# Patient Record
Sex: MALE | Race: BLACK OR AFRICAN AMERICAN | NOT HISPANIC OR LATINO
[De-identification: names, ages, dates, MRNs, and addresses within clinical notes are randomized per-mention and may not be internally consistent; named-entity substitution may affect disease eponyms.]

---

## 2017-01-11 ENCOUNTER — APPOINTMENT (OUTPATIENT)
Dept: CARDIOLOGY | Facility: CLINIC | Age: 57
End: 2017-01-11

## 2017-01-11 ENCOUNTER — NON-APPOINTMENT (OUTPATIENT)
Age: 57
End: 2017-01-11

## 2017-01-11 VITALS
HEIGHT: 67 IN | DIASTOLIC BLOOD PRESSURE: 88 MMHG | HEART RATE: 86 BPM | SYSTOLIC BLOOD PRESSURE: 143 MMHG | BODY MASS INDEX: 20.4 KG/M2 | WEIGHT: 130 LBS | OXYGEN SATURATION: 100 %

## 2017-01-20 ENCOUNTER — OUTPATIENT (OUTPATIENT)
Dept: OUTPATIENT SERVICES | Facility: HOSPITAL | Age: 57
LOS: 1 days | End: 2017-01-20

## 2017-01-20 ENCOUNTER — APPOINTMENT (OUTPATIENT)
Dept: CV DIAGNOSITCS | Facility: HOSPITAL | Age: 57
End: 2017-01-20

## 2017-01-20 DIAGNOSIS — Z99.2 DEPENDENCE ON RENAL DIALYSIS: Chronic | ICD-10-CM

## 2017-01-20 DIAGNOSIS — I10 ESSENTIAL (PRIMARY) HYPERTENSION: ICD-10-CM

## 2017-01-20 DIAGNOSIS — N18.6 END STAGE RENAL DISEASE: ICD-10-CM

## 2017-01-20 DIAGNOSIS — Z98.89 OTHER SPECIFIED POSTPROCEDURAL STATES: Chronic | ICD-10-CM

## 2017-01-26 ENCOUNTER — OTHER (OUTPATIENT)
Age: 57
End: 2017-01-26

## 2017-02-15 ENCOUNTER — OUTPATIENT (OUTPATIENT)
Dept: OUTPATIENT SERVICES | Facility: HOSPITAL | Age: 57
LOS: 1 days | Discharge: HOME | End: 2017-02-15
Payer: MEDICARE

## 2017-02-15 DIAGNOSIS — Z98.89 OTHER SPECIFIED POSTPROCEDURAL STATES: Chronic | ICD-10-CM

## 2017-02-15 DIAGNOSIS — N18.6 END STAGE RENAL DISEASE: ICD-10-CM

## 2017-02-15 DIAGNOSIS — Z99.2 DEPENDENCE ON RENAL DIALYSIS: Chronic | ICD-10-CM

## 2017-02-15 PROCEDURE — 71020: CPT | Mod: 26

## 2017-02-23 ENCOUNTER — EMERGENCY (EMERGENCY)
Facility: HOSPITAL | Age: 57
LOS: 1 days | Discharge: HOME | End: 2017-02-23
Attending: EMERGENCY MEDICINE | Admitting: EMERGENCY MEDICINE
Payer: MEDICARE

## 2017-02-23 VITALS
HEART RATE: 81 BPM | DIASTOLIC BLOOD PRESSURE: 80 MMHG | OXYGEN SATURATION: 98 % | SYSTOLIC BLOOD PRESSURE: 137 MMHG | RESPIRATION RATE: 18 BRPM | TEMPERATURE: 99 F | HEIGHT: 69 IN | WEIGHT: 130.07 LBS

## 2017-02-23 DIAGNOSIS — Z99.2 DEPENDENCE ON RENAL DIALYSIS: Chronic | ICD-10-CM

## 2017-02-23 DIAGNOSIS — Z98.89 OTHER SPECIFIED POSTPROCEDURAL STATES: Chronic | ICD-10-CM

## 2017-02-23 PROCEDURE — 99282 EMERGENCY DEPT VISIT SF MDM: CPT

## 2017-02-23 NOTE — ED ADULT NURSE NOTE - PMH
Acid reflux    Alcoholism /alcohol abuse    Bipolar 1 disorder    CKD (chronic kidney disease), stage 4 (severe)    Dialysis patient  Since 2005  Hypertension    Polycystic kidney disease

## 2017-02-23 NOTE — ED PROVIDER NOTE - MEDICAL DECISION MAKING DETAILS
55 yo male on transplant list with possible donor identified necessitating emergent pre-op blood draws.  Otherwise no complaint.  Blood draw coordinated with transplant team.

## 2017-02-23 NOTE — ED PROVIDER NOTE - OBJECTIVE STATEMENT
56 y//o gentleman hx of Polycystic kidney comes in at advice of transplant coordinator. the patient was advised his blood should be drawn. no complaints. feels well. HD t/th/saturday. had his hd earlier this morning

## 2017-02-23 NOTE — ED PROVIDER NOTE - PROGRESS NOTE DETAILS
spoke with Val who states that he does not need orders, draw the one tube of blood in the kit and call 6 du. called back again because the paperwork states "post transplant" advised that this is the correct paperwork and kit

## 2017-02-24 ENCOUNTER — RX RENEWAL (OUTPATIENT)
Age: 57
End: 2017-02-24

## 2017-02-24 ENCOUNTER — APPOINTMENT (OUTPATIENT)
Dept: TRANSPLANT | Facility: CLINIC | Age: 57
End: 2017-02-24

## 2017-02-24 ENCOUNTER — TRANSCRIPTION ENCOUNTER (OUTPATIENT)
Age: 57
End: 2017-02-24

## 2017-02-24 ENCOUNTER — INPATIENT (INPATIENT)
Facility: HOSPITAL | Age: 57
LOS: 3 days | Discharge: HOME | DRG: 652 | End: 2017-02-28
Payer: MEDICARE

## 2017-02-24 VITALS
OXYGEN SATURATION: 99 % | DIASTOLIC BLOOD PRESSURE: 83 MMHG | HEART RATE: 88 BPM | SYSTOLIC BLOOD PRESSURE: 134 MMHG | WEIGHT: 130.07 LBS | TEMPERATURE: 98 F | HEIGHT: 69 IN | RESPIRATION RATE: 18 BRPM

## 2017-02-24 DIAGNOSIS — Z98.89 OTHER SPECIFIED POSTPROCEDURAL STATES: Chronic | ICD-10-CM

## 2017-02-24 DIAGNOSIS — N17.9 ACUTE KIDNEY FAILURE, UNSPECIFIED: ICD-10-CM

## 2017-02-24 DIAGNOSIS — Z99.2 DEPENDENCE ON RENAL DIALYSIS: Chronic | ICD-10-CM

## 2017-02-24 LAB
ALBUMIN SERPL ELPH-MCNC: 3.6 G/DL — SIGNIFICANT CHANGE UP (ref 3.3–5)
ALP SERPL-CCNC: 62 U/L — SIGNIFICANT CHANGE UP (ref 40–120)
ALT FLD-CCNC: 11 U/L RC — SIGNIFICANT CHANGE UP (ref 10–45)
ANION GAP SERPL CALC-SCNC: 14 MMOL/L — SIGNIFICANT CHANGE UP (ref 5–17)
ANION GAP SERPL CALC-SCNC: 15 MMOL/L — SIGNIFICANT CHANGE UP (ref 5–17)
ANION GAP SERPL CALC-SCNC: 17 MMOL/L — SIGNIFICANT CHANGE UP (ref 5–17)
APTT BLD: 32.9 SEC — SIGNIFICANT CHANGE UP (ref 27.5–37.4)
APTT BLD: 34.5 SEC — SIGNIFICANT CHANGE UP (ref 27.5–37.4)
AST SERPL-CCNC: 18 U/L — SIGNIFICANT CHANGE UP (ref 10–40)
BILIRUB SERPL-MCNC: 0.3 MG/DL — SIGNIFICANT CHANGE UP (ref 0.2–1.2)
BLD GP AB SCN SERPL QL: NEGATIVE — SIGNIFICANT CHANGE UP
BUN SERPL-MCNC: 34 MG/DL — HIGH (ref 7–23)
BUN SERPL-MCNC: 35 MG/DL — HIGH (ref 7–23)
BUN SERPL-MCNC: 36 MG/DL — HIGH (ref 7–23)
CALCIUM SERPL-MCNC: 10.2 MG/DL — SIGNIFICANT CHANGE UP (ref 8.4–10.5)
CALCIUM SERPL-MCNC: 10.5 MG/DL — SIGNIFICANT CHANGE UP (ref 8.4–10.5)
CALCIUM SERPL-MCNC: 8.8 MG/DL — SIGNIFICANT CHANGE UP (ref 8.4–10.5)
CHLORIDE SERPL-SCNC: 104 MMOL/L — SIGNIFICANT CHANGE UP (ref 96–108)
CHLORIDE SERPL-SCNC: 96 MMOL/L — SIGNIFICANT CHANGE UP (ref 96–108)
CHLORIDE SERPL-SCNC: 96 MMOL/L — SIGNIFICANT CHANGE UP (ref 96–108)
CO2 SERPL-SCNC: 22 MMOL/L — SIGNIFICANT CHANGE UP (ref 22–31)
CO2 SERPL-SCNC: 28 MMOL/L — SIGNIFICANT CHANGE UP (ref 22–31)
CO2 SERPL-SCNC: 29 MMOL/L — SIGNIFICANT CHANGE UP (ref 22–31)
CREAT SERPL-MCNC: 7.55 MG/DL — HIGH (ref 0.5–1.3)
CREAT SERPL-MCNC: 7.83 MG/DL — HIGH (ref 0.5–1.3)
CREAT SERPL-MCNC: 8.07 MG/DL — HIGH (ref 0.5–1.3)
GLUCOSE SERPL-MCNC: 73 MG/DL — SIGNIFICANT CHANGE UP (ref 70–99)
GLUCOSE SERPL-MCNC: 75 MG/DL — SIGNIFICANT CHANGE UP (ref 70–99)
GLUCOSE SERPL-MCNC: 98 MG/DL — SIGNIFICANT CHANGE UP (ref 70–99)
HCT VFR BLD CALC: 34.9 % — LOW (ref 39–50)
HCT VFR BLD CALC: 35.8 % — LOW (ref 39–50)
HGB BLD-MCNC: 11.9 G/DL — LOW (ref 13–17)
HGB BLD-MCNC: 12.1 G/DL — LOW (ref 13–17)
INR BLD: 1.01 RATIO — SIGNIFICANT CHANGE UP (ref 0.88–1.16)
INR BLD: 1.01 RATIO — SIGNIFICANT CHANGE UP (ref 0.88–1.16)
MAGNESIUM SERPL-MCNC: 1.8 MG/DL — SIGNIFICANT CHANGE UP (ref 1.6–2.6)
MCHC RBC-ENTMCNC: 33.4 PG — SIGNIFICANT CHANGE UP (ref 27–34)
MCHC RBC-ENTMCNC: 33.7 PG — SIGNIFICANT CHANGE UP (ref 27–34)
MCHC RBC-ENTMCNC: 33.8 GM/DL — SIGNIFICANT CHANGE UP (ref 32–36)
MCHC RBC-ENTMCNC: 34 GM/DL — SIGNIFICANT CHANGE UP (ref 32–36)
MCV RBC AUTO: 99 FL — SIGNIFICANT CHANGE UP (ref 80–100)
MCV RBC AUTO: 99.1 FL — SIGNIFICANT CHANGE UP (ref 80–100)
PHOSPHATE SERPL-MCNC: 2.1 MG/DL — LOW (ref 2.5–4.5)
PLATELET # BLD AUTO: 125 K/UL — LOW (ref 150–400)
PLATELET # BLD AUTO: 126 K/UL — LOW (ref 150–400)
POTASSIUM SERPL-MCNC: 5.2 MMOL/L — SIGNIFICANT CHANGE UP (ref 3.5–5.3)
POTASSIUM SERPL-MCNC: 5.5 MMOL/L — HIGH (ref 3.5–5.3)
POTASSIUM SERPL-MCNC: 5.9 MMOL/L — HIGH (ref 3.5–5.3)
POTASSIUM SERPL-SCNC: 5.2 MMOL/L — SIGNIFICANT CHANGE UP (ref 3.5–5.3)
POTASSIUM SERPL-SCNC: 5.5 MMOL/L — HIGH (ref 3.5–5.3)
POTASSIUM SERPL-SCNC: 5.9 MMOL/L — HIGH (ref 3.5–5.3)
PROT SERPL-MCNC: 6.4 G/DL — SIGNIFICANT CHANGE UP (ref 6–8.3)
PROTHROM AB SERPL-ACNC: 11 SEC — SIGNIFICANT CHANGE UP (ref 10–13.1)
PROTHROM AB SERPL-ACNC: 11 SEC — SIGNIFICANT CHANGE UP (ref 10–13.1)
RBC # BLD: 3.52 M/UL — LOW (ref 4.2–5.8)
RBC # BLD: 3.62 M/UL — LOW (ref 4.2–5.8)
RBC # FLD: 14.2 % — SIGNIFICANT CHANGE UP (ref 10.3–14.5)
RBC # FLD: 14.3 % — SIGNIFICANT CHANGE UP (ref 10.3–14.5)
RH IG SCN BLD-IMP: POSITIVE — SIGNIFICANT CHANGE UP
SODIUM SERPL-SCNC: 138 MMOL/L — SIGNIFICANT CHANGE UP (ref 135–145)
SODIUM SERPL-SCNC: 140 MMOL/L — SIGNIFICANT CHANGE UP (ref 135–145)
SODIUM SERPL-SCNC: 143 MMOL/L — SIGNIFICANT CHANGE UP (ref 135–145)
WBC # BLD: 4.4 K/UL — SIGNIFICANT CHANGE UP (ref 3.8–10.5)
WBC # BLD: 8.9 K/UL — SIGNIFICANT CHANGE UP (ref 3.8–10.5)
WBC # FLD AUTO: 4.4 K/UL — SIGNIFICANT CHANGE UP (ref 3.8–10.5)
WBC # FLD AUTO: 8.9 K/UL — SIGNIFICANT CHANGE UP (ref 3.8–10.5)

## 2017-02-24 PROCEDURE — 93010 ELECTROCARDIOGRAM REPORT: CPT

## 2017-02-24 PROCEDURE — 71010: CPT | Mod: 26

## 2017-02-24 PROCEDURE — 76776 US EXAM K TRANSPL W/DOPPLER: CPT | Mod: 26,RT

## 2017-02-24 PROCEDURE — 50360 RNL ALTRNSPLJ W/O RCP NFRCT: CPT

## 2017-02-24 RX ORDER — NALOXONE HYDROCHLORIDE 4 MG/.1ML
0.1 SPRAY NASAL
Qty: 0 | Refills: 0 | Status: DISCONTINUED | OUTPATIENT
Start: 2017-02-24 | End: 2017-02-28

## 2017-02-24 RX ORDER — SODIUM CHLORIDE 9 MG/ML
1000 INJECTION INTRAMUSCULAR; INTRAVENOUS; SUBCUTANEOUS
Qty: 0 | Refills: 0 | Status: DISCONTINUED | OUTPATIENT
Start: 2017-02-24 | End: 2017-02-25

## 2017-02-24 RX ORDER — HYDROMORPHONE HYDROCHLORIDE 2 MG/ML
0.5 INJECTION INTRAMUSCULAR; INTRAVENOUS; SUBCUTANEOUS
Qty: 0 | Refills: 0 | Status: DISCONTINUED | OUTPATIENT
Start: 2017-02-24 | End: 2017-02-25

## 2017-02-24 RX ORDER — AMLODIPINE BESYLATE 2.5 MG/1
10 TABLET ORAL DAILY
Qty: 0 | Refills: 0 | Status: DISCONTINUED | OUTPATIENT
Start: 2017-02-24 | End: 2017-02-24

## 2017-02-24 RX ORDER — ONDANSETRON 8 MG/1
4 TABLET, FILM COATED ORAL ONCE
Qty: 0 | Refills: 0 | Status: DISCONTINUED | OUTPATIENT
Start: 2017-02-24 | End: 2017-02-25

## 2017-02-24 RX ORDER — VALGANCICLOVIR 450 MG/1
450 TABLET, FILM COATED ORAL DAILY
Qty: 0 | Refills: 0 | Status: DISCONTINUED | OUTPATIENT
Start: 2017-02-24 | End: 2017-02-24

## 2017-02-24 RX ORDER — BASILIXIMAB 20 MG/5ML
20 INJECTION, POWDER, FOR SOLUTION INTRAVENOUS ONCE
Qty: 0 | Refills: 0 | Status: COMPLETED | OUTPATIENT
Start: 2017-02-28 | End: 2017-02-28

## 2017-02-24 RX ORDER — TACROLIMUS 5 MG/1
3 CAPSULE ORAL EVERY 12 HOURS
Qty: 0 | Refills: 0 | Status: DISCONTINUED | OUTPATIENT
Start: 2017-02-25 | End: 2017-02-28

## 2017-02-24 RX ORDER — BASILIXIMAB 20 MG/5ML
20 INJECTION, POWDER, FOR SOLUTION INTRAVENOUS ONCE
Qty: 0 | Refills: 0 | Status: DISCONTINUED | OUTPATIENT
Start: 2017-02-24 | End: 2017-02-24

## 2017-02-24 RX ORDER — HYDROMORPHONE HYDROCHLORIDE 2 MG/ML
30 INJECTION INTRAMUSCULAR; INTRAVENOUS; SUBCUTANEOUS
Qty: 0 | Refills: 0 | Status: DISCONTINUED | OUTPATIENT
Start: 2017-02-24 | End: 2017-02-25

## 2017-02-24 RX ORDER — MYCOPHENOLATE MOFETIL 250 MG/1
1 CAPSULE ORAL EVERY 12 HOURS
Qty: 0 | Refills: 0 | Status: DISCONTINUED | OUTPATIENT
Start: 2017-02-25 | End: 2017-02-28

## 2017-02-24 RX ORDER — SODIUM CHLORIDE 9 MG/ML
500 INJECTION INTRAMUSCULAR; INTRAVENOUS; SUBCUTANEOUS ONCE
Qty: 0 | Refills: 0 | Status: COMPLETED | OUTPATIENT
Start: 2017-02-24 | End: 2017-02-24

## 2017-02-24 RX ORDER — NYSTATIN 500MM UNIT
500000 POWDER (EA) MISCELLANEOUS
Qty: 0 | Refills: 0 | Status: DISCONTINUED | OUTPATIENT
Start: 2017-02-24 | End: 2017-02-24

## 2017-02-24 RX ORDER — MYCOPHENOLATE MOFETIL 250 MG/1
1000 CAPSULE ORAL
Qty: 0 | Refills: 0 | Status: DISCONTINUED | OUTPATIENT
Start: 2017-02-24 | End: 2017-02-24

## 2017-02-24 RX ORDER — FAMOTIDINE 10 MG/ML
20 INJECTION INTRAVENOUS DAILY
Qty: 0 | Refills: 0 | Status: DISCONTINUED | OUTPATIENT
Start: 2017-02-24 | End: 2017-02-24

## 2017-02-24 RX ORDER — HEPARIN SODIUM 5000 [USP'U]/ML
5000 INJECTION INTRAVENOUS; SUBCUTANEOUS EVERY 8 HOURS
Qty: 0 | Refills: 0 | Status: DISCONTINUED | OUTPATIENT
Start: 2017-02-24 | End: 2017-02-24

## 2017-02-24 RX ORDER — VANCOMYCIN HCL 1 G
1000 VIAL (EA) INTRAVENOUS ONCE
Qty: 0 | Refills: 0 | Status: DISCONTINUED | OUTPATIENT
Start: 2017-02-24 | End: 2017-02-24

## 2017-02-24 RX ORDER — ASPIRIN/CALCIUM CARB/MAGNESIUM 324 MG
81 TABLET ORAL DAILY
Qty: 0 | Refills: 0 | Status: DISCONTINUED | OUTPATIENT
Start: 2017-02-24 | End: 2017-02-24

## 2017-02-24 RX ORDER — NYSTATIN 500MM UNIT
500000 POWDER (EA) MISCELLANEOUS
Qty: 0 | Refills: 0 | Status: DISCONTINUED | OUTPATIENT
Start: 2017-02-25 | End: 2017-02-28

## 2017-02-24 RX ORDER — TACROLIMUS 5 MG/1
3 CAPSULE ORAL EVERY 12 HOURS
Qty: 0 | Refills: 0 | Status: DISCONTINUED | OUTPATIENT
Start: 2017-02-24 | End: 2017-02-24

## 2017-02-24 RX ORDER — FAMOTIDINE 10 MG/ML
20 INJECTION INTRAVENOUS DAILY
Qty: 0 | Refills: 0 | Status: DISCONTINUED | OUTPATIENT
Start: 2017-02-25 | End: 2017-02-28

## 2017-02-24 RX ORDER — CEFAZOLIN SODIUM 1 G
1000 VIAL (EA) INJECTION ONCE
Qty: 0 | Refills: 0 | Status: DISCONTINUED | OUTPATIENT
Start: 2017-02-24 | End: 2017-02-24

## 2017-02-24 RX ORDER — ONDANSETRON 8 MG/1
4 TABLET, FILM COATED ORAL EVERY 6 HOURS
Qty: 0 | Refills: 0 | Status: DISCONTINUED | OUTPATIENT
Start: 2017-02-24 | End: 2017-02-28

## 2017-02-24 RX ORDER — VALGANCICLOVIR 450 MG/1
450 TABLET, FILM COATED ORAL DAILY
Qty: 0 | Refills: 0 | Status: DISCONTINUED | OUTPATIENT
Start: 2017-02-25 | End: 2017-02-25

## 2017-02-24 RX ADMIN — SODIUM CHLORIDE 1000 MILLILITER(S): 9 INJECTION INTRAMUSCULAR; INTRAVENOUS; SUBCUTANEOUS at 23:45

## 2017-02-24 RX ADMIN — HYDROMORPHONE HYDROCHLORIDE 30 MILLILITER(S): 2 INJECTION INTRAMUSCULAR; INTRAVENOUS; SUBCUTANEOUS at 21:51

## 2017-02-24 RX ADMIN — MYCOPHENOLATE MOFETIL 1000 MILLIGRAM(S): 250 CAPSULE ORAL at 15:22

## 2017-02-24 RX ADMIN — SODIUM CHLORIDE 70 MILLILITER(S): 9 INJECTION INTRAMUSCULAR; INTRAVENOUS; SUBCUTANEOUS at 21:44

## 2017-02-24 RX ADMIN — SODIUM CHLORIDE 10 MILLILITER(S): 9 INJECTION INTRAMUSCULAR; INTRAVENOUS; SUBCUTANEOUS at 21:45

## 2017-02-24 RX ADMIN — Medication 81 MILLIGRAM(S): at 15:20

## 2017-02-24 RX ADMIN — HEPARIN SODIUM 5000 UNIT(S): 5000 INJECTION INTRAVENOUS; SUBCUTANEOUS at 16:19

## 2017-02-24 RX ADMIN — FAMOTIDINE 20 MILLIGRAM(S): 10 INJECTION INTRAVENOUS at 16:18

## 2017-02-24 RX ADMIN — Medication 100 MILLIGRAM(S): at 15:20

## 2017-02-24 RX ADMIN — TACROLIMUS 3 MILLIGRAM(S): 5 CAPSULE ORAL at 15:23

## 2017-02-24 RX ADMIN — SODIUM CHLORIDE 1000 MILLILITER(S): 9 INJECTION INTRAMUSCULAR; INTRAVENOUS; SUBCUTANEOUS at 21:15

## 2017-02-24 NOTE — H&P ADULT. - ATTENDING COMMENTS
I personally saw and examined patient and discussed case with Pao Herrera, Bree, Anna, and Nate.  Patient presents to undergo a DCD kidney transplantation.  I had previously spoken with patient on 02- at around 20:24 PM to discuss this donor, inclusive of DCD status.  At the time I reviewed DCD donation with patient, who understood and agreed to proceed.  Transplant coordinator also discussed DCD donor with patient, who understood and agreed to proceed independently from my own conversation with him..  On physical exam:  Alert, oriented, responsive, appropriate.  Neck: No carotid bruits  Lungs: clear to auscultation  Heart: regular rate and rhythm  Abdomen: Soft.  No guarding or rebound  Lower extremities: Palpable femoral and pedal (DP) pulses.  Labs pending.  I personally obtained informed consent from patient.  In order to do so, I discussed the donor at length and answered all of his questions.  Patient understands that:  - this is a DCD donor,   - the donor is a  woman in her late teenage years.  - there is a high likelihood (could be as high as 50%) that the kidney may not work immediately, requiring dialysis for a period of time ranging from days to months, and impossible to predict prior to the surgery.  - there is a possibility that the kidney may not work at all, and if this is the case he would have sustained a major surgery and his chances for a second transplant would be negatively affected by sensitization  - the kidney had low flow by pump numbers, probably associated with a small diameter cannula based on the small size of the artery.  - the outcome of the transplant has the potential of being not as good as the outcome of a non-DCD donor  - he may need to be admitted to the intensive care unit postoperatively  He understands and agrees to proceed.  I personally marked the surgical site.

## 2017-02-24 NOTE — BRIEF OPERATIVE NOTE - OPERATION/FINDINGS
Donor information:  This was a DCD organ  Donor UNOS [id number]  The right kidney was transplanted  The right renal vein was not reconstructed  Cold ischemia time: 18 hours 04 minutes  Warm ischemia time: 20 minutes  The kidney had a single artery, a single vein, and a single ureter  Weight of the kidney: 137 grams  During DCD procurement, ischemic time of 15 minutes  Both donor and recipient were blood type B  The kidney did not make any urine intra-operatively

## 2017-02-24 NOTE — BRIEF OPERATIVE NOTE - PROCEDURE
Kidney transplant  2017  1- DCD  donor right kidney transplant to right external iliac vessels  2- Preparation of kidney in back table  Active  EMOLMENT

## 2017-02-24 NOTE — H&P ADULT. - ASSESSMENT
57 y/o male with history as listed presenting for  donor renal transplant.    From a medical stand point would consider pt moderate risk for general anesthesia. 57 y/o male with history as listed presenting for  donor renal transplant.    From a medical stand point would consider pt moderate risk for general anesthesia.  According to  for Psych, they will not see patient, stated they discussed it with Dr. Sullivan.

## 2017-02-25 LAB
ALBUMIN SERPL ELPH-MCNC: 3.3 G/DL — SIGNIFICANT CHANGE UP (ref 3.3–5)
ALBUMIN SERPL ELPH-MCNC: 3.6 G/DL — SIGNIFICANT CHANGE UP (ref 3.3–5)
ALBUMIN SERPL ELPH-MCNC: 4 G/DL — SIGNIFICANT CHANGE UP (ref 3.3–5)
ALBUMIN SERPL ELPH-MCNC: 4.1 G/DL — SIGNIFICANT CHANGE UP (ref 3.3–5)
ALP SERPL-CCNC: 53 U/L — SIGNIFICANT CHANGE UP (ref 40–120)
ALP SERPL-CCNC: 64 U/L — SIGNIFICANT CHANGE UP (ref 40–120)
ALP SERPL-CCNC: 75 U/L — SIGNIFICANT CHANGE UP (ref 40–120)
ALT FLD-CCNC: 10 U/L RC — SIGNIFICANT CHANGE UP (ref 10–45)
ALT FLD-CCNC: 11 U/L RC — SIGNIFICANT CHANGE UP (ref 10–45)
ALT FLD-CCNC: 9 U/L RC — LOW (ref 10–45)
ANION GAP SERPL CALC-SCNC: 17 MMOL/L — SIGNIFICANT CHANGE UP (ref 5–17)
ANION GAP SERPL CALC-SCNC: 17 MMOL/L — SIGNIFICANT CHANGE UP (ref 5–17)
ANION GAP SERPL CALC-SCNC: 18 MMOL/L — HIGH (ref 5–17)
ANION GAP SERPL CALC-SCNC: 18 MMOL/L — HIGH (ref 5–17)
APPEARANCE UR: ABNORMAL
AST SERPL-CCNC: 15 U/L — SIGNIFICANT CHANGE UP (ref 10–40)
AST SERPL-CCNC: 17 U/L — SIGNIFICANT CHANGE UP (ref 10–40)
AST SERPL-CCNC: 21 U/L — SIGNIFICANT CHANGE UP (ref 10–40)
BASE EXCESS BLDV CALC-SCNC: 1.4 MMOL/L — SIGNIFICANT CHANGE UP (ref -2–2)
BASOPHILS # BLD AUTO: 0 K/UL — SIGNIFICANT CHANGE UP (ref 0–0.2)
BASOPHILS NFR BLD AUTO: 0 % — SIGNIFICANT CHANGE UP (ref 0–2)
BILIRUB SERPL-MCNC: 0.2 MG/DL — SIGNIFICANT CHANGE UP (ref 0.2–1.2)
BILIRUB SERPL-MCNC: 0.3 MG/DL — SIGNIFICANT CHANGE UP (ref 0.2–1.2)
BILIRUB SERPL-MCNC: 0.3 MG/DL — SIGNIFICANT CHANGE UP (ref 0.2–1.2)
BILIRUB UR-MCNC: NEGATIVE — SIGNIFICANT CHANGE UP
BUN SERPL-MCNC: 41 MG/DL — HIGH (ref 7–23)
BUN SERPL-MCNC: 43 MG/DL — HIGH (ref 7–23)
BUN SERPL-MCNC: 45 MG/DL — HIGH (ref 7–23)
BUN SERPL-MCNC: 48 MG/DL — HIGH (ref 7–23)
CA-I SERPL-SCNC: 1.15 MMOL/L — SIGNIFICANT CHANGE UP (ref 1.12–1.3)
CALCIUM SERPL-MCNC: 8 MG/DL — LOW (ref 8.4–10.5)
CALCIUM SERPL-MCNC: 8.2 MG/DL — LOW (ref 8.4–10.5)
CALCIUM SERPL-MCNC: 8.8 MG/DL — SIGNIFICANT CHANGE UP (ref 8.4–10.5)
CALCIUM SERPL-MCNC: 8.8 MG/DL — SIGNIFICANT CHANGE UP (ref 8.4–10.5)
CHLORIDE BLDV-SCNC: 101 MMOL/L — SIGNIFICANT CHANGE UP (ref 96–108)
CHLORIDE SERPL-SCNC: 101 MMOL/L — SIGNIFICANT CHANGE UP (ref 96–108)
CHLORIDE SERPL-SCNC: 103 MMOL/L — SIGNIFICANT CHANGE UP (ref 96–108)
CHLORIDE SERPL-SCNC: 104 MMOL/L — SIGNIFICANT CHANGE UP (ref 96–108)
CHLORIDE SERPL-SCNC: 99 MMOL/L — SIGNIFICANT CHANGE UP (ref 96–108)
CO2 BLDV-SCNC: 28 MMOL/L — SIGNIFICANT CHANGE UP (ref 22–30)
CO2 SERPL-SCNC: 18 MMOL/L — LOW (ref 22–31)
CO2 SERPL-SCNC: 19 MMOL/L — LOW (ref 22–31)
CO2 SERPL-SCNC: 20 MMOL/L — LOW (ref 22–31)
CO2 SERPL-SCNC: 23 MMOL/L — SIGNIFICANT CHANGE UP (ref 22–31)
COLOR SPEC: YELLOW — SIGNIFICANT CHANGE UP
COMMENT - URINE: SIGNIFICANT CHANGE UP
CREAT SERPL-MCNC: 7.02 MG/DL — HIGH (ref 0.5–1.3)
CREAT SERPL-MCNC: 7.55 MG/DL — HIGH (ref 0.5–1.3)
CREAT SERPL-MCNC: 7.71 MG/DL — HIGH (ref 0.5–1.3)
CREAT SERPL-MCNC: 7.73 MG/DL — HIGH (ref 0.5–1.3)
DIFF PNL FLD: ABNORMAL
EOSINOPHIL # BLD AUTO: 0 K/UL — SIGNIFICANT CHANGE UP (ref 0–0.5)
EOSINOPHIL NFR BLD AUTO: 0.1 % — SIGNIFICANT CHANGE UP (ref 0–6)
GAS PNL BLDV: 136 MMOL/L — SIGNIFICANT CHANGE UP (ref 136–145)
GAS PNL BLDV: SIGNIFICANT CHANGE UP
GLUCOSE BLDV-MCNC: 128 MG/DL — HIGH (ref 70–99)
GLUCOSE SERPL-MCNC: 122 MG/DL — HIGH (ref 70–99)
GLUCOSE SERPL-MCNC: 133 MG/DL — HIGH (ref 70–99)
GLUCOSE SERPL-MCNC: 139 MG/DL — HIGH (ref 70–99)
GLUCOSE SERPL-MCNC: 149 MG/DL — HIGH (ref 70–99)
GLUCOSE UR QL: NEGATIVE — SIGNIFICANT CHANGE UP
HCO3 BLDV-SCNC: 26 MMOL/L — SIGNIFICANT CHANGE UP (ref 21–29)
HCT VFR BLD CALC: 34 % — LOW (ref 39–50)
HCT VFR BLD CALC: 34.3 % — LOW (ref 39–50)
HCT VFR BLDA CALC: 35 % — LOW (ref 39–50)
HGB BLD CALC-MCNC: 11.3 G/DL — LOW (ref 13–17)
HGB BLD-MCNC: 11.5 G/DL — LOW (ref 13–17)
HGB BLD-MCNC: 11.6 G/DL — LOW (ref 13–17)
KETONES UR-MCNC: NEGATIVE — SIGNIFICANT CHANGE UP
LACTATE BLDV-MCNC: 1.2 MMOL/L — SIGNIFICANT CHANGE UP (ref 0.7–2)
LDH SERPL L TO P-CCNC: 185 U/L — SIGNIFICANT CHANGE UP (ref 50–242)
LEUKOCYTE ESTERASE UR-ACNC: ABNORMAL
LYMPHOCYTES # BLD AUTO: 0.6 K/UL — LOW (ref 1–3.3)
LYMPHOCYTES # BLD AUTO: 5 % — LOW (ref 13–44)
MAGNESIUM SERPL-MCNC: 1.8 MG/DL — SIGNIFICANT CHANGE UP (ref 1.6–2.6)
MCHC RBC-ENTMCNC: 33.6 PG — SIGNIFICANT CHANGE UP (ref 27–34)
MCHC RBC-ENTMCNC: 33.6 PG — SIGNIFICANT CHANGE UP (ref 27–34)
MCHC RBC-ENTMCNC: 33.8 GM/DL — SIGNIFICANT CHANGE UP (ref 32–36)
MCHC RBC-ENTMCNC: 33.8 GM/DL — SIGNIFICANT CHANGE UP (ref 32–36)
MCV RBC AUTO: 99.2 FL — SIGNIFICANT CHANGE UP (ref 80–100)
MCV RBC AUTO: 99.5 FL — SIGNIFICANT CHANGE UP (ref 80–100)
MONOCYTES # BLD AUTO: 0.6 K/UL — SIGNIFICANT CHANGE UP (ref 0–0.9)
MONOCYTES NFR BLD AUTO: 5.8 % — SIGNIFICANT CHANGE UP (ref 2–14)
NEUTROPHILS # BLD AUTO: 9.9 K/UL — HIGH (ref 1.8–7.4)
NEUTROPHILS NFR BLD AUTO: 89.1 % — HIGH (ref 43–77)
NITRITE UR-MCNC: NEGATIVE — SIGNIFICANT CHANGE UP
OTHER CELLS CSF MANUAL: 9 ML/DL — LOW (ref 18–22)
PCO2 BLDV: 45 MMHG — SIGNIFICANT CHANGE UP (ref 35–50)
PH BLDV: 7.38 — SIGNIFICANT CHANGE UP (ref 7.35–7.45)
PH UR: 7 — SIGNIFICANT CHANGE UP (ref 4.8–8)
PHOSPHATE SERPL-MCNC: 2.5 MG/DL — SIGNIFICANT CHANGE UP (ref 2.5–4.5)
PHOSPHATE SERPL-MCNC: 3.2 MG/DL — SIGNIFICANT CHANGE UP (ref 2.5–4.5)
PLATELET # BLD AUTO: 117 K/UL — LOW (ref 150–400)
PLATELET # BLD AUTO: 121 K/UL — LOW (ref 150–400)
PO2 BLDV: 32 MMHG — SIGNIFICANT CHANGE UP (ref 25–45)
POTASSIUM BLDV-SCNC: 4.8 MMOL/L — SIGNIFICANT CHANGE UP (ref 3.5–5)
POTASSIUM SERPL-MCNC: 4.8 MMOL/L — SIGNIFICANT CHANGE UP (ref 3.5–5.3)
POTASSIUM SERPL-MCNC: 5.3 MMOL/L — SIGNIFICANT CHANGE UP (ref 3.5–5.3)
POTASSIUM SERPL-MCNC: 5.4 MMOL/L — HIGH (ref 3.5–5.3)
POTASSIUM SERPL-MCNC: 6.3 MMOL/L — CRITICAL HIGH (ref 3.5–5.3)
POTASSIUM SERPL-SCNC: 4.8 MMOL/L — SIGNIFICANT CHANGE UP (ref 3.5–5.3)
POTASSIUM SERPL-SCNC: 5.3 MMOL/L — SIGNIFICANT CHANGE UP (ref 3.5–5.3)
POTASSIUM SERPL-SCNC: 5.4 MMOL/L — HIGH (ref 3.5–5.3)
POTASSIUM SERPL-SCNC: 6.3 MMOL/L — CRITICAL HIGH (ref 3.5–5.3)
PROT SERPL-MCNC: 5.8 G/DL — LOW (ref 6–8.3)
PROT SERPL-MCNC: 6.1 G/DL — SIGNIFICANT CHANGE UP (ref 6–8.3)
PROT SERPL-MCNC: 7.1 G/DL — SIGNIFICANT CHANGE UP (ref 6–8.3)
PROT UR-MCNC: 500 MG/DL
RBC # BLD: 3.42 M/UL — LOW (ref 4.2–5.8)
RBC # BLD: 3.46 M/UL — LOW (ref 4.2–5.8)
RBC # FLD: 14.3 % — SIGNIFICANT CHANGE UP (ref 10.3–14.5)
RBC # FLD: 14.3 % — SIGNIFICANT CHANGE UP (ref 10.3–14.5)
RBC CASTS # UR COMP ASSIST: >50 /HPF (ref 0–2)
SAO2 % BLDV: 57 % — LOW (ref 67–88)
SODIUM SERPL-SCNC: 138 MMOL/L — SIGNIFICANT CHANGE UP (ref 135–145)
SODIUM SERPL-SCNC: 138 MMOL/L — SIGNIFICANT CHANGE UP (ref 135–145)
SODIUM SERPL-SCNC: 139 MMOL/L — SIGNIFICANT CHANGE UP (ref 135–145)
SODIUM SERPL-SCNC: 142 MMOL/L — SIGNIFICANT CHANGE UP (ref 135–145)
SP GR SPEC: 1.01 — SIGNIFICANT CHANGE UP (ref 1.01–1.02)
TACROLIMUS SERPL-MCNC: 7.5 NG/ML — SIGNIFICANT CHANGE UP
UROBILINOGEN FLD QL: NEGATIVE — SIGNIFICANT CHANGE UP
WBC # BLD: 11.1 K/UL — HIGH (ref 3.8–10.5)
WBC # BLD: 9.2 K/UL — SIGNIFICANT CHANGE UP (ref 3.8–10.5)
WBC # FLD AUTO: 11.1 K/UL — HIGH (ref 3.8–10.5)
WBC # FLD AUTO: 9.2 K/UL — SIGNIFICANT CHANGE UP (ref 3.8–10.5)
WBC UR QL: >50 /HPF (ref 0–5)

## 2017-02-25 PROCEDURE — 99233 SBSQ HOSP IP/OBS HIGH 50: CPT

## 2017-02-25 PROCEDURE — 93010 ELECTROCARDIOGRAM REPORT: CPT

## 2017-02-25 RX ORDER — INSULIN HUMAN 100 [IU]/ML
10 INJECTION, SOLUTION SUBCUTANEOUS ONCE
Qty: 0 | Refills: 0 | Status: DISCONTINUED | OUTPATIENT
Start: 2017-02-25 | End: 2017-02-25

## 2017-02-25 RX ORDER — DEXTROSE 50 % IN WATER 50 %
50 SYRINGE (ML) INTRAVENOUS ONCE
Qty: 0 | Refills: 0 | Status: COMPLETED | OUTPATIENT
Start: 2017-02-25 | End: 2017-02-25

## 2017-02-25 RX ORDER — FUROSEMIDE 40 MG
100 TABLET ORAL ONCE
Qty: 0 | Refills: 0 | Status: COMPLETED | OUTPATIENT
Start: 2017-02-25 | End: 2017-02-25

## 2017-02-25 RX ORDER — ACETAMINOPHEN 500 MG
650 TABLET ORAL EVERY 6 HOURS
Qty: 0 | Refills: 0 | Status: DISCONTINUED | OUTPATIENT
Start: 2017-02-25 | End: 2017-02-28

## 2017-02-25 RX ORDER — SODIUM BICARBONATE 1 MEQ/ML
50 SYRINGE (ML) INTRAVENOUS ONCE
Qty: 0 | Refills: 0 | Status: COMPLETED | OUTPATIENT
Start: 2017-02-25 | End: 2017-02-25

## 2017-02-25 RX ORDER — VALGANCICLOVIR 450 MG/1
450 TABLET, FILM COATED ORAL
Qty: 0 | Refills: 0 | Status: DISCONTINUED | OUTPATIENT
Start: 2017-02-25 | End: 2017-02-28

## 2017-02-25 RX ORDER — HEPARIN SODIUM 5000 [USP'U]/ML
5000 INJECTION INTRAVENOUS; SUBCUTANEOUS EVERY 8 HOURS
Qty: 0 | Refills: 0 | Status: DISCONTINUED | OUTPATIENT
Start: 2017-02-25 | End: 2017-02-26

## 2017-02-25 RX ORDER — INSULIN HUMAN 100 [IU]/ML
10 INJECTION, SOLUTION SUBCUTANEOUS ONCE
Qty: 0 | Refills: 0 | Status: COMPLETED | OUTPATIENT
Start: 2017-02-25 | End: 2017-02-25

## 2017-02-25 RX ADMIN — Medication 1 TABLET(S): at 12:56

## 2017-02-25 RX ADMIN — Medication 500000 UNIT(S): at 00:28

## 2017-02-25 RX ADMIN — TACROLIMUS 3 MILLIGRAM(S): 5 CAPSULE ORAL at 18:08

## 2017-02-25 RX ADMIN — Medication 500000 UNIT(S): at 06:11

## 2017-02-25 RX ADMIN — MYCOPHENOLATE MOFETIL 1 GRAM(S): 250 CAPSULE ORAL at 06:11

## 2017-02-25 RX ADMIN — TACROLIMUS 3 MILLIGRAM(S): 5 CAPSULE ORAL at 06:11

## 2017-02-25 RX ADMIN — HEPARIN SODIUM 5000 UNIT(S): 5000 INJECTION INTRAVENOUS; SUBCUTANEOUS at 23:55

## 2017-02-25 RX ADMIN — MYCOPHENOLATE MOFETIL 1 GRAM(S): 250 CAPSULE ORAL at 18:08

## 2017-02-25 RX ADMIN — Medication 50 MILLILITER(S): at 04:47

## 2017-02-25 RX ADMIN — Medication 50 MILLIGRAM(S): at 12:55

## 2017-02-25 RX ADMIN — Medication 120 MILLIGRAM(S): at 05:05

## 2017-02-25 RX ADMIN — FAMOTIDINE 20 MILLIGRAM(S): 10 INJECTION INTRAVENOUS at 12:55

## 2017-02-25 RX ADMIN — HEPARIN SODIUM 5000 UNIT(S): 5000 INJECTION INTRAVENOUS; SUBCUTANEOUS at 12:56

## 2017-02-25 RX ADMIN — Medication 50 MILLIEQUIVALENT(S): at 04:33

## 2017-02-25 RX ADMIN — Medication 500000 UNIT(S): at 23:54

## 2017-02-25 RX ADMIN — Medication 500000 UNIT(S): at 12:56

## 2017-02-25 RX ADMIN — VALGANCICLOVIR 450 MILLIGRAM(S): 450 TABLET, FILM COATED ORAL at 10:26

## 2017-02-25 RX ADMIN — Medication 500000 UNIT(S): at 18:07

## 2017-02-25 RX ADMIN — INSULIN HUMAN 10 UNIT(S): 100 INJECTION, SOLUTION SUBCUTANEOUS at 04:43

## 2017-02-26 LAB
ALBUMIN SERPL ELPH-MCNC: 3.6 G/DL — SIGNIFICANT CHANGE UP (ref 3.3–5)
ALBUMIN SERPL ELPH-MCNC: 3.6 G/DL — SIGNIFICANT CHANGE UP (ref 3.3–5)
ALP SERPL-CCNC: 53 U/L — SIGNIFICANT CHANGE UP (ref 40–120)
ALT FLD-CCNC: 11 U/L RC — SIGNIFICANT CHANGE UP (ref 10–45)
ANION GAP SERPL CALC-SCNC: 14 MMOL/L — SIGNIFICANT CHANGE UP (ref 5–17)
ANION GAP SERPL CALC-SCNC: 14 MMOL/L — SIGNIFICANT CHANGE UP (ref 5–17)
APPEARANCE UR: ABNORMAL
AST SERPL-CCNC: 19 U/L — SIGNIFICANT CHANGE UP (ref 10–40)
BACTERIA # UR AUTO: NEGATIVE — SIGNIFICANT CHANGE UP
BASOPHILS # BLD AUTO: 0 K/UL — SIGNIFICANT CHANGE UP (ref 0–0.2)
BASOPHILS NFR BLD AUTO: 0.2 % — SIGNIFICANT CHANGE UP (ref 0–2)
BILIRUB SERPL-MCNC: 0.2 MG/DL — SIGNIFICANT CHANGE UP (ref 0.2–1.2)
BILIRUB UR-MCNC: NEGATIVE — SIGNIFICANT CHANGE UP
BUN SERPL-MCNC: 55 MG/DL — HIGH (ref 7–23)
BUN SERPL-MCNC: 55 MG/DL — HIGH (ref 7–23)
CALCIUM SERPL-MCNC: 8 MG/DL — LOW (ref 8.4–10.5)
CALCIUM SERPL-MCNC: 8 MG/DL — LOW (ref 8.4–10.5)
CHLORIDE SERPL-SCNC: 96 MMOL/L — SIGNIFICANT CHANGE UP (ref 96–108)
CHLORIDE SERPL-SCNC: 96 MMOL/L — SIGNIFICANT CHANGE UP (ref 96–108)
CO2 SERPL-SCNC: 24 MMOL/L — SIGNIFICANT CHANGE UP (ref 22–31)
CO2 SERPL-SCNC: 24 MMOL/L — SIGNIFICANT CHANGE UP (ref 22–31)
COLOR SPEC: ABNORMAL
CREAT SERPL-MCNC: 6.7 MG/DL — HIGH (ref 0.5–1.3)
CREAT SERPL-MCNC: 6.7 MG/DL — HIGH (ref 0.5–1.3)
DIFF PNL FLD: ABNORMAL
EOSINOPHIL # BLD AUTO: 0 K/UL — SIGNIFICANT CHANGE UP (ref 0–0.5)
EOSINOPHIL NFR BLD AUTO: 0.1 % — SIGNIFICANT CHANGE UP (ref 0–6)
EPI CELLS # UR: 5 /HPF — SIGNIFICANT CHANGE UP (ref 0–5)
GLUCOSE SERPL-MCNC: 121 MG/DL — HIGH (ref 70–99)
GLUCOSE SERPL-MCNC: 121 MG/DL — HIGH (ref 70–99)
GLUCOSE UR QL: NEGATIVE — SIGNIFICANT CHANGE UP
HCT VFR BLD CALC: 28.7 % — LOW (ref 39–50)
HCT VFR BLD CALC: 29.2 % — LOW (ref 39–50)
HGB BLD-MCNC: 9.5 G/DL — LOW (ref 13–17)
HGB BLD-MCNC: 9.5 G/DL — LOW (ref 13–17)
HYALINE CASTS # UR AUTO: 0 /LPF — SIGNIFICANT CHANGE UP (ref 0–7)
KETONES UR-MCNC: NEGATIVE — SIGNIFICANT CHANGE UP
LDH SERPL L TO P-CCNC: 162 U/L — SIGNIFICANT CHANGE UP (ref 50–242)
LEUKOCYTE ESTERASE UR-ACNC: ABNORMAL
LYMPHOCYTES # BLD AUTO: 0.5 K/UL — LOW (ref 1–3.3)
LYMPHOCYTES # BLD AUTO: 5.5 % — LOW (ref 13–44)
MAGNESIUM SERPL-MCNC: 1.9 MG/DL — SIGNIFICANT CHANGE UP (ref 1.6–2.6)
MCHC RBC-ENTMCNC: 32.4 PG — SIGNIFICANT CHANGE UP (ref 27–34)
MCHC RBC-ENTMCNC: 32.6 GM/DL — SIGNIFICANT CHANGE UP (ref 32–36)
MCHC RBC-ENTMCNC: 32.9 PG — SIGNIFICANT CHANGE UP (ref 27–34)
MCHC RBC-ENTMCNC: 33.2 GM/DL — SIGNIFICANT CHANGE UP (ref 32–36)
MCV RBC AUTO: 99.1 FL — SIGNIFICANT CHANGE UP (ref 80–100)
MCV RBC AUTO: 99.5 FL — SIGNIFICANT CHANGE UP (ref 80–100)
MONOCYTES # BLD AUTO: 0.6 K/UL — SIGNIFICANT CHANGE UP (ref 0–0.9)
MONOCYTES NFR BLD AUTO: 7.1 % — SIGNIFICANT CHANGE UP (ref 2–14)
NEUTROPHILS # BLD AUTO: 7.3 K/UL — SIGNIFICANT CHANGE UP (ref 1.8–7.4)
NEUTROPHILS NFR BLD AUTO: 87.1 % — HIGH (ref 43–77)
NITRITE UR-MCNC: NEGATIVE — SIGNIFICANT CHANGE UP
PH UR: 7.5 — SIGNIFICANT CHANGE UP (ref 5–8)
PHOSPHATE SERPL-MCNC: 3.7 MG/DL — SIGNIFICANT CHANGE UP (ref 2.5–4.5)
PLATELET # BLD AUTO: 110 K/UL — LOW (ref 150–400)
PLATELET # BLD AUTO: 117 K/UL — LOW (ref 150–400)
POTASSIUM SERPL-MCNC: 5.2 MMOL/L — SIGNIFICANT CHANGE UP (ref 3.5–5.3)
POTASSIUM SERPL-MCNC: 5.2 MMOL/L — SIGNIFICANT CHANGE UP (ref 3.5–5.3)
POTASSIUM SERPL-SCNC: 5.2 MMOL/L — SIGNIFICANT CHANGE UP (ref 3.5–5.3)
POTASSIUM SERPL-SCNC: 5.2 MMOL/L — SIGNIFICANT CHANGE UP (ref 3.5–5.3)
PROT SERPL-MCNC: 6 G/DL — SIGNIFICANT CHANGE UP (ref 6–8.3)
PROT UR-MCNC: 300
RBC # BLD: 2.89 M/UL — LOW (ref 4.2–5.8)
RBC # BLD: 2.94 M/UL — LOW (ref 4.2–5.8)
RBC # FLD: 14.2 % — SIGNIFICANT CHANGE UP (ref 10.3–14.5)
RBC # FLD: 14.3 % — SIGNIFICANT CHANGE UP (ref 10.3–14.5)
RBC CASTS # UR COMP ASSIST: >720 /HPF — HIGH (ref 0–4)
SODIUM SERPL-SCNC: 134 MMOL/L — LOW (ref 135–145)
SODIUM SERPL-SCNC: 134 MMOL/L — LOW (ref 135–145)
SP GR SPEC: 1.01 — SIGNIFICANT CHANGE UP (ref 1.01–1.02)
TACROLIMUS SERPL-MCNC: 8.2 NG/ML — SIGNIFICANT CHANGE UP
UROBILINOGEN FLD QL: NEGATIVE — SIGNIFICANT CHANGE UP
WBC # BLD: 8 K/UL — SIGNIFICANT CHANGE UP (ref 3.8–10.5)
WBC # BLD: 8.4 K/UL — SIGNIFICANT CHANGE UP (ref 3.8–10.5)
WBC # FLD AUTO: 8 K/UL — SIGNIFICANT CHANGE UP (ref 3.8–10.5)
WBC # FLD AUTO: 8.4 K/UL — SIGNIFICANT CHANGE UP (ref 3.8–10.5)
WBC UR QL: 21 /HPF — HIGH (ref 0–5)

## 2017-02-26 PROCEDURE — 99233 SBSQ HOSP IP/OBS HIGH 50: CPT

## 2017-02-26 RX ADMIN — Medication 500000 UNIT(S): at 06:03

## 2017-02-26 RX ADMIN — Medication 1 TABLET(S): at 12:09

## 2017-02-26 RX ADMIN — MYCOPHENOLATE MOFETIL 1 GRAM(S): 250 CAPSULE ORAL at 06:04

## 2017-02-26 RX ADMIN — TACROLIMUS 3 MILLIGRAM(S): 5 CAPSULE ORAL at 06:04

## 2017-02-26 RX ADMIN — Medication 103.2 MILLIGRAM(S): at 12:13

## 2017-02-26 RX ADMIN — MYCOPHENOLATE MOFETIL 1 GRAM(S): 250 CAPSULE ORAL at 18:14

## 2017-02-26 RX ADMIN — Medication 500000 UNIT(S): at 22:01

## 2017-02-26 RX ADMIN — Medication 500000 UNIT(S): at 12:09

## 2017-02-26 RX ADMIN — HEPARIN SODIUM 5000 UNIT(S): 5000 INJECTION INTRAVENOUS; SUBCUTANEOUS at 06:12

## 2017-02-26 RX ADMIN — TACROLIMUS 3 MILLIGRAM(S): 5 CAPSULE ORAL at 18:14

## 2017-02-26 RX ADMIN — FAMOTIDINE 20 MILLIGRAM(S): 10 INJECTION INTRAVENOUS at 12:09

## 2017-02-26 RX ADMIN — Medication 500000 UNIT(S): at 18:13

## 2017-02-26 NOTE — DIETITIAN INITIAL EVALUATION ADULT. - ORAL INTAKE PTA
Although pt has been thin all his life, he has been having better diet compliance in the years since his last rehab after ETOH abuse/good good/Although pt has been thin all his life, he has been having a better diet in the years since his last rehab after ETOH abuse

## 2017-02-26 NOTE — DIETITIAN INITIAL EVALUATION ADULT. - DIET TYPE
renal replacement pts:no protein restr,no conc K & phos, low sodium liberalize diet as soon as renal indices drop and UO increases to Regular texture diet with no restrictions/renal replacement pts:no protein restr,no conc K & phos, low sodium

## 2017-02-26 NOTE — DIETITIAN INITIAL EVALUATION ADULT. - SIGNS/SYMPTOMS
POD#1 with signs of allograft function starting e.g. 900cc UO at time of pt interview. POD# 2 with signs of allograft function starting e.g. 900cc UO at time of pt interview.

## 2017-02-26 NOTE — DIETITIAN INITIAL EVALUATION ADULT. - INDICATOR
Pt to verbalize key pts from diet educ today; Pt able to work w/ RD and guides in close f/u team to change diet Rx with changing lab values (normalized renal indices) and assoc/d high UO

## 2017-02-26 NOTE — DIETITIAN INITIAL EVALUATION ADULT. - NS AS NUTRI INTERV STRATEGIES2
Monitor closely and liberalize diet order with team MD approval in next day or 2 - looking for further drop in renal indices and higher UO/Other (specify)

## 2017-02-26 NOTE — DIETITIAN INITIAL EVALUATION ADULT. - PERTINENT LABORATORY DATA
Hgb 9.5, Hct 28.7, Na 134, BUN 55, sCR 6.70 (pre surgery baseline sCR 8.22 on 12/7/16), , Ca 8.0, and all other labb values were within normal range.  RD does expect lab values to improve in days following surgery as already see signs of starting graft function. on POD # 2: Hgb 9.5, Hct 28.7, Na 134, BUN 55, sCR 6.70 (pre surgery or "baseline" sCR 8.22 on 12/7/16), , Ca 8.0, and all other labb values were within normal range.  RD does expect lab values to improve in days following surgery as we already see signs of starting graft function.and we are dealing with a DD DCDD graft, so this is encouraging.

## 2017-02-26 NOTE — DIETITIAN INITIAL EVALUATION ADULT. - NS AS NUTRI DX MULTI NUTRIENT
Pt is transitioning from HD Renal Diet to acute-phase-post-transplant diet - and RD gave education on nutr. goals & offered to guide pt on the expected changes that come with full graft function getting underway and the associated high UO that accompanies it.  We also reviewed food safety skills now that pt is immune-suppressed, and RD gave pt a reference booklet to read and refer back to as a guide when self-managing diet after discharge.  Pt has energy and protein needs, now, that are shown above where auto-calculated, that are higher than what his needs will be after healing i.e. after ~ 8 weeks time, but these needs are quite similar to what the pt needed on dialysis.  RD will enourage adequate p o intake for pt with BMI ~ 20-21 based on [facility] (67 “ as measured without shoes on), which was 2 inches lower than the Ht entered in [place] for this pt, on this admission.   Pt's fluid needs were not entered in auto-calculator in same section as protein and energy, above, since the fluid auto-calculator can't be set for the high fluid intake the pt will need to strive for, starting as soon as tomorrow, which can reach > 3L/day with expected graft function and the associated high UO as graft function begins.  The pt will be urged to try to keep his amount of fluid intake even with his amount of UO, or come as close to that as he can.  RD will monitor pt's graft function, UO, lab results and will call or see pt to help him prioritize fluid intake, and the food choices that may need to be prioritized due to dropping lab values - e.g. we will likely soon be pushing fluid intake and pushing Phos-dense food choices.  RD also discussed the immuno-suppression Rx’s the pt is now on and the related food:drug interactions (to avoid).  We discussed their potential side effects, and the need for impeccable food safety behaviors.  The diet plan during the acute-phase-post-transplant includes many changes, and RD will  pt closely during this period

## 2017-02-26 NOTE — DIETITIAN INITIAL EVALUATION ADULT. - NS AS NUTRI INTERV ED CONTENT
Purpose of the nutrition education/RD coached pt on expected changes and transitioning diet priorities during acute-phase-post-transplant and gave pt food lists that help pt address food choices that may be a priority and possible restrictions, as needed.  Reviewed food safety skills for pt, who is immune-suppressed and gave appropriate guide book to be read/understood, and be used as a day to day reference guide for food safety behaviors.

## 2017-02-26 NOTE — DIETITIAN INITIAL EVALUATION ADULT. - FACTORS AFF FOOD INTAKE
persistent nausea/other (specify)/limited appetite on POD # 2 other (specify)/limited appetite on POD # 2

## 2017-02-26 NOTE — DIETITIAN INITIAL EVALUATION ADULT. - CRITERIA
Pt did verbalize understanding of key pts and will work with RD and food guides monitor indicators, & change diet Rx accordingly - to regular texture w/ no restrictions as per labs

## 2017-02-26 NOTE — DIETITIAN INITIAL EVALUATION ADULT. - OTHER INFO
pt with limited appetite/intake since surgery, but he did report having 75% of lunch at time of our interview (POD #@) and no GI complaints.

## 2017-02-26 NOTE — DIETITIAN INITIAL EVALUATION ADULT. - PERTINENT MEDS FT
Pt is on immuno-suppression Rxs, Pain mgmt meds, as needed, Prophylactic Rx's for infection and to avor limit GI distress, surgery related Rx's e.g.heparin and asparin, and any "still pertinent" home Rx's (e.g. BP meds) Pt is on immuno-suppression Rx's, Pain mgmt meds, as needed, Prophylactic Rx's to avoid infection and to avoid or limit GI distress, surgery related Rx's: heparin and asa, and any "still pertinent" home Rx's (e.g. pt's BP meds)

## 2017-02-27 LAB
ALBUMIN SERPL ELPH-MCNC: 3.6 G/DL — SIGNIFICANT CHANGE UP (ref 3.3–5)
ALP SERPL-CCNC: 56 U/L — SIGNIFICANT CHANGE UP (ref 40–120)
ALT FLD-CCNC: 9 U/L RC — LOW (ref 10–45)
ANION GAP SERPL CALC-SCNC: 15 MMOL/L — SIGNIFICANT CHANGE UP (ref 5–17)
APPEARANCE UR: ABNORMAL
AST SERPL-CCNC: 18 U/L — SIGNIFICANT CHANGE UP (ref 10–40)
BACTERIA # UR AUTO: ABNORMAL
BASOPHILS # BLD AUTO: 0 K/UL — SIGNIFICANT CHANGE UP (ref 0–0.2)
BASOPHILS NFR BLD AUTO: 0.2 % — SIGNIFICANT CHANGE UP (ref 0–2)
BILIRUB SERPL-MCNC: 0.2 MG/DL — SIGNIFICANT CHANGE UP (ref 0.2–1.2)
BILIRUB UR-MCNC: NEGATIVE — SIGNIFICANT CHANGE UP
BUN SERPL-MCNC: 63 MG/DL — HIGH (ref 7–23)
CALCIUM SERPL-MCNC: 8.4 MG/DL — SIGNIFICANT CHANGE UP (ref 8.4–10.5)
CHLORIDE SERPL-SCNC: 95 MMOL/L — LOW (ref 96–108)
CO2 SERPL-SCNC: 23 MMOL/L — SIGNIFICANT CHANGE UP (ref 22–31)
COLOR SPEC: SIGNIFICANT CHANGE UP
CREAT SERPL-MCNC: 4.92 MG/DL — HIGH (ref 0.5–1.3)
DIFF PNL FLD: ABNORMAL
EOSINOPHIL # BLD AUTO: 0 K/UL — SIGNIFICANT CHANGE UP (ref 0–0.5)
EOSINOPHIL NFR BLD AUTO: 0.3 % — SIGNIFICANT CHANGE UP (ref 0–6)
EPI CELLS # UR: 5 /HPF — SIGNIFICANT CHANGE UP (ref 0–5)
GLUCOSE SERPL-MCNC: 90 MG/DL — SIGNIFICANT CHANGE UP (ref 70–99)
GLUCOSE UR QL: NEGATIVE — SIGNIFICANT CHANGE UP
HCT VFR BLD CALC: 29 % — LOW (ref 39–50)
HGB BLD-MCNC: 10 G/DL — LOW (ref 13–17)
KETONES UR-MCNC: NEGATIVE — SIGNIFICANT CHANGE UP
LDH SERPL L TO P-CCNC: 193 U/L — SIGNIFICANT CHANGE UP (ref 50–242)
LEUKOCYTE ESTERASE UR-ACNC: ABNORMAL
LYMPHOCYTES # BLD AUTO: 0.9 K/UL — LOW (ref 1–3.3)
LYMPHOCYTES # BLD AUTO: 13.2 % — SIGNIFICANT CHANGE UP (ref 13–44)
MAGNESIUM SERPL-MCNC: 2.3 MG/DL — SIGNIFICANT CHANGE UP (ref 1.6–2.6)
MCHC RBC-ENTMCNC: 33.8 PG — SIGNIFICANT CHANGE UP (ref 27–34)
MCHC RBC-ENTMCNC: 34.6 GM/DL — SIGNIFICANT CHANGE UP (ref 32–36)
MCV RBC AUTO: 97.6 FL — SIGNIFICANT CHANGE UP (ref 80–100)
MONOCYTES # BLD AUTO: 0.6 K/UL — SIGNIFICANT CHANGE UP (ref 0–0.9)
MONOCYTES NFR BLD AUTO: 7.8 % — SIGNIFICANT CHANGE UP (ref 2–14)
NEUTROPHILS # BLD AUTO: 5.5 K/UL — SIGNIFICANT CHANGE UP (ref 1.8–7.4)
NEUTROPHILS NFR BLD AUTO: 78.4 % — HIGH (ref 43–77)
NITRITE UR-MCNC: NEGATIVE — SIGNIFICANT CHANGE UP
PH UR: 7.5 — SIGNIFICANT CHANGE UP (ref 5–8)
PHOSPHATE SERPL-MCNC: 2.9 MG/DL — SIGNIFICANT CHANGE UP (ref 2.5–4.5)
PLATELET # BLD AUTO: 102 K/UL — LOW (ref 150–400)
POTASSIUM SERPL-MCNC: 4.8 MMOL/L — SIGNIFICANT CHANGE UP (ref 3.5–5.3)
POTASSIUM SERPL-SCNC: 4.8 MMOL/L — SIGNIFICANT CHANGE UP (ref 3.5–5.3)
PROT SERPL-MCNC: 6.3 G/DL — SIGNIFICANT CHANGE UP (ref 6–8.3)
PROT UR-MCNC: 300
RBC # BLD: 2.97 M/UL — LOW (ref 4.2–5.8)
RBC # FLD: 14.6 % — HIGH (ref 10.3–14.5)
RBC CASTS # UR COMP ASSIST: >720 /HPF — HIGH (ref 0–4)
SODIUM SERPL-SCNC: 133 MMOL/L — LOW (ref 135–145)
SP GR SPEC: 1.01 — LOW (ref 1.01–1.02)
TACROLIMUS SERPL-MCNC: 5.2 NG/ML — SIGNIFICANT CHANGE UP
UROBILINOGEN FLD QL: NEGATIVE — SIGNIFICANT CHANGE UP
WBC # BLD: 7 K/UL — SIGNIFICANT CHANGE UP (ref 3.8–10.5)
WBC # FLD AUTO: 7 K/UL — SIGNIFICANT CHANGE UP (ref 3.8–10.5)
WBC UR QL: 100 /HPF — HIGH (ref 0–5)

## 2017-02-27 PROCEDURE — 99233 SBSQ HOSP IP/OBS HIGH 50: CPT

## 2017-02-27 RX ORDER — HEPARIN SODIUM 5000 [USP'U]/ML
5000 INJECTION INTRAVENOUS; SUBCUTANEOUS EVERY 8 HOURS
Qty: 0 | Refills: 0 | Status: DISCONTINUED | OUTPATIENT
Start: 2017-02-27 | End: 2017-02-28

## 2017-02-27 RX ORDER — FLUCONAZOLE 150 MG/1
200 TABLET ORAL EVERY 24 HOURS
Qty: 0 | Refills: 0 | Status: DISCONTINUED | OUTPATIENT
Start: 2017-02-28 | End: 2017-02-28

## 2017-02-27 RX ORDER — FLUCONAZOLE 150 MG/1
200 TABLET ORAL ONCE
Qty: 0 | Refills: 0 | Status: COMPLETED | OUTPATIENT
Start: 2017-02-27 | End: 2017-02-27

## 2017-02-27 RX ORDER — FLUCONAZOLE 150 MG/1
TABLET ORAL
Qty: 0 | Refills: 0 | Status: DISCONTINUED | OUTPATIENT
Start: 2017-02-27 | End: 2017-02-28

## 2017-02-27 RX ADMIN — Medication 60 MILLIGRAM(S): at 11:59

## 2017-02-27 RX ADMIN — HEPARIN SODIUM 5000 UNIT(S): 5000 INJECTION INTRAVENOUS; SUBCUTANEOUS at 21:03

## 2017-02-27 RX ADMIN — TACROLIMUS 3 MILLIGRAM(S): 5 CAPSULE ORAL at 06:04

## 2017-02-27 RX ADMIN — Medication 500000 UNIT(S): at 17:53

## 2017-02-27 RX ADMIN — Medication 650 MILLIGRAM(S): at 15:00

## 2017-02-27 RX ADMIN — Medication 325 MILLIGRAM(S): at 04:54

## 2017-02-27 RX ADMIN — Medication 500000 UNIT(S): at 23:06

## 2017-02-27 RX ADMIN — Medication 650 MILLIGRAM(S): at 21:15

## 2017-02-27 RX ADMIN — Medication 500000 UNIT(S): at 11:59

## 2017-02-27 RX ADMIN — Medication 650 MILLIGRAM(S): at 22:04

## 2017-02-27 RX ADMIN — HEPARIN SODIUM 5000 UNIT(S): 5000 INJECTION INTRAVENOUS; SUBCUTANEOUS at 12:00

## 2017-02-27 RX ADMIN — FAMOTIDINE 20 MILLIGRAM(S): 10 INJECTION INTRAVENOUS at 12:00

## 2017-02-27 RX ADMIN — Medication 500000 UNIT(S): at 06:04

## 2017-02-27 RX ADMIN — TACROLIMUS 3 MILLIGRAM(S): 5 CAPSULE ORAL at 17:51

## 2017-02-27 RX ADMIN — MYCOPHENOLATE MOFETIL 1 GRAM(S): 250 CAPSULE ORAL at 17:52

## 2017-02-27 RX ADMIN — Medication 1 TABLET(S): at 12:01

## 2017-02-27 RX ADMIN — Medication 650 MILLIGRAM(S): at 05:25

## 2017-02-27 RX ADMIN — Medication 650 MILLIGRAM(S): at 14:34

## 2017-02-27 RX ADMIN — FLUCONAZOLE 100 MILLIGRAM(S): 150 TABLET ORAL at 16:20

## 2017-02-27 RX ADMIN — MYCOPHENOLATE MOFETIL 1 GRAM(S): 250 CAPSULE ORAL at 06:05

## 2017-02-27 NOTE — PROVIDER CONTACT NOTE (OTHER) - BACKGROUND
renal transplant recipient, hx of hypertension on lisinopril 30mg daily and norvasc 10mg daily not currently on anything

## 2017-02-28 ENCOUNTER — TRANSCRIPTION ENCOUNTER (OUTPATIENT)
Age: 57
End: 2017-02-28

## 2017-02-28 VITALS
RESPIRATION RATE: 18 BRPM | SYSTOLIC BLOOD PRESSURE: 150 MMHG | TEMPERATURE: 97 F | HEART RATE: 87 BPM | OXYGEN SATURATION: 100 % | DIASTOLIC BLOOD PRESSURE: 88 MMHG

## 2017-02-28 LAB
ALBUMIN SERPL ELPH-MCNC: 3.7 G/DL — SIGNIFICANT CHANGE UP (ref 3.3–5)
ALBUMIN SERPL ELPH-MCNC: 3.8 G/DL — SIGNIFICANT CHANGE UP (ref 3.3–5)
ALP SERPL-CCNC: 54 U/L — SIGNIFICANT CHANGE UP (ref 40–120)
ALP SERPL-CCNC: 55 U/L — SIGNIFICANT CHANGE UP (ref 40–120)
ALT FLD-CCNC: 10 U/L RC — SIGNIFICANT CHANGE UP (ref 10–45)
ALT FLD-CCNC: 14 U/L RC — SIGNIFICANT CHANGE UP (ref 10–45)
ANION GAP SERPL CALC-SCNC: 15 MMOL/L — SIGNIFICANT CHANGE UP (ref 5–17)
ANION GAP SERPL CALC-SCNC: 16 MMOL/L — SIGNIFICANT CHANGE UP (ref 5–17)
APPEARANCE UR: ABNORMAL
APTT BLD: 29.6 SEC — SIGNIFICANT CHANGE UP (ref 27.5–37.4)
AST SERPL-CCNC: 18 U/L — SIGNIFICANT CHANGE UP (ref 10–40)
AST SERPL-CCNC: 20 U/L — SIGNIFICANT CHANGE UP (ref 10–40)
BASOPHILS # BLD AUTO: 0 K/UL — SIGNIFICANT CHANGE UP (ref 0–0.2)
BASOPHILS NFR BLD AUTO: 0.3 % — SIGNIFICANT CHANGE UP (ref 0–2)
BILIRUB SERPL-MCNC: 0.4 MG/DL — SIGNIFICANT CHANGE UP (ref 0.2–1.2)
BILIRUB SERPL-MCNC: 0.4 MG/DL — SIGNIFICANT CHANGE UP (ref 0.2–1.2)
BILIRUB UR-MCNC: NEGATIVE — SIGNIFICANT CHANGE UP
BUN SERPL-MCNC: 61 MG/DL — HIGH (ref 7–23)
BUN SERPL-MCNC: 62 MG/DL — HIGH (ref 7–23)
CALCIUM SERPL-MCNC: 8.9 MG/DL — SIGNIFICANT CHANGE UP (ref 8.4–10.5)
CALCIUM SERPL-MCNC: 9.2 MG/DL — SIGNIFICANT CHANGE UP (ref 8.4–10.5)
CHLORIDE SERPL-SCNC: 100 MMOL/L — SIGNIFICANT CHANGE UP (ref 96–108)
CHLORIDE SERPL-SCNC: 99 MMOL/L — SIGNIFICANT CHANGE UP (ref 96–108)
CO2 SERPL-SCNC: 18 MMOL/L — LOW (ref 22–31)
CO2 SERPL-SCNC: 21 MMOL/L — LOW (ref 22–31)
COLOR SPEC: YELLOW — SIGNIFICANT CHANGE UP
CREAT SERPL-MCNC: 3.89 MG/DL — HIGH (ref 0.5–1.3)
CREAT SERPL-MCNC: 4.03 MG/DL — HIGH (ref 0.5–1.3)
CULTURE RESULTS: NO GROWTH — SIGNIFICANT CHANGE UP
DIFF PNL FLD: ABNORMAL
EOSINOPHIL # BLD AUTO: 0 K/UL — SIGNIFICANT CHANGE UP (ref 0–0.5)
EOSINOPHIL NFR BLD AUTO: 0.2 % — SIGNIFICANT CHANGE UP (ref 0–6)
GLUCOSE SERPL-MCNC: 93 MG/DL — SIGNIFICANT CHANGE UP (ref 70–99)
GLUCOSE SERPL-MCNC: 97 MG/DL — SIGNIFICANT CHANGE UP (ref 70–99)
GLUCOSE UR QL: NEGATIVE — SIGNIFICANT CHANGE UP
HCT VFR BLD CALC: 29.9 % — LOW (ref 39–50)
HGB BLD-MCNC: 9.8 G/DL — LOW (ref 13–17)
INR BLD: 0.97 RATIO — SIGNIFICANT CHANGE UP (ref 0.88–1.16)
KETONES UR-MCNC: NEGATIVE — SIGNIFICANT CHANGE UP
LDH SERPL L TO P-CCNC: 210 U/L — SIGNIFICANT CHANGE UP (ref 50–242)
LEUKOCYTE ESTERASE UR-ACNC: ABNORMAL
LYMPHOCYTES # BLD AUTO: 0.7 K/UL — LOW (ref 1–3.3)
LYMPHOCYTES # BLD AUTO: 14.7 % — SIGNIFICANT CHANGE UP (ref 13–44)
MAGNESIUM SERPL-MCNC: 2.1 MG/DL — SIGNIFICANT CHANGE UP (ref 1.6–2.6)
MCHC RBC-ENTMCNC: 32.2 PG — SIGNIFICANT CHANGE UP (ref 27–34)
MCHC RBC-ENTMCNC: 32.8 GM/DL — SIGNIFICANT CHANGE UP (ref 32–36)
MCV RBC AUTO: 98.2 FL — SIGNIFICANT CHANGE UP (ref 80–100)
MONOCYTES # BLD AUTO: 0.5 K/UL — SIGNIFICANT CHANGE UP (ref 0–0.9)
MONOCYTES NFR BLD AUTO: 9.2 % — SIGNIFICANT CHANGE UP (ref 2–14)
NEUTROPHILS # BLD AUTO: 3.8 K/UL — SIGNIFICANT CHANGE UP (ref 1.8–7.4)
NEUTROPHILS NFR BLD AUTO: 75.6 % — SIGNIFICANT CHANGE UP (ref 43–77)
NITRITE UR-MCNC: NEGATIVE — SIGNIFICANT CHANGE UP
PH UR: 7 — SIGNIFICANT CHANGE UP (ref 4.8–8)
PHOSPHATE SERPL-MCNC: 3.8 MG/DL — SIGNIFICANT CHANGE UP (ref 2.5–4.5)
PLATELET # BLD AUTO: 103 K/UL — LOW (ref 150–400)
POTASSIUM SERPL-MCNC: 5.1 MMOL/L — SIGNIFICANT CHANGE UP (ref 3.5–5.3)
POTASSIUM SERPL-MCNC: 5.8 MMOL/L — HIGH (ref 3.5–5.3)
POTASSIUM SERPL-SCNC: 5.1 MMOL/L — SIGNIFICANT CHANGE UP (ref 3.5–5.3)
POTASSIUM SERPL-SCNC: 5.8 MMOL/L — HIGH (ref 3.5–5.3)
PROT SERPL-MCNC: 6.4 G/DL — SIGNIFICANT CHANGE UP (ref 6–8.3)
PROT SERPL-MCNC: 6.7 G/DL — SIGNIFICANT CHANGE UP (ref 6–8.3)
PROT UR-MCNC: 150 MG/DL
PROTHROM AB SERPL-ACNC: 10.5 SEC — SIGNIFICANT CHANGE UP (ref 10–13.1)
RBC # BLD: 3.05 M/UL — LOW (ref 4.2–5.8)
RBC # FLD: 14.1 % — SIGNIFICANT CHANGE UP (ref 10.3–14.5)
RBC CASTS # UR COMP ASSIST: ABNORMAL /HPF (ref 0–2)
SODIUM SERPL-SCNC: 134 MMOL/L — LOW (ref 135–145)
SODIUM SERPL-SCNC: 135 MMOL/L — SIGNIFICANT CHANGE UP (ref 135–145)
SP GR SPEC: 1.01 — LOW (ref 1.01–1.02)
SPECIMEN SOURCE: SIGNIFICANT CHANGE UP
TACROLIMUS SERPL-MCNC: 5.6 NG/ML — SIGNIFICANT CHANGE UP
UROBILINOGEN FLD QL: NEGATIVE — SIGNIFICANT CHANGE UP
WBC # BLD: 5 K/UL — SIGNIFICANT CHANGE UP (ref 3.8–10.5)
WBC # FLD AUTO: 5 K/UL — SIGNIFICANT CHANGE UP (ref 3.8–10.5)
WBC UR QL: SIGNIFICANT CHANGE UP /HPF (ref 0–5)

## 2017-02-28 PROCEDURE — 82803 BLOOD GASES ANY COMBINATION: CPT

## 2017-02-28 PROCEDURE — 80197 ASSAY OF TACROLIMUS: CPT

## 2017-02-28 PROCEDURE — 71045 X-RAY EXAM CHEST 1 VIEW: CPT

## 2017-02-28 PROCEDURE — 82330 ASSAY OF CALCIUM: CPT

## 2017-02-28 PROCEDURE — 80048 BASIC METABOLIC PNL TOTAL CA: CPT

## 2017-02-28 PROCEDURE — 84100 ASSAY OF PHOSPHORUS: CPT

## 2017-02-28 PROCEDURE — 99222 1ST HOSP IP/OBS MODERATE 55: CPT

## 2017-02-28 PROCEDURE — 86901 BLOOD TYPING SEROLOGIC RH(D): CPT

## 2017-02-28 PROCEDURE — 83605 ASSAY OF LACTIC ACID: CPT

## 2017-02-28 PROCEDURE — 86850 RBC ANTIBODY SCREEN: CPT

## 2017-02-28 PROCEDURE — 84132 ASSAY OF SERUM POTASSIUM: CPT

## 2017-02-28 PROCEDURE — 80053 COMPREHEN METABOLIC PANEL: CPT

## 2017-02-28 PROCEDURE — 85610 PROTHROMBIN TIME: CPT

## 2017-02-28 PROCEDURE — 81001 URINALYSIS AUTO W/SCOPE: CPT

## 2017-02-28 PROCEDURE — 99282 EMERGENCY DEPT VISIT SF MDM: CPT

## 2017-02-28 PROCEDURE — 82947 ASSAY GLUCOSE BLOOD QUANT: CPT

## 2017-02-28 PROCEDURE — 85014 HEMATOCRIT: CPT

## 2017-02-28 PROCEDURE — 86900 BLOOD TYPING SEROLOGIC ABO: CPT

## 2017-02-28 PROCEDURE — 93005 ELECTROCARDIOGRAM TRACING: CPT

## 2017-02-28 PROCEDURE — 83615 LACTATE (LD) (LDH) ENZYME: CPT

## 2017-02-28 PROCEDURE — 82435 ASSAY OF BLOOD CHLORIDE: CPT

## 2017-02-28 PROCEDURE — 80069 RENAL FUNCTION PANEL: CPT

## 2017-02-28 PROCEDURE — 76776 US EXAM K TRANSPL W/DOPPLER: CPT

## 2017-02-28 PROCEDURE — 85730 THROMBOPLASTIN TIME PARTIAL: CPT

## 2017-02-28 PROCEDURE — 84295 ASSAY OF SERUM SODIUM: CPT

## 2017-02-28 PROCEDURE — 87086 URINE CULTURE/COLONY COUNT: CPT

## 2017-02-28 PROCEDURE — 99233 SBSQ HOSP IP/OBS HIGH 50: CPT

## 2017-02-28 PROCEDURE — 85027 COMPLETE CBC AUTOMATED: CPT

## 2017-02-28 PROCEDURE — 83735 ASSAY OF MAGNESIUM: CPT

## 2017-02-28 RX ORDER — TACROLIMUS 5 MG/1
3 CAPSULE ORAL
Qty: 0 | Refills: 0 | COMMUNITY
Start: 2017-02-28

## 2017-02-28 RX ORDER — TACROLIMUS 5 MG/1
5 CAPSULE ORAL ONCE
Qty: 0 | Refills: 0 | Status: DISCONTINUED | OUTPATIENT
Start: 2017-02-28 | End: 2017-02-28

## 2017-02-28 RX ORDER — BUPROPION HYDROCHLORIDE 150 MG/1
1 TABLET, EXTENDED RELEASE ORAL
Qty: 0 | Refills: 0 | COMMUNITY

## 2017-02-28 RX ORDER — CALCIUM ACETATE 667 MG
3 TABLET ORAL
Qty: 0 | Refills: 0 | COMMUNITY

## 2017-02-28 RX ORDER — NYSTATIN 500MM UNIT
5 POWDER (EA) MISCELLANEOUS
Qty: 0 | Refills: 0 | DISCHARGE
Start: 2017-02-28

## 2017-02-28 RX ORDER — AMLODIPINE BESYLATE 2.5 MG/1
1 TABLET ORAL
Qty: 0 | Refills: 0 | COMMUNITY

## 2017-02-28 RX ORDER — TACROLIMUS 5 MG/1
5 CAPSULE ORAL
Qty: 0 | Refills: 0 | COMMUNITY
Start: 2017-02-28

## 2017-02-28 RX ORDER — VALGANCICLOVIR 450 MG/1
1 TABLET, FILM COATED ORAL
Qty: 0 | Refills: 0 | COMMUNITY
Start: 2017-02-28

## 2017-02-28 RX ORDER — ASPIRIN/CALCIUM CARB/MAGNESIUM 324 MG
1 TABLET ORAL
Qty: 0 | Refills: 0 | COMMUNITY

## 2017-02-28 RX ORDER — FAMOTIDINE 10 MG/ML
1 INJECTION INTRAVENOUS
Qty: 0 | Refills: 0 | DISCHARGE
Start: 2017-02-28

## 2017-02-28 RX ORDER — LISINOPRIL 2.5 MG/1
1 TABLET ORAL
Qty: 0 | Refills: 0 | COMMUNITY

## 2017-02-28 RX ORDER — ACETAMINOPHEN 500 MG
2 TABLET ORAL
Qty: 0 | Refills: 0 | COMMUNITY
Start: 2017-02-28

## 2017-02-28 RX ORDER — OMEPRAZOLE 10 MG/1
1 CAPSULE, DELAYED RELEASE ORAL
Qty: 0 | Refills: 0 | COMMUNITY

## 2017-02-28 RX ORDER — DIAZEPAM 5 MG
1 TABLET ORAL
Qty: 0 | Refills: 0 | COMMUNITY

## 2017-02-28 RX ADMIN — HEPARIN SODIUM 5000 UNIT(S): 5000 INJECTION INTRAVENOUS; SUBCUTANEOUS at 14:01

## 2017-02-28 RX ADMIN — FAMOTIDINE 20 MILLIGRAM(S): 10 INJECTION INTRAVENOUS at 11:23

## 2017-02-28 RX ADMIN — MYCOPHENOLATE MOFETIL 1 GRAM(S): 250 CAPSULE ORAL at 06:06

## 2017-02-28 RX ADMIN — HEPARIN SODIUM 5000 UNIT(S): 5000 INJECTION INTRAVENOUS; SUBCUTANEOUS at 06:06

## 2017-02-28 RX ADMIN — Medication 500000 UNIT(S): at 11:23

## 2017-02-28 RX ADMIN — Medication 1 PATCH: at 08:12

## 2017-02-28 RX ADMIN — TACROLIMUS 3 MILLIGRAM(S): 5 CAPSULE ORAL at 06:06

## 2017-02-28 RX ADMIN — Medication 650 MILLIGRAM(S): at 08:50

## 2017-02-28 RX ADMIN — FLUCONAZOLE 100 MILLIGRAM(S): 150 TABLET ORAL at 14:02

## 2017-02-28 RX ADMIN — Medication 500000 UNIT(S): at 06:06

## 2017-02-28 RX ADMIN — VALGANCICLOVIR 450 MILLIGRAM(S): 450 TABLET, FILM COATED ORAL at 08:12

## 2017-02-28 RX ADMIN — Medication 650 MILLIGRAM(S): at 09:20

## 2017-02-28 RX ADMIN — Medication 1 TABLET(S): at 11:23

## 2017-02-28 RX ADMIN — BASILIXIMAB 100 MILLIGRAM(S): 20 INJECTION, POWDER, FOR SOLUTION INTRAVENOUS at 07:55

## 2017-02-28 RX ADMIN — Medication 40 MILLIGRAM(S): at 11:23

## 2017-02-28 NOTE — DISCHARGE NOTE ADULT - MEDICATION SUMMARY - MEDICATIONS TO STOP TAKING
I will STOP taking the medications listed below when I get home from the hospital:    Benadryl  -- 1 tab(s) by mouth once a day, As Needed    lisinopril 30 mg oral tablet  -- 1 tab(s) by mouth once a day    Norvasc 10 mg oral tablet  -- 1 tab(s) by mouth once a day    PhosLo 667 mg oral tablet  -- 3 tab(s) by mouth 3 times a day    buPROPion 75 mg oral tablet  -- 1 tab(s) by mouth once a day    benzonatate 100 mg oral capsule  -- 1 cap(s) by mouth 3 times a day    diazePAM 5 mg oral tablet  -- 1 tab(s) by mouth once a day (at bedtime)    omeprazole 40 mg oral delayed release capsule  -- 1 cap(s) by mouth once a day I will STOP taking the medications listed below when I get home from the hospital:    Benadryl  -- 1 tab(s) by mouth once a day, As Needed    lisinopril 30 mg oral tablet  -- 1 tab(s) by mouth once a day    Norvasc 10 mg oral tablet  -- 1 tab(s) by mouth once a day    PhosLo 667 mg oral tablet  -- 3 tab(s) by mouth 3 times a day    buPROPion 75 mg oral tablet  -- 1 tab(s) by mouth once a day    benzonatate 100 mg oral capsule  -- 1 cap(s) by mouth 3 times a day    diazePAM 5 mg oral tablet  -- 1 tab(s) by mouth once a day (at bedtime)    aspirin 81 mg oral tablet, chewable  -- 1 tab(s) by mouth once a day    omeprazole 40 mg oral delayed release capsule  -- 1 cap(s) by mouth once a day

## 2017-02-28 NOTE — DISCHARGE NOTE ADULT - CARE PROVIDERS DIRECT ADDRESSES
[email or web address]

## 2017-02-28 NOTE — PHARMACY COMMUNICATION NOTE - COMMENTS
Patient seen at bedside by Dr. Chung, Dr. Cedeno, Dr. Machado on multidisciplinary renal transplant ([facility] DDRT) rounds POD#4. Patient received basiliximab (Simulect) 20 mg IV x 1 dose and high dose methylprednisolone for immunosuppression induction therapy. The patient is to receive a second dose of basiliximab 20 mg IV x1 dose on POD#4.    Maintenance immunosuppression with mycophenolate mofetil (Cellcept) 1000 mg orally every 12 hours and tacrolimus (Prograf) 3 mg orally every 12 hours, with goal  8 to 12 ng/ml; nystatin suspension 500,000 units orally four times daily for oral candida prophylaxis; valganciclovir (Valcyte) 450 mg orally daily for CMV prophylaxis; Sulfamethoxazole 400 mg and trimethoprim 80 mg (Bactrim Single Strength) orally daily for PCP prophylaxis; famotidine 20 mg po daily for PUD prophylaxis; heparin 5000 unit SC q8h; steroid taper to prednisone 5 mg PO daily; fluconazole 200 mg IV q24h; clonidine 0.1 mg patch every 7 days.    Goal is to provide medication education to patient prior to discharge.

## 2017-02-28 NOTE — DISCHARGE NOTE ADULT - CARE PROVIDER_API CALL
Jacek Chung (MD), Surgery  [address] [place] [id number]  Phone: [phone]  Fax: [phone]

## 2017-02-28 NOTE — DISCHARGE NOTE ADULT - PATIENT PORTAL LINK FT
“You can access the FollowHealth Patient Portal, offered by [facility], by registering with the following website: [email or web address]”

## 2017-02-28 NOTE — DISCHARGE NOTE ADULT - CARE PLAN
Principal Discharge DX:	CKD (chronic kidney disease), stage 4 (severe)  Goal:	s/p kidney transplant. Pain control and outpatient follow up.  Instructions for follow-up, activity and diet:	Medications as prescribed.   Please do not drive until your pain is well controlled, and you do not need to take pain medications. These medications may make you constipated. If so, please take over the counter stool softeners for symptoms.   Please follow up with Dr. Chung in 1-2 weeks. You should be seen at the Transplant Clinic 2/29/17. Contact info below.  Secondary Diagnosis:	Bipolar 1 disorder  Goal:	Continue home medications and follow up with your primary care doctor.  Secondary Diagnosis:	Essential hypertension

## 2017-02-28 NOTE — PHARMACY COMMUNICATION NOTE - COMMENTS
Patient seen at bedside POD#4. Patient received basiliximab (Simulect) 20mg IV x 2 dose and high dose methylprednisolone (Solumedrol) for immunosuppressive induction therapy.     Patient's medications were shown to the patient on how they look like and education was done on their names, doses, frequency, and indications. Patient counseled on the importance of adherence. Patient advised to minimize sun exposure and to wear sunblock SPF>15  secondary to immunosuppressive regimen may increase his risk of skin cancer. Patient advised to avoid grapefruit juice and pomegranate juice as they may increase prograf levels. Patient also to avoid over-the-counter NSAIDs for analgesic and herbal or dietary supplements as they may be harmful to the kidney.

## 2017-02-28 NOTE — DISCHARGE NOTE ADULT - PLAN OF CARE
s/p kidney transplant. Pain control and outpatient follow up. Medications as prescribed.   Please do not drive until your pain is well controlled, and you do not need to take pain medications. These medications may make you constipated. If so, please take over the counter stool softeners for symptoms.   Please follow up with Dr. Chung in 1-2 weeks. You should be seen at the Transplant Clinic 2/29/17. Contact info below. Continue home medications and follow up with your primary care doctor.

## 2017-02-28 NOTE — DISCHARGE NOTE ADULT - INSTRUCTIONS
You may eat a regular diet and shower, but please do not soak your wounds in a bath or pool. You may shower. Pat Dry abdomen. Leave the white steri strips in place, they will fall off on their own in approximately 5-7 days.   NOTIFY YOUR SURGEON IF: You have any bleeding that does not stop, any pus draining from your wound, any fever (over 100.4 F) or chills, persistent nausea/vomiting, persistent diarrhea, or if your pain is not controlled on your discharge pain medications.

## 2017-02-28 NOTE — DISCHARGE NOTE ADULT - ADDITIONAL INSTRUCTIONS
1. Dr. Chung  2. Please call for a follow up appointment with your primary care medical doctor upon discharge regarding your recent surgery and hospitalization- Dr. Pearson.  3. Mental health clinic or your primary psychiatrist

## 2017-02-28 NOTE — DISCHARGE NOTE ADULT - MEDICATION SUMMARY - MEDICATIONS TO TAKE
I will START or STAY ON the medications listed below when I get home from the hospital:    aspirin 81 mg oral tablet, chewable  -- 1 tab(s) by mouth once a day  -- Indication: For Renal transplant recipient    acetaminophen 325 mg oral tablet  -- 2 tab(s) by mouth every 6 hours, As needed, Mild Pain (1 - 3)  -- Indication: For pain control    clonidine 0.1 mg/24 hr transdermal film, extended release  -- 1 patch by transdermal patch once a week  -- Indication: For HTN    Diflucan 200 mg oral tablet  -- 1 tab(s) by mouth once a day x 10 days  -- Indication: For Kidney transplant donor with fungal UTI    nystatin 100,000 units/mL oral suspension  -- 5 milliliter(s) by mouth 4 times a day  -- Indication: For Renal transplant recipient, prophylaxis    valGANciclovir 450 mg oral tablet  -- 1 tab(s) by mouth   -- Indication: For Renal transplant recipient, prophylaxis    famotidine 20 mg oral tablet  -- 1 tab(s) by mouth once a day  -- Indication: For chronic steroid therapy    mycophenolate mofetil 500 mg oral tablet  -- 2 tab(s) by mouth 2 times a day  -- Indication: For Renal transplant recipient    tacrolimus 1 mg oral capsule  -- 3 cap(s) by mouth every 12 hours  -- Indication: For Renal transplant recipient    sulfamethoxazole-trimethoprim 400 mg-80 mg oral tablet  -- 1 tab(s) by mouth once a day  -- Indication: For Renal transplant recipient, prophylaxis I will START or STAY ON the medications listed below when I get home from the hospital:    acetaminophen 325 mg oral tablet  -- 2 tab(s) by mouth every 6 hours, As needed, Mild Pain (1 - 3)  -- Indication: For pain control    clonidine 0.1 mg/24 hr transdermal film, extended release  -- 1 patch by transdermal patch once a week  -- Indication: For HTN    Diflucan 200 mg oral tablet  -- 1 tab(s) by mouth once a day x 10 days  -- Indication: For Kidney transplant donor with fungal UTI    nystatin 100,000 units/mL oral suspension  -- 5 milliliter(s) by mouth 4 times a day  -- Indication: For Renal transplant recipient, prophylaxis    valGANciclovir 450 mg oral tablet  -- 1 tab(s) by mouth   -- Indication: For Renal transplant recipient, prophylaxis    famotidine 20 mg oral tablet  -- 1 tab(s) by mouth once a day  -- Indication: For chronic steroid therapy    mycophenolate mofetil 500 mg oral tablet  -- 2 tab(s) by mouth 2 times a day  -- Indication: For Renal transplant recipient    tacrolimus 1 mg oral capsule  -- 3 cap(s) by mouth every 12 hours  -- Indication: For Renal transplant recipient    sulfamethoxazole-trimethoprim 400 mg-80 mg oral tablet  -- 1 tab(s) by mouth once a day  -- Indication: For Renal transplant recipient, prophylaxis I will START or STAY ON the medications listed below when I get home from the hospital:    predniSONE 5 mg oral delayed release tablet  -- On the 3/1/2017 take 20 mg PO daily   And then take 5 mg PO daily starting from 3/2/2017  -- Indication: For immunosupresion     acetaminophen 325 mg oral tablet  -- 2 tab(s) by mouth every 6 hours, As needed, Mild Pain (1 - 3)  -- Indication: For pain control    clonidine 0.1 mg/24 hr transdermal film, extended release  -- 1 patch by transdermal patch once a week  -- Indication: For HTN    Diflucan 200 mg oral tablet  -- 1 tab(s) by mouth once a day x 10 days  -- Indication: For Kidney transplant donor with fungal UTI    nystatin 100,000 units/mL oral suspension  -- 5 milliliter(s) by mouth 4 times a day  -- Indication: For Renal transplant recipient, prophylaxis    valGANciclovir 450 mg oral tablet  -- 1 tab(s) by mouth   -- Indication: For Renal transplant recipient, prophylaxis    famotidine 20 mg oral tablet  -- 1 tab(s) by mouth once a day  -- Indication: For chronic steroid therapy    mycophenolate mofetil 500 mg oral tablet  -- 2 tab(s) by mouth 2 times a day  -- Indication: For Renal transplant recipient    tacrolimus 1 mg oral capsule  -- 5 cap(s) by mouth every 12 hours  -- Indication: For Antirejection     sulfamethoxazole-trimethoprim 400 mg-80 mg oral tablet  -- 1 tab(s) by mouth once a day  -- Indication: For Renal transplant recipient, prophylaxis

## 2017-03-01 ENCOUNTER — LABORATORY RESULT (OUTPATIENT)
Age: 57
End: 2017-03-01

## 2017-03-01 ENCOUNTER — APPOINTMENT (OUTPATIENT)
Dept: NEPHROLOGY | Facility: CLINIC | Age: 57
End: 2017-03-01

## 2017-03-01 VITALS — BODY MASS INDEX: 19.64 KG/M2 | WEIGHT: 125.4 LBS

## 2017-03-01 VITALS
OXYGEN SATURATION: 99 % | DIASTOLIC BLOOD PRESSURE: 82 MMHG | RESPIRATION RATE: 18 BRPM | TEMPERATURE: 97.3 F | SYSTOLIC BLOOD PRESSURE: 144 MMHG | HEART RATE: 68 BPM

## 2017-03-01 RX ORDER — PREDNISONE 20 MG/1
20 TABLET ORAL
Qty: 30 | Refills: 0 | Status: DISCONTINUED | COMMUNITY
Start: 2017-02-24 | End: 2017-03-01

## 2017-03-02 ENCOUNTER — FORM ENCOUNTER (OUTPATIENT)
Age: 57
End: 2017-03-02

## 2017-03-03 ENCOUNTER — LABORATORY RESULT (OUTPATIENT)
Age: 57
End: 2017-03-03

## 2017-03-03 ENCOUNTER — APPOINTMENT (OUTPATIENT)
Dept: NEPHROLOGY | Facility: CLINIC | Age: 57
End: 2017-03-03

## 2017-03-03 ENCOUNTER — APPOINTMENT (OUTPATIENT)
Dept: ULTRASOUND IMAGING | Facility: HOSPITAL | Age: 57
End: 2017-03-03

## 2017-03-03 ENCOUNTER — OUTPATIENT (OUTPATIENT)
Dept: OUTPATIENT SERVICES | Facility: HOSPITAL | Age: 57
LOS: 1 days | End: 2017-03-03
Payer: COMMERCIAL

## 2017-03-03 VITALS
RESPIRATION RATE: 17 BRPM | HEART RATE: 71 BPM | DIASTOLIC BLOOD PRESSURE: 83 MMHG | HEIGHT: 67 IN | WEIGHT: 124 LBS | SYSTOLIC BLOOD PRESSURE: 161 MMHG | TEMPERATURE: 97.3 F | BODY MASS INDEX: 19.46 KG/M2

## 2017-03-03 DIAGNOSIS — F10.20 ALCOHOL DEPENDENCE, UNCOMPLICATED: ICD-10-CM

## 2017-03-03 DIAGNOSIS — Z99.2 DEPENDENCE ON RENAL DIALYSIS: Chronic | ICD-10-CM

## 2017-03-03 DIAGNOSIS — Z94.0 KIDNEY TRANSPLANT STATUS: ICD-10-CM

## 2017-03-03 DIAGNOSIS — F31.9 BIPOLAR DISORDER, UNSPECIFIED: ICD-10-CM

## 2017-03-03 DIAGNOSIS — Z99.2 DEPENDENCE ON RENAL DIALYSIS: ICD-10-CM

## 2017-03-03 DIAGNOSIS — Z88.0 ALLERGY STATUS TO PENICILLIN: ICD-10-CM

## 2017-03-03 DIAGNOSIS — Q61.3 POLYCYSTIC KIDNEY, UNSPECIFIED: ICD-10-CM

## 2017-03-03 DIAGNOSIS — F32.9 MAJOR DEPRESSIVE DISORDER, SINGLE EPISODE, UNSPECIFIED: ICD-10-CM

## 2017-03-03 DIAGNOSIS — K21.9 GASTRO-ESOPHAGEAL REFLUX DISEASE WITHOUT ESOPHAGITIS: ICD-10-CM

## 2017-03-03 DIAGNOSIS — N18.6 END STAGE RENAL DISEASE: ICD-10-CM

## 2017-03-03 DIAGNOSIS — I12.0 HYPERTENSIVE CHRONIC KIDNEY DISEASE WITH STAGE 5 CHRONIC KIDNEY DISEASE OR END STAGE RENAL DISEASE: ICD-10-CM

## 2017-03-03 DIAGNOSIS — Z98.89 OTHER SPECIFIED POSTPROCEDURAL STATES: Chronic | ICD-10-CM

## 2017-03-03 DIAGNOSIS — D64.9 ANEMIA, UNSPECIFIED: ICD-10-CM

## 2017-03-03 PROCEDURE — 76776 US EXAM K TRANSPL W/DOPPLER: CPT

## 2017-03-03 PROCEDURE — 76776 US EXAM K TRANSPL W/DOPPLER: CPT | Mod: 26,RT

## 2017-03-03 PROCEDURE — 76857 US EXAM PELVIC LIMITED: CPT | Mod: 26

## 2017-03-03 PROCEDURE — 76857 US EXAM PELVIC LIMITED: CPT

## 2017-03-07 ENCOUNTER — LABORATORY RESULT (OUTPATIENT)
Age: 57
End: 2017-03-07

## 2017-03-07 ENCOUNTER — OUTPATIENT (OUTPATIENT)
Dept: OUTPATIENT SERVICES | Facility: HOSPITAL | Age: 57
LOS: 1 days | End: 2017-03-07
Payer: MEDICARE

## 2017-03-07 ENCOUNTER — INPATIENT (INPATIENT)
Facility: HOSPITAL | Age: 57
LOS: 2 days | Discharge: HOME | DRG: 683 | End: 2017-03-10
Attending: INTERNAL MEDICINE | Admitting: INTERNAL MEDICINE
Payer: MEDICARE

## 2017-03-07 ENCOUNTER — APPOINTMENT (OUTPATIENT)
Dept: NEPHROLOGY | Facility: CLINIC | Age: 57
End: 2017-03-07

## 2017-03-07 VITALS
OXYGEN SATURATION: 100 % | TEMPERATURE: 98 F | DIASTOLIC BLOOD PRESSURE: 86 MMHG | SYSTOLIC BLOOD PRESSURE: 137 MMHG | HEART RATE: 82 BPM

## 2017-03-07 VITALS
RESPIRATION RATE: 16 BRPM | SYSTOLIC BLOOD PRESSURE: 136 MMHG | BODY MASS INDEX: 18.32 KG/M2 | DIASTOLIC BLOOD PRESSURE: 62 MMHG | WEIGHT: 117 LBS | TEMPERATURE: 97.6 F | HEART RATE: 70 BPM

## 2017-03-07 DIAGNOSIS — Z98.89 OTHER SPECIFIED POSTPROCEDURAL STATES: Chronic | ICD-10-CM

## 2017-03-07 DIAGNOSIS — Z94.0 KIDNEY TRANSPLANT STATUS: ICD-10-CM

## 2017-03-07 DIAGNOSIS — Z99.2 DEPENDENCE ON RENAL DIALYSIS: Chronic | ICD-10-CM

## 2017-03-07 DIAGNOSIS — Z01.818 ENCOUNTER FOR OTHER PREPROCEDURAL EXAMINATION: ICD-10-CM

## 2017-03-07 DIAGNOSIS — N18.6 END STAGE RENAL DISEASE: ICD-10-CM

## 2017-03-07 LAB
ACANTHOCYTES BLD QL SMEAR: SLIGHT — SIGNIFICANT CHANGE UP
ALBUMIN SERPL ELPH-MCNC: 4.7 G/DL — SIGNIFICANT CHANGE UP (ref 3.3–5)
ALBUMIN SERPL ELPH-MCNC: 4.8 G/DL — SIGNIFICANT CHANGE UP (ref 3.3–5)
ALP SERPL-CCNC: 69 U/L — SIGNIFICANT CHANGE UP (ref 40–120)
ALP SERPL-CCNC: 70 U/L — SIGNIFICANT CHANGE UP (ref 40–120)
ALT FLD-CCNC: 11 U/L RC — SIGNIFICANT CHANGE UP (ref 10–45)
ALT FLD-CCNC: 9 U/L — LOW (ref 10–45)
ANION GAP SERPL CALC-SCNC: 14 MMOL/L — SIGNIFICANT CHANGE UP (ref 5–17)
ANION GAP SERPL CALC-SCNC: 15 MMOL/L — SIGNIFICANT CHANGE UP (ref 5–17)
ANION GAP SERPL CALC-SCNC: 17 MMOL/L — SIGNIFICANT CHANGE UP (ref 5–17)
ANISOCYTOSIS BLD QL: SLIGHT — SIGNIFICANT CHANGE UP
APPEARANCE UR: ABNORMAL
APPEARANCE UR: ABNORMAL
AST SERPL-CCNC: 12 U/L — SIGNIFICANT CHANGE UP (ref 10–40)
AST SERPL-CCNC: 14 U/L — SIGNIFICANT CHANGE UP (ref 10–40)
BACTERIA # UR AUTO: NEGATIVE — SIGNIFICANT CHANGE UP
BASOPHILS # BLD AUTO: 0 K/UL — SIGNIFICANT CHANGE UP (ref 0–0.2)
BASOPHILS # BLD AUTO: 0.01 K/UL — SIGNIFICANT CHANGE UP (ref 0–0.2)
BASOPHILS NFR BLD AUTO: 0 % — SIGNIFICANT CHANGE UP (ref 0–2)
BASOPHILS NFR BLD AUTO: 0.1 % — SIGNIFICANT CHANGE UP (ref 0–2)
BILIRUB SERPL-MCNC: 0.6 MG/DL — SIGNIFICANT CHANGE UP (ref 0.2–1.2)
BILIRUB SERPL-MCNC: 0.6 MG/DL — SIGNIFICANT CHANGE UP (ref 0.2–1.2)
BILIRUB UR-MCNC: NEGATIVE — SIGNIFICANT CHANGE UP
BILIRUB UR-MCNC: NEGATIVE — SIGNIFICANT CHANGE UP
BUN SERPL-MCNC: 41 MG/DL — HIGH (ref 7–23)
BUN SERPL-MCNC: 42 MG/DL — HIGH (ref 7–23)
BUN SERPL-MCNC: 44 MG/DL — HIGH (ref 7–23)
CALCIUM SERPL-MCNC: 10 MG/DL — SIGNIFICANT CHANGE UP (ref 8.4–10.5)
CALCIUM SERPL-MCNC: 10.6 MG/DL — HIGH (ref 8.4–10.5)
CALCIUM SERPL-MCNC: 9.9 MG/DL — SIGNIFICANT CHANGE UP (ref 8.4–10.5)
CHLORIDE SERPL-SCNC: 102 MMOL/L — SIGNIFICANT CHANGE UP (ref 96–108)
CHLORIDE SERPL-SCNC: 103 MMOL/L — SIGNIFICANT CHANGE UP (ref 96–108)
CHLORIDE SERPL-SCNC: 104 MMOL/L — SIGNIFICANT CHANGE UP (ref 96–108)
CO2 SERPL-SCNC: 16 MMOL/L — LOW (ref 22–31)
CO2 SERPL-SCNC: 17 MMOL/L — LOW (ref 22–31)
CO2 SERPL-SCNC: 18 MMOL/L — LOW (ref 22–31)
COLOR SPEC: ABNORMAL
COLOR SPEC: YELLOW — SIGNIFICANT CHANGE UP
COMMENT - URINE: SIGNIFICANT CHANGE UP
CREAT ?TM UR-MCNC: 78 MG/DL — SIGNIFICANT CHANGE UP
CREAT SERPL-MCNC: 2.88 MG/DL — HIGH (ref 0.5–1.3)
CREAT SERPL-MCNC: 2.96 MG/DL — HIGH (ref 0.5–1.3)
CREAT SERPL-MCNC: 3.04 MG/DL — HIGH (ref 0.5–1.3)
DIFF PNL FLD: ABNORMAL
DIFF PNL FLD: ABNORMAL
ELLIPTOCYTES BLD QL SMEAR: SLIGHT — SIGNIFICANT CHANGE UP
EOSINOPHIL # BLD AUTO: 0 K/UL — SIGNIFICANT CHANGE UP (ref 0–0.5)
EOSINOPHIL # BLD AUTO: 0.09 K/UL — SIGNIFICANT CHANGE UP (ref 0–0.5)
EOSINOPHIL NFR BLD AUTO: 0 % — SIGNIFICANT CHANGE UP (ref 0–6)
EOSINOPHIL NFR BLD AUTO: 1.2 % — SIGNIFICANT CHANGE UP (ref 0–6)
EPI CELLS # UR: 7 /HPF — HIGH (ref 0–5)
GAS PNL BLDV: SIGNIFICANT CHANGE UP
GLUCOSE SERPL-MCNC: 118 MG/DL — HIGH (ref 70–99)
GLUCOSE SERPL-MCNC: 128 MG/DL — HIGH (ref 70–99)
GLUCOSE SERPL-MCNC: 99 MG/DL — SIGNIFICANT CHANGE UP (ref 70–99)
GLUCOSE UR QL: NEGATIVE MG/DL — SIGNIFICANT CHANGE UP
GLUCOSE UR QL: NEGATIVE — SIGNIFICANT CHANGE UP
HCT VFR BLD CALC: 37.3 % — LOW (ref 39–50)
HCT VFR BLD CALC: 37.4 % — LOW (ref 39–50)
HGB BLD-MCNC: 12.4 G/DL — LOW (ref 13–17)
HGB BLD-MCNC: 12.4 G/DL — LOW (ref 13–17)
HYALINE CASTS # UR AUTO: 0 /LPF — SIGNIFICANT CHANGE UP (ref 0–7)
IMM GRANULOCYTES NFR BLD AUTO: 0 % — SIGNIFICANT CHANGE UP (ref 0–1.5)
KETONES UR-MCNC: NEGATIVE — SIGNIFICANT CHANGE UP
KETONES UR-MCNC: NEGATIVE — SIGNIFICANT CHANGE UP
LDH SERPL L TO P-CCNC: 271 U/L — HIGH (ref 50–242)
LEUKOCYTE ESTERASE UR-ACNC: ABNORMAL
LEUKOCYTE ESTERASE UR-ACNC: ABNORMAL
LYMPHOCYTES # BLD AUTO: 0.8 K/UL — LOW (ref 1–3.3)
LYMPHOCYTES # BLD AUTO: 1.5 K/UL — SIGNIFICANT CHANGE UP (ref 1–3.3)
LYMPHOCYTES # BLD AUTO: 19.3 % — SIGNIFICANT CHANGE UP (ref 13–44)
LYMPHOCYTES # BLD AUTO: 8.5 % — LOW (ref 13–44)
MACROCYTES BLD QL: SLIGHT — SIGNIFICANT CHANGE UP
MAGNESIUM SERPL-MCNC: 1.9 MG/DL — SIGNIFICANT CHANGE UP (ref 1.6–2.6)
MAGNESIUM SERPL-MCNC: 1.9 MG/DL — SIGNIFICANT CHANGE UP (ref 1.6–2.6)
MANUAL SMEAR VERIFICATION: SIGNIFICANT CHANGE UP
MCHC RBC-ENTMCNC: 32 PG — SIGNIFICANT CHANGE UP (ref 27–34)
MCHC RBC-ENTMCNC: 32.8 PG — SIGNIFICANT CHANGE UP (ref 27–34)
MCHC RBC-ENTMCNC: 33.1 GM/DL — SIGNIFICANT CHANGE UP (ref 32–36)
MCHC RBC-ENTMCNC: 33.2 GM/DL — SIGNIFICANT CHANGE UP (ref 32–36)
MCV RBC AUTO: 96.6 FL — SIGNIFICANT CHANGE UP (ref 80–100)
MCV RBC AUTO: 98.9 FL — SIGNIFICANT CHANGE UP (ref 80–100)
MONOCYTES # BLD AUTO: 0.1 K/UL — SIGNIFICANT CHANGE UP (ref 0–0.9)
MONOCYTES # BLD AUTO: 0.34 K/UL — SIGNIFICANT CHANGE UP (ref 0–0.9)
MONOCYTES NFR BLD AUTO: 1 % — LOW (ref 2–14)
MONOCYTES NFR BLD AUTO: 4.4 % — SIGNIFICANT CHANGE UP (ref 2–14)
NEUTROPHILS # BLD AUTO: 5.85 K/UL — SIGNIFICANT CHANGE UP (ref 1.8–7.4)
NEUTROPHILS # BLD AUTO: 8.1 K/UL — HIGH (ref 1.8–7.4)
NEUTROPHILS NFR BLD AUTO: 75 % — SIGNIFICANT CHANGE UP (ref 43–77)
NEUTROPHILS NFR BLD AUTO: 90.4 % — HIGH (ref 43–77)
NITRITE UR-MCNC: NEGATIVE — SIGNIFICANT CHANGE UP
NITRITE UR-MCNC: POSITIVE
PH UR: 5.5 — SIGNIFICANT CHANGE UP (ref 5–8)
PH UR: 6 — SIGNIFICANT CHANGE UP (ref 4.8–8)
PHOSPHATE SERPL-MCNC: 2.6 MG/DL — SIGNIFICANT CHANGE UP (ref 2.5–4.5)
PHOSPHATE SERPL-MCNC: 3 MG/DL — SIGNIFICANT CHANGE UP (ref 2.5–4.5)
PLAT MORPH BLD: NORMAL — SIGNIFICANT CHANGE UP
PLATELET # BLD AUTO: 185 K/UL — SIGNIFICANT CHANGE UP (ref 150–400)
PLATELET # BLD AUTO: 185 K/UL — SIGNIFICANT CHANGE UP (ref 150–400)
POIKILOCYTOSIS BLD QL AUTO: SLIGHT — SIGNIFICANT CHANGE UP
POTASSIUM SERPL-MCNC: 5.9 MMOL/L — HIGH (ref 3.5–5.3)
POTASSIUM SERPL-MCNC: 6.6 MMOL/L — CRITICAL HIGH (ref 3.5–5.3)
POTASSIUM SERPL-MCNC: 6.7 MMOL/L — CRITICAL HIGH (ref 3.5–5.3)
POTASSIUM SERPL-SCNC: 5.9 MMOL/L — HIGH (ref 3.5–5.3)
POTASSIUM SERPL-SCNC: 6.6 MMOL/L — CRITICAL HIGH (ref 3.5–5.3)
POTASSIUM SERPL-SCNC: 6.7 MMOL/L — CRITICAL HIGH (ref 3.5–5.3)
PROT ?TM UR-MCNC: 175 MG/DL — HIGH (ref 0–12)
PROT SERPL-MCNC: 8.1 G/DL — SIGNIFICANT CHANGE UP (ref 6–8.3)
PROT SERPL-MCNC: 8.3 G/DL — SIGNIFICANT CHANGE UP (ref 6–8.3)
PROT UR-MCNC: 100 MG/DL
PROT UR-MCNC: 300 MG/DL
PROT/CREAT UR-RTO: 2.2 RATIO — HIGH (ref 0–0.2)
RBC # BLD: 3.77 M/UL — LOW (ref 4.2–5.8)
RBC # BLD: 3.87 M/UL — LOW (ref 4.2–5.8)
RBC # FLD: 14.7 % — HIGH (ref 10.3–14.5)
RBC # FLD: 15.9 % — HIGH (ref 10.3–14.5)
RBC BLD AUTO: ABNORMAL
RBC CASTS # UR COMP ASSIST: 337 /HPF — HIGH (ref 0–4)
RBC CASTS # UR COMP ASSIST: ABNORMAL /HPF (ref 0–2)
SCHISTOCYTES BLD QL AUTO: SLIGHT — SIGNIFICANT CHANGE UP
SODIUM SERPL-SCNC: 135 MMOL/L — SIGNIFICANT CHANGE UP (ref 135–145)
SODIUM SERPL-SCNC: 135 MMOL/L — SIGNIFICANT CHANGE UP (ref 135–145)
SODIUM SERPL-SCNC: 136 MMOL/L — SIGNIFICANT CHANGE UP (ref 135–145)
SP GR SPEC: 1.02 — SIGNIFICANT CHANGE UP (ref 1.01–1.02)
SP GR SPEC: 1.02 — SIGNIFICANT CHANGE UP (ref 1.01–1.02)
TACROLIMUS SERPL-MCNC: 17.2 NG/ML — SIGNIFICANT CHANGE UP
UROBILINOGEN FLD QL: 1 MG/DL — SIGNIFICANT CHANGE UP
UROBILINOGEN FLD QL: NEGATIVE — SIGNIFICANT CHANGE UP
WBC # BLD: 7.79 K/UL — SIGNIFICANT CHANGE UP (ref 3.8–10.5)
WBC # BLD: 9 K/UL — SIGNIFICANT CHANGE UP (ref 3.8–10.5)
WBC # FLD AUTO: 7.79 K/UL — SIGNIFICANT CHANGE UP (ref 3.8–10.5)
WBC # FLD AUTO: 9 K/UL — SIGNIFICANT CHANGE UP (ref 3.8–10.5)
WBC UR QL: 56 /HPF — HIGH (ref 0–5)
WBC UR QL: SIGNIFICANT CHANGE UP /HPF (ref 0–5)

## 2017-03-07 PROCEDURE — 84156 ASSAY OF PROTEIN URINE: CPT

## 2017-03-07 PROCEDURE — 85027 COMPLETE CBC AUTOMATED: CPT

## 2017-03-07 PROCEDURE — 83735 ASSAY OF MAGNESIUM: CPT

## 2017-03-07 PROCEDURE — 81001 URINALYSIS AUTO W/SCOPE: CPT

## 2017-03-07 PROCEDURE — 80197 ASSAY OF TACROLIMUS: CPT

## 2017-03-07 PROCEDURE — 80053 COMPREHEN METABOLIC PANEL: CPT

## 2017-03-07 PROCEDURE — 76776 US EXAM K TRANSPL W/DOPPLER: CPT | Mod: 26,RT

## 2017-03-07 PROCEDURE — 83615 LACTATE (LD) (LDH) ENZYME: CPT

## 2017-03-07 PROCEDURE — 84100 ASSAY OF PHOSPHORUS: CPT

## 2017-03-07 PROCEDURE — 99285 EMERGENCY DEPT VISIT HI MDM: CPT | Mod: 25

## 2017-03-07 PROCEDURE — 93010 ELECTROCARDIOGRAM REPORT: CPT

## 2017-03-07 RX ORDER — SODIUM POLYSTYRENE SULFONATE 4.1 MEQ/G
30 POWDER, FOR SUSPENSION ORAL ONCE
Qty: 0 | Refills: 0 | Status: COMPLETED | OUTPATIENT
Start: 2017-03-07 | End: 2017-03-07

## 2017-03-07 RX ORDER — DEXTROSE 50 % IN WATER 50 %
50 SYRINGE (ML) INTRAVENOUS ONCE
Qty: 0 | Refills: 0 | Status: COMPLETED | OUTPATIENT
Start: 2017-03-07 | End: 2017-03-07

## 2017-03-07 RX ORDER — CIPROFLOXACIN HYDROCHLORIDE 500 MG/1
500 TABLET, FILM COATED ORAL DAILY
Qty: 14 | Refills: 1 | Status: DISCONTINUED | COMMUNITY
Start: 2017-03-03 | End: 2017-03-07

## 2017-03-07 RX ORDER — CALCIUM GLUCONATE 100 MG/ML
1 VIAL (ML) INTRAVENOUS ONCE
Qty: 0 | Refills: 0 | Status: COMPLETED | OUTPATIENT
Start: 2017-03-07 | End: 2017-03-07

## 2017-03-07 RX ORDER — MORPHINE SULFATE 50 MG/1
2 CAPSULE, EXTENDED RELEASE ORAL ONCE
Qty: 0 | Refills: 0 | Status: DISCONTINUED | OUTPATIENT
Start: 2017-03-07 | End: 2017-03-07

## 2017-03-07 RX ORDER — FUROSEMIDE 40 MG
40 TABLET ORAL ONCE
Qty: 0 | Refills: 0 | Status: COMPLETED | OUTPATIENT
Start: 2017-03-07 | End: 2017-03-07

## 2017-03-07 RX ORDER — SODIUM CHLORIDE 9 MG/ML
500 INJECTION INTRAMUSCULAR; INTRAVENOUS; SUBCUTANEOUS ONCE
Qty: 0 | Refills: 0 | Status: COMPLETED | OUTPATIENT
Start: 2017-03-07 | End: 2017-03-07

## 2017-03-07 RX ORDER — INSULIN HUMAN 100 [IU]/ML
10 INJECTION, SOLUTION SUBCUTANEOUS ONCE
Qty: 0 | Refills: 0 | Status: COMPLETED | OUTPATIENT
Start: 2017-03-07 | End: 2017-03-07

## 2017-03-07 RX ADMIN — MORPHINE SULFATE 2 MILLIGRAM(S): 50 CAPSULE, EXTENDED RELEASE ORAL at 22:54

## 2017-03-07 RX ADMIN — MORPHINE SULFATE 2 MILLIGRAM(S): 50 CAPSULE, EXTENDED RELEASE ORAL at 22:13

## 2017-03-07 RX ADMIN — SODIUM POLYSTYRENE SULFONATE 30 GRAM(S): 4.1 POWDER, FOR SUSPENSION ORAL at 18:18

## 2017-03-07 RX ADMIN — Medication 200 GRAM(S): at 18:25

## 2017-03-07 RX ADMIN — SODIUM POLYSTYRENE SULFONATE 30 GRAM(S): 4.1 POWDER, FOR SUSPENSION ORAL at 23:00

## 2017-03-07 RX ADMIN — SODIUM CHLORIDE 500 MILLILITER(S): 9 INJECTION INTRAMUSCULAR; INTRAVENOUS; SUBCUTANEOUS at 22:58

## 2017-03-07 RX ADMIN — SODIUM CHLORIDE 500 MILLILITER(S): 9 INJECTION INTRAMUSCULAR; INTRAVENOUS; SUBCUTANEOUS at 18:27

## 2017-03-07 RX ADMIN — INSULIN HUMAN 10 UNIT(S): 100 INJECTION, SOLUTION SUBCUTANEOUS at 18:19

## 2017-03-07 RX ADMIN — Medication 50 MILLILITER(S): at 18:19

## 2017-03-07 RX ADMIN — Medication 50 MILLILITER(S): at 18:54

## 2017-03-07 RX ADMIN — MORPHINE SULFATE 2 MILLIGRAM(S): 50 CAPSULE, EXTENDED RELEASE ORAL at 18:18

## 2017-03-07 NOTE — ED PROVIDER NOTE - INTERPRETATION
EKG reviewed for rate, rhythm, axis, intervals and segments, including QRS morphology, P wave appearance T wave appearance, NE interval, and QT interval.  I find the EKG to be unremarkable in all of these regards except as follows: L aixs, LVH, prominent precordial T waves

## 2017-03-07 NOTE — ED ADULT NURSE NOTE - OBJECTIVE STATEMENT
patient was ESRD and recently had kidney transplant at the end of February. States he has been urinating more than he thought he should and has been feeling weak with some lower ABD pain. Patient denies any fevers or chills, states he has been feeling well overall since his surgery and taking his medications. Patient has LUE fistula. abd tender in the lower quadrant. LS clear. Patient ambulatory with steady gait. EKG done on arrival.

## 2017-03-07 NOTE — ED PROVIDER NOTE - PHYSICAL EXAMINATION
AAOX3, NAD, NCAT, EOMI, PERRL, MMM, Neck Supple, RRR, CTABL, ABD S periumbilical tenderness without rebound or gaurding, +BS, No CVAT, EXT warm, well perfused, No Edema, Distal Pulses Intact, No Rash,  MAEx4, Sensation to soft touch grossly intact, normal strength/tone.

## 2017-03-07 NOTE — ED PROVIDER NOTE - OBJECTIVE STATEMENT
56M pmhx of APKD sp renal tx in Feb at [facility] sent from tx center for hyperkalemia. Pt endorses polyuria every 3-4 minutes for the last 3 days. Tolerating PO. No hematuria, dysuria, worsening abdominal pain. No fever.

## 2017-03-07 NOTE — ED PROVIDER NOTE - PROGRESS NOTE DETAILS
Dr. Murphy / [place] - EKG, 1g caglu, d50, iv 10insulin kaexalate 30mg. 500cc NS, US Flow study. Urine Culture. Hold prograf tonight. 1/2 dose tomorrow. 2.5 twice a day. Do not give abx for nitrate positive urine.   Jaun Cedeno - [phone]; MD [place]: feeling better, pending repeat labwork. Sonal: Patient repeat bmp shows K+ is 6.0. Resident spoke with Renal and recommend consult Dr. Fulton and 500 cc IVF. spoke with Dr. Chung admit to Dr. Giron. dr. mark nagy spoke with Dr. Giron, concerned about too much kayexalate given (redosed with lasix after 2nd fluid bolus as per conversation with Dr. Cedeno after 2nd elevated potassium at 5.9, check lactate

## 2017-03-07 NOTE — ED PROVIDER NOTE - NS ED ROS FT
No fever/chills, no change in vision, no throat pain, no chest pain, no abdominal pain, no nausea/vomiting,  no dysuria, no joint pain, no rashes, no focal neurologic complaints, no known mental health issues

## 2017-03-07 NOTE — ED PROVIDER NOTE - MEDICAL DECISION MAKING DETAILS
55 yo male s/p renal transplant w/ frequent urination and hyperkalemia on outpatient labs.  Will consult transplant team/nephrology, IVF, medicate, repeat labs in 4 hours.  Nontoxic on exam otherwise.

## 2017-03-08 DIAGNOSIS — F31.9 BIPOLAR DISORDER, UNSPECIFIED: ICD-10-CM

## 2017-03-08 DIAGNOSIS — E87.5 HYPERKALEMIA: ICD-10-CM

## 2017-03-08 DIAGNOSIS — Z94.0 KIDNEY TRANSPLANT STATUS: Chronic | ICD-10-CM

## 2017-03-08 DIAGNOSIS — I10 ESSENTIAL (PRIMARY) HYPERTENSION: ICD-10-CM

## 2017-03-08 DIAGNOSIS — Z94.0 KIDNEY TRANSPLANT STATUS: ICD-10-CM

## 2017-03-08 LAB
ANION GAP SERPL CALC-SCNC: 15 MMOL/L — SIGNIFICANT CHANGE UP (ref 5–17)
ANION GAP SERPL CALC-SCNC: 18 MMOL/L — HIGH (ref 5–17)
BASE EXCESS BLDV CALC-SCNC: -5.9 MMOL/L — LOW (ref -2–2)
BUN SERPL-MCNC: 37 MG/DL — HIGH (ref 7–23)
BUN SERPL-MCNC: 40 MG/DL — HIGH (ref 7–23)
BUN SERPL-MCNC: 41 MG/DL — HIGH (ref 7–23)
BUN SERPL-MCNC: 42 MG/DL — HIGH (ref 7–23)
CA-I SERPL-SCNC: 1.3 MMOL/L — SIGNIFICANT CHANGE UP (ref 1.12–1.3)
CALCIUM SERPL-MCNC: 10 MG/DL — SIGNIFICANT CHANGE UP (ref 8.4–10.5)
CALCIUM SERPL-MCNC: 9.4 MG/DL — SIGNIFICANT CHANGE UP (ref 8.4–10.5)
CALCIUM SERPL-MCNC: 9.4 MG/DL — SIGNIFICANT CHANGE UP (ref 8.4–10.5)
CALCIUM SERPL-MCNC: 9.9 MG/DL — SIGNIFICANT CHANGE UP (ref 8.4–10.5)
CHLORIDE BLDV-SCNC: 107 MMOL/L — SIGNIFICANT CHANGE UP (ref 96–108)
CHLORIDE SERPL-SCNC: 103 MMOL/L — SIGNIFICANT CHANGE UP (ref 96–108)
CHLORIDE SERPL-SCNC: 103 MMOL/L — SIGNIFICANT CHANGE UP (ref 96–108)
CHLORIDE SERPL-SCNC: 106 MMOL/L — SIGNIFICANT CHANGE UP (ref 96–108)
CHLORIDE SERPL-SCNC: 108 MMOL/L — SIGNIFICANT CHANGE UP (ref 96–108)
CO2 BLDV-SCNC: 20 MMOL/L — LOW (ref 22–30)
CO2 SERPL-SCNC: 17 MMOL/L — LOW (ref 22–31)
CO2 SERPL-SCNC: 17 MMOL/L — LOW (ref 22–31)
CO2 SERPL-SCNC: 18 MMOL/L — LOW (ref 22–31)
CO2 SERPL-SCNC: 19 MMOL/L — LOW (ref 22–31)
CREAT SERPL-MCNC: 2.83 MG/DL — HIGH (ref 0.5–1.3)
CREAT SERPL-MCNC: 2.92 MG/DL — HIGH (ref 0.5–1.3)
CREAT SERPL-MCNC: 3.02 MG/DL — HIGH (ref 0.5–1.3)
CREAT SERPL-MCNC: 3.03 MG/DL — HIGH (ref 0.5–1.3)
CULTURE RESULTS: NO GROWTH — SIGNIFICANT CHANGE UP
GAS PNL BLDV: 135 MMOL/L — LOW (ref 136–145)
GAS PNL BLDV: SIGNIFICANT CHANGE UP
GLUCOSE BLDV-MCNC: 99 MG/DL — SIGNIFICANT CHANGE UP (ref 70–99)
GLUCOSE SERPL-MCNC: 106 MG/DL — HIGH (ref 70–99)
GLUCOSE SERPL-MCNC: 108 MG/DL — HIGH (ref 70–99)
GLUCOSE SERPL-MCNC: 131 MG/DL — HIGH (ref 70–99)
GLUCOSE SERPL-MCNC: 154 MG/DL — HIGH (ref 70–99)
HCO3 BLDV-SCNC: 19 MMOL/L — LOW (ref 21–29)
HCT VFR BLD CALC: 34.2 % — LOW (ref 39–50)
HCT VFR BLDA CALC: 36 % — LOW (ref 39–50)
HGB BLD CALC-MCNC: 11.6 G/DL — LOW (ref 13–17)
HGB BLD-MCNC: 11.5 G/DL — LOW (ref 13–17)
HOROWITZ INDEX BLDV+IHG-RTO: SIGNIFICANT CHANGE UP
LACTATE BLDV-MCNC: 1 MMOL/L — SIGNIFICANT CHANGE UP (ref 0.7–2)
MCHC RBC-ENTMCNC: 33.1 PG — SIGNIFICANT CHANGE UP (ref 27–34)
MCHC RBC-ENTMCNC: 33.7 GM/DL — SIGNIFICANT CHANGE UP (ref 32–36)
MCV RBC AUTO: 98.5 FL — SIGNIFICANT CHANGE UP (ref 80–100)
OTHER CELLS CSF MANUAL: 14 ML/DL — LOW (ref 18–22)
PCO2 BLDV: 36 MMHG — SIGNIFICANT CHANGE UP (ref 35–50)
PH BLDV: 7.34 — LOW (ref 7.35–7.45)
PLATELET # BLD AUTO: 200 K/UL — SIGNIFICANT CHANGE UP (ref 150–400)
PO2 BLDV: 64 MMHG — HIGH (ref 25–45)
POTASSIUM BLDV-SCNC: 7.8 MMOL/L — CRITICAL HIGH (ref 3.5–5)
POTASSIUM SERPL-MCNC: 5.6 MMOL/L — HIGH (ref 3.5–5.3)
POTASSIUM SERPL-MCNC: 5.7 MMOL/L — HIGH (ref 3.5–5.3)
POTASSIUM SERPL-MCNC: 6 MMOL/L — HIGH (ref 3.5–5.3)
POTASSIUM SERPL-MCNC: 6.1 MMOL/L — HIGH (ref 3.5–5.3)
POTASSIUM SERPL-SCNC: 5.6 MMOL/L — HIGH (ref 3.5–5.3)
POTASSIUM SERPL-SCNC: 5.7 MMOL/L — HIGH (ref 3.5–5.3)
POTASSIUM SERPL-SCNC: 6 MMOL/L — HIGH (ref 3.5–5.3)
POTASSIUM SERPL-SCNC: 6.1 MMOL/L — HIGH (ref 3.5–5.3)
RBC # BLD: 3.47 M/UL — LOW (ref 4.2–5.8)
RBC # FLD: 14.6 % — HIGH (ref 10.3–14.5)
SAO2 % BLDV: 91 % — HIGH (ref 67–88)
SODIUM SERPL-SCNC: 136 MMOL/L — SIGNIFICANT CHANGE UP (ref 135–145)
SODIUM SERPL-SCNC: 138 MMOL/L — SIGNIFICANT CHANGE UP (ref 135–145)
SODIUM SERPL-SCNC: 140 MMOL/L — SIGNIFICANT CHANGE UP (ref 135–145)
SODIUM SERPL-SCNC: 140 MMOL/L — SIGNIFICANT CHANGE UP (ref 135–145)
SPECIMEN SOURCE: SIGNIFICANT CHANGE UP
TACROLIMUS SERPL-MCNC: 15.5 NG/ML — SIGNIFICANT CHANGE UP
TACROLIMUS SERPL-MCNC: 34 NG/ML — SIGNIFICANT CHANGE UP
WBC # BLD: 13.4 K/UL — HIGH (ref 3.8–10.5)
WBC # FLD AUTO: 13.4 K/UL — HIGH (ref 3.8–10.5)

## 2017-03-08 PROCEDURE — 99223 1ST HOSP IP/OBS HIGH 75: CPT

## 2017-03-08 PROCEDURE — 99223 1ST HOSP IP/OBS HIGH 75: CPT | Mod: GC

## 2017-03-08 RX ORDER — INSULIN HUMAN 100 [IU]/ML
10 INJECTION, SOLUTION SUBCUTANEOUS ONCE
Qty: 0 | Refills: 0 | Status: DISCONTINUED | OUTPATIENT
Start: 2017-03-08 | End: 2017-03-08

## 2017-03-08 RX ORDER — DEXTROSE 50 % IN WATER 50 %
50 SYRINGE (ML) INTRAVENOUS ONCE
Qty: 0 | Refills: 0 | Status: COMPLETED | OUTPATIENT
Start: 2017-03-08 | End: 2017-03-08

## 2017-03-08 RX ORDER — MYCOPHENOLATE MOFETIL 250 MG/1
1000 CAPSULE ORAL EVERY 12 HOURS
Qty: 0 | Refills: 0 | Status: DISCONTINUED | OUTPATIENT
Start: 2017-03-08 | End: 2017-03-10

## 2017-03-08 RX ORDER — INSULIN HUMAN 100 [IU]/ML
10 INJECTION, SOLUTION SUBCUTANEOUS ONCE
Qty: 0 | Refills: 0 | Status: COMPLETED | OUTPATIENT
Start: 2017-03-08 | End: 2017-03-08

## 2017-03-08 RX ORDER — VALGANCICLOVIR 450 MG/1
450 TABLET, FILM COATED ORAL DAILY
Qty: 0 | Refills: 0 | Status: DISCONTINUED | OUTPATIENT
Start: 2017-03-08 | End: 2017-03-10

## 2017-03-08 RX ORDER — DEXTROSE 50 % IN WATER 50 %
50 SYRINGE (ML) INTRAVENOUS ONCE
Qty: 0 | Refills: 0 | Status: DISCONTINUED | OUTPATIENT
Start: 2017-03-08 | End: 2017-03-08

## 2017-03-08 RX ORDER — FLUCONAZOLE 150 MG/1
1 TABLET ORAL
Qty: 0 | Refills: 0 | COMMUNITY

## 2017-03-08 RX ORDER — ONDANSETRON 8 MG/1
4 TABLET, FILM COATED ORAL ONCE
Qty: 0 | Refills: 0 | Status: COMPLETED | OUTPATIENT
Start: 2017-03-08 | End: 2017-03-08

## 2017-03-08 RX ORDER — SODIUM CHLORIDE 9 MG/ML
1000 INJECTION INTRAMUSCULAR; INTRAVENOUS; SUBCUTANEOUS
Qty: 0 | Refills: 0 | Status: DISCONTINUED | OUTPATIENT
Start: 2017-03-08 | End: 2017-03-08

## 2017-03-08 RX ORDER — SODIUM CHLORIDE 9 MG/ML
500 INJECTION INTRAMUSCULAR; INTRAVENOUS; SUBCUTANEOUS ONCE
Qty: 0 | Refills: 0 | Status: COMPLETED | OUTPATIENT
Start: 2017-03-08 | End: 2017-03-08

## 2017-03-08 RX ORDER — HEPARIN SODIUM 5000 [USP'U]/ML
5000 INJECTION INTRAVENOUS; SUBCUTANEOUS EVERY 12 HOURS
Qty: 0 | Refills: 0 | Status: DISCONTINUED | OUTPATIENT
Start: 2017-03-08 | End: 2017-03-10

## 2017-03-08 RX ORDER — LIDOCAINE 4 G/100G
1 CREAM TOPICAL
Qty: 0 | Refills: 0 | Status: DISCONTINUED | OUTPATIENT
Start: 2017-03-08 | End: 2017-03-10

## 2017-03-08 RX ORDER — MORPHINE SULFATE 50 MG/1
1 CAPSULE, EXTENDED RELEASE ORAL ONCE
Qty: 0 | Refills: 0 | Status: DISCONTINUED | OUTPATIENT
Start: 2017-03-08 | End: 2017-03-08

## 2017-03-08 RX ORDER — SODIUM CHLORIDE 9 MG/ML
1000 INJECTION, SOLUTION INTRAVENOUS
Qty: 0 | Refills: 0 | Status: DISCONTINUED | OUTPATIENT
Start: 2017-03-08 | End: 2017-03-10

## 2017-03-08 RX ORDER — FAMOTIDINE 10 MG/ML
20 INJECTION INTRAVENOUS DAILY
Qty: 0 | Refills: 0 | Status: DISCONTINUED | OUTPATIENT
Start: 2017-03-08 | End: 2017-03-10

## 2017-03-08 RX ORDER — NYSTATIN 500MM UNIT
500000 POWDER (EA) MISCELLANEOUS
Qty: 0 | Refills: 0 | Status: DISCONTINUED | OUTPATIENT
Start: 2017-03-08 | End: 2017-03-10

## 2017-03-08 RX ORDER — FUROSEMIDE 40 MG
40 TABLET ORAL ONCE
Qty: 0 | Refills: 0 | Status: COMPLETED | OUTPATIENT
Start: 2017-03-08 | End: 2017-03-08

## 2017-03-08 RX ORDER — FUROSEMIDE 40 MG
20 TABLET ORAL ONCE
Qty: 0 | Refills: 0 | Status: DISCONTINUED | OUTPATIENT
Start: 2017-03-08 | End: 2017-03-08

## 2017-03-08 RX ORDER — MORPHINE SULFATE 50 MG/1
0.2 CAPSULE, EXTENDED RELEASE ORAL ONCE
Qty: 0 | Refills: 0 | Status: DISCONTINUED | OUTPATIENT
Start: 2017-03-08 | End: 2017-03-08

## 2017-03-08 RX ORDER — SODIUM POLYSTYRENE SULFONATE 4.1 MEQ/G
15 POWDER, FOR SUSPENSION ORAL ONCE
Qty: 0 | Refills: 0 | Status: DISCONTINUED | OUTPATIENT
Start: 2017-03-08 | End: 2017-03-08

## 2017-03-08 RX ORDER — MAGNESIUM SULFATE 500 MG/ML
2 VIAL (ML) INJECTION ONCE
Qty: 0 | Refills: 0 | Status: COMPLETED | OUTPATIENT
Start: 2017-03-08 | End: 2017-03-08

## 2017-03-08 RX ORDER — CALCIUM GLUCONATE 100 MG/ML
1 VIAL (ML) INTRAVENOUS ONCE
Qty: 0 | Refills: 0 | Status: COMPLETED | OUTPATIENT
Start: 2017-03-08 | End: 2017-03-08

## 2017-03-08 RX ORDER — SODIUM POLYSTYRENE SULFONATE 4.1 MEQ/G
30 POWDER, FOR SUSPENSION ORAL ONCE
Qty: 0 | Refills: 0 | Status: COMPLETED | OUTPATIENT
Start: 2017-03-08 | End: 2017-03-08

## 2017-03-08 RX ORDER — FUROSEMIDE 40 MG
80 TABLET ORAL ONCE
Qty: 0 | Refills: 0 | Status: COMPLETED | OUTPATIENT
Start: 2017-03-08 | End: 2017-03-08

## 2017-03-08 RX ORDER — ACETAMINOPHEN 500 MG
650 TABLET ORAL EVERY 6 HOURS
Qty: 0 | Refills: 0 | Status: DISCONTINUED | OUTPATIENT
Start: 2017-03-08 | End: 2017-03-10

## 2017-03-08 RX ADMIN — MORPHINE SULFATE 0.2 MILLIGRAM(S): 50 CAPSULE, EXTENDED RELEASE ORAL at 17:40

## 2017-03-08 RX ADMIN — ONDANSETRON 4 MILLIGRAM(S): 8 TABLET, FILM COATED ORAL at 01:12

## 2017-03-08 RX ADMIN — FAMOTIDINE 20 MILLIGRAM(S): 10 INJECTION INTRAVENOUS at 06:41

## 2017-03-08 RX ADMIN — MORPHINE SULFATE 1 MILLIGRAM(S): 50 CAPSULE, EXTENDED RELEASE ORAL at 22:51

## 2017-03-08 RX ADMIN — Medication 40 MILLIGRAM(S): at 17:42

## 2017-03-08 RX ADMIN — Medication 500000 UNIT(S): at 11:16

## 2017-03-08 RX ADMIN — Medication 500000 UNIT(S): at 23:26

## 2017-03-08 RX ADMIN — MORPHINE SULFATE 1 MILLIGRAM(S): 50 CAPSULE, EXTENDED RELEASE ORAL at 23:21

## 2017-03-08 RX ADMIN — MORPHINE SULFATE 2 MILLIGRAM(S): 50 CAPSULE, EXTENDED RELEASE ORAL at 00:23

## 2017-03-08 RX ADMIN — SODIUM CHLORIDE 125 MILLILITER(S): 9 INJECTION INTRAMUSCULAR; INTRAVENOUS; SUBCUTANEOUS at 17:42

## 2017-03-08 RX ADMIN — SODIUM CHLORIDE 1000 MILLILITER(S): 9 INJECTION INTRAMUSCULAR; INTRAVENOUS; SUBCUTANEOUS at 05:23

## 2017-03-08 RX ADMIN — Medication 50 MILLILITER(S): at 23:00

## 2017-03-08 RX ADMIN — VALGANCICLOVIR 450 MILLIGRAM(S): 450 TABLET, FILM COATED ORAL at 11:16

## 2017-03-08 RX ADMIN — SODIUM CHLORIDE 500 MILLILITER(S): 9 INJECTION INTRAMUSCULAR; INTRAVENOUS; SUBCUTANEOUS at 07:54

## 2017-03-08 RX ADMIN — HEPARIN SODIUM 5000 UNIT(S): 5000 INJECTION INTRAVENOUS; SUBCUTANEOUS at 17:42

## 2017-03-08 RX ADMIN — Medication 1 PATCH: at 11:16

## 2017-03-08 RX ADMIN — Medication 50 GRAM(S): at 05:38

## 2017-03-08 RX ADMIN — Medication 500000 UNIT(S): at 17:42

## 2017-03-08 RX ADMIN — SODIUM POLYSTYRENE SULFONATE 30 GRAM(S): 4.1 POWDER, FOR SUSPENSION ORAL at 07:52

## 2017-03-08 RX ADMIN — Medication 200 GRAM(S): at 07:53

## 2017-03-08 RX ADMIN — MYCOPHENOLATE MOFETIL 1000 MILLIGRAM(S): 250 CAPSULE ORAL at 22:29

## 2017-03-08 RX ADMIN — SODIUM POLYSTYRENE SULFONATE 30 GRAM(S): 4.1 POWDER, FOR SUSPENSION ORAL at 22:30

## 2017-03-08 RX ADMIN — INSULIN HUMAN 10 UNIT(S): 100 INJECTION, SOLUTION SUBCUTANEOUS at 23:01

## 2017-03-08 RX ADMIN — Medication 5 MILLIGRAM(S): at 15:43

## 2017-03-08 RX ADMIN — Medication 40 MILLIGRAM(S): at 00:22

## 2017-03-08 RX ADMIN — MYCOPHENOLATE MOFETIL 1000 MILLIGRAM(S): 250 CAPSULE ORAL at 11:16

## 2017-03-08 RX ADMIN — Medication 80 MILLIGRAM(S): at 23:00

## 2017-03-08 RX ADMIN — Medication 200 GRAM(S): at 23:26

## 2017-03-08 NOTE — H&P ADULT. - PMH
Acid reflux    Alcoholism /alcohol abuse    Bipolar 1 disorder    Hypertension    Polycystic kidney disease  with ESRD s/p kidney txp 2/24/17

## 2017-03-08 NOTE — H&P ADULT. - HISTORY OF PRESENT ILLNESS
57 yo man with PMH of ESRD from PCKD s/p renal txp 2/24/17, HTN, bipolar disorder sent from the office with hyperkalemia and high profraf level.  Pt reports feeling pretty well since transplant but has been having symptoms including frequent urination, mild pain at surgery site and some nausea.  Denies dysuria, hematuria, CP, SOB, leg swelling, fevers, chills.  Sent from the office with high K.  In the ER, given kayexylate, Ca gluconate, insulin and fluids.

## 2017-03-08 NOTE — H&P ADULT. - ASSESSMENT
55 yo man with PMH of HTN, ESRD s/p kidney txp 2/24/17, bipolar disorder a/w hyperkalemia in setting of high prograf level.  EKG with peaked T waves.  Given Ca gluconate, kayexylate, insulin in the ER.  Last K on VBP was 5.4.  Pt seen with Dr. Chung and Pao of kidney txp team - they recommend rechecking a BMP and prograf level to guide further treatment, hold prograf for now.

## 2017-03-08 NOTE — H&P ADULT. - PSH
Dialysis patient  Left Arm A V Graft ( 2004 )  H/O kidney transplant    S/P breast lumpectomy  right, 1996

## 2017-03-08 NOTE — H&P ADULT. - PROBLEM SELECTOR PLAN 1
Notable for peaked T waves, given kayexylate/Ca gluconate/insulin - last K was 5.4 on VBP  Management per renal/renal txp - checking repeat BMP now to guide further treatment

## 2017-03-09 ENCOUNTER — APPOINTMENT (OUTPATIENT)
Dept: NEPHROLOGY | Facility: CLINIC | Age: 57
End: 2017-03-09

## 2017-03-09 LAB
ANION GAP SERPL CALC-SCNC: 17 MMOL/L — SIGNIFICANT CHANGE UP (ref 5–17)
BUN SERPL-MCNC: 42 MG/DL — HIGH (ref 7–23)
CALCIUM SERPL-MCNC: 8.7 MG/DL — SIGNIFICANT CHANGE UP (ref 8.4–10.5)
CHLORIDE SERPL-SCNC: 101 MMOL/L — SIGNIFICANT CHANGE UP (ref 96–108)
CO2 SERPL-SCNC: 21 MMOL/L — LOW (ref 22–31)
CREAT SERPL-MCNC: 2.62 MG/DL — HIGH (ref 0.5–1.3)
GLUCOSE SERPL-MCNC: 151 MG/DL — HIGH (ref 70–99)
HCT VFR BLD CALC: 33.8 % — LOW (ref 39–50)
HGB BLD-MCNC: 11.1 G/DL — LOW (ref 13–17)
MAGNESIUM SERPL-MCNC: 1.7 MG/DL — SIGNIFICANT CHANGE UP (ref 1.6–2.6)
MCHC RBC-ENTMCNC: 31.8 PG — SIGNIFICANT CHANGE UP (ref 27–34)
MCHC RBC-ENTMCNC: 32.7 GM/DL — SIGNIFICANT CHANGE UP (ref 32–36)
MCV RBC AUTO: 97.3 FL — SIGNIFICANT CHANGE UP (ref 80–100)
PHOSPHATE SERPL-MCNC: 3 MG/DL — SIGNIFICANT CHANGE UP (ref 2.5–4.5)
PLATELET # BLD AUTO: 152 K/UL — SIGNIFICANT CHANGE UP (ref 150–400)
POTASSIUM SERPL-MCNC: 4.3 MMOL/L — SIGNIFICANT CHANGE UP (ref 3.5–5.3)
POTASSIUM SERPL-SCNC: 4.3 MMOL/L — SIGNIFICANT CHANGE UP (ref 3.5–5.3)
RBC # BLD: 3.47 M/UL — LOW (ref 4.2–5.8)
RBC # FLD: 14.7 % — HIGH (ref 10.3–14.5)
SODIUM SERPL-SCNC: 139 MMOL/L — SIGNIFICANT CHANGE UP (ref 135–145)
TACROLIMUS SERPL-MCNC: 7.5 NG/ML — SIGNIFICANT CHANGE UP
WBC # BLD: 17.4 K/UL — HIGH (ref 3.8–10.5)
WBC # FLD AUTO: 17.4 K/UL — HIGH (ref 3.8–10.5)

## 2017-03-09 PROCEDURE — 93010 ELECTROCARDIOGRAM REPORT: CPT

## 2017-03-09 PROCEDURE — 99232 SBSQ HOSP IP/OBS MODERATE 35: CPT | Mod: GC

## 2017-03-09 PROCEDURE — 74176 CT ABD & PELVIS W/O CONTRAST: CPT | Mod: 26

## 2017-03-09 PROCEDURE — 76775 US EXAM ABDO BACK WALL LIM: CPT | Mod: 26

## 2017-03-09 RX ORDER — ONDANSETRON 8 MG/1
4 TABLET, FILM COATED ORAL ONCE
Qty: 0 | Refills: 0 | Status: COMPLETED | OUTPATIENT
Start: 2017-03-09 | End: 2017-03-09

## 2017-03-09 RX ORDER — DEXTROSE 50 % IN WATER 50 %
25 SYRINGE (ML) INTRAVENOUS ONCE
Qty: 0 | Refills: 0 | Status: COMPLETED | OUTPATIENT
Start: 2017-03-09 | End: 2017-03-09

## 2017-03-09 RX ORDER — MORPHINE SULFATE 50 MG/1
1 CAPSULE, EXTENDED RELEASE ORAL EVERY 6 HOURS
Qty: 0 | Refills: 0 | Status: DISCONTINUED | OUTPATIENT
Start: 2017-03-09 | End: 2017-03-10

## 2017-03-09 RX ORDER — TACROLIMUS 5 MG/1
2 CAPSULE ORAL EVERY 12 HOURS
Qty: 0 | Refills: 0 | Status: DISCONTINUED | OUTPATIENT
Start: 2017-03-09 | End: 2017-03-10

## 2017-03-09 RX ORDER — TAMSULOSIN HYDROCHLORIDE 0.4 MG/1
0.4 CAPSULE ORAL AT BEDTIME
Qty: 0 | Refills: 0 | Status: DISCONTINUED | OUTPATIENT
Start: 2017-03-09 | End: 2017-03-10

## 2017-03-09 RX ADMIN — MORPHINE SULFATE 1 MILLIGRAM(S): 50 CAPSULE, EXTENDED RELEASE ORAL at 11:15

## 2017-03-09 RX ADMIN — Medication 25 MILLILITER(S): at 03:51

## 2017-03-09 RX ADMIN — TAMSULOSIN HYDROCHLORIDE 0.4 MILLIGRAM(S): 0.4 CAPSULE ORAL at 21:04

## 2017-03-09 RX ADMIN — Medication 650 MILLIGRAM(S): at 06:29

## 2017-03-09 RX ADMIN — Medication 500000 UNIT(S): at 23:35

## 2017-03-09 RX ADMIN — MORPHINE SULFATE 1 MILLIGRAM(S): 50 CAPSULE, EXTENDED RELEASE ORAL at 23:48

## 2017-03-09 RX ADMIN — VALGANCICLOVIR 450 MILLIGRAM(S): 450 TABLET, FILM COATED ORAL at 10:44

## 2017-03-09 RX ADMIN — MORPHINE SULFATE 1 MILLIGRAM(S): 50 CAPSULE, EXTENDED RELEASE ORAL at 10:44

## 2017-03-09 RX ADMIN — Medication 25 MILLILITER(S): at 01:38

## 2017-03-09 RX ADMIN — ONDANSETRON 4 MILLIGRAM(S): 8 TABLET, FILM COATED ORAL at 03:38

## 2017-03-09 RX ADMIN — Medication 500000 UNIT(S): at 17:07

## 2017-03-09 RX ADMIN — FAMOTIDINE 20 MILLIGRAM(S): 10 INJECTION INTRAVENOUS at 10:45

## 2017-03-09 RX ADMIN — HEPARIN SODIUM 5000 UNIT(S): 5000 INJECTION INTRAVENOUS; SUBCUTANEOUS at 17:07

## 2017-03-09 RX ADMIN — Medication 500000 UNIT(S): at 06:29

## 2017-03-09 RX ADMIN — MYCOPHENOLATE MOFETIL 1000 MILLIGRAM(S): 250 CAPSULE ORAL at 21:04

## 2017-03-09 RX ADMIN — HEPARIN SODIUM 5000 UNIT(S): 5000 INJECTION INTRAVENOUS; SUBCUTANEOUS at 06:29

## 2017-03-09 RX ADMIN — SODIUM CHLORIDE 125 MILLILITER(S): 9 INJECTION, SOLUTION INTRAVENOUS at 01:16

## 2017-03-09 RX ADMIN — TACROLIMUS 2 MILLIGRAM(S): 5 CAPSULE ORAL at 21:04

## 2017-03-09 RX ADMIN — Medication 500000 UNIT(S): at 10:44

## 2017-03-09 RX ADMIN — Medication 5 MILLIGRAM(S): at 06:29

## 2017-03-09 RX ADMIN — MYCOPHENOLATE MOFETIL 1000 MILLIGRAM(S): 250 CAPSULE ORAL at 09:00

## 2017-03-09 NOTE — PROVIDER CONTACT NOTE (OTHER) - ASSESSMENT
Patient appeared sweating and lethargic.  FS was 48.  Patient was slightly tachy. Patient appeared sweating and lethargic. VSS, but . FS was 48.

## 2017-03-09 NOTE — PROVIDER CONTACT NOTE (OTHER) - SITUATION
Patient glucose level is 48. 10u insulin given for hyperkalemia. 30 minute reassessment of glucose level is 48.

## 2017-03-09 NOTE — PROVIDER CONTACT NOTE (OTHER) - ACTION/TREATMENT ORDERED:
2, 25ml orders of D50.  FS in the AM 2, 25ml orders of D50. FS checked [id number] until >100.  FS in the AM is now 168.

## 2017-03-10 ENCOUNTER — TRANSCRIPTION ENCOUNTER (OUTPATIENT)
Age: 57
End: 2017-03-10

## 2017-03-10 VITALS
TEMPERATURE: 97 F | OXYGEN SATURATION: 97 % | HEART RATE: 70 BPM | RESPIRATION RATE: 20 BRPM | DIASTOLIC BLOOD PRESSURE: 64 MMHG | SYSTOLIC BLOOD PRESSURE: 100 MMHG

## 2017-03-10 LAB
ANION GAP SERPL CALC-SCNC: 12 MMOL/L — SIGNIFICANT CHANGE UP (ref 5–17)
BUN SERPL-MCNC: 33 MG/DL — HIGH (ref 7–23)
CALCIUM SERPL-MCNC: 8.8 MG/DL — SIGNIFICANT CHANGE UP (ref 8.4–10.5)
CHLORIDE SERPL-SCNC: 98 MMOL/L — SIGNIFICANT CHANGE UP (ref 96–108)
CO2 SERPL-SCNC: 26 MMOL/L — SIGNIFICANT CHANGE UP (ref 22–31)
CREAT SERPL-MCNC: 2.06 MG/DL — HIGH (ref 0.5–1.3)
GLUCOSE SERPL-MCNC: 119 MG/DL — HIGH (ref 70–99)
HCT VFR BLD CALC: 28.5 % — LOW (ref 39–50)
HGB BLD-MCNC: 9.5 G/DL — LOW (ref 13–17)
MCHC RBC-ENTMCNC: 32.7 PG — SIGNIFICANT CHANGE UP (ref 27–34)
MCHC RBC-ENTMCNC: 33.5 GM/DL — SIGNIFICANT CHANGE UP (ref 32–36)
MCV RBC AUTO: 97.6 FL — SIGNIFICANT CHANGE UP (ref 80–100)
PLATELET # BLD AUTO: 127 K/UL — LOW (ref 150–400)
POTASSIUM SERPL-MCNC: 4 MMOL/L — SIGNIFICANT CHANGE UP (ref 3.5–5.3)
POTASSIUM SERPL-SCNC: 4 MMOL/L — SIGNIFICANT CHANGE UP (ref 3.5–5.3)
RBC # BLD: 2.92 M/UL — LOW (ref 4.2–5.8)
RBC # FLD: 14.5 % — SIGNIFICANT CHANGE UP (ref 10.3–14.5)
SODIUM SERPL-SCNC: 136 MMOL/L — SIGNIFICANT CHANGE UP (ref 135–145)
TACROLIMUS SERPL-MCNC: 4.3 NG/ML — SIGNIFICANT CHANGE UP
WBC # BLD: 10.2 K/UL — SIGNIFICANT CHANGE UP (ref 3.8–10.5)
WBC # FLD AUTO: 10.2 K/UL — SIGNIFICANT CHANGE UP (ref 3.8–10.5)

## 2017-03-10 PROCEDURE — 80197 ASSAY OF TACROLIMUS: CPT

## 2017-03-10 PROCEDURE — 81001 URINALYSIS AUTO W/SCOPE: CPT

## 2017-03-10 PROCEDURE — 96376 TX/PRO/DX INJ SAME DRUG ADON: CPT

## 2017-03-10 PROCEDURE — 76775 US EXAM ABDO BACK WALL LIM: CPT

## 2017-03-10 PROCEDURE — 84132 ASSAY OF SERUM POTASSIUM: CPT

## 2017-03-10 PROCEDURE — 80048 BASIC METABOLIC PNL TOTAL CA: CPT

## 2017-03-10 PROCEDURE — 82962 GLUCOSE BLOOD TEST: CPT

## 2017-03-10 PROCEDURE — 84100 ASSAY OF PHOSPHORUS: CPT

## 2017-03-10 PROCEDURE — 82330 ASSAY OF CALCIUM: CPT

## 2017-03-10 PROCEDURE — 85027 COMPLETE CBC AUTOMATED: CPT

## 2017-03-10 PROCEDURE — 85014 HEMATOCRIT: CPT

## 2017-03-10 PROCEDURE — 76776 US EXAM K TRANSPL W/DOPPLER: CPT

## 2017-03-10 PROCEDURE — 80053 COMPREHEN METABOLIC PANEL: CPT

## 2017-03-10 PROCEDURE — 82803 BLOOD GASES ANY COMBINATION: CPT

## 2017-03-10 PROCEDURE — 83735 ASSAY OF MAGNESIUM: CPT

## 2017-03-10 PROCEDURE — 74176 CT ABD & PELVIS W/O CONTRAST: CPT

## 2017-03-10 PROCEDURE — 82435 ASSAY OF BLOOD CHLORIDE: CPT

## 2017-03-10 PROCEDURE — 87040 BLOOD CULTURE FOR BACTERIA: CPT

## 2017-03-10 PROCEDURE — 87086 URINE CULTURE/COLONY COUNT: CPT

## 2017-03-10 PROCEDURE — 87103 BLOOD FUNGUS CULTURE: CPT

## 2017-03-10 PROCEDURE — 83605 ASSAY OF LACTIC ACID: CPT

## 2017-03-10 PROCEDURE — 99232 SBSQ HOSP IP/OBS MODERATE 35: CPT

## 2017-03-10 PROCEDURE — 82947 ASSAY GLUCOSE BLOOD QUANT: CPT

## 2017-03-10 PROCEDURE — 96374 THER/PROPH/DIAG INJ IV PUSH: CPT

## 2017-03-10 PROCEDURE — 93005 ELECTROCARDIOGRAM TRACING: CPT

## 2017-03-10 PROCEDURE — 84295 ASSAY OF SERUM SODIUM: CPT

## 2017-03-10 PROCEDURE — 99285 EMERGENCY DEPT VISIT HI MDM: CPT | Mod: 25

## 2017-03-10 PROCEDURE — 96375 TX/PRO/DX INJ NEW DRUG ADDON: CPT

## 2017-03-10 RX ORDER — TACROLIMUS 5 MG/1
2.5 CAPSULE ORAL EVERY 12 HOURS
Qty: 0 | Refills: 0 | Status: DISCONTINUED | OUTPATIENT
Start: 2017-03-10 | End: 2017-03-10

## 2017-03-10 RX ORDER — MYCOPHENOLATE MOFETIL 250 MG/1
2 CAPSULE ORAL
Qty: 0 | Refills: 0 | COMMUNITY

## 2017-03-10 RX ORDER — TACROLIMUS 5 MG/1
2 CAPSULE ORAL
Qty: 0 | Refills: 0 | COMMUNITY
Start: 2017-03-10

## 2017-03-10 RX ORDER — TAMSULOSIN HYDROCHLORIDE 0.4 MG/1
1 CAPSULE ORAL
Qty: 60 | Refills: 0
Start: 2017-03-10 | End: 2017-04-09

## 2017-03-10 RX ORDER — TACROLIMUS 5 MG/1
5 CAPSULE ORAL
Qty: 0 | Refills: 0 | DISCHARGE
Start: 2017-03-10

## 2017-03-10 RX ORDER — TAMSULOSIN HYDROCHLORIDE 0.4 MG/1
1 CAPSULE ORAL
Qty: 30 | Refills: 0 | OUTPATIENT
Start: 2017-03-10 | End: 2017-04-09

## 2017-03-10 RX ORDER — VALGANCICLOVIR 450 MG/1
1 TABLET, FILM COATED ORAL
Qty: 0 | Refills: 0 | DISCHARGE
Start: 2017-03-10

## 2017-03-10 RX ORDER — MYCOPHENOLATE MOFETIL 250 MG/1
0 CAPSULE ORAL
Qty: 0 | Refills: 0 | DISCHARGE
Start: 2017-03-10

## 2017-03-10 RX ORDER — ACETAMINOPHEN 500 MG
2 TABLET ORAL
Qty: 0 | Refills: 0 | DISCHARGE
Start: 2017-03-10

## 2017-03-10 RX ORDER — TAMSULOSIN HYDROCHLORIDE 0.4 MG/1
0.4 CAPSULE ORAL ONCE
Qty: 0 | Refills: 0 | Status: COMPLETED | OUTPATIENT
Start: 2017-03-10 | End: 2017-03-10

## 2017-03-10 RX ADMIN — Medication 500000 UNIT(S): at 11:40

## 2017-03-10 RX ADMIN — Medication 5 MILLIGRAM(S): at 06:24

## 2017-03-10 RX ADMIN — MYCOPHENOLATE MOFETIL 1000 MILLIGRAM(S): 250 CAPSULE ORAL at 09:13

## 2017-03-10 RX ADMIN — FAMOTIDINE 20 MILLIGRAM(S): 10 INJECTION INTRAVENOUS at 11:40

## 2017-03-10 RX ADMIN — SODIUM CHLORIDE 125 MILLILITER(S): 9 INJECTION, SOLUTION INTRAVENOUS at 01:55

## 2017-03-10 RX ADMIN — VALGANCICLOVIR 450 MILLIGRAM(S): 450 TABLET, FILM COATED ORAL at 11:40

## 2017-03-10 RX ADMIN — Medication 500000 UNIT(S): at 06:24

## 2017-03-10 RX ADMIN — MORPHINE SULFATE 1 MILLIGRAM(S): 50 CAPSULE, EXTENDED RELEASE ORAL at 00:40

## 2017-03-10 RX ADMIN — HEPARIN SODIUM 5000 UNIT(S): 5000 INJECTION INTRAVENOUS; SUBCUTANEOUS at 06:25

## 2017-03-10 RX ADMIN — TAMSULOSIN HYDROCHLORIDE 0.4 MILLIGRAM(S): 0.4 CAPSULE ORAL at 13:04

## 2017-03-10 RX ADMIN — TACROLIMUS 2 MILLIGRAM(S): 5 CAPSULE ORAL at 09:13

## 2017-03-10 NOTE — DISCHARGE NOTE ADULT - MEDICATION SUMMARY - MEDICATIONS TO CHANGE
I will SWITCH the dose or number of times a day I take the medications listed below when I get home from the hospital:    tacrolimus 1 mg oral capsule  -- 5 cap(s) by mouth every 12 hours  -- Patient takes at 8am and 8pm

## 2017-03-10 NOTE — DISCHARGE NOTE ADULT - MEDICATION SUMMARY - MEDICATIONS TO TAKE
I will START or STAY ON the medications listed below when I get home from the hospital:    predniSONE 5 mg oral tablet  -- 1 tab(s) by mouth once a day  -- Indication: For antirejection    acetaminophen 325 mg oral tablet  -- 2 tab(s) by mouth every 6 hours, As needed, Mild Pain (1 - 3)  -- Indication: For mild pain    cloNIDine 0.1 mg/24 hr transdermal film, extended release  --  by transdermal patch every 7 days  -- Indication: For Blood pressure    tamsulosin 0.4 mg oral capsule  -- 1 cap(s) by mouth 2 times a day  -- Indication: For     nystatin 100,000 units/mL oral suspension  -- 5 milliliter(s) by mouth 4 times a day  -- Indication: For thrush prevention    valGANciclovir 450 mg oral tablet  -- 1 tab(s) by mouth once a day  -- Indication: For viral protection    famotidine 20 mg oral tablet  -- 1 tab(s) by mouth once a day  -- Indication: For GI    mycophenolate mofetil 250 mg oral capsule  -- 1000mg po twice daily  -- Indication: For antirejection    tacrolimus 1 mg oral capsule  -- 2 cap(s) by mouth every 12 hours  -- Indication: For antirejection    sulfamethoxazole-trimethoprim 400 mg-80 mg oral tablet  -- 1 tab(s) by mouth once a day  -- Indication: For PNA protection I will START or STAY ON the medications listed below when I get home from the hospital:    predniSONE 5 mg oral tablet  -- 1 tab(s) by mouth once a day  -- Indication: For antirejection    acetaminophen 325 mg oral tablet  -- 2 tab(s) by mouth every 6 hours, As needed, Mild Pain (1 - 3)  -- Indication: For mild pain    cloNIDine 0.1 mg/24 hr transdermal film, extended release  --  by transdermal patch every 7 days  -- Indication: For Blood pressure    tamsulosin 0.4 mg oral capsule  -- 1 cap(s) by mouth 2 times a day  -- Indication: For     nystatin 100,000 units/mL oral suspension  -- 5 milliliter(s) by mouth 4 times a day  -- Indication: For thrush prevention    valGANciclovir 450 mg oral tablet  -- 1 tab(s) by mouth once a day  -- Indication: For viral protection    famotidine 20 mg oral tablet  -- 1 tab(s) by mouth once a day  -- Indication: For GI    tacrolimus 0.5 mg oral capsule  -- 5 cap(s) by mouth every 12 hours  -- Indication: For antirejection    mycophenolate mofetil 250 mg oral capsule  -- 1000mg po twice daily  -- Indication: For antirejection    sulfamethoxazole-trimethoprim 400 mg-80 mg oral tablet  -- 1 tab(s) by mouth once a day  -- Indication: For PNA protection

## 2017-03-10 NOTE — DISCHARGE NOTE ADULT - PLAN OF CARE
resolved f/u at [facility] prevent rejection take prescribed medication; f/u with renal f/u with renal

## 2017-03-10 NOTE — DISCHARGE NOTE ADULT - HOSPITAL COURSE
attending to complete 57 y/o M with pmhx sig for APKD s/p renal tx in Feb at [facility], sent from tx center for hyperkalemia. Pt endorses polyuria every 3-4 minutes for the last 3 days. No fevers or chills.   Presents with increase urination and elevated Potassium  check BMP at 12N   Seen by Renal transplant Team - Patient on Bed   Per renal team patient was on high dose of Prograft currently will hold for now   check Prograft level now               prograft level 15.5 Cr. K 5.7           Per Renal : Patient dose not need Telemetry                      Place Landa catheter x 24 hours  2/2 hydronephosis   	Night: K 6.1 given 30 kayexelate, 1 g calcium gluconate, 10 units regular insulin 	and 1 amp D50, recheck BMP in AM and EKG   	s/p hyperkalemia tx became hypoglycemic 48 given 1 amp d50 and juice up to 126  	zofran 4 mg IV for nause and bilious vomiting x1   3/8 CT abd/pel w/oral contrast to evaluate abd pain; repeat US to evaluate hydronephrosis    pt eventually cleared for discharge.

## 2017-03-10 NOTE — DISCHARGE NOTE ADULT - CARE PROVIDERS DIRECT ADDRESSES
[email or web address],DirectAddress_Unknown,DirectAddress_Unknown

## 2017-03-10 NOTE — DISCHARGE NOTE ADULT - CARE PROVIDER_API CALL
Romulo Machado), Internal Medicine; Nephrology  [address] [place] [address]  Phone: [phone]  Fax: [phone]    Jaun Cedeno (), Internal Medicine; Nephrology  [address] [place] [address]  Phone: [phone]  Fax: [phone]

## 2017-03-10 NOTE — DISCHARGE NOTE ADULT - CARE PLAN
Principal Discharge DX:	Hyperkalemia  Goal:	resolved  Instructions for follow-up, activity and diet:	f/u at [facility]plant Center  Secondary Diagnosis:	H/O kidney transplant  Goal:	prevent rejection  Instructions for follow-up, activity and diet:	take prescribed medication; f/u with renal  Secondary Diagnosis:	Urinary retention  Instructions for follow-up, activity and diet:	take prescribed medication; f/u with renal  Secondary Diagnosis:	Hydronephrosis of kidney transplant  Goal:	resolved  Instructions for follow-up, activity and diet:	f/u with renal

## 2017-03-10 NOTE — DISCHARGE NOTE ADULT - PATIENT PORTAL LINK FT
“You can access the Follow[employer] Patient Portal, offered by [facility], by registering with the following website: [email or web address]”

## 2017-03-13 ENCOUNTER — APPOINTMENT (OUTPATIENT)
Dept: NEPHROLOGY | Facility: CLINIC | Age: 57
End: 2017-03-13

## 2017-03-13 ENCOUNTER — OUTPATIENT (OUTPATIENT)
Dept: OUTPATIENT SERVICES | Facility: HOSPITAL | Age: 57
LOS: 1 days | End: 2017-03-13
Payer: MEDICAID

## 2017-03-13 VITALS
OXYGEN SATURATION: 98 % | DIASTOLIC BLOOD PRESSURE: 76 MMHG | WEIGHT: 115 LBS | SYSTOLIC BLOOD PRESSURE: 126 MMHG | BODY MASS INDEX: 18.01 KG/M2 | RESPIRATION RATE: 18 BRPM | HEART RATE: 86 BPM | TEMPERATURE: 98.2 F

## 2017-03-13 DIAGNOSIS — N18.9 CHRONIC KIDNEY DISEASE, UNSPECIFIED: ICD-10-CM

## 2017-03-13 DIAGNOSIS — Z99.2 DEPENDENCE ON RENAL DIALYSIS: Chronic | ICD-10-CM

## 2017-03-13 DIAGNOSIS — Z98.89 OTHER SPECIFIED POSTPROCEDURAL STATES: Chronic | ICD-10-CM

## 2017-03-13 DIAGNOSIS — Z94.0 KIDNEY TRANSPLANT STATUS: Chronic | ICD-10-CM

## 2017-03-13 DIAGNOSIS — Z94.0 KIDNEY TRANSPLANT STATUS: ICD-10-CM

## 2017-03-13 PROCEDURE — 80197 ASSAY OF TACROLIMUS: CPT

## 2017-03-13 PROCEDURE — 85027 COMPLETE CBC AUTOMATED: CPT

## 2017-03-13 PROCEDURE — 80053 COMPREHEN METABOLIC PANEL: CPT

## 2017-03-13 PROCEDURE — 83735 ASSAY OF MAGNESIUM: CPT

## 2017-03-13 PROCEDURE — 84156 ASSAY OF PROTEIN URINE: CPT

## 2017-03-13 PROCEDURE — 83615 LACTATE (LD) (LDH) ENZYME: CPT

## 2017-03-13 PROCEDURE — 84100 ASSAY OF PHOSPHORUS: CPT

## 2017-03-13 PROCEDURE — 81001 URINALYSIS AUTO W/SCOPE: CPT

## 2017-03-13 RX ORDER — PHENAZOPYRIDINE 100 MG/1
100 TABLET, FILM COATED ORAL 3 TIMES DAILY
Qty: 21 | Refills: 1 | Status: DISCONTINUED | COMMUNITY
Start: 2017-03-03 | End: 2017-03-13

## 2017-03-14 LAB
CULTURE RESULTS: SIGNIFICANT CHANGE UP
CULTURE RESULTS: SIGNIFICANT CHANGE UP
SPECIMEN SOURCE: SIGNIFICANT CHANGE UP
SPECIMEN SOURCE: SIGNIFICANT CHANGE UP

## 2017-03-16 ENCOUNTER — APPOINTMENT (OUTPATIENT)
Dept: NEPHROLOGY | Facility: CLINIC | Age: 57
End: 2017-03-16

## 2017-03-16 VITALS
HEART RATE: 112 BPM | DIASTOLIC BLOOD PRESSURE: 74 MMHG | HEIGHT: 67 IN | RESPIRATION RATE: 17 BRPM | TEMPERATURE: 97.4 F | BODY MASS INDEX: 17.74 KG/M2 | SYSTOLIC BLOOD PRESSURE: 107 MMHG | WEIGHT: 113 LBS

## 2017-03-16 VITALS — SYSTOLIC BLOOD PRESSURE: 98 MMHG | DIASTOLIC BLOOD PRESSURE: 66 MMHG | HEART RATE: 96 BPM

## 2017-03-16 DIAGNOSIS — R30.0 DYSURIA: ICD-10-CM

## 2017-03-16 RX ORDER — FLUCONAZOLE 200 MG/1
200 TABLET ORAL DAILY
Qty: 10 | Refills: 0 | Status: DISCONTINUED | COMMUNITY
Start: 2017-02-28 | End: 2017-03-16

## 2017-03-16 RX ORDER — CLONIDINE 0.1 MG/D
0.1 PATCH TRANSDERMAL
Refills: 0 | Status: DISCONTINUED | COMMUNITY
Start: 2017-03-07 | End: 2017-03-16

## 2017-03-20 ENCOUNTER — APPOINTMENT (OUTPATIENT)
Dept: NEPHROLOGY | Facility: CLINIC | Age: 57
End: 2017-03-20

## 2017-03-20 ENCOUNTER — LABORATORY RESULT (OUTPATIENT)
Age: 57
End: 2017-03-20

## 2017-03-20 ENCOUNTER — OUTPATIENT (OUTPATIENT)
Dept: OUTPATIENT SERVICES | Facility: HOSPITAL | Age: 57
LOS: 1 days | End: 2017-03-20
Payer: MEDICARE

## 2017-03-20 ENCOUNTER — RX RENEWAL (OUTPATIENT)
Age: 57
End: 2017-03-20

## 2017-03-20 VITALS
OXYGEN SATURATION: 96 % | SYSTOLIC BLOOD PRESSURE: 108 MMHG | DIASTOLIC BLOOD PRESSURE: 68 MMHG | WEIGHT: 117 LBS | TEMPERATURE: 97.6 F | HEART RATE: 104 BPM | BODY MASS INDEX: 18.32 KG/M2 | RESPIRATION RATE: 16 BRPM

## 2017-03-20 DIAGNOSIS — Z98.89 OTHER SPECIFIED POSTPROCEDURAL STATES: Chronic | ICD-10-CM

## 2017-03-20 DIAGNOSIS — Z94.0 KIDNEY TRANSPLANT STATUS: Chronic | ICD-10-CM

## 2017-03-20 DIAGNOSIS — N18.9 CHRONIC KIDNEY DISEASE, UNSPECIFIED: ICD-10-CM

## 2017-03-20 DIAGNOSIS — Z86.39 PERSONAL HISTORY OF OTHER ENDOCRINE, NUTRITIONAL AND METABOLIC DISEASE: ICD-10-CM

## 2017-03-20 DIAGNOSIS — Z87.898 PERSONAL HISTORY OF OTHER SPECIFIED CONDITIONS: ICD-10-CM

## 2017-03-20 DIAGNOSIS — Z94.0 KIDNEY TRANSPLANT STATUS: ICD-10-CM

## 2017-03-20 DIAGNOSIS — Z99.2 DEPENDENCE ON RENAL DIALYSIS: Chronic | ICD-10-CM

## 2017-03-20 LAB
ALBUMIN SERPL ELPH-MCNC: 4.6 G/DL — SIGNIFICANT CHANGE UP (ref 3.3–5)
ALP SERPL-CCNC: 80 U/L — SIGNIFICANT CHANGE UP (ref 40–120)
ALT FLD-CCNC: 8 U/L — LOW (ref 10–45)
ANION GAP SERPL CALC-SCNC: 14 MMOL/L — SIGNIFICANT CHANGE UP (ref 5–17)
APPEARANCE UR: CLEAR — SIGNIFICANT CHANGE UP
AST SERPL-CCNC: 13 U/L — SIGNIFICANT CHANGE UP (ref 10–40)
BACTERIA # UR AUTO: NEGATIVE — SIGNIFICANT CHANGE UP
BASOPHILS # BLD AUTO: 0.02 K/UL — SIGNIFICANT CHANGE UP (ref 0–0.2)
BASOPHILS NFR BLD AUTO: 0.3 % — SIGNIFICANT CHANGE UP (ref 0–2)
BILIRUB SERPL-MCNC: 0.2 MG/DL — SIGNIFICANT CHANGE UP (ref 0.2–1.2)
BILIRUB UR-MCNC: NEGATIVE — SIGNIFICANT CHANGE UP
BUN SERPL-MCNC: 43 MG/DL — HIGH (ref 7–23)
CALCIUM SERPL-MCNC: 10.2 MG/DL — SIGNIFICANT CHANGE UP (ref 8.4–10.5)
CHLORIDE SERPL-SCNC: 100 MMOL/L — SIGNIFICANT CHANGE UP (ref 96–108)
CO2 SERPL-SCNC: 20 MMOL/L — LOW (ref 22–31)
COLOR SPEC: YELLOW — SIGNIFICANT CHANGE UP
CREAT ?TM UR-MCNC: 137 MG/DL — SIGNIFICANT CHANGE UP
CREAT SERPL-MCNC: 2.32 MG/DL — HIGH (ref 0.5–1.3)
DIFF PNL FLD: ABNORMAL
EOSINOPHIL # BLD AUTO: 0.1 K/UL — SIGNIFICANT CHANGE UP (ref 0–0.5)
EOSINOPHIL NFR BLD AUTO: 1.3 % — SIGNIFICANT CHANGE UP (ref 0–6)
EPI CELLS # UR: 0 /HPF — SIGNIFICANT CHANGE UP (ref 0–5)
GLUCOSE SERPL-MCNC: 98 MG/DL — SIGNIFICANT CHANGE UP (ref 70–99)
GLUCOSE UR QL: NEGATIVE MG/DL — SIGNIFICANT CHANGE UP
HCT VFR BLD CALC: 38 % — LOW (ref 39–50)
HGB BLD-MCNC: 12.1 G/DL — LOW (ref 13–17)
HYALINE CASTS # UR AUTO: 1 /LPF — SIGNIFICANT CHANGE UP (ref 0–7)
IMM GRANULOCYTES NFR BLD AUTO: 0.4 % — SIGNIFICANT CHANGE UP (ref 0–1.5)
KETONES UR-MCNC: NEGATIVE — SIGNIFICANT CHANGE UP
LDH SERPL L TO P-CCNC: 253 U/L — HIGH (ref 50–242)
LEUKOCYTE ESTERASE UR-ACNC: NEGATIVE — SIGNIFICANT CHANGE UP
LYMPHOCYTES # BLD AUTO: 1.75 K/UL — SIGNIFICANT CHANGE UP (ref 1–3.3)
LYMPHOCYTES # BLD AUTO: 22.1 % — SIGNIFICANT CHANGE UP (ref 13–44)
MAGNESIUM SERPL-MCNC: 1.9 MG/DL — SIGNIFICANT CHANGE UP (ref 1.6–2.6)
MCHC RBC-ENTMCNC: 31.4 PG — SIGNIFICANT CHANGE UP (ref 27–34)
MCHC RBC-ENTMCNC: 31.8 GM/DL — LOW (ref 32–36)
MCV RBC AUTO: 98.7 FL — SIGNIFICANT CHANGE UP (ref 80–100)
MONOCYTES # BLD AUTO: 0.27 K/UL — SIGNIFICANT CHANGE UP (ref 0–0.9)
MONOCYTES NFR BLD AUTO: 3.4 % — SIGNIFICANT CHANGE UP (ref 2–14)
NEUTROPHILS # BLD AUTO: 5.75 K/UL — SIGNIFICANT CHANGE UP (ref 1.8–7.4)
NEUTROPHILS NFR BLD AUTO: 72.5 % — SIGNIFICANT CHANGE UP (ref 43–77)
NITRITE UR-MCNC: NEGATIVE — SIGNIFICANT CHANGE UP
PH UR: 5.5 — SIGNIFICANT CHANGE UP (ref 5–8)
PHOSPHATE SERPL-MCNC: 2.4 MG/DL — LOW (ref 2.5–4.5)
PLATELET # BLD AUTO: 257 K/UL — SIGNIFICANT CHANGE UP (ref 150–400)
POTASSIUM SERPL-MCNC: 5.6 MMOL/L — HIGH (ref 3.5–5.3)
POTASSIUM SERPL-SCNC: 5.6 MMOL/L — HIGH (ref 3.5–5.3)
PROT ?TM UR-MCNC: 38 MG/DL — HIGH (ref 0–12)
PROT SERPL-MCNC: 7.4 G/DL — SIGNIFICANT CHANGE UP (ref 6–8.3)
PROT UR-MCNC: 30 MG/DL
PROT/CREAT UR-RTO: 0.3 RATIO — HIGH (ref 0–0.2)
RBC # BLD: 3.85 M/UL — LOW (ref 4.2–5.8)
RBC # FLD: 17.2 % — HIGH (ref 10.3–14.5)
RBC CASTS # UR COMP ASSIST: 3 /HPF — SIGNIFICANT CHANGE UP (ref 0–4)
SODIUM SERPL-SCNC: 134 MMOL/L — LOW (ref 135–145)
SP GR SPEC: 1.02 — SIGNIFICANT CHANGE UP (ref 1.01–1.02)
TACROLIMUS SERPL-MCNC: 4.5 NG/ML — SIGNIFICANT CHANGE UP
UROBILINOGEN FLD QL: NEGATIVE MG/DL — SIGNIFICANT CHANGE UP
WBC # BLD: 7.92 K/UL — SIGNIFICANT CHANGE UP (ref 3.8–10.5)
WBC # FLD AUTO: 7.92 K/UL — SIGNIFICANT CHANGE UP (ref 3.8–10.5)
WBC UR QL: 8 /HPF — HIGH (ref 0–5)

## 2017-03-20 PROCEDURE — 80197 ASSAY OF TACROLIMUS: CPT

## 2017-03-20 PROCEDURE — 84100 ASSAY OF PHOSPHORUS: CPT

## 2017-03-20 PROCEDURE — 85027 COMPLETE CBC AUTOMATED: CPT

## 2017-03-20 PROCEDURE — 84156 ASSAY OF PROTEIN URINE: CPT

## 2017-03-20 PROCEDURE — 83615 LACTATE (LD) (LDH) ENZYME: CPT

## 2017-03-20 PROCEDURE — 81001 URINALYSIS AUTO W/SCOPE: CPT

## 2017-03-20 PROCEDURE — 83735 ASSAY OF MAGNESIUM: CPT

## 2017-03-20 PROCEDURE — 80053 COMPREHEN METABOLIC PANEL: CPT

## 2017-03-27 ENCOUNTER — APPOINTMENT (OUTPATIENT)
Dept: NEPHROLOGY | Facility: CLINIC | Age: 57
End: 2017-03-27

## 2017-03-27 VITALS — WEIGHT: 121.9 LBS | BODY MASS INDEX: 19.09 KG/M2

## 2017-03-27 VITALS — SYSTOLIC BLOOD PRESSURE: 110 MMHG | HEART RATE: 86 BPM | DIASTOLIC BLOOD PRESSURE: 70 MMHG

## 2017-03-27 RX ORDER — CLONIDINE HYDROCHLORIDE 0.1 MG/1
0.1 TABLET ORAL
Qty: 90 | Refills: 1 | Status: DISCONTINUED | COMMUNITY
Start: 2017-03-16 | End: 2017-03-27

## 2017-04-03 ENCOUNTER — LABORATORY RESULT (OUTPATIENT)
Age: 57
End: 2017-04-03

## 2017-04-03 ENCOUNTER — APPOINTMENT (OUTPATIENT)
Dept: NEPHROLOGY | Facility: CLINIC | Age: 57
End: 2017-04-03

## 2017-04-03 ENCOUNTER — OUTPATIENT (OUTPATIENT)
Dept: OUTPATIENT SERVICES | Facility: HOSPITAL | Age: 57
LOS: 1 days | End: 2017-04-03
Payer: MEDICARE

## 2017-04-03 VITALS
BODY MASS INDEX: 19.72 KG/M2 | TEMPERATURE: 97.9 F | WEIGHT: 125.9 LBS | SYSTOLIC BLOOD PRESSURE: 132 MMHG | OXYGEN SATURATION: 98 % | RESPIRATION RATE: 18 BRPM | HEART RATE: 86 BPM | DIASTOLIC BLOOD PRESSURE: 78 MMHG

## 2017-04-03 DIAGNOSIS — Z94.0 KIDNEY TRANSPLANT STATUS: Chronic | ICD-10-CM

## 2017-04-03 DIAGNOSIS — Z94.0 KIDNEY TRANSPLANT STATUS: ICD-10-CM

## 2017-04-03 DIAGNOSIS — N18.9 CHRONIC KIDNEY DISEASE, UNSPECIFIED: ICD-10-CM

## 2017-04-03 DIAGNOSIS — Z98.89 OTHER SPECIFIED POSTPROCEDURAL STATES: Chronic | ICD-10-CM

## 2017-04-03 DIAGNOSIS — Z99.2 DEPENDENCE ON RENAL DIALYSIS: Chronic | ICD-10-CM

## 2017-04-03 LAB
ALBUMIN SERPL ELPH-MCNC: 4.5 G/DL — SIGNIFICANT CHANGE UP (ref 3.3–5)
ALP SERPL-CCNC: 81 U/L — SIGNIFICANT CHANGE UP (ref 40–120)
ALT FLD-CCNC: 12 U/L — SIGNIFICANT CHANGE UP (ref 10–45)
ANION GAP SERPL CALC-SCNC: 15 MMOL/L — SIGNIFICANT CHANGE UP (ref 5–17)
APPEARANCE UR: CLEAR — SIGNIFICANT CHANGE UP
AST SERPL-CCNC: 12 U/L — SIGNIFICANT CHANGE UP (ref 10–40)
BASOPHILS # BLD AUTO: 0.01 K/UL — SIGNIFICANT CHANGE UP (ref 0–0.2)
BASOPHILS NFR BLD AUTO: 0.2 % — SIGNIFICANT CHANGE UP (ref 0–2)
BILIRUB SERPL-MCNC: 0.3 MG/DL — SIGNIFICANT CHANGE UP (ref 0.2–1.2)
BILIRUB UR-MCNC: NEGATIVE — SIGNIFICANT CHANGE UP
BUN SERPL-MCNC: 32 MG/DL — HIGH (ref 7–23)
CALCIUM SERPL-MCNC: 10.2 MG/DL — SIGNIFICANT CHANGE UP (ref 8.4–10.5)
CHLORIDE SERPL-SCNC: 105 MMOL/L — SIGNIFICANT CHANGE UP (ref 96–108)
CO2 SERPL-SCNC: 22 MMOL/L — SIGNIFICANT CHANGE UP (ref 22–31)
COLOR SPEC: YELLOW — SIGNIFICANT CHANGE UP
CREAT ?TM UR-MCNC: 160 MG/DL — SIGNIFICANT CHANGE UP
CREAT SERPL-MCNC: 1.9 MG/DL — HIGH (ref 0.5–1.3)
DIFF PNL FLD: NEGATIVE — SIGNIFICANT CHANGE UP
EOSINOPHIL # BLD AUTO: 0.03 K/UL — SIGNIFICANT CHANGE UP (ref 0–0.5)
EOSINOPHIL NFR BLD AUTO: 0.6 % — SIGNIFICANT CHANGE UP (ref 0–6)
GLUCOSE SERPL-MCNC: 94 MG/DL — SIGNIFICANT CHANGE UP (ref 70–99)
GLUCOSE UR QL: NEGATIVE MG/DL — SIGNIFICANT CHANGE UP
HCT VFR BLD CALC: 37.5 % — LOW (ref 39–50)
HGB BLD-MCNC: 11.7 G/DL — LOW (ref 13–17)
IMM GRANULOCYTES NFR BLD AUTO: 0.2 % — SIGNIFICANT CHANGE UP (ref 0–1.5)
KETONES UR-MCNC: NEGATIVE — SIGNIFICANT CHANGE UP
LDH SERPL L TO P-CCNC: 160 U/L — SIGNIFICANT CHANGE UP (ref 50–242)
LEUKOCYTE ESTERASE UR-ACNC: NEGATIVE — SIGNIFICANT CHANGE UP
LYMPHOCYTES # BLD AUTO: 1.33 K/UL — SIGNIFICANT CHANGE UP (ref 1–3.3)
LYMPHOCYTES # BLD AUTO: 26.2 % — SIGNIFICANT CHANGE UP (ref 13–44)
MAGNESIUM SERPL-MCNC: 2.1 MG/DL — SIGNIFICANT CHANGE UP (ref 1.6–2.6)
MCHC RBC-ENTMCNC: 31.2 GM/DL — LOW (ref 32–36)
MCHC RBC-ENTMCNC: 32.1 PG — SIGNIFICANT CHANGE UP (ref 27–34)
MCV RBC AUTO: 102.7 FL — HIGH (ref 80–100)
MONOCYTES # BLD AUTO: 0.21 K/UL — SIGNIFICANT CHANGE UP (ref 0–0.9)
MONOCYTES NFR BLD AUTO: 4.1 % — SIGNIFICANT CHANGE UP (ref 2–14)
NEUTROPHILS # BLD AUTO: 3.48 K/UL — SIGNIFICANT CHANGE UP (ref 1.8–7.4)
NEUTROPHILS NFR BLD AUTO: 68.7 % — SIGNIFICANT CHANGE UP (ref 43–77)
NITRITE UR-MCNC: NEGATIVE — SIGNIFICANT CHANGE UP
PH UR: 5.5 — SIGNIFICANT CHANGE UP (ref 5–8)
PHOSPHATE SERPL-MCNC: 3 MG/DL — SIGNIFICANT CHANGE UP (ref 2.5–4.5)
PLATELET # BLD AUTO: 239 K/UL — SIGNIFICANT CHANGE UP (ref 150–400)
POTASSIUM SERPL-MCNC: 5.1 MMOL/L — SIGNIFICANT CHANGE UP (ref 3.5–5.3)
POTASSIUM SERPL-SCNC: 5.1 MMOL/L — SIGNIFICANT CHANGE UP (ref 3.5–5.3)
PROT ?TM UR-MCNC: 23 MG/DL — HIGH (ref 0–12)
PROT SERPL-MCNC: 7.1 G/DL — SIGNIFICANT CHANGE UP (ref 6–8.3)
PROT UR-MCNC: NEGATIVE MG/DL — SIGNIFICANT CHANGE UP
PROT/CREAT UR-RTO: 0.1 RATIO — SIGNIFICANT CHANGE UP (ref 0–0.2)
RBC # BLD: 3.65 M/UL — LOW (ref 4.2–5.8)
RBC # FLD: 17.2 % — HIGH (ref 10.3–14.5)
SODIUM SERPL-SCNC: 142 MMOL/L — SIGNIFICANT CHANGE UP (ref 135–145)
SP GR SPEC: 1.03 — HIGH (ref 1.01–1.02)
TACROLIMUS SERPL-MCNC: 7.3 NG/ML — SIGNIFICANT CHANGE UP
UROBILINOGEN FLD QL: NEGATIVE MG/DL — SIGNIFICANT CHANGE UP
WBC # BLD: 5.07 K/UL — SIGNIFICANT CHANGE UP (ref 3.8–10.5)
WBC # FLD AUTO: 5.07 K/UL — SIGNIFICANT CHANGE UP (ref 3.8–10.5)

## 2017-04-03 PROCEDURE — 84156 ASSAY OF PROTEIN URINE: CPT

## 2017-04-03 PROCEDURE — 81003 URINALYSIS AUTO W/O SCOPE: CPT

## 2017-04-03 PROCEDURE — 84100 ASSAY OF PHOSPHORUS: CPT

## 2017-04-03 PROCEDURE — 85027 COMPLETE CBC AUTOMATED: CPT

## 2017-04-03 PROCEDURE — 87799 DETECT AGENT NOS DNA QUANT: CPT

## 2017-04-03 PROCEDURE — 83735 ASSAY OF MAGNESIUM: CPT

## 2017-04-03 PROCEDURE — 80053 COMPREHEN METABOLIC PANEL: CPT

## 2017-04-03 PROCEDURE — 83615 LACTATE (LD) (LDH) ENZYME: CPT

## 2017-04-03 PROCEDURE — 80197 ASSAY OF TACROLIMUS: CPT

## 2017-04-04 LAB — BKV DNA SPEC QL NAA+PROBE: SIGNIFICANT CHANGE UP

## 2017-04-08 LAB
CULTURE RESULTS: SIGNIFICANT CHANGE UP
SPECIMEN SOURCE: SIGNIFICANT CHANGE UP

## 2017-04-10 ENCOUNTER — LABORATORY RESULT (OUTPATIENT)
Age: 57
End: 2017-04-10

## 2017-04-10 ENCOUNTER — APPOINTMENT (OUTPATIENT)
Dept: NEPHROLOGY | Facility: CLINIC | Age: 57
End: 2017-04-10
Payer: MEDICARE

## 2017-04-10 ENCOUNTER — OUTPATIENT (OUTPATIENT)
Dept: OUTPATIENT SERVICES | Facility: HOSPITAL | Age: 57
LOS: 1 days | End: 2017-04-10
Payer: MEDICAID

## 2017-04-10 VITALS
OXYGEN SATURATION: 98 % | SYSTOLIC BLOOD PRESSURE: 122 MMHG | HEART RATE: 82 BPM | WEIGHT: 124.8 LBS | RESPIRATION RATE: 17 BRPM | DIASTOLIC BLOOD PRESSURE: 68 MMHG | BODY MASS INDEX: 19.55 KG/M2 | TEMPERATURE: 98 F

## 2017-04-10 DIAGNOSIS — Z98.89 OTHER SPECIFIED POSTPROCEDURAL STATES: Chronic | ICD-10-CM

## 2017-04-10 DIAGNOSIS — N18.9 CHRONIC KIDNEY DISEASE, UNSPECIFIED: ICD-10-CM

## 2017-04-10 DIAGNOSIS — Z99.2 DEPENDENCE ON RENAL DIALYSIS: Chronic | ICD-10-CM

## 2017-04-10 DIAGNOSIS — Z94.0 KIDNEY TRANSPLANT STATUS: ICD-10-CM

## 2017-04-10 DIAGNOSIS — Z94.0 KIDNEY TRANSPLANT STATUS: Chronic | ICD-10-CM

## 2017-04-10 LAB
ALBUMIN SERPL ELPH-MCNC: 4.4 G/DL — SIGNIFICANT CHANGE UP (ref 3.3–5)
ALP SERPL-CCNC: 78 U/L — SIGNIFICANT CHANGE UP (ref 40–120)
ALT FLD-CCNC: 12 U/L — SIGNIFICANT CHANGE UP (ref 10–45)
ANION GAP SERPL CALC-SCNC: 14 MMOL/L — SIGNIFICANT CHANGE UP (ref 5–17)
AST SERPL-CCNC: 12 U/L — SIGNIFICANT CHANGE UP (ref 10–40)
BASOPHILS # BLD AUTO: 0.01 K/UL — SIGNIFICANT CHANGE UP (ref 0–0.2)
BASOPHILS NFR BLD AUTO: 0.2 % — SIGNIFICANT CHANGE UP (ref 0–2)
BILIRUB SERPL-MCNC: 0.2 MG/DL — SIGNIFICANT CHANGE UP (ref 0.2–1.2)
BUN SERPL-MCNC: 36 MG/DL — HIGH (ref 7–23)
CALCIUM SERPL-MCNC: 9.6 MG/DL — SIGNIFICANT CHANGE UP (ref 8.4–10.5)
CHLORIDE SERPL-SCNC: 104 MMOL/L — SIGNIFICANT CHANGE UP (ref 96–108)
CO2 SERPL-SCNC: 21 MMOL/L — LOW (ref 22–31)
CREAT ?TM UR-MCNC: 229 MG/DL — SIGNIFICANT CHANGE UP
CREAT SERPL-MCNC: 2.31 MG/DL — HIGH (ref 0.5–1.3)
EOSINOPHIL # BLD AUTO: 0.02 K/UL — SIGNIFICANT CHANGE UP (ref 0–0.5)
EOSINOPHIL NFR BLD AUTO: 0.3 % — SIGNIFICANT CHANGE UP (ref 0–6)
GLUCOSE SERPL-MCNC: 106 MG/DL — HIGH (ref 70–99)
HCT VFR BLD CALC: 38.1 % — LOW (ref 39–50)
HGB BLD-MCNC: 11.7 G/DL — LOW (ref 13–17)
IMM GRANULOCYTES NFR BLD AUTO: 0.3 % — SIGNIFICANT CHANGE UP (ref 0–1.5)
LDH SERPL L TO P-CCNC: 146 U/L — SIGNIFICANT CHANGE UP (ref 50–242)
LYMPHOCYTES # BLD AUTO: 1.17 K/UL — SIGNIFICANT CHANGE UP (ref 1–3.3)
LYMPHOCYTES # BLD AUTO: 19.8 % — SIGNIFICANT CHANGE UP (ref 13–44)
MAGNESIUM SERPL-MCNC: 2 MG/DL — SIGNIFICANT CHANGE UP (ref 1.6–2.6)
MCHC RBC-ENTMCNC: 30.7 GM/DL — LOW (ref 32–36)
MCHC RBC-ENTMCNC: 31.5 PG — SIGNIFICANT CHANGE UP (ref 27–34)
MCV RBC AUTO: 102.7 FL — HIGH (ref 80–100)
MONOCYTES # BLD AUTO: 0.33 K/UL — SIGNIFICANT CHANGE UP (ref 0–0.9)
MONOCYTES NFR BLD AUTO: 5.6 % — SIGNIFICANT CHANGE UP (ref 2–14)
NEUTROPHILS # BLD AUTO: 4.37 K/UL — SIGNIFICANT CHANGE UP (ref 1.8–7.4)
NEUTROPHILS NFR BLD AUTO: 73.8 % — SIGNIFICANT CHANGE UP (ref 43–77)
PHOSPHATE SERPL-MCNC: 3 MG/DL — SIGNIFICANT CHANGE UP (ref 2.5–4.5)
PLATELET # BLD AUTO: 189 K/UL — SIGNIFICANT CHANGE UP (ref 150–400)
POTASSIUM SERPL-MCNC: 4.6 MMOL/L — SIGNIFICANT CHANGE UP (ref 3.5–5.3)
POTASSIUM SERPL-SCNC: 4.6 MMOL/L — SIGNIFICANT CHANGE UP (ref 3.5–5.3)
PROT ?TM UR-MCNC: 27 MG/DL — HIGH (ref 0–12)
PROT SERPL-MCNC: 6.7 G/DL — SIGNIFICANT CHANGE UP (ref 6–8.3)
PROT/CREAT UR-RTO: 0.1 RATIO — SIGNIFICANT CHANGE UP (ref 0–0.2)
RBC # BLD: 3.71 M/UL — LOW (ref 4.2–5.8)
RBC # FLD: 17.1 % — HIGH (ref 10.3–14.5)
SODIUM SERPL-SCNC: 139 MMOL/L — SIGNIFICANT CHANGE UP (ref 135–145)
TACROLIMUS SERPL-MCNC: 7.8 NG/ML — SIGNIFICANT CHANGE UP
WBC # BLD: 5.92 K/UL — SIGNIFICANT CHANGE UP (ref 3.8–10.5)
WBC # FLD AUTO: 5.92 K/UL — SIGNIFICANT CHANGE UP (ref 3.8–10.5)

## 2017-04-10 PROCEDURE — 84156 ASSAY OF PROTEIN URINE: CPT

## 2017-04-10 PROCEDURE — 83615 LACTATE (LD) (LDH) ENZYME: CPT

## 2017-04-10 PROCEDURE — 80197 ASSAY OF TACROLIMUS: CPT

## 2017-04-10 PROCEDURE — 84100 ASSAY OF PHOSPHORUS: CPT

## 2017-04-10 PROCEDURE — 99214 OFFICE O/P EST MOD 30 MIN: CPT | Mod: GC

## 2017-04-10 PROCEDURE — 81001 URINALYSIS AUTO W/SCOPE: CPT

## 2017-04-10 PROCEDURE — 85027 COMPLETE CBC AUTOMATED: CPT

## 2017-04-10 PROCEDURE — 83735 ASSAY OF MAGNESIUM: CPT

## 2017-04-10 PROCEDURE — 80053 COMPREHEN METABOLIC PANEL: CPT

## 2017-04-10 RX ORDER — LINACLOTIDE 72 UG/1
72 CAPSULE, GELATIN COATED ORAL
Refills: 0 | Status: DISCONTINUED | COMMUNITY
Start: 2017-03-13 | End: 2017-04-10

## 2017-04-11 LAB
APPEARANCE UR: CLEAR — SIGNIFICANT CHANGE UP
BACTERIA # UR AUTO: NEGATIVE — SIGNIFICANT CHANGE UP
BILIRUB UR-MCNC: NEGATIVE — SIGNIFICANT CHANGE UP
COLOR SPEC: YELLOW — SIGNIFICANT CHANGE UP
DIFF PNL FLD: NEGATIVE — SIGNIFICANT CHANGE UP
EPI CELLS # UR: 0 /HPF — SIGNIFICANT CHANGE UP (ref 0–5)
GLUCOSE UR QL: NEGATIVE MG/DL — SIGNIFICANT CHANGE UP
KETONES UR-MCNC: NEGATIVE — SIGNIFICANT CHANGE UP
LEUKOCYTE ESTERASE UR-ACNC: NEGATIVE — SIGNIFICANT CHANGE UP
NITRITE UR-MCNC: NEGATIVE — SIGNIFICANT CHANGE UP
PH UR: 6 — SIGNIFICANT CHANGE UP (ref 5–8)
PROT UR-MCNC: ABNORMAL MG/DL
RBC CASTS # UR COMP ASSIST: 1 /HPF — SIGNIFICANT CHANGE UP (ref 0–4)
SP GR SPEC: 1.04 — HIGH (ref 1.01–1.02)
UROBILINOGEN FLD QL: 1 MG/DL — SIGNIFICANT CHANGE UP
WBC UR QL: 5 /HPF — SIGNIFICANT CHANGE UP (ref 0–5)

## 2017-04-12 ENCOUNTER — FORM ENCOUNTER (OUTPATIENT)
Age: 57
End: 2017-04-12

## 2017-04-13 ENCOUNTER — APPOINTMENT (OUTPATIENT)
Dept: ULTRASOUND IMAGING | Facility: HOSPITAL | Age: 57
End: 2017-04-13

## 2017-04-13 ENCOUNTER — OUTPATIENT (OUTPATIENT)
Dept: OUTPATIENT SERVICES | Facility: HOSPITAL | Age: 57
LOS: 1 days | End: 2017-04-13
Payer: COMMERCIAL

## 2017-04-13 DIAGNOSIS — Z94.0 KIDNEY TRANSPLANT STATUS: ICD-10-CM

## 2017-04-13 DIAGNOSIS — Z99.2 DEPENDENCE ON RENAL DIALYSIS: Chronic | ICD-10-CM

## 2017-04-13 DIAGNOSIS — Z98.89 OTHER SPECIFIED POSTPROCEDURAL STATES: Chronic | ICD-10-CM

## 2017-04-13 DIAGNOSIS — Z94.0 KIDNEY TRANSPLANT STATUS: Chronic | ICD-10-CM

## 2017-04-13 PROCEDURE — 93975 VASCULAR STUDY: CPT

## 2017-04-13 PROCEDURE — 93975 VASCULAR STUDY: CPT | Mod: 26

## 2017-04-24 ENCOUNTER — LABORATORY RESULT (OUTPATIENT)
Age: 57
End: 2017-04-24

## 2017-04-24 ENCOUNTER — APPOINTMENT (OUTPATIENT)
Dept: NEPHROLOGY | Facility: CLINIC | Age: 57
End: 2017-04-24
Payer: MEDICARE

## 2017-04-24 ENCOUNTER — OUTPATIENT (OUTPATIENT)
Dept: OUTPATIENT SERVICES | Facility: HOSPITAL | Age: 57
LOS: 1 days | End: 2017-04-24
Payer: MEDICARE

## 2017-04-24 VITALS — WEIGHT: 124 LBS | BODY MASS INDEX: 19.42 KG/M2

## 2017-04-24 VITALS
OXYGEN SATURATION: 98 % | HEART RATE: 74 BPM | TEMPERATURE: 98.2 F | RESPIRATION RATE: 18 BRPM | DIASTOLIC BLOOD PRESSURE: 78 MMHG | SYSTOLIC BLOOD PRESSURE: 132 MMHG

## 2017-04-24 DIAGNOSIS — Z98.89 OTHER SPECIFIED POSTPROCEDURAL STATES: Chronic | ICD-10-CM

## 2017-04-24 DIAGNOSIS — Z99.2 DEPENDENCE ON RENAL DIALYSIS: Chronic | ICD-10-CM

## 2017-04-24 DIAGNOSIS — Z94.0 KIDNEY TRANSPLANT STATUS: Chronic | ICD-10-CM

## 2017-04-24 DIAGNOSIS — N18.9 CHRONIC KIDNEY DISEASE, UNSPECIFIED: ICD-10-CM

## 2017-04-24 DIAGNOSIS — Z94.0 KIDNEY TRANSPLANT STATUS: ICD-10-CM

## 2017-04-24 LAB
CREAT ?TM UR-MCNC: 196 MG/DL — SIGNIFICANT CHANGE UP
PROT ?TM UR-MCNC: 21 MG/DL — HIGH (ref 0–12)
PROT/CREAT UR-RTO: 0.1 RATIO — SIGNIFICANT CHANGE UP (ref 0–0.2)

## 2017-04-24 PROCEDURE — 84156 ASSAY OF PROTEIN URINE: CPT

## 2017-04-24 PROCEDURE — 85027 COMPLETE CBC AUTOMATED: CPT

## 2017-04-24 PROCEDURE — 80197 ASSAY OF TACROLIMUS: CPT

## 2017-04-24 PROCEDURE — 84100 ASSAY OF PHOSPHORUS: CPT

## 2017-04-24 PROCEDURE — 81001 URINALYSIS AUTO W/SCOPE: CPT

## 2017-04-24 PROCEDURE — 99214 OFFICE O/P EST MOD 30 MIN: CPT

## 2017-04-24 PROCEDURE — 83735 ASSAY OF MAGNESIUM: CPT

## 2017-04-24 PROCEDURE — 82310 ASSAY OF CALCIUM: CPT

## 2017-04-24 PROCEDURE — 83615 LACTATE (LD) (LDH) ENZYME: CPT

## 2017-04-24 PROCEDURE — 80053 COMPREHEN METABOLIC PANEL: CPT

## 2017-04-24 RX ORDER — CLONIDINE HYDROCHLORIDE 0.1 MG/1
0.1 TABLET ORAL 3 TIMES DAILY
Refills: 0 | Status: DISCONTINUED | COMMUNITY
Start: 2017-04-24 | End: 2017-04-24

## 2017-04-24 RX ORDER — SODIUM BICARBONATE 650 MG/1
650 TABLET ORAL TWICE DAILY
Qty: 60 | Refills: 5 | Status: DISCONTINUED | COMMUNITY
Start: 2017-03-17 | End: 2017-04-24

## 2017-04-25 LAB
ALBUMIN SERPL ELPH-MCNC: 4.9 G/DL — SIGNIFICANT CHANGE UP (ref 3.3–5)
ALP SERPL-CCNC: 73 U/L — SIGNIFICANT CHANGE UP (ref 40–120)
ALT FLD-CCNC: 12 U/L — SIGNIFICANT CHANGE UP (ref 10–45)
ANION GAP SERPL CALC-SCNC: 18 MMOL/L — HIGH (ref 5–17)
APPEARANCE UR: CLEAR — SIGNIFICANT CHANGE UP
AST SERPL-CCNC: 17 U/L — SIGNIFICANT CHANGE UP (ref 10–40)
BACTERIA # UR AUTO: NEGATIVE — SIGNIFICANT CHANGE UP
BASOPHILS # BLD AUTO: 0.01 K/UL — SIGNIFICANT CHANGE UP (ref 0–0.2)
BASOPHILS NFR BLD AUTO: 0.1 % — SIGNIFICANT CHANGE UP (ref 0–2)
BILIRUB SERPL-MCNC: 0.3 MG/DL — SIGNIFICANT CHANGE UP (ref 0.2–1.2)
BILIRUB UR-MCNC: NEGATIVE — SIGNIFICANT CHANGE UP
BUN SERPL-MCNC: 29 MG/DL — HIGH (ref 7–23)
CALCIUM SERPL-MCNC: 10 MG/DL — SIGNIFICANT CHANGE UP (ref 8.4–10.5)
CALCIUM SERPL-MCNC: 10.1 MG/DL — SIGNIFICANT CHANGE UP (ref 8.4–10.5)
CHLORIDE SERPL-SCNC: 104 MMOL/L — SIGNIFICANT CHANGE UP (ref 96–108)
CO2 SERPL-SCNC: 20 MMOL/L — LOW (ref 22–31)
COLOR SPEC: ABNORMAL
CREAT SERPL-MCNC: 1.87 MG/DL — HIGH (ref 0.5–1.3)
DIFF PNL FLD: NEGATIVE — SIGNIFICANT CHANGE UP
EOSINOPHIL # BLD AUTO: 0.05 K/UL — SIGNIFICANT CHANGE UP (ref 0–0.5)
EOSINOPHIL NFR BLD AUTO: 0.7 % — SIGNIFICANT CHANGE UP (ref 0–6)
EPI CELLS # UR: 2 /HPF — SIGNIFICANT CHANGE UP (ref 0–5)
GLUCOSE SERPL-MCNC: 69 MG/DL — LOW (ref 70–99)
GLUCOSE UR QL: NEGATIVE MG/DL — SIGNIFICANT CHANGE UP
HCT VFR BLD CALC: 41.6 % — SIGNIFICANT CHANGE UP (ref 39–50)
HGB BLD-MCNC: 13 G/DL — SIGNIFICANT CHANGE UP (ref 13–17)
HYALINE CASTS # UR AUTO: 2 /LPF — SIGNIFICANT CHANGE UP (ref 0–7)
IMM GRANULOCYTES NFR BLD AUTO: 0.1 % — SIGNIFICANT CHANGE UP (ref 0–1.5)
KETONES UR-MCNC: NEGATIVE — SIGNIFICANT CHANGE UP
LDH SERPL L TO P-CCNC: 217 U/L — SIGNIFICANT CHANGE UP (ref 50–242)
LEUKOCYTE ESTERASE UR-ACNC: NEGATIVE — SIGNIFICANT CHANGE UP
LYMPHOCYTES # BLD AUTO: 1.55 K/UL — SIGNIFICANT CHANGE UP (ref 1–3.3)
LYMPHOCYTES # BLD AUTO: 21 % — SIGNIFICANT CHANGE UP (ref 13–44)
MAGNESIUM SERPL-MCNC: 1.9 MG/DL — SIGNIFICANT CHANGE UP (ref 1.6–2.6)
MCHC RBC-ENTMCNC: 31.3 GM/DL — LOW (ref 32–36)
MCHC RBC-ENTMCNC: 32.6 PG — SIGNIFICANT CHANGE UP (ref 27–34)
MCV RBC AUTO: 104.3 FL — HIGH (ref 80–100)
MONOCYTES # BLD AUTO: 0.59 K/UL — SIGNIFICANT CHANGE UP (ref 0–0.9)
MONOCYTES NFR BLD AUTO: 8 % — SIGNIFICANT CHANGE UP (ref 2–14)
NEUTROPHILS # BLD AUTO: 5.17 K/UL — SIGNIFICANT CHANGE UP (ref 1.8–7.4)
NEUTROPHILS NFR BLD AUTO: 70.1 % — SIGNIFICANT CHANGE UP (ref 43–77)
NITRITE UR-MCNC: NEGATIVE — SIGNIFICANT CHANGE UP
PH UR: 5.5 — SIGNIFICANT CHANGE UP (ref 5–8)
PHOSPHATE SERPL-MCNC: 3.4 MG/DL — SIGNIFICANT CHANGE UP (ref 2.5–4.5)
PLATELET # BLD AUTO: 203 K/UL — SIGNIFICANT CHANGE UP (ref 150–400)
POTASSIUM SERPL-MCNC: 4.6 MMOL/L — SIGNIFICANT CHANGE UP (ref 3.5–5.3)
POTASSIUM SERPL-SCNC: 4.6 MMOL/L — SIGNIFICANT CHANGE UP (ref 3.5–5.3)
PROT SERPL-MCNC: 7.7 G/DL — SIGNIFICANT CHANGE UP (ref 6–8.3)
PROT UR-MCNC: NEGATIVE MG/DL — SIGNIFICANT CHANGE UP
RBC # BLD: 3.99 M/UL — LOW (ref 4.2–5.8)
RBC # FLD: 16.1 % — HIGH (ref 10.3–14.5)
RBC CASTS # UR COMP ASSIST: 1 /HPF — SIGNIFICANT CHANGE UP (ref 0–4)
SODIUM SERPL-SCNC: 141 MMOL/L — SIGNIFICANT CHANGE UP (ref 135–145)
SP GR SPEC: 1.03 — HIGH (ref 1.01–1.02)
TACROLIMUS SERPL-MCNC: 7 NG/ML — SIGNIFICANT CHANGE UP
UROBILINOGEN FLD QL: NEGATIVE MG/DL — SIGNIFICANT CHANGE UP
WBC # BLD: 7.38 K/UL — SIGNIFICANT CHANGE UP (ref 3.8–10.5)
WBC # FLD AUTO: 7.38 K/UL — SIGNIFICANT CHANGE UP (ref 3.8–10.5)
WBC UR QL: 4 /HPF — SIGNIFICANT CHANGE UP (ref 0–5)

## 2017-05-08 ENCOUNTER — LABORATORY RESULT (OUTPATIENT)
Age: 57
End: 2017-05-08

## 2017-05-08 ENCOUNTER — OUTPATIENT (OUTPATIENT)
Dept: OUTPATIENT SERVICES | Facility: HOSPITAL | Age: 57
LOS: 1 days | End: 2017-05-08
Payer: MEDICARE

## 2017-05-08 ENCOUNTER — APPOINTMENT (OUTPATIENT)
Dept: NEPHROLOGY | Facility: CLINIC | Age: 57
End: 2017-05-08
Payer: MEDICARE

## 2017-05-08 VITALS
BODY MASS INDEX: 18.64 KG/M2 | SYSTOLIC BLOOD PRESSURE: 128 MMHG | OXYGEN SATURATION: 96 % | DIASTOLIC BLOOD PRESSURE: 72 MMHG | RESPIRATION RATE: 16 BRPM | HEART RATE: 77 BPM | WEIGHT: 119 LBS | TEMPERATURE: 97.6 F

## 2017-05-08 DIAGNOSIS — Z99.2 DEPENDENCE ON RENAL DIALYSIS: Chronic | ICD-10-CM

## 2017-05-08 DIAGNOSIS — N18.9 CHRONIC KIDNEY DISEASE, UNSPECIFIED: ICD-10-CM

## 2017-05-08 DIAGNOSIS — Z94.0 KIDNEY TRANSPLANT STATUS: ICD-10-CM

## 2017-05-08 DIAGNOSIS — Z94.0 KIDNEY TRANSPLANT STATUS: Chronic | ICD-10-CM

## 2017-05-08 DIAGNOSIS — Z98.89 OTHER SPECIFIED POSTPROCEDURAL STATES: Chronic | ICD-10-CM

## 2017-05-08 LAB
ALBUMIN SERPL ELPH-MCNC: 4.7 G/DL — SIGNIFICANT CHANGE UP (ref 3.3–5)
ALP SERPL-CCNC: 62 U/L — SIGNIFICANT CHANGE UP (ref 40–120)
ALT FLD-CCNC: 14 U/L — SIGNIFICANT CHANGE UP (ref 10–45)
ANION GAP SERPL CALC-SCNC: 16 MMOL/L — SIGNIFICANT CHANGE UP (ref 5–17)
APPEARANCE UR: CLEAR — SIGNIFICANT CHANGE UP
AST SERPL-CCNC: 13 U/L — SIGNIFICANT CHANGE UP (ref 10–40)
BACTERIA # UR AUTO: NEGATIVE — SIGNIFICANT CHANGE UP
BASOPHILS # BLD AUTO: 0 K/UL — SIGNIFICANT CHANGE UP (ref 0–0.2)
BASOPHILS NFR BLD AUTO: 0 % — SIGNIFICANT CHANGE UP (ref 0–2)
BILIRUB SERPL-MCNC: 0.3 MG/DL — SIGNIFICANT CHANGE UP (ref 0.2–1.2)
BILIRUB UR-MCNC: NEGATIVE — SIGNIFICANT CHANGE UP
BUN SERPL-MCNC: 28 MG/DL — HIGH (ref 7–23)
CALCIUM SERPL-MCNC: 10.3 MG/DL — SIGNIFICANT CHANGE UP (ref 8.4–10.5)
CHLORIDE SERPL-SCNC: 103 MMOL/L — SIGNIFICANT CHANGE UP (ref 96–108)
CO2 SERPL-SCNC: 21 MMOL/L — LOW (ref 22–31)
COLOR SPEC: ABNORMAL
CREAT SERPL-MCNC: 2.17 MG/DL — HIGH (ref 0.5–1.3)
DIFF PNL FLD: NEGATIVE — SIGNIFICANT CHANGE UP
EOSINOPHIL # BLD AUTO: 0.05 K/UL — SIGNIFICANT CHANGE UP (ref 0–0.5)
EOSINOPHIL NFR BLD AUTO: 1.1 % — SIGNIFICANT CHANGE UP (ref 0–6)
EPI CELLS # UR: 2 /HPF — SIGNIFICANT CHANGE UP (ref 0–5)
GLUCOSE SERPL-MCNC: 90 MG/DL — SIGNIFICANT CHANGE UP (ref 70–99)
GLUCOSE UR QL: NEGATIVE MG/DL — SIGNIFICANT CHANGE UP
HCT VFR BLD CALC: 40 % — SIGNIFICANT CHANGE UP (ref 39–50)
HGB BLD-MCNC: 12.9 G/DL — LOW (ref 13–17)
HYALINE CASTS # UR AUTO: 5 /LPF — SIGNIFICANT CHANGE UP (ref 0–7)
IMM GRANULOCYTES NFR BLD AUTO: 0.2 % — SIGNIFICANT CHANGE UP (ref 0–1.5)
KETONES UR-MCNC: ABNORMAL
LDH SERPL L TO P-CCNC: 222 U/L — SIGNIFICANT CHANGE UP (ref 50–242)
LEUKOCYTE ESTERASE UR-ACNC: NEGATIVE — SIGNIFICANT CHANGE UP
LYMPHOCYTES # BLD AUTO: 1.03 K/UL — SIGNIFICANT CHANGE UP (ref 1–3.3)
LYMPHOCYTES # BLD AUTO: 22.6 % — SIGNIFICANT CHANGE UP (ref 13–44)
MAGNESIUM SERPL-MCNC: 1.9 MG/DL — SIGNIFICANT CHANGE UP (ref 1.6–2.6)
MCHC RBC-ENTMCNC: 32.1 PG — SIGNIFICANT CHANGE UP (ref 27–34)
MCHC RBC-ENTMCNC: 32.3 GM/DL — SIGNIFICANT CHANGE UP (ref 32–36)
MCV RBC AUTO: 99.5 FL — SIGNIFICANT CHANGE UP (ref 80–100)
MONOCYTES # BLD AUTO: 0.16 K/UL — SIGNIFICANT CHANGE UP (ref 0–0.9)
MONOCYTES NFR BLD AUTO: 3.5 % — SIGNIFICANT CHANGE UP (ref 2–14)
NEUTROPHILS # BLD AUTO: 3.3 K/UL — SIGNIFICANT CHANGE UP (ref 1.8–7.4)
NEUTROPHILS NFR BLD AUTO: 72.6 % — SIGNIFICANT CHANGE UP (ref 43–77)
NITRITE UR-MCNC: NEGATIVE — SIGNIFICANT CHANGE UP
PH UR: 5.5 — SIGNIFICANT CHANGE UP (ref 5–8)
PHOSPHATE SERPL-MCNC: 2.4 MG/DL — LOW (ref 2.5–4.5)
PLATELET # BLD AUTO: 136 K/UL — LOW (ref 150–400)
POTASSIUM SERPL-MCNC: 5.4 MMOL/L — HIGH (ref 3.5–5.3)
POTASSIUM SERPL-SCNC: 5.4 MMOL/L — HIGH (ref 3.5–5.3)
PROT SERPL-MCNC: 7.1 G/DL — SIGNIFICANT CHANGE UP (ref 6–8.3)
PROT UR-MCNC: ABNORMAL MG/DL
RBC # BLD: 4.02 M/UL — LOW (ref 4.2–5.8)
RBC # FLD: 14.8 % — HIGH (ref 10.3–14.5)
RBC CASTS # UR COMP ASSIST: 3 /HPF — SIGNIFICANT CHANGE UP (ref 0–4)
SODIUM SERPL-SCNC: 140 MMOL/L — SIGNIFICANT CHANGE UP (ref 135–145)
SP GR SPEC: 1.03 — HIGH (ref 1.01–1.02)
TACROLIMUS SERPL-MCNC: 8.9 NG/ML — SIGNIFICANT CHANGE UP
UROBILINOGEN FLD QL: NEGATIVE MG/DL — SIGNIFICANT CHANGE UP
WBC # BLD: 4.55 K/UL — SIGNIFICANT CHANGE UP (ref 3.8–10.5)
WBC # FLD AUTO: 4.55 K/UL — SIGNIFICANT CHANGE UP (ref 3.8–10.5)
WBC UR QL: 8 /HPF — HIGH (ref 0–5)

## 2017-05-08 PROCEDURE — 85027 COMPLETE CBC AUTOMATED: CPT

## 2017-05-08 PROCEDURE — 81001 URINALYSIS AUTO W/SCOPE: CPT

## 2017-05-08 PROCEDURE — 83735 ASSAY OF MAGNESIUM: CPT

## 2017-05-08 PROCEDURE — 80053 COMPREHEN METABOLIC PANEL: CPT

## 2017-05-08 PROCEDURE — 80197 ASSAY OF TACROLIMUS: CPT

## 2017-05-08 PROCEDURE — 83615 LACTATE (LD) (LDH) ENZYME: CPT

## 2017-05-08 PROCEDURE — 84156 ASSAY OF PROTEIN URINE: CPT

## 2017-05-08 PROCEDURE — 84100 ASSAY OF PHOSPHORUS: CPT

## 2017-05-08 PROCEDURE — 99214 OFFICE O/P EST MOD 30 MIN: CPT

## 2017-05-09 ENCOUNTER — FORM ENCOUNTER (OUTPATIENT)
Age: 57
End: 2017-05-09

## 2017-05-09 LAB
CREAT ?TM UR-MCNC: 322 MG/DL — SIGNIFICANT CHANGE UP
PROT ?TM UR-MCNC: 29 MG/DL — HIGH (ref 0–12)
PROT/CREAT UR-RTO: 0.1 — SIGNIFICANT CHANGE UP

## 2017-05-10 ENCOUNTER — OUTPATIENT (OUTPATIENT)
Dept: OUTPATIENT SERVICES | Facility: HOSPITAL | Age: 57
LOS: 1 days | End: 2017-05-10
Payer: COMMERCIAL

## 2017-05-10 ENCOUNTER — APPOINTMENT (OUTPATIENT)
Dept: ULTRASOUND IMAGING | Facility: HOSPITAL | Age: 57
End: 2017-05-10

## 2017-05-10 DIAGNOSIS — Z98.89 OTHER SPECIFIED POSTPROCEDURAL STATES: Chronic | ICD-10-CM

## 2017-05-10 DIAGNOSIS — Z94.0 KIDNEY TRANSPLANT STATUS: Chronic | ICD-10-CM

## 2017-05-10 DIAGNOSIS — Z94.0 KIDNEY TRANSPLANT STATUS: ICD-10-CM

## 2017-05-10 DIAGNOSIS — Z99.2 DEPENDENCE ON RENAL DIALYSIS: Chronic | ICD-10-CM

## 2017-05-10 PROCEDURE — 76776 US EXAM K TRANSPL W/DOPPLER: CPT

## 2017-05-15 LAB — TACROLIMUS SERPL-MCNC: 8.5 NG/ML

## 2017-05-19 ENCOUNTER — RESULT CHARGE (OUTPATIENT)
Age: 57
End: 2017-05-19

## 2017-05-19 ENCOUNTER — OUTPATIENT (OUTPATIENT)
Dept: OUTPATIENT SERVICES | Facility: HOSPITAL | Age: 57
LOS: 1 days | End: 2017-05-19
Payer: MEDICARE

## 2017-05-19 ENCOUNTER — RESULT REVIEW (OUTPATIENT)
Age: 57
End: 2017-05-19

## 2017-05-19 ENCOUNTER — APPOINTMENT (OUTPATIENT)
Dept: ULTRASOUND IMAGING | Facility: HOSPITAL | Age: 57
End: 2017-05-19

## 2017-05-19 DIAGNOSIS — Z98.89 OTHER SPECIFIED POSTPROCEDURAL STATES: Chronic | ICD-10-CM

## 2017-05-19 DIAGNOSIS — Z94.0 KIDNEY TRANSPLANT STATUS: ICD-10-CM

## 2017-05-19 DIAGNOSIS — Z94.0 KIDNEY TRANSPLANT STATUS: Chronic | ICD-10-CM

## 2017-05-19 DIAGNOSIS — Z99.2 DEPENDENCE ON RENAL DIALYSIS: Chronic | ICD-10-CM

## 2017-05-19 LAB
APTT BLD: 28.9 SEC — SIGNIFICANT CHANGE UP (ref 27.5–37.4)
INR BLD: 1.02 RATIO — SIGNIFICANT CHANGE UP (ref 0.88–1.16)
PROTHROM AB SERPL-ACNC: 11 SEC — SIGNIFICANT CHANGE UP (ref 9.8–12.7)

## 2017-05-19 PROCEDURE — 50200 RENAL BIOPSY PERQ: CPT

## 2017-05-19 PROCEDURE — 85730 THROMBOPLASTIN TIME PARTIAL: CPT

## 2017-05-19 PROCEDURE — 88346 IMFLUOR 1ST 1ANTB STAIN PX: CPT

## 2017-05-19 PROCEDURE — 88305 TISSUE EXAM BY PATHOLOGIST: CPT

## 2017-05-19 PROCEDURE — 88313 SPECIAL STAINS GROUP 2: CPT

## 2017-05-19 PROCEDURE — 85610 PROTHROMBIN TIME: CPT

## 2017-05-19 PROCEDURE — 88350 IMFLUOR EA ADDL 1ANTB STN PX: CPT

## 2017-05-19 PROCEDURE — 88348 ELECTRON MICROSCOPY DX: CPT

## 2017-05-19 PROCEDURE — 88342 IMHCHEM/IMCYTCHM 1ST ANTB: CPT

## 2017-05-19 PROCEDURE — 76942 ECHO GUIDE FOR BIOPSY: CPT

## 2017-05-19 PROCEDURE — 88312 SPECIAL STAINS GROUP 1: CPT

## 2017-05-22 ENCOUNTER — APPOINTMENT (OUTPATIENT)
Dept: NEPHROLOGY | Facility: CLINIC | Age: 57
End: 2017-05-22

## 2017-05-22 VITALS
HEIGHT: 67 IN | BODY MASS INDEX: 18.83 KG/M2 | HEART RATE: 72 BPM | OXYGEN SATURATION: 100 % | SYSTOLIC BLOOD PRESSURE: 146 MMHG | TEMPERATURE: 97.4 F | DIASTOLIC BLOOD PRESSURE: 84 MMHG | WEIGHT: 120 LBS | RESPIRATION RATE: 14 BRPM

## 2017-05-22 RX ORDER — CIPROFLOXACIN HYDROCHLORIDE 250 MG/1
250 TABLET, FILM COATED ORAL
Qty: 10 | Refills: 0 | Status: DISCONTINUED | COMMUNITY
Start: 2017-01-30

## 2017-05-22 RX ORDER — NYSTATIN 100000 [USP'U]/ML
100000 SUSPENSION ORAL 4 TIMES DAILY
Qty: 1800 | Refills: 0 | Status: DISCONTINUED | COMMUNITY
Start: 2017-02-24 | End: 2017-05-22

## 2017-05-22 RX ORDER — AZITHROMYCIN 250 MG/1
250 TABLET, FILM COATED ORAL
Qty: 6 | Refills: 0 | Status: DISCONTINUED | COMMUNITY
Start: 2016-12-13

## 2017-05-22 RX ORDER — FAMOTIDINE 20 MG/1
20 TABLET, FILM COATED ORAL
Qty: 90 | Refills: 3 | Status: DISCONTINUED | COMMUNITY
Start: 2017-02-24 | End: 2017-05-22

## 2017-05-22 RX ORDER — DIAZEPAM 5 MG/1
5 TABLET ORAL
Qty: 30 | Refills: 0 | Status: DISCONTINUED | COMMUNITY
Start: 2016-12-06

## 2017-05-22 RX ORDER — OMEPRAZOLE 40 MG/1
40 CAPSULE, DELAYED RELEASE ORAL
Qty: 90 | Refills: 0 | Status: DISCONTINUED | COMMUNITY
Start: 2016-05-18

## 2017-05-22 RX ORDER — SODIUM POLYSTYRENE SULFONATE 15 G/60ML
15 SUSPENSION ORAL; RECTAL
Qty: 120 | Refills: 0 | Status: DISCONTINUED | COMMUNITY
Start: 2017-03-17 | End: 2017-05-22

## 2017-05-22 RX ORDER — BUSPIRONE HYDROCHLORIDE 10 MG/1
10 TABLET ORAL
Qty: 60 | Refills: 0 | Status: DISCONTINUED | COMMUNITY
Start: 2017-05-06

## 2017-06-05 ENCOUNTER — LABORATORY RESULT (OUTPATIENT)
Age: 57
End: 2017-06-05

## 2017-06-05 ENCOUNTER — APPOINTMENT (OUTPATIENT)
Dept: NEPHROLOGY | Facility: CLINIC | Age: 57
End: 2017-06-05

## 2017-06-05 VITALS
OXYGEN SATURATION: 100 % | BODY MASS INDEX: 19.62 KG/M2 | DIASTOLIC BLOOD PRESSURE: 89 MMHG | HEIGHT: 67 IN | RESPIRATION RATE: 16 BRPM | TEMPERATURE: 97.9 F | HEART RATE: 78 BPM | WEIGHT: 125 LBS | SYSTOLIC BLOOD PRESSURE: 143 MMHG

## 2017-06-05 RX ORDER — BUPROPION HYDROCHLORIDE 150 MG/1
150 TABLET, FILM COATED ORAL
Refills: 0 | Status: DISCONTINUED | COMMUNITY
Start: 2017-05-08 | End: 2017-06-05

## 2017-06-19 ENCOUNTER — LABORATORY RESULT (OUTPATIENT)
Age: 57
End: 2017-06-19

## 2017-06-19 ENCOUNTER — APPOINTMENT (OUTPATIENT)
Dept: NEPHROLOGY | Facility: CLINIC | Age: 57
End: 2017-06-19

## 2017-06-19 VITALS
WEIGHT: 123 LBS | BODY MASS INDEX: 19.3 KG/M2 | OXYGEN SATURATION: 96 % | HEIGHT: 67 IN | RESPIRATION RATE: 14 BRPM | SYSTOLIC BLOOD PRESSURE: 136 MMHG | HEART RATE: 71 BPM | DIASTOLIC BLOOD PRESSURE: 85 MMHG | TEMPERATURE: 97.5 F

## 2017-06-20 ENCOUNTER — APPOINTMENT (OUTPATIENT)
Dept: TRANSPLANT | Facility: CLINIC | Age: 57
End: 2017-06-20

## 2017-07-03 ENCOUNTER — APPOINTMENT (OUTPATIENT)
Dept: TRANSPLANT | Facility: CLINIC | Age: 57
End: 2017-07-03

## 2017-07-03 RX ORDER — TACROLIMUS 0.5 MG/1
0.5 CAPSULE ORAL
Qty: 180 | Refills: 3 | Status: DISCONTINUED | COMMUNITY
Start: 2017-02-24 | End: 2017-07-03

## 2017-07-03 RX ORDER — MYCOPHENOLATE MOFETIL 500 MG/1
500 TABLET ORAL
Qty: 180 | Refills: 0 | Status: DISCONTINUED | COMMUNITY
Start: 2017-02-24 | End: 2017-07-03

## 2017-07-05 ENCOUNTER — APPOINTMENT (OUTPATIENT)
Dept: TRANSPLANT | Facility: CLINIC | Age: 57
End: 2017-07-05

## 2017-07-17 ENCOUNTER — APPOINTMENT (OUTPATIENT)
Dept: NEPHROLOGY | Facility: CLINIC | Age: 57
End: 2017-07-17
Payer: MEDICARE

## 2017-07-17 ENCOUNTER — OTHER (OUTPATIENT)
Age: 57
End: 2017-07-17

## 2017-07-17 VITALS
BODY MASS INDEX: 19.78 KG/M2 | SYSTOLIC BLOOD PRESSURE: 118 MMHG | HEIGHT: 67 IN | OXYGEN SATURATION: 98 % | TEMPERATURE: 97.5 F | RESPIRATION RATE: 14 BRPM | HEART RATE: 74 BPM | WEIGHT: 126 LBS | DIASTOLIC BLOOD PRESSURE: 86 MMHG

## 2017-07-17 PROCEDURE — 99215 OFFICE O/P EST HI 40 MIN: CPT | Mod: GC

## 2017-08-31 ENCOUNTER — APPOINTMENT (OUTPATIENT)
Dept: NEPHROLOGY | Facility: CLINIC | Age: 57
End: 2017-08-31
Payer: MEDICARE

## 2017-08-31 VITALS
BODY MASS INDEX: 20.09 KG/M2 | HEART RATE: 71 BPM | OXYGEN SATURATION: 100 % | HEIGHT: 67 IN | DIASTOLIC BLOOD PRESSURE: 87 MMHG | TEMPERATURE: 97.4 F | SYSTOLIC BLOOD PRESSURE: 143 MMHG | WEIGHT: 128 LBS | RESPIRATION RATE: 14 BRPM

## 2017-08-31 PROCEDURE — 99215 OFFICE O/P EST HI 40 MIN: CPT

## 2017-08-31 RX ORDER — VALGANCICLOVIR HYDROCHLORIDE 450 MG/1
450 TABLET ORAL DAILY
Qty: 90 | Refills: 1 | Status: DISCONTINUED | COMMUNITY
Start: 2017-02-24 | End: 2017-08-31

## 2017-09-07 ENCOUNTER — APPOINTMENT (OUTPATIENT)
Dept: TRANSPLANT | Facility: CLINIC | Age: 57
End: 2017-09-07

## 2017-09-21 ENCOUNTER — APPOINTMENT (OUTPATIENT)
Dept: NEPHROLOGY | Facility: CLINIC | Age: 57
End: 2017-09-21
Payer: MEDICARE

## 2017-09-21 VITALS
TEMPERATURE: 97.5 F | OXYGEN SATURATION: 99 % | HEART RATE: 77 BPM | SYSTOLIC BLOOD PRESSURE: 135 MMHG | DIASTOLIC BLOOD PRESSURE: 91 MMHG | BODY MASS INDEX: 20.09 KG/M2 | RESPIRATION RATE: 14 BRPM | HEIGHT: 67 IN | WEIGHT: 128 LBS

## 2017-09-21 DIAGNOSIS — N18.6 END STAGE RENAL DISEASE: ICD-10-CM

## 2017-09-21 DIAGNOSIS — Z86.59 PERSONAL HISTORY OF OTHER MENTAL AND BEHAVIORAL DISORDERS: ICD-10-CM

## 2017-09-21 PROCEDURE — 99215 OFFICE O/P EST HI 40 MIN: CPT

## 2017-09-21 RX ORDER — TACROLIMUS 1 MG/1
1 CAPSULE ORAL
Qty: 720 | Refills: 1 | Status: DISCONTINUED | COMMUNITY
Start: 2017-02-24 | End: 2017-09-21

## 2017-09-21 RX ORDER — MYCOPHENOLATE MOFETIL 250 MG/1
250 CAPSULE ORAL TWICE DAILY
Qty: 180 | Refills: 3 | Status: DISCONTINUED | COMMUNITY
Start: 2017-07-03 | End: 2017-09-21

## 2017-10-11 ENCOUNTER — APPOINTMENT (OUTPATIENT)
Dept: TRANSPLANT | Facility: CLINIC | Age: 57
End: 2017-10-11

## 2018-01-04 ENCOUNTER — APPOINTMENT (OUTPATIENT)
Dept: TRANSPLANT | Facility: CLINIC | Age: 58
End: 2018-01-04

## 2018-01-29 ENCOUNTER — APPOINTMENT (OUTPATIENT)
Dept: NEPHROLOGY | Facility: CLINIC | Age: 58
End: 2018-01-29
Payer: MEDICARE

## 2018-01-29 VITALS
HEART RATE: 76 BPM | SYSTOLIC BLOOD PRESSURE: 138 MMHG | WEIGHT: 122 LBS | OXYGEN SATURATION: 99 % | RESPIRATION RATE: 15 BRPM | HEIGHT: 66 IN | BODY MASS INDEX: 19.61 KG/M2 | DIASTOLIC BLOOD PRESSURE: 81 MMHG | TEMPERATURE: 98.2 F

## 2018-01-29 PROCEDURE — 99215 OFFICE O/P EST HI 40 MIN: CPT

## 2018-01-29 RX ORDER — BUSPIRONE HYDROCHLORIDE 10 MG/1
10 TABLET ORAL
Refills: 0 | Status: DISCONTINUED | COMMUNITY
Start: 2017-06-05 | End: 2018-01-29

## 2018-01-29 RX ORDER — OMEPRAZOLE 40 MG/1
40 CAPSULE, DELAYED RELEASE ORAL TWICE DAILY
Qty: 180 | Refills: 1 | Status: DISCONTINUED | COMMUNITY
Start: 2017-05-22 | End: 2018-01-29

## 2018-01-29 RX ORDER — VITAMIN K2 90 MCG
125 MCG CAPSULE ORAL DAILY
Qty: 90 | Refills: 3 | Status: DISCONTINUED | COMMUNITY
Start: 2017-06-20 | End: 2018-01-29

## 2018-02-12 ENCOUNTER — APPOINTMENT (OUTPATIENT)
Dept: TRANSPLANT | Facility: CLINIC | Age: 58
End: 2018-02-12

## 2018-02-26 ENCOUNTER — APPOINTMENT (OUTPATIENT)
Dept: NEPHROLOGY | Facility: CLINIC | Age: 58
End: 2018-02-26
Payer: COMMERCIAL

## 2018-02-26 VITALS
BODY MASS INDEX: 19.61 KG/M2 | SYSTOLIC BLOOD PRESSURE: 128 MMHG | OXYGEN SATURATION: 97 % | HEART RATE: 73 BPM | HEIGHT: 66 IN | TEMPERATURE: 97.9 F | DIASTOLIC BLOOD PRESSURE: 75 MMHG | WEIGHT: 122 LBS | RESPIRATION RATE: 14 BRPM

## 2018-02-26 PROCEDURE — 99215 OFFICE O/P EST HI 40 MIN: CPT

## 2018-03-28 ENCOUNTER — APPOINTMENT (OUTPATIENT)
Dept: PHYSICAL MEDICINE AND REHAB | Facility: CLINIC | Age: 58
End: 2018-03-28
Payer: MEDICAID

## 2018-03-28 VITALS
OXYGEN SATURATION: 97 % | BODY MASS INDEX: 18.82 KG/M2 | TEMPERATURE: 97.7 F | HEIGHT: 69.5 IN | WEIGHT: 130 LBS | SYSTOLIC BLOOD PRESSURE: 118 MMHG | DIASTOLIC BLOOD PRESSURE: 81 MMHG | HEART RATE: 84 BPM

## 2018-03-28 DIAGNOSIS — M51.36 OTHER INTERVERTEBRAL DISC DEGENERATION, LUMBAR REGION: ICD-10-CM

## 2018-03-28 PROCEDURE — 99204 OFFICE O/P NEW MOD 45 MIN: CPT

## 2018-04-16 ENCOUNTER — APPOINTMENT (OUTPATIENT)
Dept: NEPHROLOGY | Facility: CLINIC | Age: 58
End: 2018-04-16
Payer: MEDICAID

## 2018-04-16 VITALS
HEART RATE: 87 BPM | SYSTOLIC BLOOD PRESSURE: 125 MMHG | TEMPERATURE: 97.8 F | BODY MASS INDEX: 17.87 KG/M2 | WEIGHT: 123.44 LBS | DIASTOLIC BLOOD PRESSURE: 82 MMHG | HEIGHT: 69.5 IN | RESPIRATION RATE: 17 BRPM

## 2018-04-16 PROCEDURE — 99215 OFFICE O/P EST HI 40 MIN: CPT

## 2018-04-16 RX ORDER — CYCLOBENZAPRINE HYDROCHLORIDE 7.5 MG/1
7.5 TABLET, FILM COATED ORAL 3 TIMES DAILY
Qty: 21 | Refills: 0 | Status: DISCONTINUED | COMMUNITY
Start: 2018-02-26 | End: 2018-04-16

## 2018-04-16 RX ORDER — FAMOTIDINE 20 MG/1
20 TABLET, FILM COATED ORAL
Qty: 90 | Refills: 3 | Status: DISCONTINUED | COMMUNITY
Start: 2018-01-29 | End: 2018-04-16

## 2018-04-23 ENCOUNTER — APPOINTMENT (OUTPATIENT)
Dept: TRANSPLANT | Facility: CLINIC | Age: 58
End: 2018-04-23

## 2018-05-08 ENCOUNTER — APPOINTMENT (OUTPATIENT)
Dept: TRANSPLANT | Facility: CLINIC | Age: 58
End: 2018-05-08

## 2018-05-14 ENCOUNTER — MEDICATION RENEWAL (OUTPATIENT)
Age: 58
End: 2018-05-14

## 2018-06-18 ENCOUNTER — APPOINTMENT (OUTPATIENT)
Dept: TRANSPLANT | Facility: CLINIC | Age: 58
End: 2018-06-18

## 2018-06-18 ENCOUNTER — APPOINTMENT (OUTPATIENT)
Dept: NEPHROLOGY | Facility: CLINIC | Age: 58
End: 2018-06-18

## 2018-06-18 PROCEDURE — 85025 COMPLETE CBC W/AUTO DIFF WBC: CPT

## 2018-06-18 PROCEDURE — 84550 ASSAY OF BLOOD/URIC ACID: CPT | Mod: QW

## 2018-06-18 PROCEDURE — 87496 CYTOMEG DNA AMP PROBE: CPT

## 2018-06-18 PROCEDURE — 80053 COMPREHEN METABOLIC PANEL: CPT | Mod: QW

## 2018-06-18 PROCEDURE — 80197 ASSAY OF TACROLIMUS: CPT

## 2018-06-18 PROCEDURE — 87799 DETECT AGENT NOS DNA QUANT: CPT

## 2018-06-18 PROCEDURE — 81001 URINALYSIS AUTO W/SCOPE: CPT

## 2018-06-18 PROCEDURE — 83615 LACTATE (LD) (LDH) ENZYME: CPT

## 2018-06-18 PROCEDURE — 84156 ASSAY OF PROTEIN URINE: CPT

## 2018-06-18 PROCEDURE — 84100 ASSAY OF PHOSPHORUS: CPT

## 2018-06-18 PROCEDURE — 83735 ASSAY OF MAGNESIUM: CPT

## 2018-07-03 ENCOUNTER — APPOINTMENT (OUTPATIENT)
Dept: NEPHROLOGY | Facility: CLINIC | Age: 58
End: 2018-07-03
Payer: COMMERCIAL

## 2018-07-03 VITALS
BODY MASS INDEX: 18.07 KG/M2 | DIASTOLIC BLOOD PRESSURE: 85 MMHG | HEART RATE: 94 BPM | HEIGHT: 69 IN | SYSTOLIC BLOOD PRESSURE: 125 MMHG | OXYGEN SATURATION: 95 % | WEIGHT: 122 LBS | TEMPERATURE: 97.9 F

## 2018-07-03 PROCEDURE — 99215 OFFICE O/P EST HI 40 MIN: CPT

## 2018-07-03 RX ORDER — TAMSULOSIN HYDROCHLORIDE 0.4 MG/1
0.4 CAPSULE ORAL
Qty: 180 | Refills: 3 | Status: DISCONTINUED | COMMUNITY
Start: 2017-03-13 | End: 2018-07-03

## 2018-07-03 RX ORDER — LOSARTAN POTASSIUM 50 MG/1
50 TABLET, FILM COATED ORAL DAILY
Qty: 90 | Refills: 2 | Status: DISCONTINUED | COMMUNITY
Start: 2018-04-24 | End: 2018-07-03

## 2018-07-05 LAB
25(OH)D3 SERPL-MCNC: 44.7 NG/ML
ALBUMIN SERPL ELPH-MCNC: 4.4 G/DL
ALP BLD-CCNC: 63 U/L
ALT SERPL-CCNC: 21 U/L
ANION GAP SERPL CALC-SCNC: 16 MMOL/L
APPEARANCE: CLEAR
AST SERPL-CCNC: 26 U/L
BACTERIA: NEGATIVE
BASOPHILS # BLD AUTO: 0.01 K/UL
BASOPHILS NFR BLD AUTO: 0.2 %
BILIRUB SERPL-MCNC: 1.6 MG/DL
BILIRUBIN URINE: ABNORMAL
BLOOD URINE: NEGATIVE
BUN SERPL-MCNC: 25 MG/DL
CALCIUM SERPL-MCNC: 10.8 MG/DL
CHLORIDE SERPL-SCNC: 98 MMOL/L
CHOLEST SERPL-MCNC: 231 MG/DL
CHOLEST/HDLC SERPL: 3.3 RATIO
CMV IGG SERPL QL: >10 U/ML
CMV IGG SERPL-IMP: POSITIVE
CO2 SERPL-SCNC: 22 MMOL/L
COLOR: ABNORMAL
CREAT SERPL-MCNC: 1.84 MG/DL
CREAT SPEC-SCNC: 350 MG/DL
CREAT/PROT UR: 0.1 RATIO
EOSINOPHIL # BLD AUTO: 0.04 K/UL
EOSINOPHIL NFR BLD AUTO: 0.8 %
GLUCOSE QUALITATIVE U: NEGATIVE MG/DL
GLUCOSE SERPL-MCNC: 110 MG/DL
HBA1C MFR BLD HPLC: 5.3 %
HCT VFR BLD CALC: 57.8 %
HDLC SERPL-MCNC: 70 MG/DL
HGB BLD-MCNC: 18.9 G/DL
HYALINE CASTS: 8 /LPF
IMM GRANULOCYTES NFR BLD AUTO: 0.2 %
KETONES URINE: ABNORMAL
LDH SERPL-CCNC: 281 U/L
LDLC SERPL CALC-MCNC: 147 MG/DL
LEUKOCYTE ESTERASE URINE: NEGATIVE
LYMPHOCYTES # BLD AUTO: 0.81 K/UL
LYMPHOCYTES NFR BLD AUTO: 16.6 %
MAGNESIUM SERPL-MCNC: 1.8 MG/DL
MAN DIFF?: NORMAL
MCHC RBC-ENTMCNC: 29.5 PG
MCHC RBC-ENTMCNC: 32.7 GM/DL
MCV RBC AUTO: 90.3 FL
MICROSCOPIC-UA: NORMAL
MONOCYTES # BLD AUTO: 0.82 K/UL
MONOCYTES NFR BLD AUTO: 16.8 %
NEUTROPHILS # BLD AUTO: 3.19 K/UL
NEUTROPHILS NFR BLD AUTO: 65.4 %
NITRITE URINE: NEGATIVE
PH URINE: 5
PHOSPHATE SERPL-MCNC: 2.9 MG/DL
PLATELET # BLD AUTO: 147 K/UL
POTASSIUM SERPL-SCNC: 4.7 MMOL/L
PROT SERPL-MCNC: 7.9 G/DL
PROT UR-MCNC: 27 MG/DL
PROTEIN URINE: ABNORMAL MG/DL
RBC # BLD: 6.4 M/UL
RBC # FLD: 13.6 %
RED BLOOD CELLS URINE: 8 /HPF
SODIUM SERPL-SCNC: 136 MMOL/L
SPECIFIC GRAVITY URINE: 1.03
SQUAMOUS EPITHELIAL CELLS: 1 /HPF
TACROLIMUS SERPL-MCNC: 10.3 NG/ML
TRIGL SERPL-MCNC: 72 MG/DL
URATE SERPL-MCNC: 8.8 MG/DL
UROBILINOGEN URINE: 1 MG/DL
WBC # FLD AUTO: 4.88 K/UL
WHITE BLOOD CELLS URINE: 2 /HPF

## 2018-07-09 LAB — BKV DNA SPEC QL NAA+PROBE: NORMAL

## 2018-07-16 PROBLEM — Q61.3 POLYCYSTIC KIDNEY, UNSPECIFIED: Chronic | Status: ACTIVE | Noted: 2017-02-23

## 2018-07-18 LAB
CMV DNA SPEC QL NAA+PROBE: 93 IU/ML
CMVPCR LOG: 1.97 LOGIU/ML

## 2018-07-26 ENCOUNTER — LABORATORY RESULT (OUTPATIENT)
Age: 58
End: 2018-07-26

## 2018-07-26 ENCOUNTER — APPOINTMENT (OUTPATIENT)
Dept: TRANSPLANT | Facility: CLINIC | Age: 58
End: 2018-07-26

## 2018-07-26 ENCOUNTER — OTHER (OUTPATIENT)
Age: 58
End: 2018-07-26

## 2018-07-26 LAB
ANION GAP SERPL CALC-SCNC: 13 MMOL/L
APPEARANCE: CLEAR
BACTERIA: NEGATIVE
BILIRUBIN URINE: NEGATIVE
BLOOD URINE: NEGATIVE
BUN SERPL-MCNC: 25 MG/DL
CALCIUM SERPL-MCNC: 9.8 MG/DL
CHLORIDE SERPL-SCNC: 102 MMOL/L
CHOLEST SERPL-MCNC: 206 MG/DL
CO2 SERPL-SCNC: 26 MMOL/L
COLOR: YELLOW
CREAT SERPL-MCNC: 1.91 MG/DL
CREAT SPEC-SCNC: 118 MG/DL
CREAT/PROT UR: 0.1 RATIO
GLUCOSE QUALITATIVE U: NEGATIVE MG/DL
GLUCOSE SERPL-MCNC: 77 MG/DL
HBA1C MFR BLD HPLC: 5.6 %
KETONES URINE: NEGATIVE
LEUKOCYTE ESTERASE URINE: NEGATIVE
MAGNESIUM SERPL-MCNC: 1.8 MG/DL
MICROSCOPIC-UA: NORMAL
NITRITE URINE: NEGATIVE
PH URINE: 6
PHOSPHATE SERPL-MCNC: 2.3 MG/DL
POTASSIUM SERPL-SCNC: 4.7 MMOL/L
PROT UR-MCNC: 16 MG/DL
PROTEIN URINE: NEGATIVE MG/DL
RED BLOOD CELLS URINE: 0 /HPF
SODIUM SERPL-SCNC: 141 MMOL/L
SPECIFIC GRAVITY URINE: 1.02
SQUAMOUS EPITHELIAL CELLS: 0 /HPF
TACROLIMUS SERPL-MCNC: 9.8 NG/ML
URATE SERPL-MCNC: 7.3 MG/DL
UROBILINOGEN URINE: NEGATIVE MG/DL
WHITE BLOOD CELLS URINE: 0 /HPF

## 2018-07-27 LAB — BKV DNA SPEC QL NAA+PROBE: NORMAL

## 2018-08-09 ENCOUNTER — APPOINTMENT (OUTPATIENT)
Dept: TRANSPLANT | Facility: CLINIC | Age: 58
End: 2018-08-09

## 2018-08-10 LAB
ALBUMIN SERPL ELPH-MCNC: 3.8 G/DL
ALP BLD-CCNC: 51 U/L
ALT SERPL-CCNC: 14 U/L
ANION GAP SERPL CALC-SCNC: 15 MMOL/L
APPEARANCE: CLEAR
AST SERPL-CCNC: 25 U/L
BACTERIA: NEGATIVE
BASOPHILS # BLD AUTO: 0.01 K/UL
BASOPHILS NFR BLD AUTO: 0.1 %
BILIRUB SERPL-MCNC: 0.8 MG/DL
BILIRUBIN URINE: NEGATIVE
BLOOD URINE: NEGATIVE
BUN SERPL-MCNC: 26 MG/DL
CALCIUM SERPL-MCNC: 9.5 MG/DL
CHLORIDE SERPL-SCNC: 102 MMOL/L
CO2 SERPL-SCNC: 22 MMOL/L
COLOR: YELLOW
CREAT SERPL-MCNC: 1.62 MG/DL
CREAT SPEC-SCNC: 79 MG/DL
CREAT/PROT UR: 0.1 RATIO
EOSINOPHIL # BLD AUTO: 0.12 K/UL
EOSINOPHIL NFR BLD AUTO: 1.8 %
GLUCOSE QUALITATIVE U: NEGATIVE MG/DL
GLUCOSE SERPL-MCNC: 68 MG/DL
HCT VFR BLD CALC: 56.1 %
HGB BLD-MCNC: 16.9 G/DL
IMM GRANULOCYTES NFR BLD AUTO: 0.4 %
KETONES URINE: NEGATIVE
LDH SERPL-CCNC: 351 U/L
LEUKOCYTE ESTERASE URINE: NEGATIVE
LYMPHOCYTES # BLD AUTO: 0.95 K/UL
LYMPHOCYTES NFR BLD AUTO: 14.1 %
MAGNESIUM SERPL-MCNC: 2.1 MG/DL
MAN DIFF?: NORMAL
MCHC RBC-ENTMCNC: 28.7 PG
MCHC RBC-ENTMCNC: 30.1 GM/DL
MCV RBC AUTO: 95.2 FL
MICROSCOPIC-UA: NORMAL
MONOCYTES # BLD AUTO: 0.79 K/UL
MONOCYTES NFR BLD AUTO: 11.7 %
NEUTROPHILS # BLD AUTO: 4.84 K/UL
NEUTROPHILS NFR BLD AUTO: 71.9 %
NITRITE URINE: NEGATIVE
PH URINE: 7
PHOSPHATE SERPL-MCNC: 2.7 MG/DL
PLATELET # BLD AUTO: 121 K/UL
POTASSIUM SERPL-SCNC: 4.5 MMOL/L
PROT SERPL-MCNC: 6.6 G/DL
PROT UR-MCNC: 11 MG/DL
PROTEIN URINE: NEGATIVE MG/DL
RBC # BLD: 5.89 M/UL
RBC # FLD: 15.5 %
RED BLOOD CELLS URINE: 0 /HPF
SODIUM SERPL-SCNC: 139 MMOL/L
SPECIFIC GRAVITY URINE: 1.02
SQUAMOUS EPITHELIAL CELLS: 0 /HPF
TACROLIMUS SERPL-MCNC: 2.7 NG/ML
URATE SERPL-MCNC: 6.5 MG/DL
UROBILINOGEN URINE: NEGATIVE MG/DL
WBC # FLD AUTO: 6.74 K/UL
WHITE BLOOD CELLS URINE: 0 /HPF

## 2018-08-11 LAB
BKV DNA SPEC QL NAA+PROBE: NORMAL
CMV DNA SPEC QL NAA+PROBE: 349 IU/ML
CMVPCR LOG: 2.54 LOGIU/ML

## 2018-09-10 ENCOUNTER — APPOINTMENT (OUTPATIENT)
Dept: NEPHROLOGY | Facility: CLINIC | Age: 58
End: 2018-09-10

## 2018-09-27 ENCOUNTER — LABORATORY RESULT (OUTPATIENT)
Age: 58
End: 2018-09-27

## 2018-09-27 ENCOUNTER — APPOINTMENT (OUTPATIENT)
Dept: NEPHROLOGY | Facility: CLINIC | Age: 58
End: 2018-09-27
Payer: MEDICARE

## 2018-09-27 ENCOUNTER — APPOINTMENT (OUTPATIENT)
Dept: NEPHROLOGY | Facility: CLINIC | Age: 58
End: 2018-09-27

## 2018-09-27 VITALS
BODY MASS INDEX: 19.4 KG/M2 | HEART RATE: 80 BPM | DIASTOLIC BLOOD PRESSURE: 94 MMHG | SYSTOLIC BLOOD PRESSURE: 141 MMHG | RESPIRATION RATE: 16 BRPM | HEIGHT: 68 IN | OXYGEN SATURATION: 98 % | WEIGHT: 128 LBS | TEMPERATURE: 97.8 F

## 2018-09-27 PROCEDURE — 99215 OFFICE O/P EST HI 40 MIN: CPT

## 2018-09-27 RX ORDER — FENTANYL 25 UG/H
25 PATCH, EXTENDED RELEASE TRANSDERMAL
Refills: 0 | Status: DISCONTINUED | COMMUNITY
Start: 2018-07-03 | End: 2018-09-27

## 2018-10-01 LAB
25(OH)D3 SERPL-MCNC: 33.4 NG/ML
ALBUMIN SERPL ELPH-MCNC: 4.2 G/DL
ALP BLD-CCNC: 50 U/L
ALT SERPL-CCNC: 10 U/L
ANION GAP SERPL CALC-SCNC: 20 MMOL/L
APPEARANCE: CLEAR
AST SERPL-CCNC: 10 U/L
BASOPHILS # BLD AUTO: 0.01 K/UL
BASOPHILS NFR BLD AUTO: 0.2 %
BILIRUB SERPL-MCNC: 0.7 MG/DL
BILIRUBIN URINE: NEGATIVE
BLOOD URINE: NEGATIVE
BUN SERPL-MCNC: 22 MG/DL
CALCIUM SERPL-MCNC: 9.6 MG/DL
CHLORIDE SERPL-SCNC: 105 MMOL/L
CHOLEST SERPL-MCNC: 190 MG/DL
CHOLEST/HDLC SERPL: 2.3 RATIO
CMV DNA SPEC QL NAA+PROBE: 197 IU/ML
CMVPCR LOG: 2.29 LOGIU/ML
CO2 SERPL-SCNC: 20 MMOL/L
COLOR: YELLOW
CREAT SERPL-MCNC: 1.59 MG/DL
CREAT SPEC-SCNC: 207 MG/DL
CREAT/PROT UR: 0.1 RATIO
EOSINOPHIL # BLD AUTO: 0.08 K/UL
EOSINOPHIL NFR BLD AUTO: 1.4 %
GLUCOSE QUALITATIVE U: NEGATIVE MG/DL
GLUCOSE SERPL-MCNC: 82 MG/DL
HBA1C MFR BLD HPLC: 5.6 %
HCT VFR BLD CALC: 58.3 %
HDLC SERPL-MCNC: 82 MG/DL
HGB BLD-MCNC: 18 G/DL
IMM GRANULOCYTES NFR BLD AUTO: 0.5 %
KETONES URINE: ABNORMAL
LDH SERPL-CCNC: 228 U/L
LDLC SERPL CALC-MCNC: 95 MG/DL
LEUKOCYTE ESTERASE URINE: NEGATIVE
LYMPHOCYTES # BLD AUTO: 0.95 K/UL
LYMPHOCYTES NFR BLD AUTO: 16.1 %
MAGNESIUM SERPL-MCNC: 1.8 MG/DL
MAN DIFF?: NORMAL
MCHC RBC-ENTMCNC: 29.8 PG
MCHC RBC-ENTMCNC: 30.9 GM/DL
MCV RBC AUTO: 96.5 FL
MONOCYTES # BLD AUTO: 0.57 K/UL
MONOCYTES NFR BLD AUTO: 9.7 %
NEUTROPHILS # BLD AUTO: 4.26 K/UL
NEUTROPHILS NFR BLD AUTO: 72.1 %
NITRITE URINE: NEGATIVE
PH URINE: 6
PHOSPHATE SERPL-MCNC: 2.9 MG/DL
PLATELET # BLD AUTO: 107 K/UL
POTASSIUM SERPL-SCNC: 4.5 MMOL/L
PROT SERPL-MCNC: 6.4 G/DL
PROT UR-MCNC: 28 MG/DL
PROTEIN URINE: ABNORMAL MG/DL
RBC # BLD: 6.04 M/UL
RBC # FLD: 15.6 %
SODIUM SERPL-SCNC: 145 MMOL/L
SPECIFIC GRAVITY URINE: 1.03
TACROLIMUS SERPL-MCNC: 5.2 NG/ML
TRIGL SERPL-MCNC: 66 MG/DL
URATE SERPL-MCNC: 6.1 MG/DL
UROBILINOGEN URINE: NEGATIVE MG/DL
WBC # FLD AUTO: 5.9 K/UL

## 2018-10-02 LAB — BKV DNA SPEC QL NAA+PROBE: NORMAL

## 2019-01-14 ENCOUNTER — APPOINTMENT (OUTPATIENT)
Dept: NEPHROLOGY | Facility: CLINIC | Age: 59
End: 2019-01-14
Payer: MEDICARE

## 2019-01-14 VITALS
WEIGHT: 126 LBS | DIASTOLIC BLOOD PRESSURE: 95 MMHG | SYSTOLIC BLOOD PRESSURE: 146 MMHG | RESPIRATION RATE: 16 BRPM | HEART RATE: 71 BPM | BODY MASS INDEX: 19.1 KG/M2 | HEIGHT: 68 IN | OXYGEN SATURATION: 95 % | TEMPERATURE: 98.2 F

## 2019-01-14 PROCEDURE — 99214 OFFICE O/P EST MOD 30 MIN: CPT

## 2019-01-14 RX ORDER — ACETAMINOPHEN EXTRA STRENGTH 500 MG/1
500 TABLET ORAL
Refills: 0 | Status: DISCONTINUED | COMMUNITY
Start: 2017-03-07 | End: 2019-01-14

## 2019-01-14 RX ORDER — CYCLOBENZAPRINE HYDROCHLORIDE 5 MG/1
5 TABLET, FILM COATED ORAL
Qty: 30 | Refills: 0 | Status: DISCONTINUED | COMMUNITY
Start: 2018-04-16 | End: 2019-01-14

## 2019-01-14 NOTE — REVIEW OF SYSTEMS
[As Noted in HPI] : as noted in HPI [Negative] : Heme/Lymph [Heart Rate Is Fast] : the heart rate was not fast [de-identified] : RLQ incision clean dry. No drainage

## 2019-01-14 NOTE — PHYSICAL EXAM
[General Appearance - Alert] : alert [General Appearance - In No Acute Distress] : in no acute distress [General Appearance - Well Nourished] : well nourished [Sclera] : the sclera and conjunctiva were normal [Outer Ear] : the ears and nose were normal in appearance [Jugular Venous Distention Increased] : there was no jugular-venous distention [Auscultation Breath Sounds / Voice Sounds] : lungs were clear to auscultation bilaterally [Apical Impulse] : the apical impulse was normal [Heart Sounds] : normal S1 and S2 [Heart Sounds Pericardial Friction Rub] : no pericardial rub [Edema] : there was no peripheral edema [Bowel Sounds] : normal bowel sounds [Abdomen Soft] : soft [Abdomen Tenderness] : non-tender [Cervical Lymph Nodes Enlarged Posterior Bilaterally] : posterior cervical [Cervical Lymph Nodes Enlarged Anterior Bilaterally] : anterior cervical [Supraclavicular Lymph Nodes Enlarged Bilaterally] : supraclavicular [Axillary Lymph Nodes Enlarged Bilaterally] : axillary [Inguinal Lymph Nodes Enlarged Bilaterally] : inguinal [No CVA Tenderness] : no ~M costovertebral angle tenderness [Abnormal Walk] : normal gait [Musculoskeletal - Swelling] : no joint swelling seen [Motor Tone] : muscle strength and tone were normal [___ (cm) Fistula] : [unfilled] (cm) fistula [Bruit] : a bruit was present [Thrill] : a thrill was present [] : no rash [No Focal Deficits] : no focal deficits [Oriented To Time, Place, And Person] : oriented to person, place, and time [Impaired Insight] : insight and judgment were intact [Affect] : the affect was normal [Mood] : the mood was normal [FreeTextEntry1] : large palpable liver

## 2019-01-14 NOTE — CONSULT LETTER
[Courtesy Letter:] : I had the pleasure of seeing your patient, [unfilled], in my office today. [Please see my note below.] : Please see my note below. [Sincerely,] : Sincerely, [Dear  ___] : Dear  [unfilled], [FreeTextEntry3] : Jaun Cedeno D.O.

## 2019-01-14 NOTE — ASSESSMENT
[FreeTextEntry1] : 58 y.o male hx depression, PCKD, HTN here for follow up. He is s/p DDRTx (DCD) 2/24/17; \par \par 1.  Renal transplant - immediate function.  Last Scr 1.6.  Will check creatinine today.  Had biopsy 5/2017 for elevated creatinine, no rejection.  Creatinine has been stable. \par 2.  Immunosuppression - On triple immunosuppression, On  mg po q12 hrs due to low WBC count.  Tacrolimus trough should be 4-6.  Pt should continue lifelong bactrim \par 3.  HTN - BP stable, not on medication, continue current management, follow blood pressure.\par 4.  Hx Bipolar/ Depression - appears to be controlled.  On medications.\par 5.  PKD: no hematuria or pain.  Also has large liver consistent with Polycystic liver.  Discussed that although enlarged his liver function is fine.  \par 6.  Back pain: Was following with Dr. Cruz but last saw patient in March. Has had some imaging.  Now following with Dr. Stout.  Should have bone density testing.  Will check vitamin D levels. \par \par Reviewed renoprotective measures, adherence with medications and follow up. \par \par Discussed pt needs to f/u in 3 months.  \par Dr. Bailey ([place]) - [phone] \par Pt to also start following up with Dr. Handley needs help setting up an appointment.

## 2019-01-14 NOTE — HISTORY OF PRESENT ILLNESS
[FreeTextEntry1] : Pt is a 57 y.o male s/p DDRTx on 2/24/17.  Donor was 19 y.o DCD female with normal creatinine.  Pt had immediate but slow function.  Donor urine culture positive was for yeast.  Pt was on fluconazole early post transplant . Currently he is feeling well. \par Had kidney biopsy 5/2017 month ago- no rejection, c4 d neg.\par \par Update:\par Pts blood pressure has been controlled at home - reports systolics in the 120's.  Having slight headaches and right leg pain since having an MRI for his liver cyst.  Head aches are mild.  Pt also still having back pain.  Otherwise feels well and taking his medications regularly.

## 2019-01-15 LAB
ALBUMIN SERPL ELPH-MCNC: 4.2 G/DL
ALP BLD-CCNC: 65 U/L
ALT SERPL-CCNC: 17 U/L
ANION GAP SERPL CALC-SCNC: 10 MMOL/L
APPEARANCE: CLEAR
AST SERPL-CCNC: 17 U/L
BACTERIA: NEGATIVE
BASOPHILS # BLD AUTO: 0.01 K/UL
BASOPHILS NFR BLD AUTO: 0.1 %
BILIRUB SERPL-MCNC: 0.4 MG/DL
BILIRUBIN URINE: NEGATIVE
BKV DNA SPEC QL NAA+PROBE: ABNORMAL COPIES/ML
BLOOD URINE: NEGATIVE
BUN SERPL-MCNC: 20 MG/DL
CALCIUM SERPL-MCNC: 10.1 MG/DL
CHLORIDE SERPL-SCNC: 103 MMOL/L
CO2 SERPL-SCNC: 28 MMOL/L
COLOR: YELLOW
CREAT SERPL-MCNC: 1.61 MG/DL
CREAT SPEC-SCNC: 56 MG/DL
CREAT/PROT UR: 0.1 RATIO
EOSINOPHIL # BLD AUTO: 0.05 K/UL
EOSINOPHIL NFR BLD AUTO: 0.7 %
GLUCOSE QUALITATIVE U: NEGATIVE MG/DL
GLUCOSE SERPL-MCNC: 87 MG/DL
HCT VFR BLD CALC: 53.6 %
HGB BLD-MCNC: 16.6 G/DL
HYALINE CASTS: 0 /LPF
IMM GRANULOCYTES NFR BLD AUTO: 0 %
KETONES URINE: NEGATIVE
LDH SERPL-CCNC: 234 U/L
LEUKOCYTE ESTERASE URINE: NEGATIVE
LYMPHOCYTES # BLD AUTO: 1.22 K/UL
LYMPHOCYTES NFR BLD AUTO: 17.8 %
MAGNESIUM SERPL-MCNC: 1.9 MG/DL
MAN DIFF?: NORMAL
MCHC RBC-ENTMCNC: 30.7 PG
MCHC RBC-ENTMCNC: 31 GM/DL
MCV RBC AUTO: 99.1 FL
MICROSCOPIC-UA: NORMAL
MONOCYTES # BLD AUTO: 0.59 K/UL
MONOCYTES NFR BLD AUTO: 8.6 %
NEUTROPHILS # BLD AUTO: 4.97 K/UL
NEUTROPHILS NFR BLD AUTO: 72.8 %
NITRITE URINE: NEGATIVE
PH URINE: 6
PHOSPHATE SERPL-MCNC: 2.9 MG/DL
PLATELET # BLD AUTO: 133 K/UL
POTASSIUM SERPL-SCNC: 3.9 MMOL/L
PROT SERPL-MCNC: 6.7 G/DL
PROT UR-MCNC: 7 MG/DL
PROTEIN URINE: NEGATIVE MG/DL
RBC # BLD: 5.41 M/UL
RBC # FLD: 14.7 %
RED BLOOD CELLS URINE: 0 /HPF
SODIUM SERPL-SCNC: 141 MMOL/L
SPECIFIC GRAVITY URINE: 1.01
SQUAMOUS EPITHELIAL CELLS: 2 /HPF
TACROLIMUS SERPL-MCNC: 4.1 NG/ML
UROBILINOGEN URINE: NEGATIVE MG/DL
WBC # FLD AUTO: 6.84 K/UL
WHITE BLOOD CELLS URINE: 1 /HPF

## 2019-01-16 LAB
CMV DNA SPEC QL NAA+PROBE: 152 IU/ML
CMVPCR LOG: 2.18 LOGIU/ML

## 2019-01-25 ENCOUNTER — APPOINTMENT (OUTPATIENT)
Dept: TRANSPLANT | Facility: CLINIC | Age: 59
End: 2019-01-25

## 2019-01-29 LAB
ALBUMIN SERPL ELPH-MCNC: 3.9 G/DL
ALP BLD-CCNC: 54 U/L
ALT SERPL-CCNC: 12 U/L
ANION GAP SERPL CALC-SCNC: 12 MMOL/L
APPEARANCE: CLEAR
AST SERPL-CCNC: 16 U/L
BACTERIA: NEGATIVE
BASOPHILS # BLD AUTO: 0 K/UL
BASOPHILS NFR BLD AUTO: 0 %
BILIRUB SERPL-MCNC: 0.7 MG/DL
BILIRUBIN URINE: NEGATIVE
BKV DNA SPEC QL NAA+PROBE: ABNORMAL COPIES/ML
BLOOD URINE: NEGATIVE
BUN SERPL-MCNC: 17 MG/DL
CALCIUM SERPL-MCNC: 10 MG/DL
CHLORIDE SERPL-SCNC: 103 MMOL/L
CMV DNA SPEC QL NAA+PROBE: NOT DETECTED IU/ML
CMVPCR LOG: NOT DETECTED LOGIU/ML
CO2 SERPL-SCNC: 26 MMOL/L
COLOR: ABNORMAL
CREAT SERPL-MCNC: 1.55 MG/DL
CREAT SPEC-SCNC: 231 MG/DL
CREAT/PROT UR: 0.2 RATIO
EOSINOPHIL # BLD AUTO: 0.1 K/UL
EOSINOPHIL NFR BLD AUTO: 2.3 %
GLUCOSE QUALITATIVE U: NEGATIVE MG/DL
GLUCOSE SERPL-MCNC: 73 MG/DL
HCT VFR BLD CALC: 52.1 %
HGB BLD-MCNC: 16.3 G/DL
HYALINE CASTS: 3 /LPF
IMM GRANULOCYTES NFR BLD AUTO: 0.2 %
KETONES URINE: NEGATIVE
LDH SERPL-CCNC: 191 U/L
LEUKOCYTE ESTERASE URINE: NEGATIVE
LYMPHOCYTES # BLD AUTO: 0.8 K/UL
LYMPHOCYTES NFR BLD AUTO: 18.4 %
MAGNESIUM SERPL-MCNC: 1.9 MG/DL
MAN DIFF?: NORMAL
MCHC RBC-ENTMCNC: 30.9 PG
MCHC RBC-ENTMCNC: 31.3 GM/DL
MCV RBC AUTO: 98.9 FL
MICROSCOPIC-UA: NORMAL
MONOCYTES # BLD AUTO: 0.08 K/UL
MONOCYTES NFR BLD AUTO: 1.8 %
NEUTROPHILS # BLD AUTO: 3.36 K/UL
NEUTROPHILS NFR BLD AUTO: 77.3 %
NITRITE URINE: NEGATIVE
PH URINE: 6
PHOSPHATE SERPL-MCNC: 2.4 MG/DL
PLATELET # BLD AUTO: 125 K/UL
POTASSIUM SERPL-SCNC: 4 MMOL/L
PROT SERPL-MCNC: 6.4 G/DL
PROT UR-MCNC: 45 MG/DL
PROTEIN URINE: ABNORMAL MG/DL
RBC # BLD: 5.27 M/UL
RBC # FLD: 14.6 %
RED BLOOD CELLS URINE: 2 /HPF
SODIUM SERPL-SCNC: 142 MMOL/L
SPECIFIC GRAVITY URINE: 1.03
SQUAMOUS EPITHELIAL CELLS: 6 /HPF
TACROLIMUS SERPL-MCNC: 2.3 NG/ML
URATE SERPL-MCNC: 5.9 MG/DL
UROBILINOGEN URINE: NEGATIVE MG/DL
WBC # FLD AUTO: 4.35 K/UL
WHITE BLOOD CELLS URINE: 2 /HPF

## 2019-01-29 RX ORDER — MYCOPHENOLATE MOFETIL 500 MG/1
500 TABLET ORAL TWICE DAILY
Qty: 180 | Refills: 3 | Status: DISCONTINUED | COMMUNITY
Start: 2017-09-21 | End: 2019-01-29

## 2019-02-01 ENCOUNTER — OTHER (OUTPATIENT)
Age: 59
End: 2019-02-01

## 2019-02-01 ENCOUNTER — APPOINTMENT (OUTPATIENT)
Dept: TRANSPLANT | Facility: CLINIC | Age: 59
End: 2019-02-01

## 2019-02-05 ENCOUNTER — OTHER (OUTPATIENT)
Age: 59
End: 2019-02-05

## 2019-02-06 LAB
ALBUMIN SERPL ELPH-MCNC: 4.5 G/DL
ALP BLD-CCNC: 68 U/L
ALT SERPL-CCNC: 12 U/L
ANION GAP SERPL CALC-SCNC: 12 MMOL/L
APPEARANCE: CLEAR
AST SERPL-CCNC: 14 U/L
BACTERIA: NEGATIVE
BASOPHILS # BLD AUTO: 0.01 K/UL
BASOPHILS NFR BLD AUTO: 0.3 %
BILIRUB SERPL-MCNC: 0.7 MG/DL
BILIRUBIN URINE: NEGATIVE
BKV DNA SPEC QL NAA+PROBE: ABNORMAL COPIES/ML
BLOOD URINE: NEGATIVE
BUN SERPL-MCNC: 17 MG/DL
CALCIUM SERPL-MCNC: 10.2 MG/DL
CHLORIDE SERPL-SCNC: 104 MMOL/L
CMV DNA SPEC QL NAA+PROBE: NOT DETECTED IU/ML
CMVPCR LOG: NOT DETECTED LOGIU/ML
CO2 SERPL-SCNC: 25 MMOL/L
COLOR: ABNORMAL
CREAT SERPL-MCNC: 1.79 MG/DL
CREAT SPEC-SCNC: 233 MG/DL
CREAT/PROT UR: 0.2 RATIO
EOSINOPHIL # BLD AUTO: 0.04 K/UL
EOSINOPHIL NFR BLD AUTO: 1.1 %
GLUCOSE QUALITATIVE U: NEGATIVE MG/DL
GLUCOSE SERPL-MCNC: 89 MG/DL
HCT VFR BLD CALC: 55.7 %
HGB BLD-MCNC: 17.6 G/DL
HYALINE CASTS: 0 /LPF
IMM GRANULOCYTES NFR BLD AUTO: 0.3 %
KETONES URINE: NEGATIVE
LDH SERPL-CCNC: 233 U/L
LEUKOCYTE ESTERASE URINE: NEGATIVE
LYMPHOCYTES # BLD AUTO: 0.61 K/UL
LYMPHOCYTES NFR BLD AUTO: 17.2 %
MAGNESIUM SERPL-MCNC: 2 MG/DL
MAN DIFF?: NORMAL
MCHC RBC-ENTMCNC: 30.9 PG
MCHC RBC-ENTMCNC: 31.6 GM/DL
MCV RBC AUTO: 97.7 FL
MICROSCOPIC-UA: NORMAL
MONOCYTES # BLD AUTO: 0.02 K/UL
MONOCYTES NFR BLD AUTO: 0.6 %
NEUTROPHILS # BLD AUTO: 2.86 K/UL
NEUTROPHILS NFR BLD AUTO: 80.5 %
NITRITE URINE: NEGATIVE
PH URINE: 5
PHOSPHATE SERPL-MCNC: 2.9 MG/DL
PLATELET # BLD AUTO: 101 K/UL
POTASSIUM SERPL-SCNC: 4.6 MMOL/L
PROT SERPL-MCNC: 7.8 G/DL
PROT UR-MCNC: 52 MG/DL
PROTEIN URINE: ABNORMAL MG/DL
RBC # BLD: 5.7 M/UL
RBC # FLD: 14.5 %
RED BLOOD CELLS URINE: 2 /HPF
SODIUM SERPL-SCNC: 141 MMOL/L
SPECIFIC GRAVITY URINE: 1.03
SQUAMOUS EPITHELIAL CELLS: 4 /HPF
TACROLIMUS SERPL-MCNC: 2.6 NG/ML
URATE SERPL-MCNC: 5.5 MG/DL
UROBILINOGEN URINE: NEGATIVE MG/DL
WBC # FLD AUTO: 3.55 K/UL
WHITE BLOOD CELLS URINE: 4 /HPF

## 2019-02-25 ENCOUNTER — APPOINTMENT (OUTPATIENT)
Dept: TRANSPLANT | Facility: CLINIC | Age: 59
End: 2019-02-25

## 2019-02-28 ENCOUNTER — RX RENEWAL (OUTPATIENT)
Age: 59
End: 2019-02-28

## 2019-02-28 LAB
25(OH)D3 SERPL-MCNC: 38.8 NG/ML
ALBUMIN SERPL ELPH-MCNC: 4.3 G/DL
ALP BLD-CCNC: 81 U/L
ALT SERPL-CCNC: 10 U/L
ANION GAP SERPL CALC-SCNC: 12 MMOL/L
APPEARANCE: CLEAR
AST SERPL-CCNC: 14 U/L
BACTERIA: NEGATIVE
BASOPHILS # BLD AUTO: 0.03 K/UL
BASOPHILS NFR BLD AUTO: 0.7 %
BILIRUB SERPL-MCNC: 0.6 MG/DL
BILIRUBIN URINE: NEGATIVE
BKV DNA SPEC QL NAA+PROBE: ABNORMAL COPIES/ML
BLOOD URINE: NEGATIVE
BUN SERPL-MCNC: 22 MG/DL
CALCIUM SERPL-MCNC: 9.6 MG/DL
CHLORIDE SERPL-SCNC: 104 MMOL/L
CMV DNA SPEC QL NAA+PROBE: NOT DETECTED
CMVPCR LOG: NOT DETECTED LOGIU/ML
CO2 SERPL-SCNC: 24 MMOL/L
COLOR: YELLOW
CREAT SERPL-MCNC: 1.72 MG/DL
CREAT SPEC-SCNC: 191 MG/DL
CREAT/PROT UR: 0.2 RATIO
EOSINOPHIL # BLD AUTO: 0 K/UL
EOSINOPHIL NFR BLD AUTO: 0 %
GLUCOSE QUALITATIVE U: NEGATIVE
GLUCOSE SERPL-MCNC: 100 MG/DL
GRANULAR CASTS: 1 /LPF
HBA1C MFR BLD HPLC: 5.6 %
HCT VFR BLD CALC: 51.4 %
HGB BLD-MCNC: 16.3 G/DL
HYALINE CASTS: 3 /LPF
IMM GRANULOCYTES NFR BLD AUTO: 0.5 %
KETONES URINE: NEGATIVE
LDH SERPL-CCNC: 205 U/L
LEUKOCYTE ESTERASE URINE: NEGATIVE
LYMPHOCYTES # BLD AUTO: 0.7 K/UL
LYMPHOCYTES NFR BLD AUTO: 17.2 %
MAGNESIUM SERPL-MCNC: 2 MG/DL
MAN DIFF?: NORMAL
MCHC RBC-ENTMCNC: 30.2 PG
MCHC RBC-ENTMCNC: 31.7 GM/DL
MCV RBC AUTO: 95.2 FL
MICROSCOPIC-UA: NORMAL
MONOCYTES # BLD AUTO: 0.06 K/UL
MONOCYTES NFR BLD AUTO: 1.5 %
NEUTROPHILS # BLD AUTO: 3.25 K/UL
NEUTROPHILS NFR BLD AUTO: 80.1 %
NITRITE URINE: NEGATIVE
PH URINE: 5.5
PHOSPHATE SERPL-MCNC: 2.8 MG/DL
PLATELET # BLD AUTO: 132 K/UL
POTASSIUM SERPL-SCNC: 4.2 MMOL/L
PROT SERPL-MCNC: 7.2 G/DL
PROT UR-MCNC: 44 MG/DL
PROTEIN URINE: ABNORMAL
RBC # BLD: 5.4 M/UL
RBC # FLD: 14.2 %
RED BLOOD CELLS URINE: 2 /HPF
SODIUM SERPL-SCNC: 140 MMOL/L
SPECIFIC GRAVITY URINE: 1.02
SQUAMOUS EPITHELIAL CELLS: 0 /HPF
TACROLIMUS SERPL-MCNC: 2.6 NG/ML
URATE SERPL-MCNC: 5.3 MG/DL
UROBILINOGEN URINE: NORMAL
WBC # FLD AUTO: 4.06 K/UL
WHITE BLOOD CELLS URINE: 4 /HPF

## 2019-03-15 ENCOUNTER — LABORATORY RESULT (OUTPATIENT)
Age: 59
End: 2019-03-15

## 2019-03-15 ENCOUNTER — APPOINTMENT (OUTPATIENT)
Dept: TRANSPLANT | Facility: CLINIC | Age: 59
End: 2019-03-15

## 2019-03-15 LAB
ALBUMIN SERPL ELPH-MCNC: 5 G/DL
ALP BLD-CCNC: 93 U/L
ALT SERPL-CCNC: 26 U/L
ANION GAP SERPL CALC-SCNC: 13 MMOL/L
APPEARANCE: CLEAR
AST SERPL-CCNC: 20 U/L
BASOPHILS # BLD AUTO: 0.02 K/UL
BASOPHILS NFR BLD AUTO: 0.5 %
BILIRUB SERPL-MCNC: 0.8 MG/DL
BILIRUBIN URINE: NEGATIVE
BLOOD URINE: NEGATIVE
BUN SERPL-MCNC: 23 MG/DL
CALCIUM SERPL-MCNC: 10.4 MG/DL
CHLORIDE SERPL-SCNC: 103 MMOL/L
CHOLEST SERPL-MCNC: 250 MG/DL
CHOLEST/HDLC SERPL: 3.4 RATIO
CO2 SERPL-SCNC: 24 MMOL/L
COLOR: YELLOW
CREAT SERPL-MCNC: 1.73 MG/DL
CREAT SPEC-SCNC: 148 MG/DL
CREAT/PROT UR: 0.3 RATIO
EOSINOPHIL # BLD AUTO: 0.03 K/UL
EOSINOPHIL NFR BLD AUTO: 0.8 %
GLUCOSE QUALITATIVE U: NEGATIVE
GLUCOSE SERPL-MCNC: 94 MG/DL
HBA1C MFR BLD HPLC: 5.6 %
HCT VFR BLD CALC: 54 %
HDLC SERPL-MCNC: 74 MG/DL
HGB BLD-MCNC: 16.8 G/DL
IMM GRANULOCYTES NFR BLD AUTO: 0.3 %
KETONES URINE: NEGATIVE
LDH SERPL-CCNC: 256 U/L
LDLC SERPL CALC-MCNC: 156 MG/DL
LEUKOCYTE ESTERASE URINE: NEGATIVE
LYMPHOCYTES # BLD AUTO: 0.77 K/UL
LYMPHOCYTES NFR BLD AUTO: 19.8 %
MAGNESIUM SERPL-MCNC: 2.3 MG/DL
MAN DIFF?: NORMAL
MCHC RBC-ENTMCNC: 30.8 PG
MCHC RBC-ENTMCNC: 31.1 GM/DL
MCV RBC AUTO: 98.9 FL
MONOCYTES # BLD AUTO: 0.06 K/UL
MONOCYTES NFR BLD AUTO: 1.5 %
NEUTROPHILS # BLD AUTO: 2.99 K/UL
NEUTROPHILS NFR BLD AUTO: 77.1 %
NITRITE URINE: NEGATIVE
PH URINE: 6
PHOSPHATE SERPL-MCNC: 2.5 MG/DL
PLATELET # BLD AUTO: 151 K/UL
POTASSIUM SERPL-SCNC: 5.2 MMOL/L
PROT SERPL-MCNC: 8 G/DL
PROT UR-MCNC: 44 MG/DL
PROTEIN URINE: ABNORMAL
RBC # BLD: 5.46 M/UL
RBC # FLD: 15.4 %
SODIUM SERPL-SCNC: 140 MMOL/L
SPECIFIC GRAVITY URINE: 1.02
TACROLIMUS SERPL-MCNC: <2 NG/ML
TRIGL SERPL-MCNC: 99 MG/DL
URATE SERPL-MCNC: 5.1 MG/DL
UROBILINOGEN URINE: NORMAL
WBC # FLD AUTO: 3.88 K/UL

## 2019-03-18 LAB
25(OH)D3 SERPL-MCNC: 44 NG/ML
CMV DNA SPEC QL NAA+PROBE: NOT DETECTED
CMVPCR LOG: NOT DETECTED LOGIU/ML

## 2019-03-19 LAB — BKV DNA SPEC QL NAA+PROBE: ABNORMAL COPIES/ML

## 2019-05-09 ENCOUNTER — LABORATORY RESULT (OUTPATIENT)
Age: 59
End: 2019-05-09

## 2019-05-09 ENCOUNTER — APPOINTMENT (OUTPATIENT)
Dept: NEPHROLOGY | Facility: CLINIC | Age: 59
End: 2019-05-09
Payer: MEDICARE

## 2019-05-09 VITALS
WEIGHT: 118 LBS | BODY MASS INDEX: 17.88 KG/M2 | SYSTOLIC BLOOD PRESSURE: 151 MMHG | HEART RATE: 73 BPM | RESPIRATION RATE: 16 BRPM | TEMPERATURE: 98.1 F | DIASTOLIC BLOOD PRESSURE: 85 MMHG | HEIGHT: 68 IN

## 2019-05-09 PROCEDURE — 99214 OFFICE O/P EST MOD 30 MIN: CPT

## 2019-05-09 RX ORDER — VALGANCICLOVIR HYDROCHLORIDE 450 MG/1
450 TABLET ORAL TWICE DAILY
Qty: 60 | Refills: 6 | Status: DISCONTINUED | COMMUNITY
Start: 2019-01-16 | End: 2019-05-09

## 2019-05-09 NOTE — HISTORY OF PRESENT ILLNESS
[FreeTextEntry1] : Pt is a 57 y.o male s/p DDRTx on 2/24/17.  Donor was 19 y.o DCD female with normal creatinine.  Pt had immediate but slow function.  Donor urine culture positive was for yeast.  Pt was on fluconazole early post transplant . Currently he is feeling well. \par Had kidney biopsy 5/2017 month ago- no rejection, c4 d neg.\par \par Update:\par PTs 's at home.  Confused about meds - supposed to be on tacro 2/2 but has been taking 1/1.  Also unclear on leflunomide dose and thinks he is on MMF when it was stopped.  did not bring in meds or list today.  Needs refills.  He cannot gain weight.  also notes some urge incontinence.

## 2019-05-09 NOTE — PHYSICAL EXAM
[General Appearance - Alert] : alert [General Appearance - In No Acute Distress] : in no acute distress [General Appearance - Well Nourished] : well nourished [Outer Ear] : the ears and nose were normal in appearance [Sclera] : the sclera and conjunctiva were normal [Jugular Venous Distention Increased] : there was no jugular-venous distention [Auscultation Breath Sounds / Voice Sounds] : lungs were clear to auscultation bilaterally [Apical Impulse] : the apical impulse was normal [Heart Sounds] : normal S1 and S2 [Heart Sounds Pericardial Friction Rub] : no pericardial rub [Edema] : there was no peripheral edema [Bowel Sounds] : normal bowel sounds [Abdomen Soft] : soft [FreeTextEntry1] : large palpable liver [Abdomen Tenderness] : non-tender [Cervical Lymph Nodes Enlarged Posterior Bilaterally] : posterior cervical [Cervical Lymph Nodes Enlarged Anterior Bilaterally] : anterior cervical [Supraclavicular Lymph Nodes Enlarged Bilaterally] : supraclavicular [Inguinal Lymph Nodes Enlarged Bilaterally] : inguinal [Axillary Lymph Nodes Enlarged Bilaterally] : axillary [No CVA Tenderness] : no ~M costovertebral angle tenderness [Abnormal Walk] : normal gait [Musculoskeletal - Swelling] : no joint swelling seen [Motor Tone] : muscle strength and tone were normal [___ (cm) Fistula] : [unfilled] (cm) fistula [Bruit] : a bruit was present [Thrill] : a thrill was present [] : no rash [No Focal Deficits] : no focal deficits [Oriented To Time, Place, And Person] : oriented to person, place, and time [Impaired Insight] : insight and judgment were intact [Affect] : the affect was normal [Mood] : the mood was normal

## 2019-05-09 NOTE — CONSULT LETTER
[Dear  ___] : Dear  [unfilled], [Courtesy Letter:] : I had the pleasure of seeing your patient, [unfilled], in my office today. [Please see my note below.] : Please see my note below. [Sincerely,] : Sincerely, [FreeTextEntry3] : Jaun Cedeno D.O.

## 2019-05-09 NOTE — ASSESSMENT
[FreeTextEntry1] : 58 y.o male hx depression, PCKD, HTN here for follow up. He is s/p DDRTx (DCD) 2/24/17; \par \par 1.  Renal transplant - immediate function.  Last Scr 1.7.  Will check creatinine today.  Had biopsy 5/2017 for elevated creatinine, no rejection.  Creatinine has been stable but pt has significant BK.  \par 2.  Immunosuppression - On tacro, pred and leflunomide 20mgs.  Tacro goal 3-5.\par 3.  HTN - increase losartan to 100mgs daily\par 4.  Hx Bipolar/ Depression - appears to be controlled.  On medications.\par 5.  PKD: no hematuria or pain.  Also has large liver consistent with Polycystic liver.  Discussed that although enlarged his liver function is fine.  \par 6.  Back pain: Was following with Dr. Cruz but last saw patient in March.\par 7.  Polycythemia: increase losartan to 100mgs\par 8.  BK: high viral load but now decreasing - down to 9800.  He is on low dose tacro, pred 5 and leflunomide 20\par 8.  Compliance:  pt now confused about meds after BK became a problem.  Gave pt new list of meds - he will review with his meds at home\par \par Pt needs f/u labs in 2 weeks and visit in 3 months. \par Dr. Bailey ([place]) - [phone] \par Pt to also start following up with Dr. Handley needs help setting up an appointment.

## 2019-05-09 NOTE — REVIEW OF SYSTEMS
[Heart Rate Is Fast] : the heart rate was not fast [As Noted in HPI] : as noted in HPI [Negative] : Heme/Lymph [de-identified] : RLQ incision clean dry. No drainage

## 2019-05-14 LAB
ALBUMIN SERPL ELPH-MCNC: 4.7 G/DL
ALP BLD-CCNC: 57 U/L
ALT SERPL-CCNC: 10 U/L
ANION GAP SERPL CALC-SCNC: 11 MMOL/L
APPEARANCE: CLEAR
AST SERPL-CCNC: 18 U/L
BACTERIA: NEGATIVE
BASOPHILS # BLD AUTO: 0.02 K/UL
BASOPHILS NFR BLD AUTO: 0.3 %
BILIRUB SERPL-MCNC: 1.2 MG/DL
BILIRUBIN URINE: NEGATIVE
BKV DNA SPEC QL NAA+PROBE: 184 COPIES/ML
BLOOD URINE: NEGATIVE
BUN SERPL-MCNC: 22 MG/DL
CALCIUM SERPL-MCNC: 10 MG/DL
CHLORIDE SERPL-SCNC: 105 MMOL/L
CHOLEST SERPL-MCNC: 237 MG/DL
CHOLEST/HDLC SERPL: 3.3 RATIO
CMV DNA SPEC QL NAA+PROBE: NOT DETECTED
CMVPCR LOG: NOT DETECTED LOGIU/ML
CO2 SERPL-SCNC: 25 MMOL/L
COLOR: YELLOW
CREAT SERPL-MCNC: 1.57 MG/DL
CREAT SPEC-SCNC: 181 MG/DL
CREAT/PROT UR: 0.1 RATIO
EOSINOPHIL # BLD AUTO: 0.01 K/UL
EOSINOPHIL NFR BLD AUTO: 0.2 %
ESTIMATED AVERAGE GLUCOSE: 108 MG/DL
GLUCOSE QUALITATIVE U: NEGATIVE
GLUCOSE SERPL-MCNC: 86 MG/DL
HBA1C MFR BLD HPLC: 5.4 %
HCT VFR BLD CALC: 44.4 %
HDLC SERPL-MCNC: 73 MG/DL
HGB BLD-MCNC: 14 G/DL
HYALINE CASTS: 1 /LPF
IMM GRANULOCYTES NFR BLD AUTO: 0.3 %
KETONES URINE: NORMAL
LDH SERPL-CCNC: 224 U/L
LDLC SERPL CALC-MCNC: 137 MG/DL
LEUKOCYTE ESTERASE URINE: NEGATIVE
LYMPHOCYTES # BLD AUTO: 1.36 K/UL
LYMPHOCYTES NFR BLD AUTO: 21.8 %
MAGNESIUM SERPL-MCNC: 2.2 MG/DL
MAN DIFF?: NORMAL
MCHC RBC-ENTMCNC: 31.5 GM/DL
MCHC RBC-ENTMCNC: 33.3 PG
MCV RBC AUTO: 105.5 FL
MICROSCOPIC-UA: NORMAL
MONOCYTES # BLD AUTO: 0.12 K/UL
MONOCYTES NFR BLD AUTO: 1.9 %
NEUTROPHILS # BLD AUTO: 4.71 K/UL
NEUTROPHILS NFR BLD AUTO: 75.5 %
NITRITE URINE: NEGATIVE
PH URINE: 6
PHOSPHATE SERPL-MCNC: 2.4 MG/DL
PLATELET # BLD AUTO: 102 K/UL
POTASSIUM SERPL-SCNC: 4.3 MMOL/L
PROT SERPL-MCNC: 7.2 G/DL
PROT UR-MCNC: 19 MG/DL
PROTEIN URINE: NORMAL
RBC # BLD: 4.21 M/UL
RBC # FLD: 16.4 %
RED BLOOD CELLS URINE: 1 /HPF
SODIUM SERPL-SCNC: 141 MMOL/L
SPECIFIC GRAVITY URINE: 1.02
SQUAMOUS EPITHELIAL CELLS: 1 /HPF
TACROLIMUS SERPL-MCNC: <2 NG/ML
TRIGL SERPL-MCNC: 137 MG/DL
UROBILINOGEN URINE: NORMAL
WBC # FLD AUTO: 6.24 K/UL
WHITE BLOOD CELLS URINE: 1 /HPF

## 2019-05-16 ENCOUNTER — APPOINTMENT (OUTPATIENT)
Dept: TRANSPLANT | Facility: CLINIC | Age: 59
End: 2019-05-16

## 2019-05-16 LAB
ALBUMIN SERPL ELPH-MCNC: 4.6 G/DL
ALP BLD-CCNC: 60 U/L
ALT SERPL-CCNC: 14 U/L
ANION GAP SERPL CALC-SCNC: 11 MMOL/L
APPEARANCE: CLEAR
AST SERPL-CCNC: 25 U/L
BACTERIA: NEGATIVE
BASOPHILS # BLD AUTO: 0.02 K/UL
BASOPHILS NFR BLD AUTO: 0.4 %
BILIRUB SERPL-MCNC: 1.4 MG/DL
BILIRUBIN URINE: NEGATIVE
BLOOD URINE: NEGATIVE
BUN SERPL-MCNC: 23 MG/DL
CALCIUM SERPL-MCNC: 10 MG/DL
CHLORIDE SERPL-SCNC: 106 MMOL/L
CO2 SERPL-SCNC: 24 MMOL/L
COLOR: NORMAL
CREAT SERPL-MCNC: 1.84 MG/DL
CREAT SPEC-SCNC: 142 MG/DL
CREAT/PROT UR: 0.1 RATIO
EOSINOPHIL # BLD AUTO: 0.02 K/UL
EOSINOPHIL NFR BLD AUTO: 0.4 %
GLUCOSE QUALITATIVE U: NEGATIVE
GLUCOSE SERPL-MCNC: 91 MG/DL
HCT VFR BLD CALC: 42.7 %
HGB BLD-MCNC: 13.9 G/DL
HYALINE CASTS: 2 /LPF
IMM GRANULOCYTES NFR BLD AUTO: 0.2 %
KETONES URINE: NEGATIVE
LDH SERPL-CCNC: 275 U/L
LEUKOCYTE ESTERASE URINE: NEGATIVE
LYMPHOCYTES # BLD AUTO: 0.85 K/UL
LYMPHOCYTES NFR BLD AUTO: 15.3 %
MAGNESIUM SERPL-MCNC: 2 MG/DL
MAN DIFF?: NORMAL
MCHC RBC-ENTMCNC: 32.6 GM/DL
MCHC RBC-ENTMCNC: 33.9 PG
MCV RBC AUTO: 104.1 FL
MICROSCOPIC-UA: NORMAL
MONOCYTES # BLD AUTO: 0.14 K/UL
MONOCYTES NFR BLD AUTO: 2.5 %
NEUTROPHILS # BLD AUTO: 4.51 K/UL
NEUTROPHILS NFR BLD AUTO: 81.2 %
NITRITE URINE: NEGATIVE
PH URINE: 6
PHOSPHATE SERPL-MCNC: 3 MG/DL
PLATELET # BLD AUTO: 133 K/UL
POTASSIUM SERPL-SCNC: 4.5 MMOL/L
PROT SERPL-MCNC: 7 G/DL
PROT UR-MCNC: 10 MG/DL
PROTEIN URINE: NEGATIVE
RBC # BLD: 4.1 M/UL
RBC # FLD: 16.4 %
RED BLOOD CELLS URINE: 1 /HPF
SODIUM SERPL-SCNC: 141 MMOL/L
SPECIFIC GRAVITY URINE: 1.02
SQUAMOUS EPITHELIAL CELLS: 0 /HPF
TACROLIMUS SERPL-MCNC: 3.1 NG/ML
URATE SERPL-MCNC: 6.5 MG/DL
UROBILINOGEN URINE: NORMAL
WBC # FLD AUTO: 5.55 K/UL
WHITE BLOOD CELLS URINE: 1 /HPF

## 2019-05-20 LAB
BKV DNA SPEC QL NAA+PROBE: 40 COPIES/ML
CMV DNA SPEC QL NAA+PROBE: NOT DETECTED
CMVPCR LOG: NOT DETECTED LOGIU/ML

## 2019-05-23 ENCOUNTER — INPATIENT (INPATIENT)
Facility: HOSPITAL | Age: 59
LOS: 0 days | Discharge: LEFT BEFORE DISCHARGE | DRG: 392 | End: 2019-05-24
Attending: INTERNAL MEDICINE | Admitting: STUDENT IN AN ORGANIZED HEALTH CARE EDUCATION/TRAINING PROGRAM
Payer: MEDICARE

## 2019-05-23 VITALS
HEIGHT: 69 IN | SYSTOLIC BLOOD PRESSURE: 116 MMHG | TEMPERATURE: 98 F | OXYGEN SATURATION: 98 % | WEIGHT: 117.95 LBS | DIASTOLIC BLOOD PRESSURE: 77 MMHG | HEART RATE: 70 BPM | RESPIRATION RATE: 18 BRPM

## 2019-05-23 DIAGNOSIS — Z98.89 OTHER SPECIFIED POSTPROCEDURAL STATES: Chronic | ICD-10-CM

## 2019-05-23 DIAGNOSIS — Z99.2 DEPENDENCE ON RENAL DIALYSIS: Chronic | ICD-10-CM

## 2019-05-23 DIAGNOSIS — Z94.0 KIDNEY TRANSPLANT STATUS: Chronic | ICD-10-CM

## 2019-05-23 LAB
ALBUMIN SERPL ELPH-MCNC: 4.2 G/DL — SIGNIFICANT CHANGE UP (ref 3.3–5)
ALP SERPL-CCNC: 50 U/L — SIGNIFICANT CHANGE UP (ref 40–120)
ALT FLD-CCNC: 19 U/L — SIGNIFICANT CHANGE UP (ref 10–45)
ANION GAP SERPL CALC-SCNC: 11 MMOL/L — SIGNIFICANT CHANGE UP (ref 5–17)
APTT BLD: 32.7 SEC — SIGNIFICANT CHANGE UP (ref 27.5–36.3)
AST SERPL-CCNC: 33 U/L — SIGNIFICANT CHANGE UP (ref 10–40)
BASOPHILS # BLD AUTO: 0 K/UL — SIGNIFICANT CHANGE UP (ref 0–0.2)
BASOPHILS NFR BLD AUTO: 1 % — SIGNIFICANT CHANGE UP (ref 0–2)
BILIRUB SERPL-MCNC: 0.5 MG/DL — SIGNIFICANT CHANGE UP (ref 0.2–1.2)
BUN SERPL-MCNC: 23 MG/DL — SIGNIFICANT CHANGE UP (ref 7–23)
CALCIUM SERPL-MCNC: 10 MG/DL — SIGNIFICANT CHANGE UP (ref 8.4–10.5)
CHLORIDE SERPL-SCNC: 106 MMOL/L — SIGNIFICANT CHANGE UP (ref 96–108)
CO2 SERPL-SCNC: 23 MMOL/L — SIGNIFICANT CHANGE UP (ref 22–31)
CREAT SERPL-MCNC: 1.72 MG/DL — HIGH (ref 0.5–1.3)
EOSINOPHIL # BLD AUTO: 0 K/UL — SIGNIFICANT CHANGE UP (ref 0–0.5)
EOSINOPHIL NFR BLD AUTO: 0.8 % — SIGNIFICANT CHANGE UP (ref 0–6)
GAS PNL BLDV: SIGNIFICANT CHANGE UP
GLUCOSE SERPL-MCNC: 77 MG/DL — SIGNIFICANT CHANGE UP (ref 70–99)
HCT VFR BLD CALC: 40.1 % — SIGNIFICANT CHANGE UP (ref 39–50)
HGB BLD-MCNC: 13.5 G/DL — SIGNIFICANT CHANGE UP (ref 13–17)
INR BLD: 0.99 RATIO — SIGNIFICANT CHANGE UP (ref 0.88–1.16)
LIDOCAIN IGE QN: 57 U/L — SIGNIFICANT CHANGE UP (ref 7–60)
LYMPHOCYTES # BLD AUTO: 1.2 K/UL — SIGNIFICANT CHANGE UP (ref 1–3.3)
LYMPHOCYTES # BLD AUTO: 28.1 % — SIGNIFICANT CHANGE UP (ref 13–44)
MCHC RBC-ENTMCNC: 33.6 GM/DL — SIGNIFICANT CHANGE UP (ref 32–36)
MCHC RBC-ENTMCNC: 35.4 PG — HIGH (ref 27–34)
MCV RBC AUTO: 105 FL — HIGH (ref 80–100)
MONOCYTES # BLD AUTO: 0.1 K/UL — SIGNIFICANT CHANGE UP (ref 0–0.9)
MONOCYTES NFR BLD AUTO: 1.8 % — LOW (ref 2–14)
NEUTROPHILS # BLD AUTO: 2.8 K/UL — SIGNIFICANT CHANGE UP (ref 1.8–7.4)
NEUTROPHILS NFR BLD AUTO: 68.4 % — SIGNIFICANT CHANGE UP (ref 43–77)
PLATELET # BLD AUTO: 150 K/UL — SIGNIFICANT CHANGE UP (ref 150–400)
POTASSIUM SERPL-MCNC: 5.3 MMOL/L — SIGNIFICANT CHANGE UP (ref 3.5–5.3)
POTASSIUM SERPL-SCNC: 5.3 MMOL/L — SIGNIFICANT CHANGE UP (ref 3.5–5.3)
PROT SERPL-MCNC: 7 G/DL — SIGNIFICANT CHANGE UP (ref 6–8.3)
PROTHROM AB SERPL-ACNC: 11.3 SEC — SIGNIFICANT CHANGE UP (ref 10–12.9)
RBC # BLD: 3.81 M/UL — LOW (ref 4.2–5.8)
RBC # FLD: 14.8 % — HIGH (ref 10.3–14.5)
SODIUM SERPL-SCNC: 140 MMOL/L — SIGNIFICANT CHANGE UP (ref 135–145)
WBC # BLD: 4.2 K/UL — SIGNIFICANT CHANGE UP (ref 3.8–10.5)
WBC # FLD AUTO: 4.2 K/UL — SIGNIFICANT CHANGE UP (ref 3.8–10.5)

## 2019-05-23 PROCEDURE — 93010 ELECTROCARDIOGRAM REPORT: CPT

## 2019-05-23 PROCEDURE — 71046 X-RAY EXAM CHEST 2 VIEWS: CPT | Mod: 26

## 2019-05-23 PROCEDURE — 99284 EMERGENCY DEPT VISIT MOD MDM: CPT | Mod: 25

## 2019-05-23 PROCEDURE — 74176 CT ABD & PELVIS W/O CONTRAST: CPT | Mod: 26

## 2019-05-23 RX ORDER — SODIUM CHLORIDE 9 MG/ML
500 INJECTION INTRAMUSCULAR; INTRAVENOUS; SUBCUTANEOUS ONCE
Refills: 0 | Status: COMPLETED | OUTPATIENT
Start: 2019-05-23 | End: 2019-05-23

## 2019-05-23 RX ORDER — MORPHINE SULFATE 50 MG/1
2 CAPSULE, EXTENDED RELEASE ORAL ONCE
Refills: 0 | Status: DISCONTINUED | OUTPATIENT
Start: 2019-05-23 | End: 2019-05-23

## 2019-05-23 RX ORDER — ONDANSETRON 8 MG/1
4 TABLET, FILM COATED ORAL ONCE
Refills: 0 | Status: COMPLETED | OUTPATIENT
Start: 2019-05-23 | End: 2019-05-23

## 2019-05-23 RX ADMIN — SODIUM CHLORIDE 500 MILLILITER(S): 9 INJECTION INTRAMUSCULAR; INTRAVENOUS; SUBCUTANEOUS at 22:38

## 2019-05-23 RX ADMIN — ONDANSETRON 4 MILLIGRAM(S): 8 TABLET, FILM COATED ORAL at 22:35

## 2019-05-23 RX ADMIN — SODIUM CHLORIDE 500 MILLILITER(S): 9 INJECTION INTRAMUSCULAR; INTRAVENOUS; SUBCUTANEOUS at 20:51

## 2019-05-23 RX ADMIN — MORPHINE SULFATE 2 MILLIGRAM(S): 50 CAPSULE, EXTENDED RELEASE ORAL at 22:35

## 2019-05-23 NOTE — ED PROVIDER NOTE - CLINICAL SUMMARY MEDICAL DECISION MAKING FREE TEXT BOX
58 y old male chronic  alcoholic ,pancreatitis ,bipolar disorder ,sp renal transplant has close follow up with nephrology  ,came in complains of abdominal pain ,nausea and chills ,will obtain blood work ,ct scan ,pain meds and on reevaluation: ZR

## 2019-05-23 NOTE — ED PROVIDER NOTE - OBJECTIVE STATEMENT
Kassie Malone MD PGY-1 pt is a 58 y M with PMH pancreatitis, polycystic kidney disease now s/p kidney transplant Right sided 2017, anxiety/depression who p/w epigastric abdominal pain x 3 weeks, associated with chills, generalized weakness, nausea, 1 episode of nonbloody vomit.     Nephrologist Dr. Cedeno Kassie Malone MD PGY-1 pt is a 58 y M with PMH pancreatitis, polycystic kidney disease now s/p kidney transplant Right sided 2017, anxiety/depression who p/w epigastric abdominal pain x 3 weeks, associated with chills, generalized weakness, nausea, 1 episode of nonbloody vomit. Came to ED today because it was the first chance he didn't have to get nieces/nephews off bus    Nephrologist Dr. Cedeno Kassie Malone MD PGY-1 pt is a 58 y M with PMH pancreatitis, polycystic kidney disease now s/p kidney transplant Right sided 2017, anxiety/depression who p/w epigastric abdominal pain x 3 weeks, associated with chills, generalized weakness, nausea, 1 episode of nonbloody vomit. Came to ED today because it was the first chance he didn't have to get nieces/nephews off bus. Endorses 10lb+ weight loss in 3 months, +night sweats    Nephrologist Dr. Cedeno

## 2019-05-23 NOTE — ED PROVIDER NOTE - PROGRESS NOTE DETAILS
nephrology called Srinivasan Borrego PGY1  Patient mildly tolerated PO intake, but still had abdominal pain and generalized pain, given more morhpine, admitted for intractable abdominal pain

## 2019-05-23 NOTE — ED ADULT NURSE NOTE - CHPI ED NUR SYMPTOMS NEG
no abdominal distension/no chills/no blood in stool/no hematuria/no diarrhea/no burning urination/no dysuria/no fever

## 2019-05-23 NOTE — ED ADULT NURSE NOTE - OBJECTIVE STATEMENT
59 y/o male history of polycystic kidney disease (received kidney transplant (Feb 2017)), anxiety, depression, and HTN  presents to the ED  from home c/o increased weakness and generalized abdominal pain. Patient states he has been having this abdominal pain and increased weakness for the past 3 weeks. Patient states he has lost ten pounds over the course of the past 3 months. Patient states during these 3 weeks he had one episode of vomiting. Patient denies blood in the stool. Denies hematemesis.  Denies fever, chills,  abd pain, diarrhea/constipation, numbness/tingling, urinary s/s. Patient A&Ox3, in no respiratory distress, and denies chest pain. Abdomen soft and non distended.

## 2019-05-23 NOTE — ED ADULT NURSE NOTE - NSIMPLEMENTINTERV_GEN_ALL_ED
Implemented All Fall Risk Interventions:  [place] to call system. Call bell, personal items and telephone within reach. Instruct patient to call for assistance. Room bathroom lighting operational. Non-slip footwear when patient is off stretcher. Physically safe environment: no spills, clutter or unnecessary equipment. Stretcher in lowest position, wheels locked, appropriate side rails in place. Provide visual cue, wrist band, yellow gown, etc. Monitor gait and stability. Monitor for mental status changes and reorient to person, place, and time. Review medications for side effects contributing to fall risk. Reinforce activity limits and safety measures with patient and family. Implemented All Universal Safety Interventions:  [place] to call system. Call bell, personal items and telephone within reach. Instruct patient to call for assistance. Room bathroom lighting operational. Non-slip footwear when patient is off stretcher. Physically safe environment: no spills, clutter or unnecessary equipment. Stretcher in lowest position, wheels locked, appropriate side rails in place.

## 2019-05-24 VITALS
HEART RATE: 60 BPM | TEMPERATURE: 98 F | RESPIRATION RATE: 18 BRPM | OXYGEN SATURATION: 96 % | DIASTOLIC BLOOD PRESSURE: 79 MMHG | SYSTOLIC BLOOD PRESSURE: 123 MMHG

## 2019-05-24 DIAGNOSIS — Z94.0 KIDNEY TRANSPLANT STATUS: ICD-10-CM

## 2019-05-24 DIAGNOSIS — R10.9 UNSPECIFIED ABDOMINAL PAIN: ICD-10-CM

## 2019-05-24 DIAGNOSIS — Z29.9 ENCOUNTER FOR PROPHYLACTIC MEASURES, UNSPECIFIED: ICD-10-CM

## 2019-05-24 DIAGNOSIS — R63.4 ABNORMAL WEIGHT LOSS: ICD-10-CM

## 2019-05-24 DIAGNOSIS — N13.30 UNSPECIFIED HYDRONEPHROSIS: ICD-10-CM

## 2019-05-24 DIAGNOSIS — I10 ESSENTIAL (PRIMARY) HYPERTENSION: ICD-10-CM

## 2019-05-24 DIAGNOSIS — Q61.3 POLYCYSTIC KIDNEY, UNSPECIFIED: ICD-10-CM

## 2019-05-24 LAB
AFP-TM SERPL-MCNC: 2.3 NG/ML — SIGNIFICANT CHANGE UP
ALBUMIN SERPL ELPH-MCNC: 4.3 G/DL — SIGNIFICANT CHANGE UP (ref 3.3–5)
ALP SERPL-CCNC: 55 U/L — SIGNIFICANT CHANGE UP (ref 40–120)
ALT FLD-CCNC: 19 U/L — SIGNIFICANT CHANGE UP (ref 10–45)
ANION GAP SERPL CALC-SCNC: 11 MMOL/L — SIGNIFICANT CHANGE UP (ref 5–17)
AST SERPL-CCNC: 22 U/L — SIGNIFICANT CHANGE UP (ref 10–40)
BILIRUB SERPL-MCNC: 0.9 MG/DL — SIGNIFICANT CHANGE UP (ref 0.2–1.2)
BUN SERPL-MCNC: 20 MG/DL — SIGNIFICANT CHANGE UP (ref 7–23)
CALCIUM SERPL-MCNC: 10 MG/DL — SIGNIFICANT CHANGE UP (ref 8.4–10.5)
CHLORIDE SERPL-SCNC: 105 MMOL/L — SIGNIFICANT CHANGE UP (ref 96–108)
CO2 SERPL-SCNC: 24 MMOL/L — SIGNIFICANT CHANGE UP (ref 22–31)
CREAT SERPL-MCNC: 1.5 MG/DL — HIGH (ref 0.5–1.3)
CRP SERPL-MCNC: 0.14 MG/DL — SIGNIFICANT CHANGE UP (ref 0–0.4)
ERYTHROCYTE [SEDIMENTATION RATE] IN BLOOD: 15 MM/HR — SIGNIFICANT CHANGE UP (ref 0–20)
GLUCOSE SERPL-MCNC: 88 MG/DL — SIGNIFICANT CHANGE UP (ref 70–99)
HCT VFR BLD CALC: 44.6 % — SIGNIFICANT CHANGE UP (ref 39–50)
HGB BLD-MCNC: 14.2 G/DL — SIGNIFICANT CHANGE UP (ref 13–17)
MCHC RBC-ENTMCNC: 31.8 GM/DL — LOW (ref 32–36)
MCHC RBC-ENTMCNC: 33.6 PG — SIGNIFICANT CHANGE UP (ref 27–34)
MCV RBC AUTO: 105.4 FL — HIGH (ref 80–100)
PLATELET # BLD AUTO: 151 K/UL — SIGNIFICANT CHANGE UP (ref 150–400)
POTASSIUM SERPL-MCNC: 5.1 MMOL/L — SIGNIFICANT CHANGE UP (ref 3.5–5.3)
POTASSIUM SERPL-SCNC: 5.1 MMOL/L — SIGNIFICANT CHANGE UP (ref 3.5–5.3)
PROT SERPL-MCNC: 7.2 G/DL — SIGNIFICANT CHANGE UP (ref 6–8.3)
RBC # BLD: 4.23 M/UL — SIGNIFICANT CHANGE UP (ref 4.2–5.8)
RBC # FLD: 15.5 % — HIGH (ref 10.3–14.5)
SODIUM SERPL-SCNC: 140 MMOL/L — SIGNIFICANT CHANGE UP (ref 135–145)
TSH SERPL-MCNC: 1.36 UIU/ML — SIGNIFICANT CHANGE UP (ref 0.27–4.2)
WBC # BLD: 4.75 K/UL — SIGNIFICANT CHANGE UP (ref 3.8–10.5)
WBC # FLD AUTO: 4.75 K/UL — SIGNIFICANT CHANGE UP (ref 3.8–10.5)

## 2019-05-24 PROCEDURE — 85014 HEMATOCRIT: CPT

## 2019-05-24 PROCEDURE — 99285 EMERGENCY DEPT VISIT HI MDM: CPT | Mod: 25

## 2019-05-24 PROCEDURE — 82105 ALPHA-FETOPROTEIN SERUM: CPT

## 2019-05-24 PROCEDURE — 80053 COMPREHEN METABOLIC PANEL: CPT

## 2019-05-24 PROCEDURE — 96376 TX/PRO/DX INJ SAME DRUG ADON: CPT

## 2019-05-24 PROCEDURE — 76700 US EXAM ABDOM COMPLETE: CPT

## 2019-05-24 PROCEDURE — 82803 BLOOD GASES ANY COMBINATION: CPT

## 2019-05-24 PROCEDURE — 83605 ASSAY OF LACTIC ACID: CPT

## 2019-05-24 PROCEDURE — 96374 THER/PROPH/DIAG INJ IV PUSH: CPT | Mod: XU

## 2019-05-24 PROCEDURE — 84443 ASSAY THYROID STIM HORMONE: CPT

## 2019-05-24 PROCEDURE — 99222 1ST HOSP IP/OBS MODERATE 55: CPT

## 2019-05-24 PROCEDURE — 71046 X-RAY EXAM CHEST 2 VIEWS: CPT

## 2019-05-24 PROCEDURE — 82435 ASSAY OF BLOOD CHLORIDE: CPT

## 2019-05-24 PROCEDURE — 85652 RBC SED RATE AUTOMATED: CPT

## 2019-05-24 PROCEDURE — 76700 US EXAM ABDOM COMPLETE: CPT | Mod: 26

## 2019-05-24 PROCEDURE — 93005 ELECTROCARDIOGRAM TRACING: CPT

## 2019-05-24 PROCEDURE — 84295 ASSAY OF SERUM SODIUM: CPT

## 2019-05-24 PROCEDURE — 96375 TX/PRO/DX INJ NEW DRUG ADDON: CPT

## 2019-05-24 PROCEDURE — 82330 ASSAY OF CALCIUM: CPT

## 2019-05-24 PROCEDURE — 85027 COMPLETE CBC AUTOMATED: CPT

## 2019-05-24 PROCEDURE — 85610 PROTHROMBIN TIME: CPT

## 2019-05-24 PROCEDURE — 85730 THROMBOPLASTIN TIME PARTIAL: CPT

## 2019-05-24 PROCEDURE — 84132 ASSAY OF SERUM POTASSIUM: CPT

## 2019-05-24 PROCEDURE — 86140 C-REACTIVE PROTEIN: CPT

## 2019-05-24 PROCEDURE — 83690 ASSAY OF LIPASE: CPT

## 2019-05-24 PROCEDURE — 82947 ASSAY GLUCOSE BLOOD QUANT: CPT

## 2019-05-24 PROCEDURE — 87040 BLOOD CULTURE FOR BACTERIA: CPT

## 2019-05-24 PROCEDURE — 74176 CT ABD & PELVIS W/O CONTRAST: CPT

## 2019-05-24 RX ORDER — VALGANCICLOVIR 450 MG/1
450 TABLET, FILM COATED ORAL DAILY
Refills: 0 | Status: DISCONTINUED | OUTPATIENT
Start: 2019-05-24 | End: 2019-05-24

## 2019-05-24 RX ORDER — LOSARTAN POTASSIUM 100 MG/1
100 TABLET, FILM COATED ORAL DAILY
Refills: 0 | Status: DISCONTINUED | OUTPATIENT
Start: 2019-05-24 | End: 2019-05-24

## 2019-05-24 RX ORDER — MORPHINE SULFATE 50 MG/1
2 CAPSULE, EXTENDED RELEASE ORAL ONCE
Refills: 0 | Status: DISCONTINUED | OUTPATIENT
Start: 2019-05-24 | End: 2019-05-24

## 2019-05-24 RX ORDER — TACROLIMUS 5 MG/1
2.5 CAPSULE ORAL EVERY 12 HOURS
Refills: 0 | Status: DISCONTINUED | OUTPATIENT
Start: 2019-05-24 | End: 2019-05-24

## 2019-05-24 RX ORDER — TAMSULOSIN HYDROCHLORIDE 0.4 MG/1
0.4 CAPSULE ORAL
Refills: 0 | Status: DISCONTINUED | OUTPATIENT
Start: 2019-05-24 | End: 2019-05-24

## 2019-05-24 RX ORDER — ONDANSETRON 8 MG/1
4 TABLET, FILM COATED ORAL EVERY 6 HOURS
Refills: 0 | Status: DISCONTINUED | OUTPATIENT
Start: 2019-05-24 | End: 2019-05-24

## 2019-05-24 RX ORDER — ASPIRIN/CALCIUM CARB/MAGNESIUM 324 MG
81 TABLET ORAL DAILY
Refills: 0 | Status: DISCONTINUED | OUTPATIENT
Start: 2019-05-24 | End: 2019-05-24

## 2019-05-24 RX ORDER — MYCOPHENOLATE MOFETIL 250 MG/1
250 CAPSULE ORAL
Refills: 0 | Status: DISCONTINUED | OUTPATIENT
Start: 2019-05-24 | End: 2019-05-24

## 2019-05-24 RX ORDER — PANTOPRAZOLE SODIUM 20 MG/1
40 TABLET, DELAYED RELEASE ORAL
Refills: 0 | Status: DISCONTINUED | OUTPATIENT
Start: 2019-05-24 | End: 2019-05-24

## 2019-05-24 RX ORDER — MORPHINE SULFATE 50 MG/1
4 CAPSULE, EXTENDED RELEASE ORAL ONCE
Refills: 0 | Status: DISCONTINUED | OUTPATIENT
Start: 2019-05-24 | End: 2019-05-24

## 2019-05-24 RX ORDER — ACETAMINOPHEN 500 MG
650 TABLET ORAL ONCE
Refills: 0 | Status: COMPLETED | OUTPATIENT
Start: 2019-05-24 | End: 2019-05-24

## 2019-05-24 RX ORDER — HEPARIN SODIUM 5000 [USP'U]/ML
5000 INJECTION INTRAVENOUS; SUBCUTANEOUS EVERY 8 HOURS
Refills: 0 | Status: DISCONTINUED | OUTPATIENT
Start: 2019-05-24 | End: 2019-05-24

## 2019-05-24 RX ORDER — TACROLIMUS 5 MG/1
2 CAPSULE ORAL ONCE
Refills: 0 | Status: COMPLETED | OUTPATIENT
Start: 2019-05-24 | End: 2019-05-24

## 2019-05-24 RX ADMIN — MORPHINE SULFATE 2 MILLIGRAM(S): 50 CAPSULE, EXTENDED RELEASE ORAL at 09:49

## 2019-05-24 RX ADMIN — HEPARIN SODIUM 5000 UNIT(S): 5000 INJECTION INTRAVENOUS; SUBCUTANEOUS at 15:36

## 2019-05-24 RX ADMIN — LOSARTAN POTASSIUM 100 MILLIGRAM(S): 100 TABLET, FILM COATED ORAL at 08:40

## 2019-05-24 RX ADMIN — ONDANSETRON 4 MILLIGRAM(S): 8 TABLET, FILM COATED ORAL at 15:36

## 2019-05-24 RX ADMIN — TACROLIMUS 2 MILLIGRAM(S): 5 CAPSULE ORAL at 08:40

## 2019-05-24 RX ADMIN — TAMSULOSIN HYDROCHLORIDE 0.4 MILLIGRAM(S): 0.4 CAPSULE ORAL at 17:00

## 2019-05-24 RX ADMIN — VALGANCICLOVIR 450 MILLIGRAM(S): 450 TABLET, FILM COATED ORAL at 12:49

## 2019-05-24 RX ADMIN — MORPHINE SULFATE 4 MILLIGRAM(S): 50 CAPSULE, EXTENDED RELEASE ORAL at 02:53

## 2019-05-24 RX ADMIN — MORPHINE SULFATE 2 MILLIGRAM(S): 50 CAPSULE, EXTENDED RELEASE ORAL at 17:15

## 2019-05-24 RX ADMIN — Medication 1 TABLET(S): at 08:40

## 2019-05-24 RX ADMIN — MORPHINE SULFATE 2 MILLIGRAM(S): 50 CAPSULE, EXTENDED RELEASE ORAL at 17:00

## 2019-05-24 RX ADMIN — Medication 1 PATCH: at 12:49

## 2019-05-24 RX ADMIN — MORPHINE SULFATE 4 MILLIGRAM(S): 50 CAPSULE, EXTENDED RELEASE ORAL at 03:23

## 2019-05-24 RX ADMIN — Medication 1 TABLET(S): at 12:49

## 2019-05-24 RX ADMIN — Medication 5 MILLIGRAM(S): at 09:49

## 2019-05-24 RX ADMIN — Medication 81 MILLIGRAM(S): at 12:49

## 2019-05-24 RX ADMIN — Medication 5 MILLIGRAM(S): at 12:49

## 2019-05-24 NOTE — H&P ADULT - PROBLEM SELECTOR PLAN 4
on prophylactic ABx  On prednisone, celcept and prograph on prophylactic ABx  On prednisone, celcept and prograph  F/U recs regarding continuation of his meds

## 2019-05-24 NOTE — ED ADULT NURSE REASSESSMENT NOTE - NS ED NURSE REASSESS COMMENT FT1
vss; pt requests Nepro adv will have MD order it; pt has mild pain, adv will have md address; safety/comfort maintained

## 2019-05-24 NOTE — H&P ADULT - NSICDXFAMILYHX_GEN_ALL_CORE_FT
FAMILY HISTORY:  Grandparent  Still living? Unknown  Family history of cancer, Age at diagnosis: Age Unknown

## 2019-05-24 NOTE — H&P ADULT - NSHPLABSRESULTS_GEN_ALL_CORE
13.5   4.2   )-----------( 150      ( 23 May 2019 20:20 )             40.1               05-23    140  |  106  |  23  ----------------------------<  77  5.3   |  23  |  1.72<H>    Ca    10.0      23 May 2019 20:20    TPro  7.0  /  Alb  4.2  /  TBili  0.5  /  DBili  x   /  AST  33  /  ALT  19  /  AlkPhos  50  05-23    PT/INR - ( 23 May 2019 23:08 )   PT: 11.3 sec;   INR: 0.99 ratio         PTT - ( 23 May 2019 23:08 )  PTT:32.7 sec               < from: CT Abdomen and Pelvis w/ Oral Cont (05.23.19 @ 21:57) >    IMPRESSION:   Mild hydronephrosis of the transplant kidney without perinephric   stranding or ureteral calculus. This may be due to a partially distended   bladder. Please correlate with urinalysis to exclude infection.  No evidence of colitis or hernia as clinically questioned.

## 2019-05-24 NOTE — CONSULT NOTE ADULT - SUBJECTIVE AND OBJECTIVE BOX
Patient is a 58y Male     Patient is a 58y old  Male who presents with a chief complaint of abdominal pain (24 May 2019 15:07)      HPI:  Pt is a 58 y M with PMH pancreatitis, polycystic kidney disease now s/p kidney transplant Right sided 2017, anxiety/depression who p/w epigastric abdominal pain x 3 weeks, associated with chills, generalized weakness, nausea, 1 episode of nonbloody vomit. Came to ED today because it was the first chance he didn't have to get nieces/nephews off bus. Endorses 10lb+ weight loss in 3 months, +night sweats (24 May 2019 10:42)      PAST MEDICAL & SURGICAL HISTORY:  Polycystic kidney disease: with ESRD s/p kidney txp 2/24/17  Acid reflux  Alcoholism /alcohol abuse  Bipolar 1 disorder  Hypertension  H/O kidney transplant  Dialysis patient: Left Arm A V Graft ( 2004 )  S/P breast lumpectomy: right, 1996      MEDICATIONS  (STANDING):  aspirin  chewable 81 milliGRAM(s) Oral daily  heparin  Injectable 5000 Unit(s) SubCutaneous every 8 hours  losartan 100 milliGRAM(s) Oral daily  pantoprazole    Tablet 40 milliGRAM(s) Oral before breakfast  predniSONE   Tablet 5 milliGRAM(s) Oral daily  tacrolimus 2.5 milliGRAM(s) Oral every 12 hours  tamsulosin 0.4 milliGRAM(s) Oral two times a day  trimethoprim   80 mG/sulfamethoxazole 400 mG 1 Tablet(s) Oral daily      Allergies    penicillin (Hives)    Intolerances        SOCIAL HISTORY:  Denies ETOh,Smoking,     FAMILY HISTORY:  Family history of cancer (Grandparent)      REVIEW OF SYSTEMS:    CONSTITUTIONAL: No weakness, fevers or chills  EYES/ENT: No visual changes;  No vertigo or throat pain   NECK: No pain or stiffness  RESPIRATORY: No cough, wheezing, hemoptysis; No shortness of breath  CARDIOVASCULAR: No chest pain or palpitations  GASTROINTESTINAL: No abdominal or epigastric pain. No nausea, vomiting, or hematemesis; No diarrhea or constipation. No melena or hematochezia.  GENITOURINARY: No dysuria, frequency or hematuria  NEUROLOGICAL: No numbness or weakness  SKIN: No itching, burning, rashes, or lesions   All other review of systems is negative unless indicated above.    VITAL:  T(C): , Max: 36.7 (05-23-19 @ 18:51)  T(F): , Max: 98.1 (05-24-19 @ 04:14)  HR: 60 (05-24-19 @ 13:59)  BP: 123/79 (05-24-19 @ 13:59)  BP(mean): --  RR: 18 (05-24-19 @ 13:59)  SpO2: 96% (05-24-19 @ 13:59)  Wt(kg): --    I and O's:    Height (cm): 175.26 (05-23 @ 18:51)  Weight (kg): 53.5 (05-23 @ 18:51)  BMI (kg/m2): 17.4 (05-23 @ 18:51)  BSA (m2): 1.65 (05-23 @ 18:51)    PHYSICAL EXAM:    Constitutional: NAD  HEENT: PERRLA,   Neck: No JVD  Respiratory: CTA B/L  Cardiovascular: S1 and S2  Gastrointestinal: BS+, soft, NT/ND  Extremities: No peripheral edema  Neurological: A/O x 3, no focal deficits  Psychiatric: Normal mood, normal affect  : No Landa  Skin: No rashes  Access: Not applicable  Back: No CVA tenderness    LABS:                        13.5   4.2   )-----------( 150      ( 23 May 2019 20:20 )             40.1     05-23    140  |  106  |  23  ----------------------------<  77  5.3   |  23  |  1.72<H>    Ca    10.0      23 May 2019 20:20    TPro  7.0  /  Alb  4.2  /  TBili  0.5  /  DBili  x   /  AST  33  /  ALT  19  /  AlkPhos  50  05-23          RADIOLOGY & ADDITIONAL STUDIES:

## 2019-05-24 NOTE — H&P ADULT - NSHPREVIEWOFSYSTEMS_GEN_ALL_CORE
REVIEW OF SYSTEMS:    CONSTITUTIONAL: + weakness, weight loss  EYES/ENT: No visual changes;  No vertigo or throat pain   NECK: No pain or stiffness  RESPIRATORY: No cough, wheezing, hemoptysis; No shortness of breath  CARDIOVASCULAR: No chest pain or palpitations  GASTROINTESTINAL: nausea, vomiting, epigastric pain  GENITOURINARY: No dysuria, frequency or hematuria  NEUROLOGICAL: No numbness or weakness  SKIN: No itching, burning, rashes, or lesions   All other review of systems is negative unless indicated above.

## 2019-05-24 NOTE — CONSULT NOTE ADULT - ASSESSMENT
Pt with three weeks of epigastric and left lower abdominal pain.  ct scan and blood work unrevaling.  pt on immunosuppresion for kidny transplant.  recommend ppi and carafate.  awaith transpant team input re: kidney and ct scan.   pt may benefit from endosopcy rule out ulcer/ cmv/ etc if not primary from kidney.

## 2019-05-24 NOTE — H&P ADULT - NSHPPHYSICALEXAM_GEN_ALL_CORE
Vital Signs Last 24 Hrs  T(C): 36.6 (24 May 2019 07:35), Max: 36.7 (23 May 2019 18:51)  T(F): 97.9 (24 May 2019 07:35), Max: 98.1 (24 May 2019 04:14)  HR: 56 (24 May 2019 07:35) (56 - 70)  BP: 98/65 (24 May 2019 07:35) (98/56 - 138/63)  BP(mean): --  RR: 17 (24 May 2019 07:35) (17 - 18)  SpO2: 97% (24 May 2019 07:35) (96% - 98%) Vital Signs Last 24 Hrs  T(C): 36.6 (24 May 2019 07:35), Max: 36.7 (23 May 2019 18:51)  T(F): 97.9 (24 May 2019 07:35), Max: 98.1 (24 May 2019 04:14)  HR: 56 (24 May 2019 07:35) (56 - 70)  BP: 98/65 (24 May 2019 07:35) (98/56 - 138/63)  BP(mean): --  RR: 17 (24 May 2019 07:35) (17 - 18)  SpO2: 97% (24 May 2019 07:35) (96% - 98%)    Appearance: Normal, awake, AOx3	  HEENT:   Normal oral mucosa, PERRL, EOMI	  Lymphatic: No lymphadenopathy , no edema  Cardiovascular: Normal S1 S2, No JVD, No murmurs , Peripheral pulses palpable 2+ bilaterally  Respiratory: Lungs clear to auscultation, normal effort 	  Gastrointestinal:  Soft, Non-tender, + BS	  Skin: No rashes, No ecchymoses, No cyanosis, warm to touch  Musculoskeletal: Normal range of motion, L UE fistula  Psychiatry:  Mood & affect appropriate  Ext: No edema

## 2019-05-24 NOTE — CONSULT NOTE ADULT - SUBJECTIVE AND OBJECTIVE BOX
[facility] DIVISION OF KIDNEY DISEASES AND HYPERTENSION -- INITIAL CONSULT NOTE  --------------------------------------------------------------------------------  HPI:  Pt is a 58 y M well known to me with PMH pancreatitis, polycystic kidney disease now s/p  donor kidney transplant in 2017, anxiety/depression who p/w epigastric abdominal pain x 3 weeks, associated with chills, generalized weakness, nausea, 1 episode of nonbloody vomit.   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  Last BK virus pcr from 19 was 40 (very low) and CMV pcr negative.  Of note he also ran out of his prilosec and has some heartburn.  Has also lost about 10 Ibs in the last month.  He has known polycystic liver disease.          PAST HISTORY  --------------------------------------------------------------------------------  PAST MEDICAL & SURGICAL HISTORY:  Polycystic kidney disease: with ESRD s/p kidney txp 17  Acid reflux  Alcoholism /alcohol abuse  Bipolar 1 disorder  Hypertension  H/O kidney transplant  Dialysis patient: Left Arm A V Graft (  )  S/P breast lumpectomy: right,     FAMILY HISTORY:  Family history of cancer (Grandparent)    PAST SOCIAL HISTORY:    ALLERGIES & MEDICATIONS  --------------------------------------------------------------------------------  Allergies    penicillin (Hives)    Intolerances      Standing Inpatient Medications  aspirin  chewable 81 milliGRAM(s) Oral daily  cloNIDine Patch 0.1 mG/24Hr(s) 1 patch Transdermal every 7 days  heparin  Injectable 5000 Unit(s) SubCutaneous every 8 hours  losartan 100 milliGRAM(s) Oral daily  mycophenolate mofetil 250 milliGRAM(s) Oral two times a day  predniSONE   Tablet 5 milliGRAM(s) Oral daily  tacrolimus 2.5 milliGRAM(s) Oral every 12 hours  tamsulosin 0.4 milliGRAM(s) Oral two times a day  trimethoprim   80 mG/sulfamethoxazole 400 mG 1 Tablet(s) Oral daily  valGANciclovir 450 milliGRAM(s) Oral daily    PRN Inpatient Medications      REVIEW OF SYSTEMS  --------------------------------------------------------------------------------  Gen: weakness fatigue  Skin: No rashes  Head/Eyes/Ears/Mouth: No headache; Normal hearing; Normal vision w/o blurriness; No sinus pain/discomfort, sore throat  Respiratory: No dyspnea, cough, wheezing, hemoptysis  CV: No chest pain, PND, orthopnea  GI: heartburn, abdominal discomfort  : No increased frequency, dysuria, hematuria, nocturia  MSK: No joint pain/swelling; no back pain; no edema  Neuro: No dizziness/lightheadedness, weakness, seizures, numbness, tingling  Heme: No easy bruising or bleeding  Endo: No heat/cold intolerance  Psych: No significant nervousness, anxiety, stress, depression    All other systems were reviewed and are negative, except as noted.    VITALS/PHYSICAL EXAM  --------------------------------------------------------------------------------  T(C): 36.7 (19 @ 13:59), Max: 36.7 (19 @ 18:51)  HR: 60 (19 @ 13:59) (56 - 84)  BP: 123/79 (19 @ 13:59) (98/56 - 138/63)  RR: 18 (19 13:59) (17 - 18)  SpO2: 96% (19 13:59) (96% - 98%)  Wt(kg): --  Height (cm): 175.26 (19 18:51)  Weight (kg): 53.5 (19 18:51)  BMI (kg/m2): 17.4 (19 @ 18:51)  BSA (m2): 1.65 (19 @ 18:51)      Physical Exam:  	Gen: NAD, thin male  	HEENT: PERRL, supple neck, clear oropharynx  	Pulm: CTA B/L  	CV: RRR, S1S2; no rub  	Back: No spinal or CVA tenderness; no sacral edema  	Abd: +BS, soft, enlarged liver, mild tenderness, no guarding or rebound.                        Transplant: non tender  	: No suprapubic tenderness  	UE: Warm, FROM, no clubbing, intact strength; no edema; no asterixis  	LE: Warm, FROM, no clubbing, intact strength; no edema  	Neuro: No focal deficits, intact gait  	Psych: Normal affect and mood  	Skin: Warm, without rashes  	Vascular access: large aneurysmal left UE AVF    LABS/STUDIES  --------------------------------------------------------------------------------              13.5   4.2   >-----------<  150      [19 @ 20:20]              40.1     140  |  106  |  23  ----------------------------<  77      [19 @ 20:20]  5.3   |  23  |  1.72        Ca     10.0     [19 @ 20:20]    TPro  7.0  /  Alb  4.2  /  TBili  0.5  /  DBili  x   /  AST  33  /  ALT  19  /  AlkPhos  50  [19 @ 20:20]    PT/INR: PT 11.3 , INR 0.99       [19 @ 23:08]  PTT: 32.7       [19 @ 23:08]      Creatinine Trend:  SCr 1.72 [ 20:20]    Urinalysis - [17 @ 20:43]      Color Carol / Appearance Clear / SG 1.035 / pH 5.5      Gluc Negative / Ketone Trace  / Bili Negative / Urobili Negative       Blood Negative / Protein Trace / Leuk Est Negative / Nitrite Negative      RBC 3 / WBC 8 / Hyaline 5 / Gran  / Sq Epi  / Non Sq Epi 2 / Bacteria Negative      HbA1c 5.2      [16 @ 20:13]    HBsAb >1000.0      [16 @ 20:16]  HBsAb Reactive      [16 @ 20:16]  HBsAg Nonreact      [16 @ 20:16]  HBcAb Nonreact      [16 @ 20:16]  HCV 0.10, Nonreact      [16 @ 20:16]  HIV Nonreact      [16 @ 20:17]      Tacrolimus  Cyclosporine  Sirolimus  Mycophenolate  BK PCR  CMV PCR  Parvo PCR  EBV PCR

## 2019-05-24 NOTE — CONSULT NOTE ADULT - ASSESSMENT
58 y M well known to me with PMH pancreatitis, polycystic kidney disease now s/p  donor kidney transplant in 2017, anxiety/depression who p/w epigastric abdominal pain x 3 weeks, associated with chills, generalized weakness, nausea, 1 episode of nonbloody vomit and weight loss.   1.  Renal transplant - Pts creatinine of 1.7 is his baseline.  Had a biopsy early post transplant which was normal.  Has BK viremia but at very low titer.  No need to repeat testing.  No additional therapy  2.  Immunosuppression - continue tacrolimus 2.5mgs BID.  He is on leflunomide at home but should hold it for now in case it is contributing to his symptoms.  Cont prednisone 5.  3.  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  Doubt he has active CMV, please stop valcyte.   4.  GI - Pt has been missing his PPI and has heartburn - would resume PPI daily.  5.  Failure to thrive - unclear etiology.  Infectious w/u in progress.  Possibly could be due to enlarged liver pressing on stomach leading to early satiety.  Has muscle aches and was on statin - agree with holding.  Check CPK  6.  HTN - cont losartan, was not on clonidine as outpatient - can stop. 58 y M well known to me with PMH pancreatitis, polycystic kidney disease now s/p  donor kidney transplant in 2017, anxiety/depression who p/w epigastric abdominal pain x 3 weeks, associated with chills, generalized weakness, nausea, 1 episode of nonbloody vomit and weight loss.   1.  Renal transplant - Pts creatinine of 1.7 is his baseline.  Had a biopsy early post transplant which was normal.  Has BK viremia but at very low titer.  PT was on arava - please do not restart, and MMF was held - please d/c.  No need to repeat testing.  No additional therapy  2.  Immunosuppression - continue tacrolimus 2.5mgs BID.  He is on leflunomide at home but should hold it for now in case it is contributing to his symptoms.  Cont prednisone 5.  3.  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  Doubt he has active CMV, please stop valcyte.   4.  GI - Pt has been missing his PPI and has heartburn - would resume PPI daily.  5.  Failure to thrive - unclear etiology.  Infectious w/u in progress.  Possibly could be due to enlarged liver pressing on stomach leading to early satiety.  Has muscle aches and was on statin - agree with holding.  Check CPK  6.  HTN - cont losartan, was not on clonidine as outpatient - can stop.

## 2019-05-24 NOTE — H&P ADULT - NSICDXPASTMEDICALHX_GEN_ALL_CORE_FT
PAST MEDICAL HISTORY:  Acid reflux     Alcoholism /alcohol abuse     Bipolar 1 disorder     Hypertension     Polycystic kidney disease with ESRD s/p kidney txp 2/24/17

## 2019-05-24 NOTE — H&P ADULT - PROBLEM SELECTOR PLAN 1
pain x 3 weeks   CT abd/Pelv Noted  ? Hydro  Kidney transplant team eval  GI eval, Hx of alcoholism  ESR/CRP level  Pan Cx pain x 3 weeks   CT abd/Pelv Noted  ? Hydro  Kidney transplant team eval  GI eval, Hx of alcoholism  ESR/CRP level  Pan Cx  R/O H-Pylori

## 2019-05-24 NOTE — H&P ADULT - NSICDXPASTSURGICALHX_GEN_ALL_CORE_FT
PAST SURGICAL HISTORY:  Dialysis patient Left Arm A V Graft ( 2004 )    H/O kidney transplant     S/P breast lumpectomy right, 1996

## 2019-05-24 NOTE — CHART NOTE - NSCHARTNOTEFT_GEN_A_CORE
Medicine NP note:  @17:50 RN notified NP / PA  that pt. does not want to stay in the hospital and is walking off the floor.  RN adds attempted to talk to pt. but the pt. pulled the IV out and walked off the floor stating he wants to leave.  Security called when pt. left the floor.  Dr. Benavides notified that pt. left the floor after removing the IV.  ЕЛЕНА Villagran, NP - [facility]   [phone]

## 2019-05-24 NOTE — CONSULT NOTE ADULT - ASSESSMENT
57 yo male with nausea and weight loss associated with fatigue and non specific abdominal pain.  He also has had wt loss which is of concern.  His night sweats are very nonspecific,certainly raise concern of underlying infection but are in no way diagnostic.  He has myalgias as well and overall failure to thrive syndrome.  No obvious infection at this point  Suggest:  1. monitor off antibiotics  2.check CMV viral load  3.blood cultures x 2 sets  4.? role for GI involvement  5.Observe off systemic antibiotics 59 yo male with nausea and weight loss associated with fatigue and non specific abdominal pain.  He also has had wt loss which is of concern.  His night sweats are very nonspecific,certainly raise concern of underlying infection but are in no way diagnostic.  He has myalgias as well and overall failure to thrive syndrome.  No obvious infection at this point  Nephrology input and guidance on prophylaxis, BK virus management, and CMV history  Suggest:  1. monitor off antibiotics  2.check CMV viral load  3.blood cultures x 2 sets  4.? role for GI involvement  5.Observe off systemic antibiotics save for antibiotic/antiviral management per nephrology

## 2019-05-24 NOTE — ED ADULT NURSE REASSESSMENT NOTE - NS ED NURSE REASSESS COMMENT FT1
pt becoming verbally abusive to resident and nurse because he is not getting his pain medicine when he wants it; pt threatening to leave because he hasn't gotten morphine; md canchola aware; will find admitting team refer pt for in house medication orders

## 2019-05-24 NOTE — H&P ADULT - ASSESSMENT
Pt is a 58 y M with PMH pancreatitis, polycystic kidney disease now s/p kidney transplant Right sided 2017, anxiety/depression who p/w epigastric abdominal pain x 3 weeks, associated with chills, generalized weakness, nausea, 1 episode of nonbloody vomit. Came to ED today because it was the first chance he didn't have to get nieces/nephews off bus. Endorses 10lb+ weight loss in 3 months, +night sweats

## 2019-05-24 NOTE — H&P ADULT - PROBLEM SELECTOR PLAN 3
Seen on CT  making urine   elevated Cr level  unknonw baseline Cr level  Nephro F/U  Renal US noted   avoid nephrotoxic medications

## 2019-05-24 NOTE — CONSULT NOTE ADULT - SUBJECTIVE AND OBJECTIVE BOX
HPI:   Patient is a 58y male with a past history of PCKD, ESRD on HD x 12 years, s/p renal transplant 2017, who os now being admitted with a history of 10 pounds wt loss, nonspecific abdominal pain, nausea,and fatigue,myalgias.He has a history of renal transplant 2017.He has been treated in past for low level of CMV,viral loads negative x 1 year.He also has had positive  BKV PCR levels,his immunosupression has been adjusted.He has not felt well x months.He reports weakness, muscle pain, anorexia and episodes of vomiting.He also has night sweats although this is not knew for him.No fever,no headache, no respiratory symptoms.    REVIEW OF SYSTEMS:  All other review of systems negative (Comprehensive ROS)    PAST MEDICAL & SURGICAL HISTORY:  Polycystic kidney disease: with ESRD s/p kidney txp 2/24/17  Acid reflux  Alcoholism /alcohol abuse  Bipolar 1 disorder  Hypertension  H/O kidney transplant  Dialysis patient: Left Arm A V Graft ( 2004 )  S/P breast lumpectomy: right, 1996      Allergies    penicillin (Hives)    Intolerances        Antimicrobials Day #  :  trimethoprim   80 mG/sulfamethoxazole 400 mG 1 Tablet(s) Oral daily  valGANciclovir 450 milliGRAM(s) Oral daily    Other Medications:  aspirin  chewable 81 milliGRAM(s) Oral daily  cloNIDine Patch 0.1 mG/24Hr(s) 1 patch Transdermal every 7 days  heparin  Injectable 5000 Unit(s) SubCutaneous every 8 hours  losartan 100 milliGRAM(s) Oral daily  mycophenolate mofetil 250 milliGRAM(s) Oral two times a day  predniSONE   Tablet 5 milliGRAM(s) Oral daily  tacrolimus 2.5 milliGRAM(s) Oral every 12 hours  tamsulosin 0.4 milliGRAM(s) Oral two times a day      FAMILY HISTORY:  Family history of cancer (Grandparent)      SOCIAL HISTORY:  Smoking: no    ETOH:no     Drug Use: no    Single     T(F): 97.8 (05-24-19 @ 10:52), Max: 98.1 (05-24-19 @ 04:14)  HR: 84 (05-24-19 @ 10:52)  BP: 112/68 (05-24-19 @ 10:52)  RR: 18 (05-24-19 @ 10:52)  SpO2: 97% (05-24-19 @ 10:52)  Wt(kg): --    PHYSICAL EXAM:  General: alert, no acute distress, thin black male  Eyes:  anicteric, no conjunctival injection, no discharge  Oropharynx: no lesions or injection 	  Neck: supple, without adenopathy  Lungs: clear to auscultation  Heart: regular rate and rhythm; no murmur, rubs or gallops  Abdomen: soft, nondistended, mild mid abdominal tenderness, without mass or organomegaly  Skin: no lesions  Extremities: no clubbing, cyanosis, or edema  Neurologic: alert, oriented, moves all extremities  Left arm with aneurysmal dilatation of AVF  LAB RESULTS:                        13.5   4.2   )-----------( 150      ( 23 May 2019 20:20 )             40.1     05-23    140  |  106  |  23  ----------------------------<  77  5.3   |  23  |  1.72<H>    Ca    10.0      23 May 2019 20:20    TPro  7.0  /  Alb  4.2  /  TBili  0.5  /  DBili  x   /  AST  33  /  ALT  19  /  AlkPhos  50  05-23    LIVER FUNCTIONS - ( 23 May 2019 20:20 )  Alb: 4.2 g/dL / Pro: 7.0 g/dL / ALK PHOS: 50 U/L / ALT: 19 U/L / AST: 33 U/L / GGT: x               MICROBIOLOGY:  RECENT CULTURES:        RADIOLOGY REVIEWED:  < from: US Abdomen Complete (05.24.19 @ 00:11) >  IMPRESSION:     Mildly contracted gallbladder limits evaluation for acute cholecystitis.   However, there is no cholelithiasis    < end of copied text >  < from: Xray Chest 2 Views PA/Lat (05.23.19 @ 22:59) >  IMPRESSION:    Clear lungs.    < end of copied text >  < from: CT Abdomen and Pelvis w/ Oral Cont (05.23.19 @ 21:57) >  IMPRESSION:   Mild hydronephrosis of the transplant kidney without perinephric   stranding or ureteral calculus. This may be due to a partially distended   bladder. Please correlate with urinalysis to exclude infection.  No evidence of colitis or hernia as clinically questioned.

## 2019-05-28 LAB
CMV DNA CSF QL NAA+PROBE: SIGNIFICANT CHANGE UP
CMV DNA SPEC NAA+PROBE-LOG#: SIGNIFICANT CHANGE UP LOGIU/ML

## 2019-05-29 LAB
CULTURE RESULTS: SIGNIFICANT CHANGE UP
SPECIMEN SOURCE: SIGNIFICANT CHANGE UP

## 2019-06-01 ENCOUNTER — OUTPATIENT (OUTPATIENT)
Dept: OUTPATIENT SERVICES | Facility: HOSPITAL | Age: 59
LOS: 1 days | End: 2019-06-01
Payer: MEDICARE

## 2019-06-01 DIAGNOSIS — Z98.89 OTHER SPECIFIED POSTPROCEDURAL STATES: Chronic | ICD-10-CM

## 2019-06-01 DIAGNOSIS — Z99.2 DEPENDENCE ON RENAL DIALYSIS: Chronic | ICD-10-CM

## 2019-06-01 DIAGNOSIS — Z94.0 KIDNEY TRANSPLANT STATUS: Chronic | ICD-10-CM

## 2019-06-01 PROCEDURE — G9001: CPT

## 2019-06-04 ENCOUNTER — LABORATORY RESULT (OUTPATIENT)
Age: 59
End: 2019-06-04

## 2019-06-04 ENCOUNTER — APPOINTMENT (OUTPATIENT)
Dept: TRANSPLANT | Facility: CLINIC | Age: 59
End: 2019-06-04

## 2019-06-05 LAB
ALBUMIN SERPL ELPH-MCNC: 4.2 G/DL
ALP BLD-CCNC: 58 U/L
ALT SERPL-CCNC: 12 U/L
ANION GAP SERPL CALC-SCNC: 10 MMOL/L
APPEARANCE: CLEAR
AST SERPL-CCNC: 13 U/L
BACTERIA: NEGATIVE
BASOPHILS # BLD AUTO: 0.03 K/UL
BASOPHILS NFR BLD AUTO: 0.6 %
BILIRUB SERPL-MCNC: 0.6 MG/DL
BILIRUBIN URINE: NEGATIVE
BLOOD URINE: NEGATIVE
BUN SERPL-MCNC: 25 MG/DL
CALCIUM SERPL-MCNC: 9.8 MG/DL
CHLORIDE SERPL-SCNC: 105 MMOL/L
CO2 SERPL-SCNC: 26 MMOL/L
COLOR: YELLOW
CREAT SERPL-MCNC: 2.05 MG/DL
CREAT SPEC-SCNC: 199 MG/DL
CREAT/PROT UR: 0.1 RATIO
EOSINOPHIL # BLD AUTO: 0 K/UL
EOSINOPHIL NFR BLD AUTO: 0 %
GLUCOSE QUALITATIVE U: NEGATIVE
GLUCOSE SERPL-MCNC: 78 MG/DL
HCT VFR BLD CALC: 40.8 %
HGB BLD-MCNC: 13.2 G/DL
HYALINE CASTS: 0 /LPF
IMM GRANULOCYTES NFR BLD AUTO: 0.2 %
KETONES URINE: NEGATIVE
LDH SERPL-CCNC: 196 U/L
LEUKOCYTE ESTERASE URINE: NEGATIVE
LYMPHOCYTES # BLD AUTO: 1.53 K/UL
LYMPHOCYTES NFR BLD AUTO: 30.1 %
MAGNESIUM SERPL-MCNC: 2 MG/DL
MAN DIFF?: NORMAL
MCHC RBC-ENTMCNC: 32.4 GM/DL
MCHC RBC-ENTMCNC: 35.4 PG
MCV RBC AUTO: 109.4 FL
MICROSCOPIC-UA: NORMAL
MONOCYTES # BLD AUTO: 0.08 K/UL
MONOCYTES NFR BLD AUTO: 1.6 %
NEUTROPHILS # BLD AUTO: 3.43 K/UL
NEUTROPHILS NFR BLD AUTO: 67.5 %
NITRITE URINE: NEGATIVE
PH URINE: 6
PHOSPHATE SERPL-MCNC: 3.1 MG/DL
PLATELET # BLD AUTO: 125 K/UL
POTASSIUM SERPL-SCNC: 4.6 MMOL/L
PROT SERPL-MCNC: 6.6 G/DL
PROT UR-MCNC: 19 MG/DL
PROTEIN URINE: NORMAL
RBC # BLD: 3.73 M/UL
RBC # FLD: 15.5 %
RED BLOOD CELLS URINE: 1 /HPF
SODIUM SERPL-SCNC: 141 MMOL/L
SPECIFIC GRAVITY URINE: 1.02
SQUAMOUS EPITHELIAL CELLS: 1 /HPF
TACROLIMUS SERPL-MCNC: 7.4 NG/ML
URATE SERPL-MCNC: 7.3 MG/DL
UROBILINOGEN URINE: NORMAL
WBC # FLD AUTO: 5.08 K/UL
WHITE BLOOD CELLS URINE: 1 /HPF

## 2019-06-07 DIAGNOSIS — Z71.89 OTHER SPECIFIED COUNSELING: ICD-10-CM

## 2019-06-07 LAB
BKV DNA SPEC QL NAA+PROBE: 63 COPIES/ML
CMV DNA SPEC QL NAA+PROBE: NOT DETECTED
CMVPCR LOG: NOT DETECTED LOGIU/ML

## 2019-06-11 ENCOUNTER — FORM ENCOUNTER (OUTPATIENT)
Age: 59
End: 2019-06-11

## 2019-06-12 ENCOUNTER — APPOINTMENT (OUTPATIENT)
Dept: ULTRASOUND IMAGING | Facility: CLINIC | Age: 59
End: 2019-06-12
Payer: MEDICARE

## 2019-06-12 ENCOUNTER — OUTPATIENT (OUTPATIENT)
Dept: OUTPATIENT SERVICES | Facility: HOSPITAL | Age: 59
LOS: 1 days | End: 2019-06-12
Payer: MEDICARE

## 2019-06-12 DIAGNOSIS — Z98.89 OTHER SPECIFIED POSTPROCEDURAL STATES: Chronic | ICD-10-CM

## 2019-06-12 DIAGNOSIS — Z99.2 DEPENDENCE ON RENAL DIALYSIS: Chronic | ICD-10-CM

## 2019-06-12 DIAGNOSIS — Z94.0 KIDNEY TRANSPLANT STATUS: Chronic | ICD-10-CM

## 2019-06-12 DIAGNOSIS — Z94.0 KIDNEY TRANSPLANT STATUS: ICD-10-CM

## 2019-06-12 PROCEDURE — 93975 VASCULAR STUDY: CPT

## 2019-06-12 PROCEDURE — 93975 VASCULAR STUDY: CPT | Mod: 26

## 2019-07-11 ENCOUNTER — APPOINTMENT (OUTPATIENT)
Dept: NEPHROLOGY | Facility: CLINIC | Age: 59
End: 2019-07-11
Payer: MEDICARE

## 2019-07-11 VITALS
TEMPERATURE: 97.5 F | OXYGEN SATURATION: 100 % | HEART RATE: 61 BPM | RESPIRATION RATE: 16 BRPM | WEIGHT: 120 LBS | SYSTOLIC BLOOD PRESSURE: 147 MMHG | HEIGHT: 68 IN | BODY MASS INDEX: 18.19 KG/M2 | DIASTOLIC BLOOD PRESSURE: 82 MMHG

## 2019-07-11 PROCEDURE — 99214 OFFICE O/P EST MOD 30 MIN: CPT

## 2019-07-11 NOTE — REVIEW OF SYSTEMS
[As Noted in HPI] : as noted in HPI [Negative] : Heme/Lymph [de-identified] : RLQ incision clean dry. No drainage [Heart Rate Is Fast] : the heart rate was not fast

## 2019-07-11 NOTE — HISTORY OF PRESENT ILLNESS
[FreeTextEntry1] : Pt is a 57 y.o male s/p DDRTx on 2/24/17.  Donor was 17 y.o DCD female with normal creatinine.  Pt had immediate but slow function.  Donor urine culture positive was for yeast.  Pt was on fluconazole early post transplant . Currently he is feeling well. \par Had kidney biopsy 5/2017 month ago- no rejection, c4 d neg.\par \par Update:\par Confused about meds again today.  Pt did not bring in his meds or list.  Believes he is taking valcyte at home.   Pt has been losing weight - had mild epigastric pain in the past and was in hospital for a day.  States he is not taking cozaar but not sure.

## 2019-07-11 NOTE — PHYSICAL EXAM
[General Appearance - Alert] : alert [Sclera] : the sclera and conjunctiva were normal [General Appearance - In No Acute Distress] : in no acute distress [General Appearance - Well Nourished] : well nourished [Outer Ear] : the ears and nose were normal in appearance [Jugular Venous Distention Increased] : there was no jugular-venous distention [Auscultation Breath Sounds / Voice Sounds] : lungs were clear to auscultation bilaterally [Heart Sounds] : normal S1 and S2 [Apical Impulse] : the apical impulse was normal [Heart Sounds Pericardial Friction Rub] : no pericardial rub [Edema] : there was no peripheral edema [Bowel Sounds] : normal bowel sounds [Abdomen Soft] : soft [Abdomen Tenderness] : non-tender [Cervical Lymph Nodes Enlarged Posterior Bilaterally] : posterior cervical [Cervical Lymph Nodes Enlarged Anterior Bilaterally] : anterior cervical [Supraclavicular Lymph Nodes Enlarged Bilaterally] : supraclavicular [Axillary Lymph Nodes Enlarged Bilaterally] : axillary [Inguinal Lymph Nodes Enlarged Bilaterally] : inguinal [No CVA Tenderness] : no ~M costovertebral angle tenderness [Abnormal Walk] : normal gait [Musculoskeletal - Swelling] : no joint swelling seen [Motor Tone] : muscle strength and tone were normal [___ (cm) Fistula] : [unfilled] (cm) fistula [Bruit] : a bruit was present [Thrill] : a thrill was present [] : no rash [No Focal Deficits] : no focal deficits [Impaired Insight] : insight and judgment were intact [Oriented To Time, Place, And Person] : oriented to person, place, and time [Affect] : the affect was normal [Mood] : the mood was normal [FreeTextEntry1] : large palpable liver

## 2019-07-11 NOTE — ASSESSMENT
[FreeTextEntry1] : 58 y.o male hx depression, PCKD, HTN here for follow up. He is s/p DDRTx (DCD) 2/24/17; \par \par 1.  Renal transplant - immediate function.  Last Scr 2.  May be due to BK or increasing losartan.  Will check creatinine today.  Had biopsy 5/2017 for elevated creatinine, no rejection.  Unclear if pt is taking meds appropriately \par 2.  Immunosuppression - On tacro, pred and leflunomide 20mgs.  Tacro goal 4-6.\par 3.  HTN - resume losartan\par 4.  Hx Bipolar/ Depression - seems more tangential in conversation today.  will discuss with his PMD.  \par 5.  PKD: no hematuria or pain.  Also has large liver consistent with Polycystic liver.  Discussed that although enlarged his liver function is fine.  \par 6.  Back pain: Was following with Dr. Cruz but last saw patient in March.\par 7.  Polycythemia: controlled. \par 8.  BK: Much improved.  He is on low dose tacro, pred 5 and leflunomide 20\par 9.  Compliance:  pt now confused about meds.  Gave pt new list of meds - he will review with his meds at home and told he must bring meds and list in next visit. \par 10.  Weight loss - send to GI for possibly EGD.  Was suggested in hospital. \par \par F/u in 1 month.\par Dr. Bailey ([place]) - [phone] \par Pt to also start following up with Dr. Handley needs help setting up an appointment.

## 2019-07-12 LAB
ALBUMIN SERPL ELPH-MCNC: 4.5 G/DL
ALP BLD-CCNC: 57 U/L
ALT SERPL-CCNC: 18 U/L
APPEARANCE: CLEAR
AST SERPL-CCNC: 15 U/L
BACTERIA: NEGATIVE
BASOPHILS # BLD AUTO: 0.01 K/UL
BASOPHILS NFR BLD AUTO: 0.2 %
BILIRUB SERPL-MCNC: 0.6 MG/DL
BILIRUBIN URINE: NEGATIVE
BLOOD URINE: NEGATIVE
BUN SERPL-MCNC: 29 MG/DL
CALCIUM SERPL-MCNC: 9.8 MG/DL
CALCIUM SERPL-MCNC: 9.8 MG/DL
CHLORIDE SERPL-SCNC: 104 MMOL/L
CO2 SERPL-SCNC: 26 MMOL/L
COLOR: YELLOW
CREAT SERPL-MCNC: 1.68 MG/DL
CREAT SPEC-SCNC: 140 MG/DL
CREAT/PROT UR: 0.1 RATIO
EOSINOPHIL # BLD AUTO: 0 K/UL
EOSINOPHIL NFR BLD AUTO: 0 %
GLUCOSE QUALITATIVE U: NEGATIVE
GLUCOSE SERPL-MCNC: 91 MG/DL
HCT VFR BLD CALC: 44.8 %
HGB BLD-MCNC: 14.3 G/DL
HYALINE CASTS: 0 /LPF
IMM GRANULOCYTES NFR BLD AUTO: 0.2 %
KETONES URINE: NEGATIVE
LDH SERPL-CCNC: 203 U/L
LEUKOCYTE ESTERASE URINE: NEGATIVE
LYMPHOCYTES # BLD AUTO: 1.24 K/UL
LYMPHOCYTES NFR BLD AUTO: 25.4 %
MAGNESIUM SERPL-MCNC: 2 MG/DL
MAN DIFF?: NORMAL
MCHC RBC-ENTMCNC: 31.9 GM/DL
MCHC RBC-ENTMCNC: 35.8 PG
MCV RBC AUTO: 112.3 FL
MICROSCOPIC-UA: NORMAL
MONOCYTES # BLD AUTO: 0.08 K/UL
MONOCYTES NFR BLD AUTO: 1.6 %
NEUTROPHILS # BLD AUTO: 3.55 K/UL
NEUTROPHILS NFR BLD AUTO: 72.6 %
NITRITE URINE: NEGATIVE
PARATHYROID HORMONE INTACT: 87 PG/ML
PH URINE: 6.5
PHOSPHATE SERPL-MCNC: 3.4 MG/DL
PLATELET # BLD AUTO: 111 K/UL
POTASSIUM SERPL-SCNC: 4.8 MMOL/L
PROT SERPL-MCNC: 6.6 G/DL
PROT UR-MCNC: 14 MG/DL
PROTEIN URINE: NORMAL
RBC # BLD: 3.99 M/UL
RBC # FLD: 13 %
RED BLOOD CELLS URINE: 1 /HPF
SODIUM SERPL-SCNC: 141 MMOL/L
SPECIFIC GRAVITY URINE: 1.02
SQUAMOUS EPITHELIAL CELLS: 0 /HPF
TACROLIMUS SERPL-MCNC: 6.6 NG/ML
URATE SERPL-MCNC: 6.4 MG/DL
UROBILINOGEN URINE: NORMAL
WBC # FLD AUTO: 4.89 K/UL
WHITE BLOOD CELLS URINE: 0 /HPF

## 2019-07-15 LAB
BKV DNA SPEC QL NAA+PROBE: 156 COPIES/ML
CMV DNA SPEC QL NAA+PROBE: NOT DETECTED
CMVPCR LOG: NOT DETECTED LOGIU/ML

## 2019-08-08 ENCOUNTER — APPOINTMENT (OUTPATIENT)
Dept: GASTROENTEROLOGY | Facility: CLINIC | Age: 59
End: 2019-08-08

## 2019-08-13 RX ORDER — ASPIRIN ENTERIC COATED TABLETS 81 MG 81 MG/1
81 TABLET, DELAYED RELEASE ORAL DAILY
Qty: 90 | Refills: 3 | Status: ACTIVE | COMMUNITY
Start: 2017-02-24 | End: 1900-01-01

## 2019-09-04 ENCOUNTER — APPOINTMENT (OUTPATIENT)
Dept: GASTROENTEROLOGY | Facility: CLINIC | Age: 59
End: 2019-09-04

## 2019-09-10 ENCOUNTER — APPOINTMENT (OUTPATIENT)
Dept: GASTROENTEROLOGY | Facility: CLINIC | Age: 59
End: 2019-09-10
Payer: MEDICARE

## 2019-09-10 VITALS
RESPIRATION RATE: 16 BRPM | SYSTOLIC BLOOD PRESSURE: 126 MMHG | TEMPERATURE: 98 F | OXYGEN SATURATION: 96 % | WEIGHT: 119 LBS | BODY MASS INDEX: 18.04 KG/M2 | HEIGHT: 68 IN | DIASTOLIC BLOOD PRESSURE: 78 MMHG | HEART RATE: 72 BPM

## 2019-09-10 DIAGNOSIS — Z87.19 PERSONAL HISTORY OF OTHER DISEASES OF THE DIGESTIVE SYSTEM: ICD-10-CM

## 2019-09-10 DIAGNOSIS — Z99.2 DEPENDENCE ON RENAL DIALYSIS: ICD-10-CM

## 2019-09-10 DIAGNOSIS — R63.4 ABNORMAL WEIGHT LOSS: ICD-10-CM

## 2019-09-10 PROCEDURE — 99204 OFFICE O/P NEW MOD 45 MIN: CPT

## 2019-09-11 ENCOUNTER — RESULT REVIEW (OUTPATIENT)
Age: 59
End: 2019-09-11

## 2019-09-11 ENCOUNTER — APPOINTMENT (OUTPATIENT)
Dept: GASTROENTEROLOGY | Facility: HOSPITAL | Age: 59
End: 2019-09-11

## 2019-09-11 ENCOUNTER — OUTPATIENT (OUTPATIENT)
Dept: OUTPATIENT SERVICES | Facility: HOSPITAL | Age: 59
LOS: 1 days | Discharge: HOME | End: 2019-09-11
Payer: MEDICARE

## 2019-09-11 DIAGNOSIS — Z99.2 DEPENDENCE ON RENAL DIALYSIS: Chronic | ICD-10-CM

## 2019-09-11 DIAGNOSIS — Z94.0 KIDNEY TRANSPLANT STATUS: Chronic | ICD-10-CM

## 2019-09-11 DIAGNOSIS — Z98.89 OTHER SPECIFIED POSTPROCEDURAL STATES: Chronic | ICD-10-CM

## 2019-09-11 PROBLEM — R63.4 WEIGHT LOSS, NON-INTENTIONAL: Status: ACTIVE | Noted: 2019-09-11

## 2019-09-11 PROCEDURE — 88312 SPECIAL STAINS GROUP 1: CPT | Mod: 26

## 2019-09-11 PROCEDURE — 43239 EGD BIOPSY SINGLE/MULTIPLE: CPT | Mod: GC

## 2019-09-11 PROCEDURE — 88305 TISSUE EXAM BY PATHOLOGIST: CPT | Mod: 26

## 2019-09-11 NOTE — ASSESSMENT
[FreeTextEntry1] : Assessment\par 1) exacerbation of acid reflux symptoms while on PPI therapy as well as early satiety, in patient taking immunosuppressive therapy and also notes weight loss, rule out gastric lymphoma, erosive esophagitis, gastroparesis\par 2)  RUQ tenderness, rule out cholelithiasis, large hepatic cysts\par 3) polycystic liver disease\par Plan\par 1) Upper Endoscopy risks, benefits and preparation discussed with the patient \par 2) abdominal sonogram\par 3) office follow-up after endoscopy and sonogram\par

## 2019-09-11 NOTE — PHYSICAL EXAM
[General Appearance - In No Acute Distress] : in no acute distress [General Appearance - Alert] : alert [Sclera] : the sclera and conjunctiva were normal [PERRL With Normal Accommodation] : pupils were equal in size, round, and reactive to light [Examination Of The Oral Cavity] : the lips and gums were normal [Oropharynx] : the oropharynx was normal [Neck Appearance] : the appearance of the neck was normal [Neck Cervical Mass (___cm)] : no neck mass was observed [Jugular Venous Distention Increased] : there was no jugular-venous distention [Thyroid Nodule] : there were no palpable thyroid nodules [Thyroid Diffuse Enlargement] : the thyroid was not enlarged [Auscultation Breath Sounds / Voice Sounds] : lungs were clear to auscultation bilaterally [Heart Rate And Rhythm] : heart rate was normal and rhythm regular [Heart Sounds] : normal S1 and S2 [Murmurs] : no murmurs [Heart Sounds Gallop] : no gallops [Heart Sounds Pericardial Friction Rub] : no pericardial rub [Arterial Pulses Carotid] : carotid pulses were normal with no bruits [Abdominal Aorta] : the abdominal aorta was normal [Full Pulse] : the pedal pulses are present [Bowel Sounds] : normal bowel sounds [Edema] : there was no peripheral edema [Abdomen Hernia] : no hernia was discovered [Abdomen Soft] : soft [Abdomen Mass (___ Cm)] : no abdominal mass palpated [RUQ] : in the right upper quadrant [Epigastric] : in the epigastric area [Normal Sphincter Tone] : normal sphincter tone [No Rectal Mass] : no rectal mass [Prostate Enlargement] : the prostate was not enlarged [Cervical Lymph Nodes Enlarged Posterior Bilaterally] : posterior cervical [Cervical Lymph Nodes Enlarged Anterior Bilaterally] : anterior cervical [Axillary Lymph Nodes Enlarged Bilaterally] : axillary [Supraclavicular Lymph Nodes Enlarged Bilaterally] : supraclavicular [Inguinal Lymph Nodes Enlarged Bilaterally] : inguinal [Abnormal Walk] : normal gait [Femoral Lymph Nodes Enlarged Bilaterally] : femoral [Nail Clubbing] : no clubbing  or cyanosis of the fingernails [Motor Tone] : muscle strength and tone were normal [Musculoskeletal - Swelling] : no joint swelling seen [Skin Color & Pigmentation] : normal skin color and pigmentation [] : no rash [Deep Tendon Reflexes (DTR)] : deep tendon reflexes were 2+ and symmetric [Skin Turgor] : normal skin turgor [No Focal Deficits] : no focal deficits [Sensation] : the sensory exam was normal to light touch and pinprick [Oriented To Time, Place, And Person] : oriented to person, place, and time [Impaired Insight] : insight and judgment were intact [Affect] : the affect was normal [FreeTextEntry1] : Mallampati 1 [Occult Blood Positive] : stool was negative for occult blood

## 2019-09-11 NOTE — CONSULT LETTER
[Dear  ___] : Dear  [unfilled], [Consult Letter:] : I had the pleasure of evaluating your patient, [unfilled]. [Please see my note below.] : Please see my note below. [Sincerely,] : Sincerely, [FreeTextEntry2] : Jaun Cedeno MD

## 2019-09-11 NOTE — HISTORY OF PRESENT ILLNESS
[de-identified] :  Mr. TRACY JENNINGS, a 58 year man, is seen for evaluation of dyspepsia manifest by severe heartburn, epigastric pain, nausea and bloating.  The pain is related to meals.    The patient has been taking Prilosec before breakfast continuously.  The patient denies nocturnal pain, nocturnal cough, vomiting, a change in bowel habit, dysphagia, weight loss, jaundice, melena or hematochezia.  He has two bowel movements daily.  He had a colonoscopy in 2015 and a 7 mm sessile polyp was resected from the rectosigmoid..\par \par The patient had polycystic kidney disease with no know relatives with renal disease.  There is no family history of colon cancer, colon polyp, peptic ulcer disease, female genitourinary disease, liver disease, cholelithiasis, pancreatic disease or cancer, inflammatory bowel disease or celiac disease.\par

## 2019-09-17 ENCOUNTER — RESULT REVIEW (OUTPATIENT)
Age: 59
End: 2019-09-17

## 2019-09-17 LAB — SURGICAL PATHOLOGY STUDY: SIGNIFICANT CHANGE UP

## 2019-10-17 ENCOUNTER — APPOINTMENT (OUTPATIENT)
Dept: NEPHROLOGY | Facility: CLINIC | Age: 59
End: 2019-10-17
Payer: MEDICARE

## 2019-10-17 ENCOUNTER — MESSAGE (OUTPATIENT)
Age: 59
End: 2019-10-17

## 2019-10-17 VITALS
RESPIRATION RATE: 16 BRPM | WEIGHT: 121 LBS | HEIGHT: 68 IN | DIASTOLIC BLOOD PRESSURE: 88 MMHG | HEART RATE: 70 BPM | TEMPERATURE: 97.4 F | BODY MASS INDEX: 18.34 KG/M2 | OXYGEN SATURATION: 98 % | SYSTOLIC BLOOD PRESSURE: 130 MMHG

## 2019-10-17 DIAGNOSIS — K63.5 POLYP OF COLON: ICD-10-CM

## 2019-10-17 PROCEDURE — 99214 OFFICE O/P EST MOD 30 MIN: CPT

## 2019-10-17 NOTE — HISTORY OF PRESENT ILLNESS
[FreeTextEntry1] : Pt is a 57 y.o male s/p DDRTx on 2/24/17.  Donor was 19 y.o DCD female with normal creatinine.  Pt had immediate but slow function.  Donor urine culture positive was for yeast.  Pt was on fluconazole early post transplant . Currently he is feeling well. \par Had kidney biopsy 5/2017 month ago- no rejection, c4 d neg.\par \par Update:\par Today pt feeling good.  Gained a little weight and had EGD.  no reflux symptoms, feels that he is eating well.  No longer involved helping take care of his friends children or brother in law.

## 2019-10-17 NOTE — REVIEW OF SYSTEMS
[Heart Rate Is Fast] : the heart rate was not fast [As Noted in HPI] : as noted in HPI [Negative] : Heme/Lymph [de-identified] : RLQ incision clean dry. No drainage

## 2019-10-17 NOTE — PHYSICAL EXAM
[General Appearance - Alert] : alert [General Appearance - In No Acute Distress] : in no acute distress [General Appearance - Well Nourished] : well nourished [Outer Ear] : the ears and nose were normal in appearance [Sclera] : the sclera and conjunctiva were normal [Jugular Venous Distention Increased] : there was no jugular-venous distention [Auscultation Breath Sounds / Voice Sounds] : lungs were clear to auscultation bilaterally [Apical Impulse] : the apical impulse was normal [Heart Sounds] : normal S1 and S2 [Heart Sounds Pericardial Friction Rub] : no pericardial rub [Edema] : there was no peripheral edema [Bowel Sounds] : normal bowel sounds [Abdomen Soft] : soft [Abdomen Tenderness] : non-tender [FreeTextEntry1] : large palpable liver [Cervical Lymph Nodes Enlarged Posterior Bilaterally] : posterior cervical [Cervical Lymph Nodes Enlarged Anterior Bilaterally] : anterior cervical [Supraclavicular Lymph Nodes Enlarged Bilaterally] : supraclavicular [Axillary Lymph Nodes Enlarged Bilaterally] : axillary [Inguinal Lymph Nodes Enlarged Bilaterally] : inguinal [No CVA Tenderness] : no ~M costovertebral angle tenderness [Motor Tone] : muscle strength and tone were normal [Musculoskeletal - Swelling] : no joint swelling seen [Abnormal Walk] : normal gait [___ (cm) Fistula] : [unfilled] (cm) fistula [Bruit] : a bruit was present [Thrill] : a thrill was present [] : no rash [No Focal Deficits] : no focal deficits [Oriented To Time, Place, And Person] : oriented to person, place, and time [Impaired Insight] : insight and judgment were intact [Affect] : the affect was normal [Mood] : the mood was normal

## 2019-10-17 NOTE — ASSESSMENT
[FreeTextEntry1] : 58 y.o male hx depression, PCKD, HTN here for follow up. He is s/p DDRTx (DCD) 2/24/17; \par \par 1.  Renal transplant - immediate function.  Last Scr 1.6.  Will check creatinine today.  Had biopsy 5/2017 for elevated creatinine, no rejection.  \par 2.  Immunosuppression - On tacro, pred and leflunomide 20mgs.  Tacro goal 4-6.  Pt brought in all of his meds and they are accurate.\par 3.  HTN - controlled. \par 4.  Hx Bipolar/ Depression - Improved, seems stable.    \par 5.  PKD: no hematuria or pain.  Also has large liver consistent with Polycystic liver.  Discussed that although enlarged his liver function is fine.  \par 6.  Back pain: chronic\par 7.  Polycythemia: controlled. \par 8.  BK: Much improved.  He is on low dose tacro, pred 5 and leflunomide 20\par 9.  Compliance:  doing much better.  Brought in his meds and medication list.  \par 10.  Health maintenance - due for colonoscopy next year.  Will give influenza vaccine today.    \par \par F/u in 3 months.\par Dr. Bailey ([place]) - [phone] \par

## 2019-10-18 LAB
25(OH)D3 SERPL-MCNC: 49.5 NG/ML
ALBUMIN SERPL ELPH-MCNC: 4.4 G/DL
ALP BLD-CCNC: 71 U/L
ALT SERPL-CCNC: 12 U/L
ANION GAP SERPL CALC-SCNC: 12 MMOL/L
APPEARANCE: CLEAR
AST SERPL-CCNC: 15 U/L
BACTERIA: NEGATIVE
BASOPHILS # BLD AUTO: 0.03 K/UL
BASOPHILS NFR BLD AUTO: 0.4 %
BILIRUB SERPL-MCNC: 0.9 MG/DL
BILIRUBIN URINE: NEGATIVE
BKV DNA SPEC QL NAA+PROBE: NOT DETECTED COPIES/ML
BLOOD URINE: NEGATIVE
BUN SERPL-MCNC: 23 MG/DL
CALCIUM SERPL-MCNC: 11.1 MG/DL
CALCIUM SERPL-MCNC: 11.1 MG/DL
CHLORIDE SERPL-SCNC: 102 MMOL/L
CHOLEST SERPL-MCNC: 222 MG/DL
CHOLEST/HDLC SERPL: 2.5 RATIO
CMV DNA SPEC QL NAA+PROBE: NOT DETECTED
CMVPCR LOG: NOT DETECTED LOGIU/ML
CO2 SERPL-SCNC: 25 MMOL/L
COLOR: YELLOW
CREAT SERPL-MCNC: 1.77 MG/DL
CREAT SPEC-SCNC: 202 MG/DL
CREAT/PROT UR: 0.1 RATIO
EOSINOPHIL # BLD AUTO: 0.58 K/UL
EOSINOPHIL NFR BLD AUTO: 8.5 %
ESTIMATED AVERAGE GLUCOSE: 111 MG/DL
GLUCOSE QUALITATIVE U: NEGATIVE
GLUCOSE SERPL-MCNC: 96 MG/DL
HBA1C MFR BLD HPLC: 5.5 %
HCT VFR BLD CALC: 48.5 %
HDLC SERPL-MCNC: 88 MG/DL
HGB BLD-MCNC: 15.4 G/DL
HYALINE CASTS: 1 /LPF
IMM GRANULOCYTES NFR BLD AUTO: 0.1 %
KETONES URINE: NEGATIVE
LDH SERPL-CCNC: 208 U/L
LDLC SERPL CALC-MCNC: 118 MG/DL
LEUKOCYTE ESTERASE URINE: NEGATIVE
LYMPHOCYTES # BLD AUTO: 1.72 K/UL
LYMPHOCYTES NFR BLD AUTO: 25.1 %
MAGNESIUM SERPL-MCNC: 2 MG/DL
MAN DIFF?: NORMAL
MCHC RBC-ENTMCNC: 31.8 GM/DL
MCHC RBC-ENTMCNC: 32.3 PG
MCV RBC AUTO: 101.7 FL
MICROSCOPIC-UA: NORMAL
MONOCYTES # BLD AUTO: 0.64 K/UL
MONOCYTES NFR BLD AUTO: 9.4 %
NEUTROPHILS # BLD AUTO: 3.86 K/UL
NEUTROPHILS NFR BLD AUTO: 56.5 %
NITRITE URINE: NEGATIVE
PARATHYROID HORMONE INTACT: 45 PG/ML
PH URINE: 6.5
PHOSPHATE SERPL-MCNC: 2.8 MG/DL
PLATELET # BLD AUTO: 117 K/UL
POTASSIUM SERPL-SCNC: 4.5 MMOL/L
PROT SERPL-MCNC: 7.1 G/DL
PROT UR-MCNC: 26 MG/DL
PROTEIN URINE: NORMAL
RBC # BLD: 4.77 M/UL
RBC # FLD: 12.3 %
RED BLOOD CELLS URINE: 2 /HPF
SODIUM SERPL-SCNC: 139 MMOL/L
SPECIFIC GRAVITY URINE: 1.03
SQUAMOUS EPITHELIAL CELLS: 1 /HPF
TACROLIMUS SERPL-MCNC: 3.6 NG/ML
TRIGL SERPL-MCNC: 79 MG/DL
URATE SERPL-MCNC: 6 MG/DL
UROBILINOGEN URINE: NORMAL
WBC # FLD AUTO: 6.84 K/UL
WHITE BLOOD CELLS URINE: 2 /HPF

## 2019-10-21 ENCOUNTER — MESSAGE (OUTPATIENT)
Age: 59
End: 2019-10-21

## 2019-10-23 ENCOUNTER — MESSAGE (OUTPATIENT)
Age: 59
End: 2019-10-23

## 2019-10-24 ENCOUNTER — MESSAGE (OUTPATIENT)
Age: 59
End: 2019-10-24

## 2019-10-24 RX ORDER — RANITIDINE 150 MG/1
150 TABLET ORAL
Qty: 90 | Refills: 3 | Status: DISCONTINUED | COMMUNITY
Start: 2018-04-16 | End: 2019-10-24

## 2019-10-24 RX ORDER — POLYETHYLENE GLYCOL 3350, SODIUM SULFATE, SODIUM CHLORIDE, POTASSIUM CHLORIDE, ASCORBIC ACID, SODIUM ASCORBATE 7.5-2.691G
100 KIT ORAL
Qty: 1 | Refills: 0 | Status: DISCONTINUED | COMMUNITY
Start: 2019-10-21 | End: 2019-10-24

## 2019-11-20 ENCOUNTER — APPOINTMENT (OUTPATIENT)
Dept: GASTROENTEROLOGY | Facility: HOSPITAL | Age: 59
End: 2019-11-20

## 2019-11-20 ENCOUNTER — OUTPATIENT (OUTPATIENT)
Dept: OUTPATIENT SERVICES | Facility: HOSPITAL | Age: 59
LOS: 1 days | Discharge: HOME | End: 2019-11-20
Payer: MEDICARE

## 2019-11-20 VITALS
OXYGEN SATURATION: 96 % | SYSTOLIC BLOOD PRESSURE: 128 MMHG | HEART RATE: 57 BPM | RESPIRATION RATE: 17 BRPM | DIASTOLIC BLOOD PRESSURE: 72 MMHG

## 2019-11-20 VITALS
TEMPERATURE: 98 F | DIASTOLIC BLOOD PRESSURE: 89 MMHG | RESPIRATION RATE: 18 BRPM | OXYGEN SATURATION: 98 % | HEART RATE: 68 BPM | SYSTOLIC BLOOD PRESSURE: 123 MMHG

## 2019-11-20 DIAGNOSIS — Z99.2 DEPENDENCE ON RENAL DIALYSIS: Chronic | ICD-10-CM

## 2019-11-20 DIAGNOSIS — Z98.89 OTHER SPECIFIED POSTPROCEDURAL STATES: Chronic | ICD-10-CM

## 2019-11-20 DIAGNOSIS — Z94.0 KIDNEY TRANSPLANT STATUS: Chronic | ICD-10-CM

## 2019-11-20 DIAGNOSIS — K63.5 POLYP OF COLON: ICD-10-CM

## 2019-11-20 PROCEDURE — G0105: CPT | Mod: GC

## 2019-11-20 RX ORDER — SODIUM CHLORIDE 9 MG/ML
1000 INJECTION INTRAMUSCULAR; INTRAVENOUS; SUBCUTANEOUS
Refills: 0 | Status: DISCONTINUED | OUTPATIENT
Start: 2019-11-20 | End: 2019-12-17

## 2019-11-20 RX ADMIN — SODIUM CHLORIDE 30 MILLILITER(S): 9 INJECTION INTRAMUSCULAR; INTRAVENOUS; SUBCUTANEOUS at 12:34

## 2020-01-23 ENCOUNTER — APPOINTMENT (OUTPATIENT)
Dept: NEPHROLOGY | Facility: CLINIC | Age: 60
End: 2020-01-23
Payer: MEDICAID

## 2020-01-23 VITALS
HEIGHT: 68 IN | OXYGEN SATURATION: 98 % | TEMPERATURE: 97.8 F | DIASTOLIC BLOOD PRESSURE: 88 MMHG | HEART RATE: 83 BPM | RESPIRATION RATE: 16 BRPM | BODY MASS INDEX: 19.4 KG/M2 | WEIGHT: 128 LBS | SYSTOLIC BLOOD PRESSURE: 137 MMHG

## 2020-01-23 LAB
ALBUMIN SERPL ELPH-MCNC: 4.1 G/DL
ALP BLD-CCNC: 75 U/L
ALT SERPL-CCNC: 11 U/L
ANION GAP SERPL CALC-SCNC: 13 MMOL/L
APPEARANCE: CLEAR
AST SERPL-CCNC: 12 U/L
BACTERIA: NEGATIVE
BASOPHILS # BLD AUTO: 0.02 K/UL
BASOPHILS NFR BLD AUTO: 0.3 %
BILIRUB SERPL-MCNC: 0.4 MG/DL
BILIRUBIN URINE: NEGATIVE
BLOOD URINE: NEGATIVE
BUN SERPL-MCNC: 29 MG/DL
CALCIUM SERPL-MCNC: 10 MG/DL
CHLORIDE SERPL-SCNC: 104 MMOL/L
CO2 SERPL-SCNC: 23 MMOL/L
COLOR: NORMAL
CREAT SERPL-MCNC: 1.57 MG/DL
CREAT SPEC-SCNC: 86 MG/DL
CREAT/PROT UR: 0.1 RATIO
EOSINOPHIL # BLD AUTO: 0.11 K/UL
EOSINOPHIL NFR BLD AUTO: 1.7 %
GLUCOSE QUALITATIVE U: NEGATIVE
GLUCOSE SERPL-MCNC: 82 MG/DL
HCT VFR BLD CALC: 46.5 %
HGB BLD-MCNC: 14.4 G/DL
HYALINE CASTS: 0 /LPF
IMM GRANULOCYTES NFR BLD AUTO: 0.3 %
KETONES URINE: NEGATIVE
LDH SERPL-CCNC: 164 U/L
LEUKOCYTE ESTERASE URINE: NEGATIVE
LYMPHOCYTES # BLD AUTO: 1.55 K/UL
LYMPHOCYTES NFR BLD AUTO: 23.8 %
MAGNESIUM SERPL-MCNC: 1.8 MG/DL
MAN DIFF?: NORMAL
MCHC RBC-ENTMCNC: 31 GM/DL
MCHC RBC-ENTMCNC: 31.3 PG
MCV RBC AUTO: 101.1 FL
MICROSCOPIC-UA: NORMAL
MONOCYTES # BLD AUTO: 0.6 K/UL
MONOCYTES NFR BLD AUTO: 9.2 %
NEUTROPHILS # BLD AUTO: 4.22 K/UL
NEUTROPHILS NFR BLD AUTO: 64.7 %
NITRITE URINE: NEGATIVE
PH URINE: 6.5
PHOSPHATE SERPL-MCNC: 3.4 MG/DL
PLATELET # BLD AUTO: 209 K/UL
POTASSIUM SERPL-SCNC: 4.5 MMOL/L
PROT SERPL-MCNC: 6.6 G/DL
PROT UR-MCNC: 10 MG/DL
PROTEIN URINE: NEGATIVE
RBC # BLD: 4.6 M/UL
RBC # FLD: 13.8 %
RED BLOOD CELLS URINE: 1 /HPF
SODIUM SERPL-SCNC: 140 MMOL/L
SPECIFIC GRAVITY URINE: 1.02
SQUAMOUS EPITHELIAL CELLS: 0 /HPF
TACROLIMUS SERPL-MCNC: <2 NG/ML
URATE SERPL-MCNC: 5.7 MG/DL
UROBILINOGEN URINE: NORMAL
WBC # FLD AUTO: 6.52 K/UL
WHITE BLOOD CELLS URINE: 0 /HPF

## 2020-01-23 PROCEDURE — 99214 OFFICE O/P EST MOD 30 MIN: CPT

## 2020-01-23 NOTE — HISTORY OF PRESENT ILLNESS
[FreeTextEntry1] : Pt is a 57 y.o male s/p DDRTx on 2/24/17.  Donor was 19 y.o DCD female with normal creatinine.  Pt had immediate but slow function.  Donor urine culture positive was for yeast.  Pt was on fluconazole early post transplant . Currently he is feeling well. \par Had kidney biopsy 5/2017 month ago- no rejection, c4 d neg.\par \par Update:\par Pts fistula clotted off about 1 month ago.  Has been having pain over his left arm.  Went to [facility] and was told to remove the fistula he would need a vein removed in his leg.  Otherwise feels ok and weight increasing.  No longer follows with Dr. Bailey due to insurance issues.

## 2020-01-23 NOTE — CONSULT LETTER
[Dear  ___] : Dear  [unfilled], [Please see my note below.] : Please see my note below. [Courtesy Letter:] : I had the pleasure of seeing your patient, [unfilled], in my office today. [Sincerely,] : Sincerely, [FreeTextEntry3] : Jaun Cedeno D.O.

## 2020-01-23 NOTE — REVIEW OF SYSTEMS
[As Noted in HPI] : as noted in HPI [Negative] : Heme/Lymph [Heart Rate Is Fast] : the heart rate was not fast [de-identified] : RLQ incision clean dry. No drainage

## 2020-01-23 NOTE — PHYSICAL EXAM
[General Appearance - Alert] : alert [General Appearance - In No Acute Distress] : in no acute distress [General Appearance - Well Nourished] : well nourished [Sclera] : the sclera and conjunctiva were normal [Outer Ear] : the ears and nose were normal in appearance [Jugular Venous Distention Increased] : there was no jugular-venous distention [Auscultation Breath Sounds / Voice Sounds] : lungs were clear to auscultation bilaterally [Apical Impulse] : the apical impulse was normal [Heart Sounds] : normal S1 and S2 [Heart Sounds Pericardial Friction Rub] : no pericardial rub [Bowel Sounds] : normal bowel sounds [Edema] : there was no peripheral edema [Abdomen Soft] : soft [Abdomen Tenderness] : non-tender [Supraclavicular Lymph Nodes Enlarged Bilaterally] : supraclavicular [Cervical Lymph Nodes Enlarged Anterior Bilaterally] : anterior cervical [Cervical Lymph Nodes Enlarged Posterior Bilaterally] : posterior cervical [Axillary Lymph Nodes Enlarged Bilaterally] : axillary [Inguinal Lymph Nodes Enlarged Bilaterally] : inguinal [No CVA Tenderness] : no ~M costovertebral angle tenderness [Abnormal Walk] : normal gait [___ (cm) Fistula] : [unfilled] (cm) fistula [Musculoskeletal - Swelling] : no joint swelling seen [Motor Tone] : muscle strength and tone were normal [Thrill] : a thrill was present [Bruit] : a bruit was present [] : no rash [Oriented To Time, Place, And Person] : oriented to person, place, and time [No Focal Deficits] : no focal deficits [Impaired Insight] : insight and judgment were intact [Affect] : the affect was normal [Mood] : the mood was normal [FreeTextEntry1] : left arm aneurysmal fistula - no thrill, non tender.

## 2020-01-23 NOTE — ASSESSMENT
[FreeTextEntry1] : 58 y.o male hx depression, PCKD, HTN here for follow up. He is s/p DDRTx (DCD) 2/24/17; \par \par 1.  Renal transplant - immediate function.  Last Scr 1.7.  Will check creatinine today.  Had biopsy 5/2017 for elevated creatinine, no rejection.  \par 2.  Immunosuppression - On tacro, pred and leflunomide 20mgs.  Tacro goal 4-6.  Pt brought in all of his meds and they are accurate.\par 3.  HTN - stable\par 4.  Hx Bipolar/ Depression - Improved, seems stable.    \par 5.  PKD: no hematuria or pain.  Also has large liver consistent with Polycystic liver.  Discussed that although enlarged his liver function is fine.  \par 6.  Fistula pain: Pt plans to go to vascular at [facility].  It is clotted.  \par 7.  Polycythemia: controlled. \par 8.  BK: Much improved.  He is on low dose tacro, pred 5 and leflunomide 20\par 9.  Compliance:  doing much better.  Brought in his medication list.  \par 10.  Health maintenance - had colonoscopy this year. \par \par F/u in 3 months.\par Will refer to internal medicine Dr. Ling Tony.  \par

## 2020-01-24 LAB
BKV DNA SPEC QL NAA+PROBE: NOT DETECTED COPIES/ML
CMV DNA SPEC QL NAA+PROBE: NOT DETECTED
CMVPCR LOG: NOT DETECTED LOG10IU/ML

## 2020-01-27 ENCOUNTER — APPOINTMENT (OUTPATIENT)
Dept: TRANSPLANT | Facility: CLINIC | Age: 60
End: 2020-01-27

## 2020-01-28 LAB
ALBUMIN SERPL ELPH-MCNC: 3.9 G/DL
ALP BLD-CCNC: 78 U/L
ALT SERPL-CCNC: 11 U/L
ANION GAP SERPL CALC-SCNC: 13 MMOL/L
APPEARANCE: CLEAR
AST SERPL-CCNC: 12 U/L
BACTERIA: NEGATIVE
BASOPHILS # BLD AUTO: 0.02 K/UL
BASOPHILS NFR BLD AUTO: 0.3 %
BILIRUB SERPL-MCNC: 0.2 MG/DL
BILIRUBIN URINE: NEGATIVE
BLOOD URINE: NEGATIVE
BUN SERPL-MCNC: 26 MG/DL
CALCIUM SERPL-MCNC: 9.9 MG/DL
CHLORIDE SERPL-SCNC: 104 MMOL/L
CO2 SERPL-SCNC: 25 MMOL/L
COLOR: YELLOW
CREAT SERPL-MCNC: 1.68 MG/DL
CREAT SPEC-SCNC: 216 MG/DL
CREAT/PROT UR: 0.1 RATIO
EOSINOPHIL # BLD AUTO: 0.11 K/UL
EOSINOPHIL NFR BLD AUTO: 1.8 %
GLUCOSE QUALITATIVE U: NEGATIVE
GLUCOSE SERPL-MCNC: 93 MG/DL
HCT VFR BLD CALC: 45.4 %
HGB BLD-MCNC: 14.1 G/DL
HYALINE CASTS: 0 /LPF
IMM GRANULOCYTES NFR BLD AUTO: 0.2 %
KETONES URINE: NEGATIVE
LEUKOCYTE ESTERASE URINE: NEGATIVE
LYMPHOCYTES # BLD AUTO: 1.25 K/UL
LYMPHOCYTES NFR BLD AUTO: 20.6 %
MAN DIFF?: NORMAL
MCHC RBC-ENTMCNC: 31.1 GM/DL
MCHC RBC-ENTMCNC: 31.5 PG
MCV RBC AUTO: 101.3 FL
MICROSCOPIC-UA: NORMAL
MONOCYTES # BLD AUTO: 0.53 K/UL
MONOCYTES NFR BLD AUTO: 8.7 %
NEUTROPHILS # BLD AUTO: 4.14 K/UL
NEUTROPHILS NFR BLD AUTO: 68.4 %
NITRITE URINE: NEGATIVE
PH URINE: 6.5
PHOSPHATE SERPL-MCNC: 2.8 MG/DL
PLATELET # BLD AUTO: 182 K/UL
POTASSIUM SERPL-SCNC: 4.6 MMOL/L
PROT SERPL-MCNC: 6.5 G/DL
PROT UR-MCNC: 30 MG/DL
PROTEIN URINE: ABNORMAL
RBC # BLD: 4.48 M/UL
RBC # FLD: 13.7 %
RED BLOOD CELLS URINE: 1 /HPF
SODIUM SERPL-SCNC: 142 MMOL/L
SPECIFIC GRAVITY URINE: 1.04
SQUAMOUS EPITHELIAL CELLS: 1 /HPF
TACROLIMUS SERPL-MCNC: 8 NG/ML
UROBILINOGEN URINE: ABNORMAL
WBC # FLD AUTO: 6.06 K/UL
WHITE BLOOD CELLS URINE: 1 /HPF

## 2020-04-17 DIAGNOSIS — Z00.00 ENCOUNTER FOR GENERAL ADULT MEDICAL EXAMINATION W/OUT ABNORMAL FINDINGS: ICD-10-CM

## 2020-04-20 ENCOUNTER — APPOINTMENT (OUTPATIENT)
Dept: TRANSPLANT | Facility: CLINIC | Age: 60
End: 2020-04-20

## 2020-04-20 ENCOUNTER — APPOINTMENT (OUTPATIENT)
Dept: NEPHROLOGY | Facility: CLINIC | Age: 60
End: 2020-04-20

## 2020-04-21 LAB
25(OH)D3 SERPL-MCNC: 55.7 NG/ML
ALBUMIN SERPL ELPH-MCNC: 4.2 G/DL
ALP BLD-CCNC: 76 U/L
ALT SERPL-CCNC: 17 U/L
ANION GAP SERPL CALC-SCNC: 12 MMOL/L
APPEARANCE: CLEAR
AST SERPL-CCNC: 31 U/L
BACTERIA: NEGATIVE
BASOPHILS # BLD AUTO: 0.03 K/UL
BASOPHILS NFR BLD AUTO: 0.5 %
BILIRUB SERPL-MCNC: 0.4 MG/DL
BILIRUBIN URINE: NEGATIVE
BLOOD URINE: NEGATIVE
BUN SERPL-MCNC: 29 MG/DL
CALCIUM SERPL-MCNC: 10.7 MG/DL
CALCIUM SERPL-MCNC: 10.7 MG/DL
CHLORIDE SERPL-SCNC: 104 MMOL/L
CHOLEST SERPL-MCNC: 233 MG/DL
CHOLEST/HDLC SERPL: 2.5 RATIO
CO2 SERPL-SCNC: 24 MMOL/L
COLOR: YELLOW
CREAT SERPL-MCNC: 1.77 MG/DL
CREAT SPEC-SCNC: 197 MG/DL
CREAT/PROT UR: 0.1 RATIO
EOSINOPHIL # BLD AUTO: 0.1 K/UL
EOSINOPHIL NFR BLD AUTO: 1.7 %
ESTIMATED AVERAGE GLUCOSE: 111 MG/DL
GLUCOSE QUALITATIVE U: NEGATIVE
GLUCOSE SERPL-MCNC: 84 MG/DL
HBA1C MFR BLD HPLC: 5.5 %
HCT VFR BLD CALC: 46.9 %
HDLC SERPL-MCNC: 95 MG/DL
HGB BLD-MCNC: 14.6 G/DL
HYALINE CASTS: 0 /LPF
IMM GRANULOCYTES NFR BLD AUTO: 0.2 %
KETONES URINE: NEGATIVE
LDH SERPL-CCNC: 211 U/L
LDLC SERPL CALC-MCNC: 124 MG/DL
LEUKOCYTE ESTERASE URINE: NEGATIVE
LYMPHOCYTES # BLD AUTO: 1.51 K/UL
LYMPHOCYTES NFR BLD AUTO: 26.1 %
MAGNESIUM SERPL-MCNC: 2 MG/DL
MAN DIFF?: NORMAL
MCHC RBC-ENTMCNC: 29.7 PG
MCHC RBC-ENTMCNC: 31.1 GM/DL
MCV RBC AUTO: 95.5 FL
MICROSCOPIC-UA: NORMAL
MONOCYTES # BLD AUTO: 0.64 K/UL
MONOCYTES NFR BLD AUTO: 11.1 %
NEUTROPHILS # BLD AUTO: 3.5 K/UL
NEUTROPHILS NFR BLD AUTO: 60.4 %
NITRITE URINE: NEGATIVE
PARATHYROID HORMONE INTACT: 54 PG/ML
PH URINE: 6.5
PHOSPHATE SERPL-MCNC: 3 MG/DL
PLATELET # BLD AUTO: 168 K/UL
POTASSIUM SERPL-SCNC: 4.4 MMOL/L
PROT SERPL-MCNC: 6.7 G/DL
PROT UR-MCNC: 19 MG/DL
PROTEIN URINE: ABNORMAL
RBC # BLD: 4.91 M/UL
RBC # FLD: 14.9 %
RED BLOOD CELLS URINE: 1 /HPF
SODIUM SERPL-SCNC: 140 MMOL/L
SPECIFIC GRAVITY URINE: 1.03
SQUAMOUS EPITHELIAL CELLS: 0 /HPF
TACROLIMUS SERPL-MCNC: 11.8 NG/ML
TRIGL SERPL-MCNC: 75 MG/DL
URATE SERPL-MCNC: 6.3 MG/DL
UROBILINOGEN URINE: NORMAL
WBC # FLD AUTO: 5.79 K/UL
WHITE BLOOD CELLS URINE: 1 /HPF

## 2020-04-22 ENCOUNTER — APPOINTMENT (OUTPATIENT)
Dept: NEPHROLOGY | Facility: CLINIC | Age: 60
End: 2020-04-22

## 2020-04-22 ENCOUNTER — APPOINTMENT (OUTPATIENT)
Dept: NEPHROLOGY | Facility: CLINIC | Age: 60
End: 2020-04-22
Payer: MEDICARE

## 2020-04-22 PROCEDURE — 99212 OFFICE O/P EST SF 10 MIN: CPT | Mod: 95

## 2020-04-22 RX ORDER — FAMOTIDINE 40 MG/1
40 TABLET, FILM COATED ORAL DAILY
Qty: 90 | Refills: 3 | Status: DISCONTINUED | COMMUNITY
Start: 2019-10-24 | End: 2020-04-22

## 2020-04-22 NOTE — ASSESSMENT
[FreeTextEntry1] : 59 y.o male hx depression, PCKD, HTN here for follow up. He is s/p DDRTx (DCD) 2/24/17; \par \par 1.  Renal transplant - immediate function.  Last Scr 1.7.  Had biopsy 5/2017 for elevated creatinine, no rejection.  \par 2.  Immunosuppression - On tacro, pred and leflunomide 20mgs.  Tacro goal 4-6.  Reviewed medications.  Last tacro trough elevated but not true trough.  \par 3.  HTN - stable at home. \par 4.  Hx Bipolar/ Depression - Stopped psych meds due to change in PMD.  I will refill for now but told him he needs to establish care with another doctor for his mental health.  \par 5.  PKD: no hematuria or pain.  Also has large liver consistent with Polycystic liver.  \par 6.  Polycythemia: controlled. \par 7.  BK: resolved.  He is on low dose tacro, pred 5 and leflunomide 20\par \par F/u in 3 months.\par

## 2020-04-22 NOTE — HISTORY OF PRESENT ILLNESS
[Home] : at home, [unfilled] , at the time of the visit. [Medical Office: ([facility])___] : at the medical office located in  [Patient] : the patient [Self] : self [FreeTextEntry1] : Pt is a 57 y.o male s/p DDRTx on 2/24/17.  Donor was 19 y.o DCD female with normal creatinine.  Pt had immediate but slow function.  Donor urine culture positive was for yeast.  Pt was on fluconazole early post transplant . Currently he is feeling well. \par Had kidney biopsy 5/2017 month ago- no rejection, c4 d neg.\par \par Update:\par Feels ok and weight increasing.  No longer follows with Dr. Bailey due to insurance issues.  Having some pain with hemorrhoids - scheduled to see colorectal surgeon in 1 month.  Pt ran out of psych meds 2 months ago but has been feeling well.  Feels like taking care of kids are helping.

## 2020-04-22 NOTE — PHYSICAL EXAM
[General Appearance - In No Acute Distress] : in no acute distress [General Appearance - Alert] : alert [General Appearance - Well Nourished] : well nourished [Sclera] : the sclera and conjunctiva were normal [Outer Ear] : the ears and nose were normal in appearance [] : no rash [No Focal Deficits] : no focal deficits [Oriented To Time, Place, And Person] : oriented to person, place, and time [Impaired Insight] : insight and judgment were intact [Affect] : the affect was normal [Neck Appearance] : the appearance of the neck was normal [Exaggerated Use Of Accessory Muscles For Inspiration] : no accessory muscle use

## 2020-04-22 NOTE — CONSULT LETTER
[Dear  ___] : Dear  [unfilled], [Courtesy Letter:] : I had the pleasure of seeing your patient, [unfilled], in my office today. [Sincerely,] : Sincerely, [Please see my note below.] : Please see my note below. [FreeTextEntry3] : Jaun Cedeno D.O.

## 2020-04-22 NOTE — REVIEW OF SYSTEMS
[As Noted in HPI] : as noted in HPI [Negative] : Heme/Lymph [Heart Rate Is Fast] : the heart rate was not fast [de-identified] : RLQ incision clean dry. No drainage

## 2020-05-05 ENCOUNTER — RX RENEWAL (OUTPATIENT)
Age: 60
End: 2020-05-05

## 2020-07-06 ENCOUNTER — APPOINTMENT (OUTPATIENT)
Dept: TRANSPLANT | Facility: CLINIC | Age: 60
End: 2020-07-06

## 2020-07-06 LAB
25(OH)D3 SERPL-MCNC: 61.3 NG/ML
ALBUMIN SERPL ELPH-MCNC: 4.8 G/DL
ALP BLD-CCNC: 66 U/L
ALT SERPL-CCNC: 16 U/L
ANION GAP SERPL CALC-SCNC: 14 MMOL/L
APPEARANCE: CLEAR
AST SERPL-CCNC: 19 U/L
BACTERIA: NEGATIVE
BASOPHILS # BLD AUTO: 0.02 K/UL
BASOPHILS NFR BLD AUTO: 0.3 %
BILIRUB SERPL-MCNC: 1.2 MG/DL
BILIRUBIN URINE: ABNORMAL
BLOOD URINE: NEGATIVE
BUN SERPL-MCNC: 32 MG/DL
CALCIUM SERPL-MCNC: 10.4 MG/DL
CALCIUM SERPL-MCNC: 10.4 MG/DL
CHLORIDE SERPL-SCNC: 104 MMOL/L
CHOLEST SERPL-MCNC: 244 MG/DL
CHOLEST/HDLC SERPL: 2.8 RATIO
CO2 SERPL-SCNC: 22 MMOL/L
COLOR: YELLOW
CREAT SERPL-MCNC: 1.93 MG/DL
CREAT SPEC-SCNC: 303 MG/DL
CREAT/PROT UR: 0.2 RATIO
EOSINOPHIL # BLD AUTO: 0.13 K/UL
EOSINOPHIL NFR BLD AUTO: 1.8 %
ESTIMATED AVERAGE GLUCOSE: 103 MG/DL
GLUCOSE QUALITATIVE U: NEGATIVE
GLUCOSE SERPL-MCNC: 104 MG/DL
HBA1C MFR BLD HPLC: 5.2 %
HCT VFR BLD CALC: 46.3 %
HDLC SERPL-MCNC: 87 MG/DL
HGB BLD-MCNC: 14.7 G/DL
HYALINE CASTS: 2 /LPF
IMM GRANULOCYTES NFR BLD AUTO: 0.3 %
KETONES URINE: NORMAL
LDH SERPL-CCNC: 209 U/L
LDLC SERPL CALC-MCNC: 140 MG/DL
LEUKOCYTE ESTERASE URINE: NEGATIVE
LYMPHOCYTES # BLD AUTO: 1.08 K/UL
LYMPHOCYTES NFR BLD AUTO: 15.3 %
MAGNESIUM SERPL-MCNC: 1.9 MG/DL
MAN DIFF?: NORMAL
MCHC RBC-ENTMCNC: 31.2 PG
MCHC RBC-ENTMCNC: 31.7 GM/DL
MCV RBC AUTO: 98.3 FL
MICROSCOPIC-UA: NORMAL
MONOCYTES # BLD AUTO: 0.67 K/UL
MONOCYTES NFR BLD AUTO: 9.5 %
NEUTROPHILS # BLD AUTO: 5.15 K/UL
NEUTROPHILS NFR BLD AUTO: 72.8 %
NITRITE URINE: NEGATIVE
PARATHYROID HORMONE INTACT: 77 PG/ML
PH URINE: 6
PHOSPHATE SERPL-MCNC: 2.8 MG/DL
PLATELET # BLD AUTO: 202 K/UL
POTASSIUM SERPL-SCNC: 4.7 MMOL/L
PROT SERPL-MCNC: 7.2 G/DL
PROT UR-MCNC: 60 MG/DL
PROTEIN URINE: ABNORMAL
RBC # BLD: 4.71 M/UL
RBC # FLD: 14.2 %
RED BLOOD CELLS URINE: 2 /HPF
SARS-COV-2 IGG SERPL IA-ACNC: <0.1 INDEX
SARS-COV-2 IGG SERPL QL IA: NEGATIVE
SODIUM SERPL-SCNC: 140 MMOL/L
SPECIFIC GRAVITY URINE: 1.04
SQUAMOUS EPITHELIAL CELLS: 1 /HPF
TACROLIMUS SERPL-MCNC: 3.8 NG/ML
TRIGL SERPL-MCNC: 88 MG/DL
URATE SERPL-MCNC: 5.9 MG/DL
UROBILINOGEN URINE: ABNORMAL
WBC # FLD AUTO: 7.07 K/UL
WHITE BLOOD CELLS URINE: 3 /HPF

## 2020-07-09 ENCOUNTER — APPOINTMENT (OUTPATIENT)
Dept: NEPHROLOGY | Facility: CLINIC | Age: 60
End: 2020-07-09
Payer: MEDICARE

## 2020-07-09 PROCEDURE — 99213 OFFICE O/P EST LOW 20 MIN: CPT | Mod: 95

## 2020-07-09 RX ORDER — LEFLUNOMIDE 20 MG/1
20 TABLET, FILM COATED ORAL DAILY
Qty: 90 | Refills: 3 | Status: DISCONTINUED | COMMUNITY
Start: 2019-02-05 | End: 2020-07-09

## 2020-07-09 NOTE — HISTORY OF PRESENT ILLNESS
[FreeTextEntry1] : Pt is a 57 y.o male s/p DDRTx on 2/24/17.  Donor was 19 y.o DCD female with normal creatinine.  Pt had immediate but slow function.  Donor urine culture positive was for yeast.  Pt was on fluconazole early post transplant . Currently he is feeling well. \par Had kidney biopsy 5/2017 month ago- no rejection, c4 d neg.\par \par Update:\par Feels ok.  Has occasional back pain and left sided pain.  Still active.  Has not scheduled f/u with PMD.  Recent labs reviewed and stable.  LDL is elevated.  [Home] : at home, [unfilled] , at the time of the visit. [Medical Office: ([facility])___] : at the medical office located in  [Patient] : the patient [Self] : self

## 2020-07-09 NOTE — REVIEW OF SYSTEMS
[Heart Rate Is Fast] : the heart rate was not fast [As Noted in HPI] : as noted in HPI [Negative] : Endocrine [de-identified] : RLQ incision clean dry. No drainage

## 2020-07-09 NOTE — ASSESSMENT
[FreeTextEntry1] : 59 y.o male hx depression, PCKD, HTN here for follow up. He is s/p DDRTx (DCD) 2/24/17; \par \par 1.  Renal transplant - immediate function.  Last Scr 1.9.  Had biopsy 5/2017 for elevated creatinine, no rejection.  \par 2.  Immunosuppression - On tacro, pred and leflunomide 20mgs.  Tacro goal 4-6.  Reviewed medications.  Last tacro trough slightly low but goes to high with increase.  Will leave same and repeat.    \par 3.  HTN - stable at home. \par 4.  Hx Bipolar/ Depression - back on meds.  Needs to f/u with psychiatry or PMD. \par 5.  PKD: no hematuria or pain.  Also has large liver consistent with Polycystic liver.  \par 6.  Polycythemia: controlled. \par 7.  BK: resolved.  He is on low dose tacro, pred 5 and leflunomide 20.  Will stop leflunomide as BK has resolved. \par 8.  Back pain:  hx of herniated disk.  \par \par F/u in 3 months, repeat labs in 2 weeks. \par Stop leflunomide\par Increase pravastatin to 40mgs\par Pt needs PMD.  Gave number for Dr. Tony.

## 2020-07-09 NOTE — PHYSICAL EXAM
[General Appearance - Alert] : alert [General Appearance - In No Acute Distress] : in no acute distress [General Appearance - Well Nourished] : well nourished [Sclera] : the sclera and conjunctiva were normal [Outer Ear] : the ears and nose were normal in appearance [Neck Appearance] : the appearance of the neck was normal [Exaggerated Use Of Accessory Muscles For Inspiration] : no accessory muscle use [] : no rash [No Focal Deficits] : no focal deficits [Oriented To Time, Place, And Person] : oriented to person, place, and time [Impaired Insight] : insight and judgment were intact [Affect] : the affect was normal

## 2020-07-27 ENCOUNTER — APPOINTMENT (OUTPATIENT)
Dept: TRANSPLANT | Facility: CLINIC | Age: 60
End: 2020-07-27

## 2020-07-27 LAB
ALBUMIN SERPL ELPH-MCNC: 4.8 G/DL
ALP BLD-CCNC: 69 U/L
ALT SERPL-CCNC: 17 U/L
ANION GAP SERPL CALC-SCNC: 13 MMOL/L
APPEARANCE: CLEAR
AST SERPL-CCNC: 21 U/L
BACTERIA: NEGATIVE
BASOPHILS # BLD AUTO: 0.03 K/UL
BASOPHILS NFR BLD AUTO: 0.5 %
BILIRUB SERPL-MCNC: 0.2 MG/DL
BILIRUBIN URINE: NEGATIVE
BLOOD URINE: NEGATIVE
BUN SERPL-MCNC: 34 MG/DL
CALCIUM SERPL-MCNC: 10.4 MG/DL
CHLORIDE SERPL-SCNC: 103 MMOL/L
CO2 SERPL-SCNC: 26 MMOL/L
COLOR: YELLOW
CREAT SERPL-MCNC: 2.15 MG/DL
CREAT SPEC-SCNC: 169 MG/DL
CREAT/PROT UR: 0.1 RATIO
EOSINOPHIL # BLD AUTO: 0.14 K/UL
EOSINOPHIL NFR BLD AUTO: 2.5 %
GLUCOSE QUALITATIVE U: NEGATIVE
GLUCOSE SERPL-MCNC: 82 MG/DL
HCT VFR BLD CALC: 44.8 %
HGB BLD-MCNC: 13.8 G/DL
HYALINE CASTS: 0 /LPF
IMM GRANULOCYTES NFR BLD AUTO: 0.2 %
KETONES URINE: NEGATIVE
LDH SERPL-CCNC: 190 U/L
LEUKOCYTE ESTERASE URINE: NEGATIVE
LYMPHOCYTES # BLD AUTO: 1.62 K/UL
LYMPHOCYTES NFR BLD AUTO: 29.4 %
MAGNESIUM SERPL-MCNC: 2.2 MG/DL
MAN DIFF?: NORMAL
MCHC RBC-ENTMCNC: 30.8 GM/DL
MCHC RBC-ENTMCNC: 31.1 PG
MCV RBC AUTO: 100.9 FL
MICROSCOPIC-UA: NORMAL
MONOCYTES # BLD AUTO: 0.58 K/UL
MONOCYTES NFR BLD AUTO: 10.5 %
NEUTROPHILS # BLD AUTO: 3.13 K/UL
NEUTROPHILS NFR BLD AUTO: 56.9 %
NITRITE URINE: NEGATIVE
PH URINE: 6
PHOSPHATE SERPL-MCNC: 2.8 MG/DL
PLATELET # BLD AUTO: 193 K/UL
POTASSIUM SERPL-SCNC: 4.6 MMOL/L
PROT SERPL-MCNC: 7.1 G/DL
PROT UR-MCNC: 13 MG/DL
PROTEIN URINE: NORMAL
RBC # BLD: 4.44 M/UL
RBC # FLD: 14.6 %
RED BLOOD CELLS URINE: 0 /HPF
SODIUM SERPL-SCNC: 143 MMOL/L
SPECIFIC GRAVITY URINE: 1.03
SQUAMOUS EPITHELIAL CELLS: 0 /HPF
TACROLIMUS SERPL-MCNC: 4.9 NG/ML
URATE SERPL-MCNC: 6.4 MG/DL
UROBILINOGEN URINE: NORMAL
WBC # FLD AUTO: 5.51 K/UL
WHITE BLOOD CELLS URINE: 1 /HPF

## 2020-07-30 ENCOUNTER — APPOINTMENT (OUTPATIENT)
Dept: NEPHROLOGY | Facility: CLINIC | Age: 60
End: 2020-07-30
Payer: MEDICARE

## 2020-07-30 VITALS
RESPIRATION RATE: 16 BRPM | HEIGHT: 68 IN | TEMPERATURE: 97.6 F | DIASTOLIC BLOOD PRESSURE: 86 MMHG | HEART RATE: 65 BPM | WEIGHT: 120 LBS | SYSTOLIC BLOOD PRESSURE: 138 MMHG | BODY MASS INDEX: 18.19 KG/M2 | OXYGEN SATURATION: 98 %

## 2020-07-30 LAB — BKV DNA SPEC QL NAA+PROBE: NOT DETECTED COPIES/ML

## 2020-07-30 PROCEDURE — 99214 OFFICE O/P EST MOD 30 MIN: CPT

## 2020-07-30 NOTE — PHYSICAL EXAM
[General Appearance - Alert] : alert [General Appearance - In No Acute Distress] : in no acute distress [General Appearance - Well Nourished] : well nourished [Sclera] : the sclera and conjunctiva were normal [Outer Ear] : the ears and nose were normal in appearance [Neck Appearance] : the appearance of the neck was normal [Exaggerated Use Of Accessory Muscles For Inspiration] : no accessory muscle use [No Focal Deficits] : no focal deficits [Impaired Insight] : insight and judgment were intact [Oriented To Time, Place, And Person] : oriented to person, place, and time [Affect] : the affect was normal [Extraocular Movements] : extraocular movements were intact [Neck Cervical Mass (___cm)] : no neck mass was observed [] : the neck was supple [Respiration, Rhythm And Depth] : normal respiratory rhythm and effort [Auscultation Breath Sounds / Voice Sounds] : lungs were clear to auscultation bilaterally [Heart Rate And Rhythm] : heart rate was normal and rhythm regular [Heart Sounds] : normal S1 and S2 [Heart Sounds Pericardial Friction Rub] : no pericardial rub [Edema] : there was no peripheral edema [Abdomen Soft] : soft [Bowel Sounds] : normal bowel sounds [FreeTextEntry1] : palpable liver.   [Abdomen Tenderness] : non-tender [Cervical Lymph Nodes Enlarged Posterior Bilaterally] : posterior cervical [Cervical Lymph Nodes Enlarged Anterior Bilaterally] : anterior cervical [Supraclavicular Lymph Nodes Enlarged Bilaterally] : supraclavicular [Abnormal Walk] : normal gait [___ (cm) Fistula] : [unfilled] (cm) fistula

## 2020-07-30 NOTE — ASSESSMENT
[FreeTextEntry1] : 59 y.o male hx depression, PCKD, HTN here for follow up. He is s/p DDRTx (DCD) 2/24/17; \par \par 1.  Renal transplant - immediate function.  Last Scr from this week 2.1.  Tends to fluctuate, BK negative.  Had biopsy 5/2017 for elevated creatinine, no rejection.  \par 2.  Immunosuppression - On tacro, pred and leflunomide 20mgs.  Tacro goal 4-6.  Reviewed medications.  Will stop leflunomide and reintroduce MMF 250mgs BID.  Gave pt new med list.  \par 3.  HTN - stable at home. \par 4.  Hx Bipolar/ Depression - back on meds.  Needs to f/u with psychiatry or PMD. \par 5.  PKD: no hematuria or pain.  Also has large liver consistent with Polycystic liver.  \par 6.  Polycythemia: controlled. \par 7.  BK: resolved.  He is on low dose tacro, pred 5 and leflunomide 20.  Will stop leflunomide as BK has resolved. \par 8.  Back pain:  hx of herniated disk.  \par 9.  Hemorrhoids:  ok for pt to have hemorrhoid surgery. \par \par F/u in 3 months\par Stop leflunomide\par Increase pravastatin to 40mgs\par Pt needs PMD.  Gave number for Dr. Tony.  \par Resume MMF 250mgs BID

## 2020-07-30 NOTE — HISTORY OF PRESENT ILLNESS
[Home] : at home, [unfilled] , at the time of the visit. [Patient] : the patient [Medical Office: ([facility])___] : at the medical office located in  [Self] : self [FreeTextEntry1] : Pt is a 57 y.o male s/p DDRTx on 2/24/17.  Donor was 19 y.o DCD female with normal creatinine.  Pt had immediate but slow function.  Donor urine culture positive was for yeast.  Pt was on fluconazole early post transplant . Currently he is feeling well. \par Had kidney biopsy 5/2017 month ago- no rejection, c4 d neg.\par \par Update:\par Feels ok.  Has occasional back pain and left sided pain.  Still active.  Has not scheduled f/u with PMD.  LDL is elevated.  Pt brought in meds, still taking leflunomide and only on pravachol 20mgs.  Also having problems with hemorrhoids.  Going to see colorectal for possible removal.

## 2020-07-30 NOTE — REVIEW OF SYSTEMS
[As Noted in HPI] : as noted in HPI [Negative] : Psychiatric [Heart Rate Is Fast] : the heart rate was not fast [de-identified] : RLQ incision clean dry. No drainage

## 2020-07-31 LAB — CMV DNA SPEC QL NAA+PROBE: NOT DETECTED IU/ML

## 2020-08-07 NOTE — PATIENT PROFILE ADULT. - FUNCTIONAL SCREEN CURRENT LEVEL: AMBULATION, MLM
Patient Education   1.  Doxycycline started.   2.  Anti tear shampoo cleansing nightly.   Sty (or Stye)  A sty is an infection of the oil gland of the eyelid. It may develop into a small pocket of pus (an abscess). This can cause pain, redness, and swelling. In early stages, a sty is treated with antibiotic cream, eye drops, or a small towel soaked in warm water (a warm compress). More severe cases may need to be opened and drained by a healthcare provider.  Home care  · Eye drops or ointment are usually prescribed to treat the infection. Use these as directed.   · Artificial tears may also be used to lubricate the eye and make it more comfortable. You can buy these over the counter without a prescription. Talk with your healthcare provider before using any over-the-counter treatment for a sty.  · Apply a warm, damp towel to the affected eye for at least 5 minutes, 3 to 4 times a day for a week. Warm compresses open the pores and speed the healing. But if the compresses are too hot, they may burn your eyelid.  · Sometimes the sty will drain with this treatment alone. If this happens, keep using the antibiotic until all the redness and swelling are gone.  · Wash your hands before and after touching the infected eyelid to avoid spreading the infection.  · Don’t squeeze or try to break open the sty.  Follow-up care  Follow up with your healthcare provider, or as advised.   When to seek medical advice  Call your healthcare provider right away if any of these occur:  · Increase in swelling or redness around the eyelid after 48 to 72 hours  · Increase in eye pain or the eyelid blisters  · Increase in warmth--the eyelid feels hot  · Drainage of blood or thick pus from the sty  · Blister on the eyelid  · Inability to open the eyelid due to swelling  · Fever of 100.4°F (38°C) or above, or as directed by your provider  · Vision changes  · Headache or stiff neck  · The sty comes back  Date Last Reviewed: 8/1/2017  © [id number]  [employer]. [address] [place] [address], [place] [address]. All rights reserved. This information is not intended as a substitute for professional medical care. Always follow your healthcare professional's instructions.            (0) independent

## 2020-08-11 ENCOUNTER — APPOINTMENT (OUTPATIENT)
Dept: TRANSPLANT | Facility: CLINIC | Age: 60
End: 2020-08-11

## 2020-08-27 ENCOUNTER — APPOINTMENT (OUTPATIENT)
Dept: TRANSPLANT | Facility: CLINIC | Age: 60
End: 2020-08-27

## 2020-08-27 NOTE — PATIENT PROFILE ADULT. - NS PRO OT REFERRAL QUES 1 YN
no
parents
Sarecycline Counseling: Patient advised regarding possible photosensitivity and discoloration of the teeth, skin, lips, tongue and gums.  Patient instructed to avoid sunlight, if possible.  When exposed to sunlight, patients should wear protective clothing, sunglasses, and sunscreen.  The patient was instructed to call the office immediately if the following severe adverse effects occur:  hearing changes, easy bruising/bleeding, severe headache, or vision changes.  The patient verbalized understanding of the proper use and possible adverse effects of sarecycline.  All of the patient's questions and concerns were addressed.

## 2020-08-28 LAB
ALBUMIN SERPL ELPH-MCNC: 4.7 G/DL
ALP BLD-CCNC: 61 U/L
ALT SERPL-CCNC: 23 U/L
ANION GAP SERPL CALC-SCNC: 11 MMOL/L
APPEARANCE: CLEAR
AST SERPL-CCNC: 15 U/L
BACTERIA: NEGATIVE
BASOPHILS # BLD AUTO: 0.02 K/UL
BASOPHILS NFR BLD AUTO: 0.3 %
BILIRUB SERPL-MCNC: 0.3 MG/DL
BILIRUBIN URINE: NEGATIVE
BLOOD URINE: NEGATIVE
BUN SERPL-MCNC: 27 MG/DL
CALCIUM SERPL-MCNC: 10 MG/DL
CHLORIDE SERPL-SCNC: 101 MMOL/L
CO2 SERPL-SCNC: 26 MMOL/L
COLOR: YELLOW
CREAT SERPL-MCNC: 1.89 MG/DL
CREAT SPEC-SCNC: 106 MG/DL
CREAT/PROT UR: 0.1 RATIO
EOSINOPHIL # BLD AUTO: 0.14 K/UL
EOSINOPHIL NFR BLD AUTO: 2.4 %
GLUCOSE QUALITATIVE U: NEGATIVE
GLUCOSE SERPL-MCNC: 89 MG/DL
HCT VFR BLD CALC: 44.4 %
HGB BLD-MCNC: 14.2 G/DL
HYALINE CASTS: 0 /LPF
IMM GRANULOCYTES NFR BLD AUTO: 0.3 %
KETONES URINE: NEGATIVE
LDH SERPL-CCNC: 153 U/L
LEUKOCYTE ESTERASE URINE: NEGATIVE
LYMPHOCYTES # BLD AUTO: 1.72 K/UL
LYMPHOCYTES NFR BLD AUTO: 29.5 %
MAGNESIUM SERPL-MCNC: 2.1 MG/DL
MAN DIFF?: NORMAL
MCHC RBC-ENTMCNC: 32 GM/DL
MCHC RBC-ENTMCNC: 32.3 PG
MCV RBC AUTO: 101.1 FL
MICROSCOPIC-UA: NORMAL
MONOCYTES # BLD AUTO: 0.52 K/UL
MONOCYTES NFR BLD AUTO: 8.9 %
NEUTROPHILS # BLD AUTO: 3.41 K/UL
NEUTROPHILS NFR BLD AUTO: 58.6 %
NITRITE URINE: NEGATIVE
PH URINE: 6.5
PHOSPHATE SERPL-MCNC: 3.6 MG/DL
PLATELET # BLD AUTO: 175 K/UL
POTASSIUM SERPL-SCNC: 5.1 MMOL/L
PROT SERPL-MCNC: 6.7 G/DL
PROT UR-MCNC: 6 MG/DL
PROTEIN URINE: NEGATIVE
RBC # BLD: 4.39 M/UL
RBC # FLD: 14.5 %
RED BLOOD CELLS URINE: 0 /HPF
SODIUM SERPL-SCNC: 138 MMOL/L
SPECIFIC GRAVITY URINE: 1.02
SQUAMOUS EPITHELIAL CELLS: 0 /HPF
TACROLIMUS SERPL-MCNC: 4.2 NG/ML
URATE SERPL-MCNC: 5.7 MG/DL
UROBILINOGEN URINE: NORMAL
WBC # FLD AUTO: 5.83 K/UL
WHITE BLOOD CELLS URINE: 0 /HPF

## 2020-08-31 LAB
BKV DNA SPEC QL NAA+PROBE: NOT DETECTED COPIES/ML
CMV DNA SPEC QL NAA+PROBE: NOT DETECTED IU/ML

## 2020-10-19 ENCOUNTER — APPOINTMENT (OUTPATIENT)
Dept: NEPHROLOGY | Facility: CLINIC | Age: 60
End: 2020-10-19
Payer: MEDICARE

## 2020-10-19 VITALS
BODY MASS INDEX: 21.99 KG/M2 | TEMPERATURE: 97.8 F | WEIGHT: 132 LBS | DIASTOLIC BLOOD PRESSURE: 100 MMHG | RESPIRATION RATE: 17 BRPM | OXYGEN SATURATION: 99 % | HEIGHT: 65 IN | SYSTOLIC BLOOD PRESSURE: 161 MMHG | HEART RATE: 85 BPM

## 2020-10-19 VITALS — DIASTOLIC BLOOD PRESSURE: 90 MMHG | SYSTOLIC BLOOD PRESSURE: 140 MMHG

## 2020-10-19 DIAGNOSIS — Z23 ENCOUNTER FOR IMMUNIZATION: ICD-10-CM

## 2020-10-19 LAB
25(OH)D3 SERPL-MCNC: 76.1 NG/ML
ALBUMIN SERPL ELPH-MCNC: 4.5 G/DL
ALP BLD-CCNC: 72 U/L
ALT SERPL-CCNC: 17 U/L
ANION GAP SERPL CALC-SCNC: 13 MMOL/L
APPEARANCE: CLEAR
AST SERPL-CCNC: 15 U/L
BACTERIA: NEGATIVE
BASOPHILS # BLD AUTO: 0.02 K/UL
BASOPHILS NFR BLD AUTO: 0.3 %
BILIRUB SERPL-MCNC: 0.4 MG/DL
BILIRUBIN URINE: NEGATIVE
BLOOD URINE: NEGATIVE
BUN SERPL-MCNC: 28 MG/DL
CALCIUM SERPL-MCNC: 11 MG/DL
CALCIUM SERPL-MCNC: 11 MG/DL
CHLORIDE SERPL-SCNC: 102 MMOL/L
CHOLEST SERPL-MCNC: 214 MG/DL
CHOLEST/HDLC SERPL: 2.2 RATIO
CO2 SERPL-SCNC: 26 MMOL/L
COLOR: NORMAL
CREAT SERPL-MCNC: 1.74 MG/DL
CREAT SPEC-SCNC: 130 MG/DL
CREAT/PROT UR: 0.1 RATIO
EOSINOPHIL # BLD AUTO: 0.08 K/UL
EOSINOPHIL NFR BLD AUTO: 1.1 %
ESTIMATED AVERAGE GLUCOSE: 103 MG/DL
GLUCOSE QUALITATIVE U: NEGATIVE
GLUCOSE SERPL-MCNC: 82 MG/DL
HBA1C MFR BLD HPLC: 5.2 %
HCT VFR BLD CALC: 56.5 %
HDLC SERPL-MCNC: 98 MG/DL
HGB BLD-MCNC: 17.7 G/DL
HYALINE CASTS: 0 /LPF
IMM GRANULOCYTES NFR BLD AUTO: 0.3 %
KETONES URINE: NEGATIVE
LDH SERPL-CCNC: 190 U/L
LDLC SERPL CALC-MCNC: 95 MG/DL
LEUKOCYTE ESTERASE URINE: NEGATIVE
LYMPHOCYTES # BLD AUTO: 1.9 K/UL
LYMPHOCYTES NFR BLD AUTO: 26.2 %
MAGNESIUM SERPL-MCNC: 2 MG/DL
MAN DIFF?: NORMAL
MCHC RBC-ENTMCNC: 30.7 PG
MCHC RBC-ENTMCNC: 31.3 GM/DL
MCV RBC AUTO: 97.9 FL
MICROSCOPIC-UA: NORMAL
MONOCYTES # BLD AUTO: 0.66 K/UL
MONOCYTES NFR BLD AUTO: 9.1 %
NEUTROPHILS # BLD AUTO: 4.57 K/UL
NEUTROPHILS NFR BLD AUTO: 63 %
NITRITE URINE: NEGATIVE
PARATHYROID HORMONE INTACT: 33 PG/ML
PH URINE: 6
PHOSPHATE SERPL-MCNC: 3.8 MG/DL
PLAT MORPH BLD: NORMAL
PLATELET # BLD AUTO: 187 K/UL
POTASSIUM SERPL-SCNC: 5.5 MMOL/L
PROT SERPL-MCNC: 7.1 G/DL
PROT UR-MCNC: 12 MG/DL
PROTEIN URINE: NORMAL
RBC # BLD: 5.77 M/UL
RBC # FLD: 13.2 %
RBC BLD AUTO: NORMAL
RED BLOOD CELLS URINE: 0 /HPF
SODIUM SERPL-SCNC: 141 MMOL/L
SPECIFIC GRAVITY URINE: 1.02
SQUAMOUS EPITHELIAL CELLS: 1 /HPF
TACROLIMUS SERPL-MCNC: 3.8 NG/ML
TRIGL SERPL-MCNC: 103 MG/DL
URATE SERPL-MCNC: 5.3 MG/DL
UROBILINOGEN URINE: NORMAL
WBC # FLD AUTO: 7.25 K/UL
WHITE BLOOD CELLS URINE: 0 /HPF

## 2020-10-19 PROCEDURE — 99214 OFFICE O/P EST MOD 30 MIN: CPT | Mod: 25

## 2020-10-19 PROCEDURE — 99072 ADDL SUPL MATRL&STAF TM PHE: CPT

## 2020-10-19 NOTE — ASSESSMENT
[FreeTextEntry1] : 60 y.o male hx depression, PCKD, HTN here for follow up. He is s/p DDRTx (DCD) 2/24/17; \par \par 1.  Renal transplant - immediate function.  Last Scr 1.9, stable.  Tends to fluctuate, BK negative.  Had biopsy 5/2017 for elevated creatinine, no rejection.  \par 2.  Immunosuppression - On tacro, pred and MMF 250mgs BId.  Tacro goal 4-6.  Reviewed medications.  \par 3.  HTN - stable at home. \par 4.  Hx Bipolar/ Depression - back on meds.  Needs to f/u with psychiatry or PMD. \par 5.  PKD: no hematuria or pain.  Also has large liver consistent with Polycystic liver.  \par 6.  Polycythemia: controlled. \par 7.  BK: resolved.  He is on low dose tacro, pred 5 and [id number] BID.  Will repeat BK pcr \par 8.  Back pain:  hx of herniated disk.  \par 9.  COVID19 - no signs or symptoms.  Pt would like antibody testing, will provide\par \par F/u in 3 months\par Influenza vaccine given. \par

## 2020-10-19 NOTE — CONSULT LETTER
[Dear  ___] : Dear  [unfilled], [Courtesy Letter:] : I had the pleasure of seeing your patient, [unfilled], in my office today. [Please see my note below.] : Please see my note below. [Sincerely,] : Sincerely, [FreeTextEntry3] : Jaun eCdeno D.O.

## 2020-10-19 NOTE — PHYSICAL EXAM
[General Appearance - Alert] : alert [General Appearance - Well Nourished] : well nourished [General Appearance - In No Acute Distress] : in no acute distress [Sclera] : the sclera and conjunctiva were normal [Extraocular Movements] : extraocular movements were intact [Outer Ear] : the ears and nose were normal in appearance [Neck Appearance] : the appearance of the neck was normal [Neck Cervical Mass (___cm)] : no neck mass was observed [Respiration, Rhythm And Depth] : normal respiratory rhythm and effort [Exaggerated Use Of Accessory Muscles For Inspiration] : no accessory muscle use [Auscultation Breath Sounds / Voice Sounds] : lungs were clear to auscultation bilaterally [Heart Rate And Rhythm] : heart rate was normal and rhythm regular [Heart Sounds Pericardial Friction Rub] : no pericardial rub [Heart Sounds] : normal S1 and S2 [Edema] : there was no peripheral edema [Bowel Sounds] : normal bowel sounds [Abdomen Soft] : soft [Abdomen Tenderness] : non-tender [Cervical Lymph Nodes Enlarged Posterior Bilaterally] : posterior cervical [Cervical Lymph Nodes Enlarged Anterior Bilaterally] : anterior cervical [Abnormal Walk] : normal gait [Supraclavicular Lymph Nodes Enlarged Bilaterally] : supraclavicular [___ (cm) Fistula] : [unfilled] (cm) fistula [] : no rash [No Focal Deficits] : no focal deficits [Impaired Insight] : insight and judgment were intact [Oriented To Time, Place, And Person] : oriented to person, place, and time [Affect] : the affect was normal [FreeTextEntry1] : palpable liver.

## 2020-10-19 NOTE — HISTORY OF PRESENT ILLNESS
[FreeTextEntry1] : Pt is a 57 y.o male s/p DDRTx on 2/24/17.  Donor was 19 y.o DCD female with normal creatinine.  Pt had immediate but slow function.  Donor urine culture positive was for yeast.  Pt was on fluconazole early post transplant . Currently he is feeling well. \par Had kidney biopsy 5/2017 month ago- no rejection, c4 d neg.\par \par Update:\par Feels ok.  did not take meds yet this morning.  currently living alone.

## 2020-10-19 NOTE — REVIEW OF SYSTEMS
[As Noted in HPI] : as noted in HPI [Negative] : Endocrine [Heart Rate Is Fast] : the heart rate was not fast [de-identified] : RLQ incision clean dry. No drainage

## 2020-10-20 LAB
SARS-COV-2 IGG SERPL IA-ACNC: <3.8 AU/ML
SARS-COV-2 IGG SERPL QL IA: NEGATIVE

## 2020-10-22 LAB
BKV DNA SPEC QL NAA+PROBE: NOT DETECTED COPIES/ML
CMV DNA SPEC QL NAA+PROBE: NOT DETECTED IU/ML

## 2020-11-05 ENCOUNTER — APPOINTMENT (OUTPATIENT)
Dept: TRANSPLANT | Facility: CLINIC | Age: 60
End: 2020-11-05

## 2020-11-05 LAB
ALBUMIN SERPL ELPH-MCNC: 4.6 G/DL
ALP BLD-CCNC: 72 U/L
ALT SERPL-CCNC: 12 U/L
ANION GAP SERPL CALC-SCNC: 10 MMOL/L
APPEARANCE: CLEAR
AST SERPL-CCNC: 15 U/L
BACTERIA: NEGATIVE
BASOPHILS # BLD AUTO: 0.02 K/UL
BASOPHILS NFR BLD AUTO: 0.4 %
BILIRUB SERPL-MCNC: 0.5 MG/DL
BILIRUBIN URINE: NEGATIVE
BLOOD URINE: NEGATIVE
BUN SERPL-MCNC: 28 MG/DL
CALCIUM SERPL-MCNC: 10.3 MG/DL
CHLORIDE SERPL-SCNC: 101 MMOL/L
CO2 SERPL-SCNC: 27 MMOL/L
COLOR: YELLOW
CREAT SERPL-MCNC: 1.83 MG/DL
CREAT SPEC-SCNC: 238 MG/DL
CREAT/PROT UR: 0.1 RATIO
EOSINOPHIL # BLD AUTO: 0.08 K/UL
EOSINOPHIL NFR BLD AUTO: 1.4 %
GLUCOSE QUALITATIVE U: NEGATIVE
GLUCOSE SERPL-MCNC: 78 MG/DL
HCT VFR BLD CALC: 55.2 %
HGB BLD-MCNC: 17.2 G/DL
HYALINE CASTS: 0 /LPF
IMM GRANULOCYTES NFR BLD AUTO: 0.4 %
KETONES URINE: NEGATIVE
LDH SERPL-CCNC: 207 U/L
LEUKOCYTE ESTERASE URINE: NEGATIVE
LYMPHOCYTES # BLD AUTO: 1.82 K/UL
LYMPHOCYTES NFR BLD AUTO: 31.9 %
MAGNESIUM SERPL-MCNC: 2 MG/DL
MAN DIFF?: NORMAL
MCHC RBC-ENTMCNC: 30.3 PG
MCHC RBC-ENTMCNC: 31.2 GM/DL
MCV RBC AUTO: 97.2 FL
MICROSCOPIC-UA: NORMAL
MONOCYTES # BLD AUTO: 0.49 K/UL
MONOCYTES NFR BLD AUTO: 8.6 %
NEUTROPHILS # BLD AUTO: 3.28 K/UL
NEUTROPHILS NFR BLD AUTO: 57.3 %
NITRITE URINE: NEGATIVE
PH URINE: 6.5
PHOSPHATE SERPL-MCNC: 2.8 MG/DL
PLATELET # BLD AUTO: 186 K/UL
POTASSIUM SERPL-SCNC: 4.7 MMOL/L
PROT SERPL-MCNC: 7 G/DL
PROT UR-MCNC: 23 MG/DL
PROTEIN URINE: NORMAL
RBC # BLD: 5.68 M/UL
RBC # FLD: 13.1 %
RED BLOOD CELLS URINE: 2 /HPF
SODIUM SERPL-SCNC: 138 MMOL/L
SPECIFIC GRAVITY URINE: 1.03
SQUAMOUS EPITHELIAL CELLS: 0 /HPF
TACROLIMUS SERPL-MCNC: 5.8 NG/ML
URATE SERPL-MCNC: 6.1 MG/DL
UROBILINOGEN URINE: NORMAL
WBC # FLD AUTO: 5.71 K/UL
WHITE BLOOD CELLS URINE: 1 /HPF

## 2020-11-09 ENCOUNTER — APPOINTMENT (OUTPATIENT)
Dept: NEPHROLOGY | Facility: CLINIC | Age: 60
End: 2020-11-09

## 2020-11-09 LAB
BKV DNA SPEC QL NAA+PROBE: NOT DETECTED COPIES/ML
CMV DNA SPEC QL NAA+PROBE: NOT DETECTED IU/ML

## 2020-11-17 ENCOUNTER — OUTPATIENT (OUTPATIENT)
Dept: OUTPATIENT SERVICES | Facility: HOSPITAL | Age: 60
LOS: 1 days | Discharge: HOME | End: 2020-11-17

## 2020-11-17 DIAGNOSIS — Z98.89 OTHER SPECIFIED POSTPROCEDURAL STATES: Chronic | ICD-10-CM

## 2020-11-17 DIAGNOSIS — R71.8 OTHER ABNORMALITY OF RED BLOOD CELLS: ICD-10-CM

## 2020-11-17 DIAGNOSIS — Z94.0 KIDNEY TRANSPLANT STATUS: Chronic | ICD-10-CM

## 2020-11-17 DIAGNOSIS — Z99.2 DEPENDENCE ON RENAL DIALYSIS: Chronic | ICD-10-CM

## 2020-11-23 ENCOUNTER — RESULT REVIEW (OUTPATIENT)
Age: 60
End: 2020-11-23

## 2020-11-23 ENCOUNTER — APPOINTMENT (OUTPATIENT)
Dept: HEMATOLOGY ONCOLOGY | Facility: CLINIC | Age: 60
End: 2020-11-23
Payer: MEDICARE

## 2020-11-23 VITALS
TEMPERATURE: 96.8 F | RESPIRATION RATE: 17 BRPM | SYSTOLIC BLOOD PRESSURE: 126 MMHG | DIASTOLIC BLOOD PRESSURE: 86 MMHG | OXYGEN SATURATION: 96 % | WEIGHT: 130.07 LBS | HEIGHT: 67.13 IN | BODY MASS INDEX: 20.18 KG/M2 | HEART RATE: 101 BPM

## 2020-11-23 DIAGNOSIS — R71.8 OTHER ABNORMALITY OF RED BLOOD CELLS: ICD-10-CM

## 2020-11-23 DIAGNOSIS — D58.2 OTHER HEMOGLOBINOPATHIES: ICD-10-CM

## 2020-11-23 LAB
BASOPHILS # BLD AUTO: 0.02 K/UL — SIGNIFICANT CHANGE UP (ref 0–0.2)
BASOPHILS NFR BLD AUTO: 0.3 % — SIGNIFICANT CHANGE UP (ref 0–2)
EOSINOPHIL # BLD AUTO: 0.07 K/UL — SIGNIFICANT CHANGE UP (ref 0–0.5)
EOSINOPHIL NFR BLD AUTO: 0.9 % — SIGNIFICANT CHANGE UP (ref 0–6)
HCT VFR BLD CALC: 56.9 % — HIGH (ref 39–50)
HGB BLD-MCNC: 18 G/DL — HIGH (ref 13–17)
IMM GRANULOCYTES NFR BLD AUTO: 0.5 % — SIGNIFICANT CHANGE UP (ref 0–1.5)
LYMPHOCYTES # BLD AUTO: 1.04 K/UL — SIGNIFICANT CHANGE UP (ref 1–3.3)
LYMPHOCYTES # BLD AUTO: 13.9 % — SIGNIFICANT CHANGE UP (ref 13–44)
MCHC RBC-ENTMCNC: 30.1 PG — SIGNIFICANT CHANGE UP (ref 27–34)
MCHC RBC-ENTMCNC: 31.6 G/DL — LOW (ref 32–36)
MCV RBC AUTO: 95.2 FL — SIGNIFICANT CHANGE UP (ref 80–100)
MONOCYTES # BLD AUTO: 0.5 K/UL — SIGNIFICANT CHANGE UP (ref 0–0.9)
MONOCYTES NFR BLD AUTO: 6.7 % — SIGNIFICANT CHANGE UP (ref 2–14)
NEUTROPHILS # BLD AUTO: 5.83 K/UL — SIGNIFICANT CHANGE UP (ref 1.8–7.4)
NEUTROPHILS NFR BLD AUTO: 77.7 % — HIGH (ref 43–77)
NRBC # BLD: 0 /100 WBCS — SIGNIFICANT CHANGE UP (ref 0–0)
PLATELET # BLD AUTO: 174 K/UL — SIGNIFICANT CHANGE UP (ref 150–400)
RBC # BLD: 5.98 M/UL — HIGH (ref 4.2–5.8)
RBC # FLD: 13.5 % — SIGNIFICANT CHANGE UP (ref 10.3–14.5)
WBC # BLD: 7.5 K/UL — SIGNIFICANT CHANGE UP (ref 3.8–10.5)
WBC # FLD AUTO: 7.5 K/UL — SIGNIFICANT CHANGE UP (ref 3.8–10.5)

## 2020-11-23 PROCEDURE — 99205 OFFICE O/P NEW HI 60 MIN: CPT

## 2020-11-23 NOTE — ASSESSMENT
[FreeTextEntry1] : 60 year-old Mr. JENNINGS is evaluated for high H/H (17.2/55.2). He has no symptoms; no h/o thrombosis. He has had kidney transplant for polycystic kidney (PKD) in 2017. It is known that there is 8-15% incidence of polycythemia after kidney transplant, specifically those for PKD. The patient is already on ARB and ASA. He may need therapeutic phlebotomy. Will r/u neoplastic causes first.\par \par # Polycythemia, likely secondary to kidney transplant\par - CBC, LDH, CMP, Iron studies, Viscosity, JAK2, EPO\par - RTC in a month

## 2020-11-23 NOTE — HISTORY OF PRESENT ILLNESS
[de-identified] : 60 year-old Mr. JENNINGS is evaluated for high H/H (17.2/55.2) since October this year. His counts were WNL until 08/2020. Of note the patient has had a kidney transplant in 2017 for polycystic kidney disease. He has been on immunosuppressor (Prograf, CellCept). The patient has no complaints that may relate to his high H/H. Denies any history of thrombosis. The patient is on ASA and ARB.

## 2020-11-24 LAB
ALBUMIN SERPL ELPH-MCNC: 4.9 G/DL
ALP BLD-CCNC: 76 U/L
ALT SERPL-CCNC: 11 U/L
ANION GAP SERPL CALC-SCNC: 11 MMOL/L
AST SERPL-CCNC: 17 U/L
BILIRUB SERPL-MCNC: 0.6 MG/DL
BUN SERPL-MCNC: 34 MG/DL
CALCIUM SERPL-MCNC: 10.5 MG/DL
CHLORIDE SERPL-SCNC: 103 MMOL/L
CO2 SERPL-SCNC: 26 MMOL/L
CREAT SERPL-MCNC: 1.99 MG/DL
EPO SERPL-MCNC: 9.3 MIU/ML
GLUCOSE SERPL-MCNC: 77 MG/DL
LDH SERPL-CCNC: 223 U/L
POTASSIUM SERPL-SCNC: 4.7 MMOL/L
PROT SERPL-MCNC: 7.3 G/DL
SODIUM SERPL-SCNC: 140 MMOL/L

## 2020-11-25 LAB — JAK2 GENE MUT ANL BLD/T: NORMAL

## 2020-11-26 LAB — VISCOSITY, SERUM: 1.9

## 2021-01-12 ENCOUNTER — APPOINTMENT (OUTPATIENT)
Dept: NEPHROLOGY | Facility: CLINIC | Age: 61
End: 2021-01-12
Payer: MEDICARE

## 2021-01-12 VITALS
SYSTOLIC BLOOD PRESSURE: 138 MMHG | OXYGEN SATURATION: 97 % | RESPIRATION RATE: 17 BRPM | HEIGHT: 67 IN | DIASTOLIC BLOOD PRESSURE: 96 MMHG | TEMPERATURE: 96.5 F | BODY MASS INDEX: 21.35 KG/M2 | WEIGHT: 136 LBS | HEART RATE: 74 BPM

## 2021-01-12 LAB
ALBUMIN SERPL ELPH-MCNC: 4.4 G/DL
ALP BLD-CCNC: 89 U/L
ALT SERPL-CCNC: 10 U/L
ANION GAP SERPL CALC-SCNC: 14 MMOL/L
APPEARANCE: CLEAR
AST SERPL-CCNC: 16 U/L
BACTERIA: NEGATIVE
BASOPHILS # BLD AUTO: 0.02 K/UL
BASOPHILS NFR BLD AUTO: 0.3 %
BILIRUB SERPL-MCNC: 0.4 MG/DL
BILIRUBIN URINE: NEGATIVE
BLOOD URINE: NEGATIVE
BUN SERPL-MCNC: 32 MG/DL
CALCIUM SERPL-MCNC: 9.9 MG/DL
CHLORIDE SERPL-SCNC: 103 MMOL/L
CO2 SERPL-SCNC: 23 MMOL/L
COLOR: YELLOW
CREAT SERPL-MCNC: 1.84 MG/DL
CREAT SPEC-SCNC: 248 MG/DL
CREAT/PROT UR: 0.1 RATIO
EOSINOPHIL # BLD AUTO: 0.09 K/UL
EOSINOPHIL NFR BLD AUTO: 1.4 %
GLUCOSE QUALITATIVE U: NEGATIVE
GLUCOSE SERPL-MCNC: 87 MG/DL
HCT VFR BLD CALC: 53.4 %
HGB BLD-MCNC: 16.6 G/DL
HYALINE CASTS: 1 /LPF
IMM GRANULOCYTES NFR BLD AUTO: 0.2 %
KETONES URINE: NEGATIVE
LDH SERPL-CCNC: 206 U/L
LEUKOCYTE ESTERASE URINE: NEGATIVE
LYMPHOCYTES # BLD AUTO: 1.97 K/UL
LYMPHOCYTES NFR BLD AUTO: 31.2 %
MAGNESIUM SERPL-MCNC: 2 MG/DL
MAN DIFF?: NORMAL
MCHC RBC-ENTMCNC: 29.9 PG
MCHC RBC-ENTMCNC: 31.1 GM/DL
MCV RBC AUTO: 96 FL
MICROSCOPIC-UA: NORMAL
MONOCYTES # BLD AUTO: 0.64 K/UL
MONOCYTES NFR BLD AUTO: 10.1 %
NEUTROPHILS # BLD AUTO: 3.58 K/UL
NEUTROPHILS NFR BLD AUTO: 56.8 %
NITRITE URINE: NEGATIVE
PH URINE: 6
PHOSPHATE SERPL-MCNC: 3.6 MG/DL
PLATELET # BLD AUTO: 201 K/UL
POTASSIUM SERPL-SCNC: 4.6 MMOL/L
PROT SERPL-MCNC: 6.9 G/DL
PROT UR-MCNC: 30 MG/DL
PROTEIN URINE: ABNORMAL
RBC # BLD: 5.56 M/UL
RBC # FLD: 14.7 %
RED BLOOD CELLS URINE: 1 /HPF
SODIUM SERPL-SCNC: 140 MMOL/L
SPECIFIC GRAVITY URINE: 1.04
SQUAMOUS EPITHELIAL CELLS: 1 /HPF
TACROLIMUS SERPL-MCNC: 7.7 NG/ML
URATE SERPL-MCNC: 5.6 MG/DL
UROBILINOGEN URINE: ABNORMAL
WBC # FLD AUTO: 6.31 K/UL
WHITE BLOOD CELLS URINE: 1 /HPF

## 2021-01-12 PROCEDURE — 99214 OFFICE O/P EST MOD 30 MIN: CPT

## 2021-01-12 PROCEDURE — 99072 ADDL SUPL MATRL&STAF TM PHE: CPT

## 2021-01-12 NOTE — PHYSICAL EXAM
[General Appearance - Alert] : alert [General Appearance - In No Acute Distress] : in no acute distress [General Appearance - Well Nourished] : well nourished [Sclera] : the sclera and conjunctiva were normal [Extraocular Movements] : extraocular movements were intact [Outer Ear] : the ears and nose were normal in appearance [Neck Appearance] : the appearance of the neck was normal [Neck Cervical Mass (___cm)] : no neck mass was observed [Respiration, Rhythm And Depth] : normal respiratory rhythm and effort [Exaggerated Use Of Accessory Muscles For Inspiration] : no accessory muscle use [Heart Rate And Rhythm] : heart rate was normal and rhythm regular [Auscultation Breath Sounds / Voice Sounds] : lungs were clear to auscultation bilaterally [Heart Sounds] : normal S1 and S2 [Heart Sounds Pericardial Friction Rub] : no pericardial rub [Edema] : there was no peripheral edema [Bowel Sounds] : normal bowel sounds [Abdomen Soft] : soft [Abdomen Tenderness] : non-tender [Cervical Lymph Nodes Enlarged Posterior Bilaterally] : posterior cervical [Cervical Lymph Nodes Enlarged Anterior Bilaterally] : anterior cervical [Supraclavicular Lymph Nodes Enlarged Bilaterally] : supraclavicular [Abnormal Walk] : normal gait [___ (cm) Fistula] : [unfilled] (cm) fistula [] : no rash [No Focal Deficits] : no focal deficits [Oriented To Time, Place, And Person] : oriented to person, place, and time [Impaired Insight] : insight and judgment were intact [Affect] : the affect was normal [FreeTextEntry1] : palpable liver.

## 2021-01-12 NOTE — REVIEW OF SYSTEMS
[As Noted in HPI] : as noted in HPI [Negative] : Heme/Lymph [Heart Rate Is Fast] : the heart rate was not fast [de-identified] : RLQ incision clean dry. No drainage

## 2021-01-12 NOTE — ASSESSMENT
[FreeTextEntry1] : 60 y.o male hx depression, PCKD, HTN here for follow up. He is s/p DDRTx (DCD) 2/24/17; \par \par 1.  Renal transplant - immediate function.  Baseline creatinine on ARB is about 1.8.  Tends to fluctuate, BK negative.  Had biopsy 5/2017 for elevated creatinine, no rejection.  Will repeat creatinine today\par 2.  Immunosuppression - On tacro, pred and MMF 250mgs BId.  Tacro goal 4-6.  Reviewed medications.  Check tacrolimus trough.  \par 3.  HTN - stable at home. \par 4.  Hx Bipolar/ Depression - back on meds.  Needs to f/u with psychiatry or PMD. \par 5.  PKD:Recently had hematuria but resolved.  Also has large liver consistent with Polycystic liver.  \par 6.  Polycythemia: Hemoglobin rising despite ARB which can put pt at risk for CVA.  Will have patient schedule f/u with hematology - will likely need phlebotomy. \par 7.  BK: resolved.  He is on low dose tacro, pred 5 and [id number] BID.  Will repeat BK pcr \par 8.  Back pain:  hx of herniated disk.  will send Rx for muscle relaxant. \par \par F/u in 3 months\par \par

## 2021-01-12 NOTE — HISTORY OF PRESENT ILLNESS
[FreeTextEntry1] : Pt is a 57 y.o male s/p DDRTx on 2/24/17.  Donor was 19 y.o DCD female with normal creatinine.  Pt had immediate but slow function.  Donor urine culture positive was for yeast.  Pt was on fluconazole early post transplant . Currently he is feeling well. \par Had kidney biopsy 5/2017 month ago- no rejection, c4 d neg.\par \par Update:\par Feels ok.  He had some hematuria about 1 week ago.  Went to [facility] ER and w/u negative.  Also having problems with erectile dysfunction.  sister very sick s/p MVA.  Saw hematology but no f/u.

## 2021-01-15 LAB
BKV DNA SPEC QL NAA+PROBE: NOT DETECTED COPIES/ML
CMV DNA SPEC QL NAA+PROBE: NOT DETECTED IU/ML

## 2021-02-26 ENCOUNTER — APPOINTMENT (OUTPATIENT)
Dept: UROLOGY | Facility: CLINIC | Age: 61
End: 2021-02-26
Payer: MEDICARE

## 2021-02-26 VITALS
WEIGHT: 128 LBS | TEMPERATURE: 97.8 F | BODY MASS INDEX: 20.09 KG/M2 | DIASTOLIC BLOOD PRESSURE: 77 MMHG | HEIGHT: 67 IN | HEART RATE: 65 BPM | SYSTOLIC BLOOD PRESSURE: 127 MMHG

## 2021-02-26 DIAGNOSIS — N52.9 MALE ERECTILE DYSFUNCTION, UNSPECIFIED: ICD-10-CM

## 2021-02-26 PROCEDURE — 99203 OFFICE O/P NEW LOW 30 MIN: CPT

## 2021-02-26 PROCEDURE — 99072 ADDL SUPL MATRL&STAF TM PHE: CPT

## 2021-02-26 RX ORDER — POLYETHYLENE GLYCOL 3350 AND ELECTROLYTES WITH LEMON FLAVOR 236; 22.74; 6.74; 5.86; 2.97 G/4L; G/4L; G/4L; G/4L; G/4L
236 POWDER, FOR SOLUTION ORAL
Qty: 1 | Refills: 0 | Status: COMPLETED | COMMUNITY
Start: 2019-10-23 | End: 2021-02-26

## 2021-02-26 NOTE — ASSESSMENT
[FreeTextEntry1] : We recommended a follow-up PSA level since his last was about 5 years ago.  I will be in touch with him with that result once available for review.\par \par We discussed various options for managing the erectile dysfunction and also discussed the potential etiology related to his prior history of hypertension or renal transplant or combination of the 2.  I would recommend initial treatment with sildenafil and I provided him with a prescription for 100 mg tablets.  I provided him with instructions regarding the proper use and potential side effects that might be experienced.  I have also provided him with the name of a colleague with expertise in men sexual health such that if this is not successful for him, he will have another follow-up here with that individual to discuss other treatment options that could be considered.  The patient communicates his understanding of this plan and is in agreement.\par \par

## 2021-02-26 NOTE — HISTORY OF PRESENT ILLNESS
[FreeTextEntry1] : Timtohy Way presents to the office today.  He is a 60-year-old man who is here with a primary complaint of erectile dysfunction.  Medical history is notable for having had a renal transplant done about 4 years ago from a  donor.  He did maintain erectile function after the transplant but about 6 months ago he started to notice a lack of erections.  He does feel normal desire for sex.  He also had prior history of undergoing a hydrocele repair about 5 years ago and says that prior to that his erections were more uncomfortable but now they have been better.  He denies any curvature the penis.  He reports a normal urine flow without hesitancy or sensation of incomplete bladder emptying.  His baseline creatinine is around 1.7 to 1.8 mg/dL.  Prior PSA level was 1.6 about 5 years ago.\par \par As far as his erections, he does report some sensation of heaviness or fullness in the penis with stimulation.  He has never been on any treatment in the past for the erectile dysfunction.\par

## 2021-04-12 ENCOUNTER — RX RENEWAL (OUTPATIENT)
Age: 61
End: 2021-04-12

## 2021-04-15 ENCOUNTER — APPOINTMENT (OUTPATIENT)
Dept: NEPHROLOGY | Facility: CLINIC | Age: 61
End: 2021-04-15
Payer: MEDICARE

## 2021-04-15 VITALS
RESPIRATION RATE: 17 BRPM | OXYGEN SATURATION: 96 % | TEMPERATURE: 97.9 F | HEART RATE: 78 BPM | DIASTOLIC BLOOD PRESSURE: 87 MMHG | BODY MASS INDEX: 20.09 KG/M2 | HEIGHT: 67 IN | WEIGHT: 128 LBS | SYSTOLIC BLOOD PRESSURE: 152 MMHG

## 2021-04-15 PROCEDURE — 99214 OFFICE O/P EST MOD 30 MIN: CPT

## 2021-04-15 PROCEDURE — 99072 ADDL SUPL MATRL&STAF TM PHE: CPT

## 2021-04-15 NOTE — HISTORY OF PRESENT ILLNESS
[FreeTextEntry1] : Pt is a 57 y.o male s/p DDRTx on 2/24/17.  Donor was 19 y.o DCD female with normal creatinine.  Pt had immediate but slow function.  Donor urine culture positive was for yeast.  Pt was on fluconazole early post transplant . Currently he is feeling well. \par Had kidney biopsy 5/2017 month ago- no rejection, c4 d neg.\par \par Update:\par Feels ok.  He saw urology and hematology.  Last hemoglobin improved.  Trying to exercise carefully.  Sister is getting better.  has not received Covid vaccine yet.

## 2021-04-15 NOTE — PHYSICAL EXAM
[General Appearance - Alert] : alert [General Appearance - In No Acute Distress] : in no acute distress [General Appearance - Well Nourished] : well nourished [Sclera] : the sclera and conjunctiva were normal [Extraocular Movements] : extraocular movements were intact [Outer Ear] : the ears and nose were normal in appearance [Neck Appearance] : the appearance of the neck was normal [Neck Cervical Mass (___cm)] : no neck mass was observed [Respiration, Rhythm And Depth] : normal respiratory rhythm and effort [Exaggerated Use Of Accessory Muscles For Inspiration] : no accessory muscle use [Auscultation Breath Sounds / Voice Sounds] : lungs were clear to auscultation bilaterally [Heart Rate And Rhythm] : heart rate was normal and rhythm regular [Heart Sounds] : normal S1 and S2 [Heart Sounds Pericardial Friction Rub] : no pericardial rub [Edema] : there was no peripheral edema [Bowel Sounds] : normal bowel sounds [Abdomen Soft] : soft [Abdomen Tenderness] : non-tender [Cervical Lymph Nodes Enlarged Posterior Bilaterally] : posterior cervical [Cervical Lymph Nodes Enlarged Anterior Bilaterally] : anterior cervical [Supraclavicular Lymph Nodes Enlarged Bilaterally] : supraclavicular [Abnormal Walk] : normal gait [___ (cm) Fistula] : [unfilled] (cm) fistula [No Focal Deficits] : no focal deficits [] : no rash [Oriented To Time, Place, And Person] : oriented to person, place, and time [Impaired Insight] : insight and judgment were intact [Affect] : the affect was normal [FreeTextEntry1] : palpable liver.

## 2021-04-15 NOTE — REVIEW OF SYSTEMS
[As Noted in HPI] : as noted in HPI [Negative] : Heme/Lymph [Heart Rate Is Fast] : the heart rate was not fast [de-identified] : RLQ incision clean dry. No drainage

## 2021-04-15 NOTE — ASSESSMENT
[FreeTextEntry1] : 60 y.o male hx depression, PCKD, HTN here for follow up. He is s/p DDRTx (DCD) 2/24/17; \par \par 1.  Renal transplant - immediate function.  Baseline creatinine on ARB is about 1.8.  Tends to fluctuate, BK negative.  Had biopsy 5/2017 for elevated creatinine, no rejection.  Will repeat creatinine today\par 2.  Immunosuppression - On tacro, pred and MMF 250mgs BId.  Tacro goal 4-6.  Reviewed medications.  Check tacrolimus trough.  \par 3.  HTN - stable at home. \par 4.  Hx Bipolar/ Depression - back on meds.  Needs to f/u with psychiatry or PMD. \par 5.  PKD:Recently had hematuria but resolved.  Also has large liver consistent with Polycystic liver.  \par 6.  Polycythemia: Last hemoglobin improved.  Has seen hematology.  If hemoglobin high will send back to hematology.  \par 7.  BK: resolved.  He is on low dose tacro, pred 5 and [id number] BID.  Will repeat BK pcr \par 8.  Back pain:  hx of herniated disk.   Stable. \par \par F/u in 3 months\par \par

## 2021-04-19 LAB
25(OH)D3 SERPL-MCNC: 69.1 NG/ML
ALBUMIN SERPL ELPH-MCNC: 4.9 G/DL
ALP BLD-CCNC: 74 U/L
ALT SERPL-CCNC: 16 U/L
ANION GAP SERPL CALC-SCNC: 15 MMOL/L
APPEARANCE: CLEAR
AST SERPL-CCNC: 33 U/L
BACTERIA: NEGATIVE
BASOPHILS # BLD AUTO: 0.03 K/UL
BASOPHILS NFR BLD AUTO: 0.5 %
BILIRUB SERPL-MCNC: 0.5 MG/DL
BILIRUBIN URINE: NEGATIVE
BKV DNA SPEC QL NAA+PROBE: NEGATIVE COPIES/ML
BLOOD URINE: NEGATIVE
BUN SERPL-MCNC: 34 MG/DL
CALCIUM SERPL-MCNC: 10.1 MG/DL
CALCIUM SERPL-MCNC: 10.1 MG/DL
CHLORIDE SERPL-SCNC: 107 MMOL/L
CHOLEST SERPL-MCNC: 226 MG/DL
CMV DNA SPEC QL NAA+PROBE: NOT DETECTED IU/ML
CO2 SERPL-SCNC: 14 MMOL/L
COLOR: YELLOW
CREAT SERPL-MCNC: 1.95 MG/DL
CREAT SPEC-SCNC: 185 MG/DL
CREAT/PROT UR: 0.2 RATIO
EOSINOPHIL # BLD AUTO: 0.04 K/UL
EOSINOPHIL NFR BLD AUTO: 0.7 %
GLUCOSE QUALITATIVE U: NEGATIVE
GLUCOSE SERPL-MCNC: 114 MG/DL
HCT VFR BLD CALC: 54.6 %
HDLC SERPL-MCNC: 77 MG/DL
HGB BLD-MCNC: 17.2 G/DL
HYALINE CASTS: 4 /LPF
IMM GRANULOCYTES NFR BLD AUTO: 0.2 %
KETONES URINE: NEGATIVE
LDH SERPL-CCNC: 282 U/L
LDLC SERPL CALC-MCNC: 130 MG/DL
LEUKOCYTE ESTERASE URINE: NEGATIVE
LOG 10 BK QUANTITATION PCR: NORMAL
LYMPHOCYTES # BLD AUTO: 1.12 K/UL
LYMPHOCYTES NFR BLD AUTO: 19.7 %
MAGNESIUM SERPL-MCNC: 2.2 MG/DL
MAN DIFF?: NORMAL
MCHC RBC-ENTMCNC: 30.9 PG
MCHC RBC-ENTMCNC: 31.5 GM/DL
MCV RBC AUTO: 98.2 FL
MICROSCOPIC-UA: NORMAL
MONOCYTES # BLD AUTO: 0.52 K/UL
MONOCYTES NFR BLD AUTO: 9.2 %
NEUTROPHILS # BLD AUTO: 3.96 K/UL
NEUTROPHILS NFR BLD AUTO: 69.7 %
NITRITE URINE: NEGATIVE
NONHDLC SERPL-MCNC: 148 MG/DL
PARATHYROID HORMONE INTACT: 41 PG/ML
PH URINE: 6
PHOSPHATE SERPL-MCNC: 4.4 MG/DL
PLATELET # BLD AUTO: 166 K/UL
POTASSIUM SERPL-SCNC: 5.1 MMOL/L
PROT SERPL-MCNC: 7.4 G/DL
PROT UR-MCNC: 33 MG/DL
PROTEIN URINE: ABNORMAL
RBC # BLD: 5.56 M/UL
RBC # FLD: 14.6 %
RED BLOOD CELLS URINE: 1 /HPF
SODIUM SERPL-SCNC: 137 MMOL/L
SPECIFIC GRAVITY URINE: 1.04
SQUAMOUS EPITHELIAL CELLS: 1 /HPF
TACROLIMUS SERPL-MCNC: 14.5 NG/ML
TRIGL SERPL-MCNC: 93 MG/DL
URATE SERPL-MCNC: 5.9 MG/DL
UROBILINOGEN URINE: NORMAL
WBC # FLD AUTO: 5.68 K/UL
WHITE BLOOD CELLS URINE: 0 /HPF

## 2021-04-29 ENCOUNTER — APPOINTMENT (OUTPATIENT)
Dept: TRANSPLANT | Facility: CLINIC | Age: 61
End: 2021-04-29

## 2021-04-30 LAB
25(OH)D3 SERPL-MCNC: 55.3 NG/ML
ALBUMIN SERPL ELPH-MCNC: 4.5 G/DL
ALP BLD-CCNC: 72 U/L
ALT SERPL-CCNC: 12 U/L
ANION GAP SERPL CALC-SCNC: 10 MMOL/L
APPEARANCE: CLEAR
AST SERPL-CCNC: 19 U/L
BACTERIA: NEGATIVE
BASOPHILS # BLD AUTO: 0.03 K/UL
BASOPHILS NFR BLD AUTO: 0.5 %
BILIRUB SERPL-MCNC: 0.5 MG/DL
BILIRUBIN URINE: NEGATIVE
BLOOD URINE: NEGATIVE
BUN SERPL-MCNC: 28 MG/DL
CALCIUM SERPL-MCNC: 10.2 MG/DL
CALCIUM SERPL-MCNC: 10.2 MG/DL
CHLORIDE SERPL-SCNC: 105 MMOL/L
CHOLEST SERPL-MCNC: 191 MG/DL
CO2 SERPL-SCNC: 25 MMOL/L
COLOR: NORMAL
COVID-19 SPIKE DOMAIN ANTIBODY INTERPRETATION: NEGATIVE
CREAT SERPL-MCNC: 1.59 MG/DL
CREAT SPEC-SCNC: 120 MG/DL
CREAT/PROT UR: 0.1 RATIO
EOSINOPHIL # BLD AUTO: 0.1 K/UL
EOSINOPHIL NFR BLD AUTO: 1.6 %
ESTIMATED AVERAGE GLUCOSE: 108 MG/DL
GLUCOSE QUALITATIVE U: NEGATIVE
GLUCOSE SERPL-MCNC: 76 MG/DL
HBA1C MFR BLD HPLC: 5.4 %
HCT VFR BLD CALC: 53.2 %
HDLC SERPL-MCNC: 77 MG/DL
HGB BLD-MCNC: 16.5 G/DL
HYALINE CASTS: 0 /LPF
IMM GRANULOCYTES NFR BLD AUTO: 0.3 %
KETONES URINE: NEGATIVE
LDH SERPL-CCNC: 238 U/L
LDLC SERPL CALC-MCNC: 85 MG/DL
LEUKOCYTE ESTERASE URINE: NEGATIVE
LYMPHOCYTES # BLD AUTO: 2.48 K/UL
LYMPHOCYTES NFR BLD AUTO: 39.4 %
MAGNESIUM SERPL-MCNC: 2 MG/DL
MAN DIFF?: NORMAL
MCHC RBC-ENTMCNC: 31 GM/DL
MCHC RBC-ENTMCNC: 31.1 PG
MCV RBC AUTO: 100.4 FL
MICROSCOPIC-UA: NORMAL
MONOCYTES # BLD AUTO: 0.53 K/UL
MONOCYTES NFR BLD AUTO: 8.4 %
NEUTROPHILS # BLD AUTO: 3.13 K/UL
NEUTROPHILS NFR BLD AUTO: 49.8 %
NITRITE URINE: NEGATIVE
NONHDLC SERPL-MCNC: 115 MG/DL
PARATHYROID HORMONE INTACT: 63 PG/ML
PH URINE: 6.5
PHOSPHATE SERPL-MCNC: 2.8 MG/DL
PLATELET # BLD AUTO: 193 K/UL
POTASSIUM SERPL-SCNC: 5.1 MMOL/L
PROT SERPL-MCNC: 6.9 G/DL
PROT UR-MCNC: 16 MG/DL
PROTEIN URINE: NORMAL
RBC # BLD: 5.3 M/UL
RBC # FLD: 14.3 %
RED BLOOD CELLS URINE: 1 /HPF
SARS-COV-2 AB SERPL IA-ACNC: 0.4 U/ML
SODIUM SERPL-SCNC: 141 MMOL/L
SPECIFIC GRAVITY URINE: 1.03
SQUAMOUS EPITHELIAL CELLS: 0 /HPF
TACROLIMUS SERPL-MCNC: 9.7 NG/ML
TRIGL SERPL-MCNC: 150 MG/DL
URATE SERPL-MCNC: 4.9 MG/DL
UROBILINOGEN URINE: NORMAL
WBC # FLD AUTO: 6.29 K/UL
WHITE BLOOD CELLS URINE: 0 /HPF

## 2021-05-03 LAB
BKV DNA SPEC QL NAA+PROBE: NEGATIVE COPIES/ML
LOG 10 BK QUANTITATION PCR: NORMAL

## 2021-05-05 LAB — CMV DNA SPEC QL NAA+PROBE: NOT DETECTED IU/ML

## 2021-05-13 ENCOUNTER — APPOINTMENT (OUTPATIENT)
Dept: TRANSPLANT | Facility: CLINIC | Age: 61
End: 2021-05-13

## 2021-06-14 ENCOUNTER — APPOINTMENT (OUTPATIENT)
Dept: TRANSPLANT | Facility: CLINIC | Age: 61
End: 2021-06-14

## 2021-06-15 ENCOUNTER — APPOINTMENT (OUTPATIENT)
Dept: NEPHROLOGY | Facility: CLINIC | Age: 61
End: 2021-06-15
Payer: MEDICARE

## 2021-06-15 VITALS
BODY MASS INDEX: 19.78 KG/M2 | WEIGHT: 126 LBS | TEMPERATURE: 97.9 F | OXYGEN SATURATION: 97 % | SYSTOLIC BLOOD PRESSURE: 148 MMHG | RESPIRATION RATE: 17 BRPM | HEART RATE: 57 BPM | DIASTOLIC BLOOD PRESSURE: 93 MMHG | HEIGHT: 67 IN

## 2021-06-15 LAB
ALBUMIN SERPL ELPH-MCNC: 4.4 G/DL
ALP BLD-CCNC: 69 U/L
ALT SERPL-CCNC: 17 U/L
ANION GAP SERPL CALC-SCNC: 9 MMOL/L
APPEARANCE: CLEAR
AST SERPL-CCNC: 23 U/L
BACTERIA: NEGATIVE
BASOPHILS # BLD AUTO: 0.02 K/UL
BASOPHILS NFR BLD AUTO: 0.4 %
BILIRUB SERPL-MCNC: 0.5 MG/DL
BILIRUBIN URINE: NEGATIVE
BLOOD URINE: NEGATIVE
BUN SERPL-MCNC: 36 MG/DL
CALCIUM SERPL-MCNC: 10.3 MG/DL
CHLORIDE SERPL-SCNC: 105 MMOL/L
CO2 SERPL-SCNC: 26 MMOL/L
COLOR: YELLOW
CREAT SERPL-MCNC: 1.62 MG/DL
CREAT SPEC-SCNC: 156 MG/DL
CREAT/PROT UR: 0.1 RATIO
EOSINOPHIL # BLD AUTO: 0.08 K/UL
EOSINOPHIL NFR BLD AUTO: 1.5 %
GLUCOSE QUALITATIVE U: NEGATIVE
GLUCOSE SERPL-MCNC: 83 MG/DL
HCT VFR BLD CALC: 47.6 %
HGB BLD-MCNC: 14.9 G/DL
HYALINE CASTS: 0 /LPF
IMM GRANULOCYTES NFR BLD AUTO: 0.2 %
KETONES URINE: NEGATIVE
LDH SERPL-CCNC: 226 U/L
LEUKOCYTE ESTERASE URINE: NEGATIVE
LYMPHOCYTES # BLD AUTO: 1.46 K/UL
LYMPHOCYTES NFR BLD AUTO: 27.8 %
MAGNESIUM SERPL-MCNC: 2.2 MG/DL
MAN DIFF?: NORMAL
MCHC RBC-ENTMCNC: 31.1 PG
MCHC RBC-ENTMCNC: 31.3 GM/DL
MCV RBC AUTO: 99.4 FL
MICROSCOPIC-UA: NORMAL
MONOCYTES # BLD AUTO: 0.48 K/UL
MONOCYTES NFR BLD AUTO: 9.1 %
NEUTROPHILS # BLD AUTO: 3.2 K/UL
NEUTROPHILS NFR BLD AUTO: 61 %
NITRITE URINE: NEGATIVE
PH URINE: 6.5
PHOSPHATE SERPL-MCNC: 2.7 MG/DL
PLATELET # BLD AUTO: 166 K/UL
POTASSIUM SERPL-SCNC: 4.7 MMOL/L
PROT SERPL-MCNC: 6.8 G/DL
PROT UR-MCNC: 17 MG/DL
PROTEIN URINE: NORMAL
RBC # BLD: 4.79 M/UL
RBC # FLD: 14.4 %
RED BLOOD CELLS URINE: 0 /HPF
SODIUM SERPL-SCNC: 141 MMOL/L
SPECIFIC GRAVITY URINE: 1.03
SQUAMOUS EPITHELIAL CELLS: 0 /HPF
TACROLIMUS SERPL-MCNC: 3 NG/ML
URATE SERPL-MCNC: 6 MG/DL
UROBILINOGEN URINE: NORMAL
WBC # FLD AUTO: 5.25 K/UL
WHITE BLOOD CELLS URINE: 0 /HPF

## 2021-06-15 PROCEDURE — 99072 ADDL SUPL MATRL&STAF TM PHE: CPT

## 2021-06-15 PROCEDURE — 99215 OFFICE O/P EST HI 40 MIN: CPT

## 2021-06-15 NOTE — HISTORY OF PRESENT ILLNESS
[FreeTextEntry1] : Pt is a 57 y.o male s/p DDRTx on 2/24/17.  Donor was 19 y.o DCD female with normal creatinine.  Pt had immediate but slow function.  Donor urine culture positive was for yeast.  Pt was on fluconazole early post transplant . Currently he is feeling well. \par Had kidney biopsy 5/2017 month ago- no rejection, c4 d neg.\par \par Update:\par Feels ok but stressed out due to sister being sick in rehab and him being the primary family care taker.  Trying to exercise carefully.  He reached out to find a therapist, will be making an appointment.  Also following with orthopedics for cervical DDD and lumbar DDD/ spondylo.  Has some pain over left thigh, uses gabapentin.

## 2021-06-15 NOTE — REVIEW OF SYSTEMS
[As Noted in HPI] : as noted in HPI [Negative] : Heme/Lymph [Heart Rate Is Fast] : the heart rate was not fast [de-identified] : RLQ incision clean dry. No drainage

## 2021-06-16 LAB — CMV DNA SPEC QL NAA+PROBE: NOT DETECTED IU/ML

## 2021-06-17 LAB
BKV DNA SPEC QL NAA+PROBE: NEGATIVE COPIES/ML
LOG 10 BK QUANTITATION PCR: NORMAL

## 2021-06-28 NOTE — H&P ADULT. - PULMONARY EMBOLUS
Hospital follow-up PCP transitional care appointment has been scheduled with Dr. Frieda Amos for Wednesday, 6/30/21 at 8:00 a.m. Pending patient discharge.   Ananth Chino, Care Management Specialist. no

## 2021-08-05 ENCOUNTER — APPOINTMENT (OUTPATIENT)
Dept: NEPHROLOGY | Facility: CLINIC | Age: 61
End: 2021-08-05
Payer: MEDICARE

## 2021-08-05 ENCOUNTER — APPOINTMENT (OUTPATIENT)
Dept: TRANSPLANT | Facility: CLINIC | Age: 61
End: 2021-08-05

## 2021-08-05 PROCEDURE — 99214 OFFICE O/P EST MOD 30 MIN: CPT

## 2021-08-05 NOTE — REVIEW OF SYSTEMS
[As Noted in HPI] : as noted in HPI [Negative] : Heme/Lymph [Heart Rate Is Fast] : the heart rate was not fast [de-identified] : RLQ incision clean dry. No drainage

## 2021-08-05 NOTE — ASSESSMENT
[FreeTextEntry1] : 60 y.o male hx depression, PCKD, HTN here for follow up. He is s/p DDRTx (DCD) 2/24/17; \par \par 1.  Renal transplant - immediate function.  Baseline creatinine on ARB is about 1.7, last creatinine from yesterday was 1.6.  Tends to fluctuate, BK negative.  Had biopsy 5/2017 for elevated creatinine, no rejection.  \par 2.  Immunosuppression - On tacro, pred and MMF 250mgs BId.  Tacro goal 4-6.  Reviewed medications.  Check tacro trough today. \par 3.  HTN - stable at home. \par 4.  Hx Bipolar/ Depression - back on meds.  Needs to f/u with psychiatry or PMD.  Will make appointment with therapist.  \par 5.  PKD:Recently had hematuria but resolved.  Also has large liver consistent with polycystic liver.  \par 6.  Polycythemia: Last hemoglobin improved.  Has seen hematology.  \par 7.  BK: resolved.  He is on low dose tacro, pred 5 and [id number] BID.  Will repeat BK pcr \par 8.  Back pain:  hx of herniated disk.   Stable.  Going to PT.  \par \par F/u in 3 months\par \par

## 2021-08-05 NOTE — PHYSICAL EXAM
[General Appearance - Alert] : alert [General Appearance - In No Acute Distress] : in no acute distress [General Appearance - Well Nourished] : well nourished [Sclera] : the sclera and conjunctiva were normal [Extraocular Movements] : extraocular movements were intact [Outer Ear] : the ears and nose were normal in appearance [Neck Appearance] : the appearance of the neck was normal [Neck Cervical Mass (___cm)] : no neck mass was observed [Respiration, Rhythm And Depth] : normal respiratory rhythm and effort [Exaggerated Use Of Accessory Muscles For Inspiration] : no accessory muscle use [Auscultation Breath Sounds / Voice Sounds] : lungs were clear to auscultation bilaterally [Heart Rate And Rhythm] : heart rate was normal and rhythm regular [Heart Sounds] : normal S1 and S2 [Heart Sounds Pericardial Friction Rub] : no pericardial rub [Edema] : there was no peripheral edema [Bowel Sounds] : normal bowel sounds [Abdomen Soft] : soft [Abdomen Tenderness] : non-tender [Cervical Lymph Nodes Enlarged Posterior Bilaterally] : posterior cervical [Cervical Lymph Nodes Enlarged Anterior Bilaterally] : anterior cervical [Supraclavicular Lymph Nodes Enlarged Bilaterally] : supraclavicular [Abnormal Walk] : normal gait [___ (cm) Fistula] : [unfilled] (cm) fistula [] : no rash [No Focal Deficits] : no focal deficits [Oriented To Time, Place, And Person] : oriented to person, place, and time [Impaired Insight] : insight and judgment were intact [Affect] : the affect was normal [FreeTextEntry1] : palpable liver.

## 2021-08-05 NOTE — CONSULT LETTER
[Courtesy Letter:] : I had the pleasure of seeing your patient, [unfilled], in my office today. [Please see my note below.] : Please see my note below. [Sincerely,] : Sincerely, [Dear  ___] : Dear ~JAY, [FreeTextEntry3] : Jaun Cedeno D.O.

## 2021-08-05 NOTE — HISTORY OF PRESENT ILLNESS
[Not Working] : Not working [90: Able to carry normal activity; minor signs or symptoms of disease.] : 90: Able to carry normal activity; minor signs or symptoms of disease.  [FreeTextEntry1] : Pt is a 60 y.o male s/p DDRTx on 2/24/17.  Donor was 19 y.o DCD female with normal creatinine.  Pt had immediate but slow function.  Donor urine culture positive was for yeast.  Pt was on fluconazole early post transplant . 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 \par \par Update:\par Starting to follow with Sara Doss NP ([phone]).  Received Covid vaccination in May 2021.  Complains of back pain and neuropathy.  Now on gabapentin and baclofen.  Has medical marijuana card.

## 2021-08-06 ENCOUNTER — NON-APPOINTMENT (OUTPATIENT)
Age: 61
End: 2021-08-06

## 2021-08-06 LAB
ALBUMIN SERPL ELPH-MCNC: 4.5 G/DL
ALP BLD-CCNC: 70 U/L
ALT SERPL-CCNC: 13 U/L
ANION GAP SERPL CALC-SCNC: 12 MMOL/L
APPEARANCE: CLEAR
AST SERPL-CCNC: 18 U/L
BACTERIA: NEGATIVE
BASOPHILS # BLD AUTO: 0.01 K/UL
BASOPHILS NFR BLD AUTO: 0.2 %
BILIRUB SERPL-MCNC: 0.4 MG/DL
BILIRUBIN URINE: NEGATIVE
BLOOD URINE: NEGATIVE
BUN SERPL-MCNC: 28 MG/DL
CALCIUM SERPL-MCNC: 10.5 MG/DL
CHLORIDE SERPL-SCNC: 106 MMOL/L
CO2 SERPL-SCNC: 26 MMOL/L
COLOR: YELLOW
CREAT SERPL-MCNC: 1.69 MG/DL
CREAT SPEC-SCNC: 254 MG/DL
CREAT/PROT UR: 0.1 RATIO
EOSINOPHIL # BLD AUTO: 0.06 K/UL
EOSINOPHIL NFR BLD AUTO: 1 %
GLUCOSE QUALITATIVE U: NEGATIVE
GLUCOSE SERPL-MCNC: 64 MG/DL
HCT VFR BLD CALC: 42.5 %
HGB BLD-MCNC: 13.5 G/DL
HYALINE CASTS: 3 /LPF
IMM GRANULOCYTES NFR BLD AUTO: 0.3 %
KETONES URINE: NORMAL
LDH SERPL-CCNC: 203 U/L
LEUKOCYTE ESTERASE URINE: NEGATIVE
LYMPHOCYTES # BLD AUTO: 1.22 K/UL
LYMPHOCYTES NFR BLD AUTO: 19.5 %
MAGNESIUM SERPL-MCNC: 2.1 MG/DL
MAN DIFF?: NORMAL
MCHC RBC-ENTMCNC: 31.8 GM/DL
MCHC RBC-ENTMCNC: 32.3 PG
MCV RBC AUTO: 101.7 FL
MICROSCOPIC-UA: NORMAL
MONOCYTES # BLD AUTO: 0.56 K/UL
MONOCYTES NFR BLD AUTO: 9 %
NEUTROPHILS # BLD AUTO: 4.38 K/UL
NEUTROPHILS NFR BLD AUTO: 70 %
NITRITE URINE: NEGATIVE
PH URINE: 6.5
PHOSPHATE SERPL-MCNC: 3.4 MG/DL
PLATELET # BLD AUTO: 218 K/UL
POTASSIUM SERPL-SCNC: 5.4 MMOL/L
PROT SERPL-MCNC: 6.7 G/DL
PROT UR-MCNC: 28 MG/DL
PROTEIN URINE: ABNORMAL
RBC # BLD: 4.18 M/UL
RBC # FLD: 13.4 %
RED BLOOD CELLS URINE: 1 /HPF
SODIUM SERPL-SCNC: 143 MMOL/L
SPECIFIC GRAVITY URINE: 1.03
SQUAMOUS EPITHELIAL CELLS: 1 /HPF
TACROLIMUS SERPL-MCNC: 10.5 NG/ML
URATE SERPL-MCNC: 5.7 MG/DL
UROBILINOGEN URINE: ABNORMAL
WBC # FLD AUTO: 6.25 K/UL
WHITE BLOOD CELLS URINE: 1 /HPF

## 2021-08-09 LAB
BKV DNA SPEC QL NAA+PROBE: NEGATIVE COPIES/ML
CMV DNA SPEC QL NAA+PROBE: NOT DETECTED IU/ML
LOG 10 BK QUANTITATION PCR: NORMAL

## 2021-08-26 ENCOUNTER — APPOINTMENT (OUTPATIENT)
Dept: TRANSPLANT | Facility: CLINIC | Age: 61
End: 2021-08-26

## 2021-08-26 LAB
ALBUMIN SERPL ELPH-MCNC: 4.6 G/DL
ALP BLD-CCNC: 63 U/L
ALT SERPL-CCNC: 18 U/L
ANION GAP SERPL CALC-SCNC: 14 MMOL/L
APPEARANCE: CLEAR
AST SERPL-CCNC: 22 U/L
BACTERIA: NEGATIVE
BASOPHILS # BLD AUTO: 0.01 K/UL
BASOPHILS NFR BLD AUTO: 0.2 %
BILIRUB SERPL-MCNC: 0.5 MG/DL
BILIRUBIN URINE: NEGATIVE
BLOOD URINE: NEGATIVE
BUN SERPL-MCNC: 39 MG/DL
CALCIUM SERPL-MCNC: 10.3 MG/DL
CHLORIDE SERPL-SCNC: 106 MMOL/L
CO2 SERPL-SCNC: 24 MMOL/L
COLOR: YELLOW
CREAT SERPL-MCNC: 2.23 MG/DL
CREAT SPEC-SCNC: 252 MG/DL
CREAT/PROT UR: 0.1 RATIO
EOSINOPHIL # BLD AUTO: 0.12 K/UL
EOSINOPHIL NFR BLD AUTO: 2.1 %
GLUCOSE QUALITATIVE U: NEGATIVE
GLUCOSE SERPL-MCNC: 107 MG/DL
HCT VFR BLD CALC: 40.3 %
HGB BLD-MCNC: 12.9 G/DL
HYALINE CASTS: 0 /LPF
IMM GRANULOCYTES NFR BLD AUTO: 0.4 %
KETONES URINE: NEGATIVE
LEUKOCYTE ESTERASE URINE: NEGATIVE
LYMPHOCYTES # BLD AUTO: 1.58 K/UL
LYMPHOCYTES NFR BLD AUTO: 27.8 %
MAGNESIUM SERPL-MCNC: 2.3 MG/DL
MAN DIFF?: NORMAL
MCHC RBC-ENTMCNC: 32 GM/DL
MCHC RBC-ENTMCNC: 32.5 PG
MCV RBC AUTO: 101.5 FL
MICROSCOPIC-UA: NORMAL
MONOCYTES # BLD AUTO: 0.6 K/UL
MONOCYTES NFR BLD AUTO: 10.5 %
NEUTROPHILS # BLD AUTO: 3.36 K/UL
NEUTROPHILS NFR BLD AUTO: 59 %
NITRITE URINE: NEGATIVE
PH URINE: 6
PHOSPHATE SERPL-MCNC: 3.7 MG/DL
PLATELET # BLD AUTO: 182 K/UL
POTASSIUM SERPL-SCNC: 5.1 MMOL/L
PROT SERPL-MCNC: 6.9 G/DL
PROT UR-MCNC: 32 MG/DL
PROTEIN URINE: ABNORMAL
RBC # BLD: 3.97 M/UL
RBC # FLD: 12.8 %
RED BLOOD CELLS URINE: 1 /HPF
SODIUM SERPL-SCNC: 144 MMOL/L
SPECIFIC GRAVITY URINE: 1.03
SQUAMOUS EPITHELIAL CELLS: 1 /HPF
TACROLIMUS SERPL-MCNC: 4.7 NG/ML
UROBILINOGEN URINE: NORMAL
WBC # FLD AUTO: 5.69 K/UL
WHITE BLOOD CELLS URINE: 1 /HPF

## 2021-08-30 ENCOUNTER — TRANSCRIPTION ENCOUNTER (OUTPATIENT)
Age: 61
End: 2021-08-30

## 2021-09-02 ENCOUNTER — APPOINTMENT (OUTPATIENT)
Dept: TRANSPLANT | Facility: CLINIC | Age: 61
End: 2021-09-02

## 2021-09-16 ENCOUNTER — APPOINTMENT (OUTPATIENT)
Dept: NEPHROLOGY | Facility: CLINIC | Age: 61
End: 2021-09-16

## 2021-10-28 ENCOUNTER — APPOINTMENT (OUTPATIENT)
Dept: TRANSPLANT | Facility: CLINIC | Age: 61
End: 2021-10-28

## 2021-10-29 ENCOUNTER — NON-APPOINTMENT (OUTPATIENT)
Age: 61
End: 2021-10-29

## 2021-10-29 LAB
25(OH)D3 SERPL-MCNC: 52.5 NG/ML
ALBUMIN SERPL ELPH-MCNC: 4.5 G/DL
ALP BLD-CCNC: 70 U/L
ALT SERPL-CCNC: 13 U/L
ANION GAP SERPL CALC-SCNC: 14 MMOL/L
APPEARANCE: CLEAR
AST SERPL-CCNC: 18 U/L
BACTERIA: NEGATIVE
BASOPHILS # BLD AUTO: 0.01 K/UL
BASOPHILS NFR BLD AUTO: 0.2 %
BILIRUB SERPL-MCNC: 0.4 MG/DL
BILIRUBIN URINE: NEGATIVE
BLOOD URINE: NEGATIVE
BUN SERPL-MCNC: 24 MG/DL
CALCIUM SERPL-MCNC: 10.4 MG/DL
CALCIUM SERPL-MCNC: 10.4 MG/DL
CHLORIDE SERPL-SCNC: 104 MMOL/L
CHOLEST SERPL-MCNC: 219 MG/DL
CO2 SERPL-SCNC: 22 MMOL/L
COLOR: NORMAL
CREAT SERPL-MCNC: 1.5 MG/DL
CREAT SPEC-SCNC: 74 MG/DL
CREAT/PROT UR: 0.2 RATIO
EOSINOPHIL # BLD AUTO: 0.11 K/UL
EOSINOPHIL NFR BLD AUTO: 1.8 %
ESTIMATED AVERAGE GLUCOSE: 111 MG/DL
GLUCOSE QUALITATIVE U: NEGATIVE
GLUCOSE SERPL-MCNC: 77 MG/DL
HBA1C MFR BLD HPLC: 5.5 %
HCT VFR BLD CALC: 42.2 %
HDLC SERPL-MCNC: 98 MG/DL
HGB BLD-MCNC: 13.4 G/DL
HYALINE CASTS: 0 /LPF
IMM GRANULOCYTES NFR BLD AUTO: 0.2 %
KETONES URINE: NEGATIVE
LDH SERPL-CCNC: 194 U/L
LDLC SERPL CALC-MCNC: 99 MG/DL
LEUKOCYTE ESTERASE URINE: NEGATIVE
LYMPHOCYTES # BLD AUTO: 1.65 K/UL
LYMPHOCYTES NFR BLD AUTO: 27 %
MAGNESIUM SERPL-MCNC: 2 MG/DL
MAN DIFF?: NORMAL
MCHC RBC-ENTMCNC: 31.5 PG
MCHC RBC-ENTMCNC: 31.8 GM/DL
MCV RBC AUTO: 99.3 FL
MICROSCOPIC-UA: NORMAL
MONOCYTES # BLD AUTO: 0.71 K/UL
MONOCYTES NFR BLD AUTO: 11.6 %
NEUTROPHILS # BLD AUTO: 3.62 K/UL
NEUTROPHILS NFR BLD AUTO: 59.2 %
NITRITE URINE: NEGATIVE
NONHDLC SERPL-MCNC: 122 MG/DL
PARATHYROID HORMONE INTACT: 58 PG/ML
PH URINE: 6
PHOSPHATE SERPL-MCNC: 4.3 MG/DL
PLATELET # BLD AUTO: 213 K/UL
POTASSIUM SERPL-SCNC: 5.3 MMOL/L
PROT SERPL-MCNC: 7.1 G/DL
PROT UR-MCNC: 12 MG/DL
PROTEIN URINE: NEGATIVE
RBC # BLD: 4.25 M/UL
RBC # FLD: 13.1 %
RED BLOOD CELLS URINE: 0 /HPF
SODIUM SERPL-SCNC: 140 MMOL/L
SPECIFIC GRAVITY URINE: 1.02
SQUAMOUS EPITHELIAL CELLS: 0 /HPF
TACROLIMUS SERPL-MCNC: 6.2 NG/ML
TRIGL SERPL-MCNC: 113 MG/DL
URATE SERPL-MCNC: 5.7 MG/DL
UROBILINOGEN URINE: NORMAL
WBC # FLD AUTO: 6.11 K/UL
WHITE BLOOD CELLS URINE: 0 /HPF

## 2021-11-11 ENCOUNTER — APPOINTMENT (OUTPATIENT)
Dept: NEPHROLOGY | Facility: CLINIC | Age: 61
End: 2021-11-11
Payer: MEDICARE

## 2021-11-11 ENCOUNTER — NON-APPOINTMENT (OUTPATIENT)
Age: 61
End: 2021-11-11

## 2021-11-11 VITALS
DIASTOLIC BLOOD PRESSURE: 89 MMHG | SYSTOLIC BLOOD PRESSURE: 140 MMHG | HEIGHT: 67 IN | BODY MASS INDEX: 19.3 KG/M2 | WEIGHT: 123 LBS | OXYGEN SATURATION: 100 % | RESPIRATION RATE: 17 BRPM | HEART RATE: 53 BPM | TEMPERATURE: 98 F

## 2021-11-11 PROCEDURE — 99213 OFFICE O/P EST LOW 20 MIN: CPT

## 2021-11-11 NOTE — CONSULT LETTER
[Dear  ___] : Dear ~AJY, [Courtesy Letter:] : I had the pleasure of seeing your patient, [unfilled], in my office today. [Please see my note below.] : Please see my note below. [Sincerely,] : Sincerely, [FreeTextEntry3] : Jaun Cedeno D.O.

## 2021-11-11 NOTE — HISTORY OF PRESENT ILLNESS
[Not Working] : Not working [90: Able to carry normal activity; minor signs or symptoms of disease.] : 90: Able to carry normal activity; minor signs or symptoms of disease.  [FreeTextEntry1] : Pt is a 60 y.o male s/p DDRTx on 2/24/17.  Donor was 19 y.o DCD female with normal creatinine.  Pt had immediate but slow function.  Donor urine culture positive was for yeast.  Pt was on fluconazole early post transplant . 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 \par \par Update:\par Starting to follow with Sara Doss NP ([phone]).  Received Covid vaccination in May 2021.  Complains of back pain and neuropathy.  Now on gabapentin and baclofen.  Has medical marijuana card. Stressed out about his sick sister.  Ran out of Wellbutrin.

## 2021-11-11 NOTE — REVIEW OF SYSTEMS
[As Noted in HPI] : as noted in HPI [Negative] : Heme/Lymph [Heart Rate Is Fast] : the heart rate was not fast [de-identified] : RLQ incision clean dry. No drainage

## 2021-11-11 NOTE — ASSESSMENT
[FreeTextEntry1] : 61 y.o male hx depression, PCKD, HTN here for follow up. He is s/p DDRTx (DCD) 2/24/17; \par \par 1.  Renal transplant - immediate function.  Baseline creatinine on ARB is about 1.7, last was 1.5.  Tends to fluctuate, BK negative.  Had biopsy 5/2017 for elevated creatinine, no rejection.  \par 2.  Immunosuppression - On tacro, pred and MMF 250mgs BId.  Tacro goal 4-6.  Reviewed medications.  Check tacro trough today. \par 3.  HTN - stable. \par 4.  Hx Bipolar/ Depression - I reordered his medication.  Needs to f/u with psychiatry or PMD.  Will make appointment with therapist.  \par 5.  PKD:Recently had hematuria but resolved.  Also has large liver consistent with polycystic liver.  \par 6.  Polycythemia: Last hemoglobin improved.  Has seen hematology.  \par 7.  BK: resolved.  He is on low dose tacro, pred 5 and [id number] BID.  Will repeat BK pcr \par 8.  Back pain:  hx of herniated disk.   Stable.  Going to PT.  \par \par F/u in 3 months\par \par

## 2021-11-14 NOTE — ED PROVIDER NOTE - CONSTITUTIONAL, MLM
ACP ACP ACP ACP ACP ACP ACP ACP normal... Well appearing, well nourished, awake, alert, oriented to person, place, time/situation and in no apparent distress.

## 2021-11-17 LAB
25(OH)D3 SERPL-MCNC: 44.4 NG/ML
ALBUMIN SERPL ELPH-MCNC: 4.4 G/DL
ALP BLD-CCNC: 57 U/L
ALT SERPL-CCNC: 12 U/L
ANION GAP SERPL CALC-SCNC: 10 MMOL/L
APPEARANCE: CLEAR
AST SERPL-CCNC: 17 U/L
BACTERIA: NEGATIVE
BASOPHILS # BLD AUTO: 0.01 K/UL
BASOPHILS NFR BLD AUTO: 0.2 %
BILIRUB SERPL-MCNC: 0.3 MG/DL
BILIRUBIN URINE: NEGATIVE
BKV DNA SPEC QL NAA+PROBE: NEGATIVE COPIES/ML
BLOOD URINE: NEGATIVE
BUN SERPL-MCNC: 25 MG/DL
CALCIUM SERPL-MCNC: 10 MG/DL
CALCIUM SERPL-MCNC: 10 MG/DL
CHLORIDE SERPL-SCNC: 106 MMOL/L
CHOLEST SERPL-MCNC: 209 MG/DL
CMV DNA SPEC QL NAA+PROBE: NOT DETECTED IU/ML
CO2 SERPL-SCNC: 25 MMOL/L
COLOR: YELLOW
COVID-19 SPIKE DOMAIN ANTIBODY INTERPRETATION: POSITIVE
CREAT SERPL-MCNC: 1.53 MG/DL
CREAT SPEC-SCNC: 197 MG/DL
CREAT/PROT UR: 0.1 RATIO
EOSINOPHIL # BLD AUTO: 0.07 K/UL
EOSINOPHIL NFR BLD AUTO: 1.1 %
ESTIMATED AVERAGE GLUCOSE: 114 MG/DL
GLUCOSE QUALITATIVE U: NEGATIVE
GLUCOSE SERPL-MCNC: 84 MG/DL
HBA1C MFR BLD HPLC: 5.6 %
HCT VFR BLD CALC: 40.9 %
HDLC SERPL-MCNC: 83 MG/DL
HGB BLD-MCNC: 12.8 G/DL
HYALINE CASTS: 0 /LPF
IMM GRANULOCYTES NFR BLD AUTO: 0.3 %
KETONES URINE: NEGATIVE
LDLC SERPL CALC-MCNC: 107 MG/DL
LEUKOCYTE ESTERASE URINE: NEGATIVE
LOG 10 BK QUANTITATION PCR: NORMAL
LYMPHOCYTES # BLD AUTO: 1.44 K/UL
LYMPHOCYTES NFR BLD AUTO: 22.3 %
MAGNESIUM SERPL-MCNC: 2.1 MG/DL
MAN DIFF?: NORMAL
MCHC RBC-ENTMCNC: 31.2 PG
MCHC RBC-ENTMCNC: 31.3 GM/DL
MCV RBC AUTO: 99.8 FL
MICROSCOPIC-UA: NORMAL
MONOCYTES # BLD AUTO: 0.54 K/UL
MONOCYTES NFR BLD AUTO: 8.3 %
NEUTROPHILS # BLD AUTO: 4.39 K/UL
NEUTROPHILS NFR BLD AUTO: 67.8 %
NITRITE URINE: NEGATIVE
NONHDLC SERPL-MCNC: 126 MG/DL
PARATHYROID HORMONE INTACT: 58 PG/ML
PH URINE: 6.5
PHOSPHATE SERPL-MCNC: 3.1 MG/DL
PLATELET # BLD AUTO: 205 K/UL
POTASSIUM SERPL-SCNC: 5 MMOL/L
PROT SERPL-MCNC: 6.6 G/DL
PROT UR-MCNC: 27 MG/DL
PROTEIN URINE: NORMAL
RBC # BLD: 4.1 M/UL
RBC # FLD: 12.9 %
RED BLOOD CELLS URINE: 1 /HPF
SARS-COV-2 AB SERPL IA-ACNC: >250 U/ML
SODIUM SERPL-SCNC: 141 MMOL/L
SPECIFIC GRAVITY URINE: 1.03
SQUAMOUS EPITHELIAL CELLS: 1 /HPF
TACROLIMUS SERPL-MCNC: 4 NG/ML
TRIGL SERPL-MCNC: 94 MG/DL
URATE SERPL-MCNC: 6 MG/DL
UROBILINOGEN URINE: NORMAL
WBC # FLD AUTO: 6.47 K/UL
WHITE BLOOD CELLS URINE: 1 /HPF

## 2022-02-04 ENCOUNTER — APPOINTMENT (OUTPATIENT)
Dept: TRANSPLANT | Facility: CLINIC | Age: 62
End: 2022-02-04

## 2022-02-07 ENCOUNTER — NON-APPOINTMENT (OUTPATIENT)
Age: 62
End: 2022-02-07

## 2022-02-07 LAB
25(OH)D3 SERPL-MCNC: 39.7 NG/ML
ALBUMIN SERPL ELPH-MCNC: 4.5 G/DL
ALP BLD-CCNC: 65 U/L
ALT SERPL-CCNC: 9 U/L
ANION GAP SERPL CALC-SCNC: 11 MMOL/L
APPEARANCE: CLEAR
AST SERPL-CCNC: 14 U/L
BACTERIA: NEGATIVE
BASOPHILS # BLD AUTO: 0.01 K/UL
BASOPHILS NFR BLD AUTO: 0.2 %
BILIRUB SERPL-MCNC: 0.4 MG/DL
BILIRUBIN URINE: NEGATIVE
BLOOD URINE: NEGATIVE
BUN SERPL-MCNC: 26 MG/DL
CALCIUM SERPL-MCNC: 10.3 MG/DL
CALCIUM SERPL-MCNC: 10.3 MG/DL
CHLORIDE SERPL-SCNC: 104 MMOL/L
CHOLEST SERPL-MCNC: 207 MG/DL
CO2 SERPL-SCNC: 25 MMOL/L
COLOR: YELLOW
COVID-19 SPIKE DOMAIN ANTIBODY INTERPRETATION: POSITIVE
CREAT SERPL-MCNC: 1.69 MG/DL
CREAT SPEC-SCNC: 186 MG/DL
CREAT/PROT UR: 0.1 RATIO
EOSINOPHIL # BLD AUTO: 0.11 K/UL
EOSINOPHIL NFR BLD AUTO: 1.8 %
ESTIMATED AVERAGE GLUCOSE: 111 MG/DL
GLUCOSE QUALITATIVE U: NEGATIVE
GLUCOSE SERPL-MCNC: 80 MG/DL
HBA1C MFR BLD HPLC: 5.5 %
HCT VFR BLD CALC: 41.4 %
HDLC SERPL-MCNC: 94 MG/DL
HGB BLD-MCNC: 12.9 G/DL
HYALINE CASTS: 0 /LPF
IMM GRANULOCYTES NFR BLD AUTO: 0.2 %
KETONES URINE: NEGATIVE
LDLC SERPL CALC-MCNC: 90 MG/DL
LEUKOCYTE ESTERASE URINE: NEGATIVE
LYMPHOCYTES # BLD AUTO: 1.81 K/UL
LYMPHOCYTES NFR BLD AUTO: 29.6 %
MAGNESIUM SERPL-MCNC: 2 MG/DL
MAN DIFF?: NORMAL
MCHC RBC-ENTMCNC: 30.7 PG
MCHC RBC-ENTMCNC: 31.2 GM/DL
MCV RBC AUTO: 98.6 FL
MICROSCOPIC-UA: NORMAL
MONOCYTES # BLD AUTO: 0.62 K/UL
MONOCYTES NFR BLD AUTO: 10.1 %
NEUTROPHILS # BLD AUTO: 3.56 K/UL
NEUTROPHILS NFR BLD AUTO: 58.1 %
NITRITE URINE: NEGATIVE
NONHDLC SERPL-MCNC: 113 MG/DL
PARATHYROID HORMONE INTACT: 56 PG/ML
PH URINE: 6.5
PHOSPHATE SERPL-MCNC: 4 MG/DL
PLATELET # BLD AUTO: 201 K/UL
POTASSIUM SERPL-SCNC: 4.9 MMOL/L
PROT SERPL-MCNC: 6.5 G/DL
PROT UR-MCNC: 18 MG/DL
PROTEIN URINE: NORMAL
RBC # BLD: 4.2 M/UL
RBC # FLD: 13.7 %
RED BLOOD CELLS URINE: 1 /HPF
SARS-COV-2 AB SERPL IA-ACNC: >250 U/ML
SODIUM SERPL-SCNC: 141 MMOL/L
SPECIFIC GRAVITY URINE: 1.03
SQUAMOUS EPITHELIAL CELLS: 0 /HPF
TACROLIMUS SERPL-MCNC: 6.5 NG/ML
TRIGL SERPL-MCNC: 115 MG/DL
URATE SERPL-MCNC: 5.4 MG/DL
UROBILINOGEN URINE: ABNORMAL
WBC # FLD AUTO: 6.12 K/UL
WHITE BLOOD CELLS URINE: 0 /HPF

## 2022-02-09 LAB
BKV DNA SPEC QL NAA+PROBE: NOT DETECTED COPIES/ML
CMV DNA SPEC QL NAA+PROBE: NOT DETECTED IU/ML

## 2022-02-10 ENCOUNTER — APPOINTMENT (OUTPATIENT)
Dept: NEPHROLOGY | Facility: CLINIC | Age: 62
End: 2022-02-10

## 2022-03-28 ENCOUNTER — APPOINTMENT (OUTPATIENT)
Dept: TRANSPLANT | Facility: CLINIC | Age: 62
End: 2022-03-28

## 2022-03-29 ENCOUNTER — APPOINTMENT (OUTPATIENT)
Dept: NEPHROLOGY | Facility: CLINIC | Age: 62
End: 2022-03-29
Payer: MEDICARE

## 2022-03-29 VITALS
HEART RATE: 67 BPM | DIASTOLIC BLOOD PRESSURE: 70 MMHG | RESPIRATION RATE: 17 BRPM | OXYGEN SATURATION: 98 % | TEMPERATURE: 97.4 F | SYSTOLIC BLOOD PRESSURE: 109 MMHG | BODY MASS INDEX: 19.78 KG/M2 | HEIGHT: 67 IN | WEIGHT: 126 LBS

## 2022-03-29 DIAGNOSIS — B34.8 OTHER VIRAL INFECTIONS OF UNSPECIFIED SITE: ICD-10-CM

## 2022-03-29 LAB
ALBUMIN SERPL ELPH-MCNC: 4.4 G/DL
ALP BLD-CCNC: 63 U/L
ALT SERPL-CCNC: 11 U/L
ANION GAP SERPL CALC-SCNC: 11 MMOL/L
APPEARANCE: CLEAR
AST SERPL-CCNC: 15 U/L
BACTERIA: NEGATIVE
BASOPHILS # BLD AUTO: 0.02 K/UL
BASOPHILS NFR BLD AUTO: 0.3 %
BILIRUB SERPL-MCNC: 0.3 MG/DL
BILIRUBIN URINE: NEGATIVE
BLOOD URINE: NEGATIVE
BUN SERPL-MCNC: 25 MG/DL
CALCIUM SERPL-MCNC: 10.1 MG/DL
CHLORIDE SERPL-SCNC: 107 MMOL/L
CO2 SERPL-SCNC: 25 MMOL/L
COLOR: YELLOW
CREAT SERPL-MCNC: 1.77 MG/DL
CREAT SPEC-SCNC: 192 MG/DL
CREAT/PROT UR: 0.1 RATIO
EGFR: 43 ML/MIN/1.73M2
EOSINOPHIL # BLD AUTO: 0.1 K/UL
EOSINOPHIL NFR BLD AUTO: 1.5 %
GLUCOSE QUALITATIVE U: NEGATIVE
GLUCOSE SERPL-MCNC: 95 MG/DL
HCT VFR BLD CALC: 46.3 %
HGB BLD-MCNC: 14.2 G/DL
HYALINE CASTS: 0 /LPF
IMM GRANULOCYTES NFR BLD AUTO: 0.1 %
KETONES URINE: NEGATIVE
LEUKOCYTE ESTERASE URINE: NEGATIVE
LYMPHOCYTES # BLD AUTO: 1.86 K/UL
LYMPHOCYTES NFR BLD AUTO: 27.6 %
MAGNESIUM SERPL-MCNC: 1.9 MG/DL
MAN DIFF?: NORMAL
MCHC RBC-ENTMCNC: 30.7 GM/DL
MCHC RBC-ENTMCNC: 30.7 PG
MCV RBC AUTO: 100 FL
MICROSCOPIC-UA: NORMAL
MONOCYTES # BLD AUTO: 0.59 K/UL
MONOCYTES NFR BLD AUTO: 8.7 %
NEUTROPHILS # BLD AUTO: 4.17 K/UL
NEUTROPHILS NFR BLD AUTO: 61.8 %
NITRITE URINE: NEGATIVE
PH URINE: 6.5
PHOSPHATE SERPL-MCNC: 3.8 MG/DL
PLATELET # BLD AUTO: 197 K/UL
POTASSIUM SERPL-SCNC: 4.4 MMOL/L
PROT SERPL-MCNC: 6.5 G/DL
PROT UR-MCNC: 16 MG/DL
PROTEIN URINE: NORMAL
RBC # BLD: 4.63 M/UL
RBC # FLD: 13.5 %
RED BLOOD CELLS URINE: 1 /HPF
SODIUM SERPL-SCNC: 143 MMOL/L
SPECIFIC GRAVITY URINE: 1.03
SQUAMOUS EPITHELIAL CELLS: 1 /HPF
TACROLIMUS SERPL-MCNC: 7 NG/ML
UROBILINOGEN URINE: NORMAL
WBC # FLD AUTO: 6.75 K/UL
WHITE BLOOD CELLS URINE: 1 /HPF

## 2022-03-29 PROCEDURE — 99214 OFFICE O/P EST MOD 30 MIN: CPT

## 2022-03-29 NOTE — CONSULT LETTER
[Dear  ___] : Dear ~JAY, [Courtesy Letter:] : I had the pleasure of seeing your patient, [unfilled], in my office today. [Please see my note below.] : Please see my note below. [Sincerely,] : Sincerely, [FreeTextEntry3] : Jaun Cedeno D.O.

## 2022-03-29 NOTE — ASSESSMENT
[FreeTextEntry1] : 61 y.o male hx depression, PCKD, HTN here for follow up. He is s/p DDRTx (DCD) 2/24/17; \par \par 1.  Renal transplant - immediate function.  Baseline creatinine on ARB is about 1.7.  Tends to fluctuate, BK negative. Had biopsy 5/2017 for elevated creatinine, no rejection.  \par 2.  Immunosuppression - On tacro, pred and MMF 250mgs BId.  Tacro goal 4-6.  Reviewed medications.  Check tacro trough today. \par 3.  HTN - stable. \par 4.  Hx Bipolar/ Depression - I reordered his medication.  Needs to f/u with psychiatry or PMD.  Will make appointment with therapist.  \par 5.  PKD:  Also has large liver consistent with polycystic liver.  \par 6.  Polycythemia: Last hemoglobin improved.  Has seen hematology.  \par 7.  BK: resolved.  He is on low dose tacro, pred 5 and MMF 250mgs BID.  Will repeat BK pcr \par 8.  Back pain:  hx of herniated disk.   Stable.  Going to PT.  \par 9.  ED:  Asked pt to see urology.  \par \par F/u in 3 months\par \par

## 2022-03-29 NOTE — HISTORY OF PRESENT ILLNESS
[Not Working] : Not working [90: Able to carry normal activity; minor signs or symptoms of disease.] : 90: Able to carry normal activity; minor signs or symptoms of disease.  [FreeTextEntry1] : Pt is a 60 y.o male s/p DDRTx on 2/24/17.  Donor was 19 y.o DCD female with normal creatinine.  Pt had immediate but slow function.  Donor urine culture positive was for yeast.  Pt was on fluconazole early post transplant . 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 \par \par Update:\par Starting to follow with Sara Doss NP ([phone]).  Received Covid vaccination in May 2021.  Complains of back pain and neuropathy.  Now on gabapentin and baclofen.  Has medical marijuana card. Stressed out about his sick sister.  Ran out of Wellbutrin.   [de-identified] : Pt feel ok.  Still has back pain and now complains of ED.

## 2022-03-29 NOTE — REVIEW OF SYSTEMS
[As Noted in HPI] : as noted in HPI [Negative] : Heme/Lymph [Heart Rate Is Fast] : the heart rate was not fast [de-identified] : RLQ incision clean dry. No drainage

## 2022-03-31 LAB
BKV DNA SPEC QL NAA+PROBE: NOT DETECTED COPIES/ML
CMV DNA SPEC QL NAA+PROBE: NOT DETECTED IU/ML

## 2022-04-08 ENCOUNTER — TRANSCRIPTION ENCOUNTER (OUTPATIENT)
Age: 62
End: 2022-04-08

## 2022-04-12 ENCOUNTER — APPOINTMENT (OUTPATIENT)
Dept: PAIN MANAGEMENT | Facility: CLINIC | Age: 62
End: 2022-04-12
Payer: MEDICARE

## 2022-04-12 PROCEDURE — 62323 NJX INTERLAMINAR LMBR/SAC: CPT

## 2022-04-12 NOTE — PROCEDURE
[FreeTextEntry3] : Date of Service: 04/12/2022 \par \par Account: [id number]\par \par Patient: TRACY JENNINGS \par \par YOB: 1960\par \par Age: 61 year\par \par Surgeon: Marquita Jack M.D.\par \par Pre-Operative Diagnosis:  Lumbosacral Radiculitis (M54.17)\par \par Post Operative Diagnosis: Lumbosacral Radiculitis (M54.17)\par \par Procedure: Interlaminar lumbar epidural steroid injection (L4-5) under fluoroscopic guidance\par \par Anesthesia:           MAC\par \par \par This procedure was carried out using fluoroscopic guidance.  The risks and benefits of the procedure were discussed extensively with the patient.  The consent of the patient was obtained and the following procedure was performed.\par \par The patient was placed in the prone position.  The lumbar area was prepped and draped in a sterile fashion.  Under AP view with slight cephalad-caudad angulation, the L4-5 interspace was identified and marked.  Using sterile technique the superficial skin was anesthetized with 1% Lidocaine without epinephrine.  A 20 gauge Tuohoy needle was advanced into the epidural space under fluoroscopy using xlwqm-zsisnwjlo-tytda technique and using loss of resistance at the L4-5 level.  After negative aspiration for heme or CSF, an epidurogram was obtained using 3 cc Omnipaque contrast confirming epidural placement of the needle. \par \par Lumbar epidurogram showed no intrathecal or intravascular spread and showed adequate bilateral epidural spread from L1 to S1 levels.\par \par After this, 5 cc of preservative free normal saline and 80 mg of Kenalog were injected into the epidural space.\par \par The needle was subsequently removed.  Anesthesia personnel were present throughout the procedure.\par \par The patient tolerated the procedure well and was instructed to contact me immediately if there were any problems.\par \par Marquita Jack M.D.\par \par

## 2022-04-26 ENCOUNTER — APPOINTMENT (OUTPATIENT)
Dept: PAIN MANAGEMENT | Facility: CLINIC | Age: 62
End: 2022-04-26
Payer: MEDICARE

## 2022-04-26 VITALS — WEIGHT: 123 LBS | HEIGHT: 66 IN | BODY MASS INDEX: 19.77 KG/M2

## 2022-04-26 PROCEDURE — 99214 OFFICE O/P EST MOD 30 MIN: CPT

## 2022-04-26 NOTE — ASSESSMENT
[FreeTextEntry1] : After discussing various treatment options with the patient including but not limited to oral medications, physical therapy, exercise, modalities as well as interventional spinal injections, we have decided with the following plan:\par I personally reviewed the MRI/CT scan images and agree with the radiologist's report. The radiological findings were discussed with the patient.\par The risks, benefits, contents and alternatives to injection were explained in full to the patient. Risks outlined include but are not limited to infection,sepsis, bleeding, post-dural puncture headache, nerve damage, temporary increase in pain, syncopal episode, failure to resolve symptoms, allergic reaction, symptom recurrence, and elevation of blood sugar in diabetics. Cortisone may cause immunosuppression. Patient understands the risks. All questions were answered. After discussion of options, patient requested an injection. Information regarding the injection was given to the patient. Which medications to stop prior to the injection was explained to the patient as well.\par Follow up in 1-2 weeks post injection for re-evaluation. \par Continue Home exercises, stretching, activity modification, physical therapy, and conservative care.\par Patient is presenting with acute/sub-acute radicular pain with impairment in ADLs and functionality. The pain has not responded to conservative care including nsaid therapy and/or physical therapy. There is no bleeding tendency, unstable medical condition, or systemic infection.\par \par Patient presents with axial lumbar pain that has not responded to  3 months of conservative therapy including physical therapy or NSAID therapy.  The pain is interfering with activities of daily living and functionality.   There is no radicular pain.   The pain is exacerbated by facet loading.  Positive Kemps maneuver which is defined by pain reproduction with extension and rotation of the lumbar spine to the affected side.  The patient has not had a vertebral fusion at the levels of the proposed treatment.  There is no unexplained neurologic deficit.  There is no history of systemic infection, unstable medical condition, bleeding tendency, or local infection.  The injection is being performed to diagnose the facet joint as the source of the individual's pain.\par
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
## 2022-04-27 ENCOUNTER — APPOINTMENT (OUTPATIENT)
Dept: ORTHOPEDIC SURGERY | Facility: CLINIC | Age: 62
End: 2022-04-27
Payer: MEDICAID

## 2022-04-27 DIAGNOSIS — M47.812 SPONDYLOSIS W/OUT MYELOPATHY OR RADICULOPATHY, CERVICAL REGION: ICD-10-CM

## 2022-04-27 PROCEDURE — 99213 OFFICE O/P EST LOW 20 MIN: CPT

## 2022-04-27 NOTE — HISTORY OF PRESENT ILLNESS
[7] : 7 [9] : 9 [de-identified] : Patient Complaint - 2/2/22: 61 yo RHD male with hx of low back pain. Aggravated low back pain after shoveling 2 days\par ago. Pain usually left sided back pain but now radiating across back to right side. Intermittent radicular pain down left\par leg relieved with Neurontin. Denies left leg paresthesias. ambulating without discomfort, orthotics help.\par 6/14/21: Patient is a 61 yo male c/o low back pain which radiates down his left leg that has been worsening over past 4\par months. Having tingling in toes. Taking Tylenol as needed for pain. Having left arm and neck pain as well. No previous\par surgeries to low back or cervical spine. \par Pmhx: Kidney Transplant (2017), Stroke (2015)\par C MRI:\par C2-3: Disc space narrowing and chronic vertebral endplate changes. Disc bulge which causes mild left neuroforaminal\par stenosis. Central canal and left neural foramen are normal.\par C3-4: Left foraminal disc herniation/extrusion measuring 5 x 6 mm causing moderate left foraminal stenosis. The\par herniation abuts the left ventral spinal cord and causes moderate left central canal stenosis. The herniation is\par superimposed on a disc bulge which abuts the spinal cord and causes mild right foraminal stenosis. Disc space narrowing\par and chronic vertebral endplate changes.\par C4-5: Disc space narrowing and chronic vertebral end plate changes. Disc bulge which indents the thecal sac. The\par bulge narrows the neuroforamina. Disc space narrowing and chronic vertebral changes.\par C5-6: 3 mm retrolisthesis of C5 on C6. Severe disc space narrowing chronic vertebral endplate changes. Broad-based\par central disc herniation which impinges the spinal cord. Posterior ligamentum thickening. These cause mild to moderate\par central spinal canal stenosis. The herniation is superimposed on a disc bulge which effaces the lateral recesses and\par causes moderate bilateral neuroforaminal stenosis.\par C6-7: Severe disc space narrowing chronic vertebral endplate changes. Disc bulge which abuts the ventral spinal cord.\par The bulge causes moderate bilateral neuroforaminal stenosis.\par C7-T1: Disc bulge which indents the thecal sac. Moderate to severe left and mild right neuroforaminal stenosis. Disc\par space narrowing chronic vertebral endplate changes. Posterior ligamentum thickening.\par T1-2: No spinal canal or neuroforaminal stenosis.\par T2-3: Disc bulge which abuts the ventral spinal cord and mildly narrows the neuroforamina.\par Ind review multilevel foraminal stenosis and central stenosis most notable C3/4 C5/6\par \par L MRI:\par T10-11: Disc bulge indents the thecal sac. No neuroforaminal stenosis. Disc space narrowing.\par T11-12: Disc bulge indents the thecal sac. No neuroforaminal stenosis.\par T12-L1 and L1-2: No spinal canal or neuroforaminal stenosis.\par L2-3: Disc bulging indents the thecal sac. The bulge causes mild bilateral neuroforaminal stenosis.\par L3-4: Disc bulge impinges the thecal sac and causes mild central canal stenosis. The bulge causes mild bilateral\par neuroforaminal stenosis. Disc space narrowing.\par L4-5: 5 mm grade 1 anterolisthesis of L4 and L5. Broad-based central and right subarticular and foraminal disc herniation\par which causes moderate central spinal canal stenosis and effaces the right lateral recess. Severe right foraminal stenosis\par impinging the exiting right L4 nerve. The herniation is superimposed on a disc bulge which causes moderate left\par foraminal stenosis and effaces the lateral recesses. Severe disc space narrowing and chronic vertebral endplate\par changes. Facet arthropathy with facet effusions.\par L5-S1: 5 mm grade 1 anterolisthesis of L5 on S1. Severe disc space narrowing chronic vertebral endplate changes with\par bone-on-bone appearance. Type I chronic vertebral endplate changes with bone edema. Disc bulge which causes\par moderate central spinal canal stenosis. The bulge effaces the lateral recesses. Moderate to severe bilateral\par neuroforaminal stenosis impinging the exiting L5 nerves. Severe facet arthropathy.\par Ind. review- stepwise anterolisthesis L4/5, L5/S1 with central and foraminal stenosis\par pain Dr. Jack- \par LESI L4/5 on 4/12/22\par  LESI L4/5 9/2/21.\par \par ________________________________________________________________________\par \par RHDM. \par Neck pain with pain radiating to LUE localized to arm, to dorsal forearm, in to hand with N/T. \par Reports mild gait imbalance and hand dexterity issues. \par Also LBP with pain radiating to the LLE localized to the posterolateral thigh to lateral leg to foot, similar sxs on the RLE\par though less severe.\par In PT with some relief. \par 7/30/21- MRI f/u. Uses handrails up/down stairs.\par 9/15/21- feels better after LESI, pain down the LLE less severe. Still pain down the LUE\par 12/1/21- Still neck pain radiating down the LUE, also LBP with pain radiating down the LLE.\par 3/2/22- Worsening of pain in the LBP down the LLE after shoveling snow\par 4/27/22- LBP worse. Now having radic down RLE rather than LLE. Has 2nd LESI scheduled for 5/5/22; 1st LESI was 4/12/22. Has been going to PT. [de-identified] : PT

## 2022-04-27 NOTE — ASSESSMENT
[FreeTextEntry1] : cervical radic with element of central stenosis \par mJOA 13\par Reports several recent falls though able to tandem gait\par Is likely beginning to experience symptoms of CSM\par Pain referral for discussion of Kati, repeat LESI\par C/w PT

## 2022-04-27 NOTE — IMAGING
[de-identified] : Back, including spine: Palpation of the thoracic/lumbar spine is as follows: left lumbar paraspinal\par tenderness. No GT/ITB TTP Range of motion of the thoracic and lumbar spine is as follows: Diminished range of\par motion in all planes. No pain with hip F/IR Strength Testing of the thoracic and lumbar spine is as follows: motor exam\par is non-focal throughout both lower extremities Special testing of the thoracic and lumbar spine is as\par follows: Positive sitting straight leg raise on the left. Neurological testing of the thoracic and lumbar spine is as\par follows: sensory exam is non-focal throughout both lower extremities Gait and function is as follows: able to tandem\par gait

## 2022-05-05 ENCOUNTER — APPOINTMENT (OUTPATIENT)
Dept: ORTHOPEDIC SURGERY | Facility: AMBULATORY SURGERY CENTER | Age: 62
End: 2022-05-05

## 2022-05-06 ENCOUNTER — APPOINTMENT (OUTPATIENT)
Dept: PAIN MANAGEMENT | Facility: CLINIC | Age: 62
End: 2022-05-06
Payer: MEDICARE

## 2022-05-06 PROCEDURE — J3490M: CUSTOM

## 2022-05-06 PROCEDURE — 64493 INJ PARAVERT F JNT L/S 1 LEV: CPT | Mod: 50

## 2022-05-06 PROCEDURE — 64494 INJ PARAVERT F JNT L/S 2 LEV: CPT | Mod: 50

## 2022-05-06 NOTE — PROCEDURE
[FreeTextEntry3] : Date of Service: 05/06/2022 \par \par Account: [id number]\par \par Patient: TRACY JENNINGS \par \par YOB: 1960\par \par Age: 61 year\par \par \par Surgeon:                                      Marquita Jack M.D.\par \par Assistant:                                    None.\par \par Pre-Operative Diagnosis:            Spondylosis of Lumbar Region without Myelopathy or Radiculopathy (M47.816)    \par \par Post Operative Diagnosis:        Same  \par \par Procedure:                 Right L4-5, L5-S1 Facet block\par \par                                     Left L4-5, L5-S1 Facet block under fluoroscopic guidance\par \par  Anesthesia:                MAC\par \par \par This procedure was carried out using fluoroscopic guidance.  The risks and benefits of the procedure were discussed extensively with the patient.  The consent of the patient was obtained and the following procedure was performed.\par \par The patient was placed in the prone position.  The patient's back was prepped and draped in a sterile fashion.  The left L4 and L5 lumbar vertebral bodies were identified and the fluoroscope left obliqued to approximately 30 degrees to reveal good "Seth-dog" anatomical view.  The junction of the superior articulate process and tranverse process at the L4 and L5 level was then identified and marked. The skin at these target points was then localized using 1 cc of 1% Lidocaine without epinephrine at each injection site.  A spinal needle was then introduced and advanced to the above target points at the junction of the SAP and transverse processes until oss was contacted.  After negative aspiration for heme and CSF, an injectate of 1cc 0.25% marcaine and 10mg of methylprednisolone acetate was injected at each of the two levels.\par \par The right L4 and L5 lumbar vertebral bodies were identified and the fluoroscope right obliqued to approximately 30 degrees to reveal good "Seth-dog" anatomical view.  The junction of the superior articulate process and tranverse process at the L4 and L5 level was then identified and marked. The skin at these target points was then localized using 1 cc of 1% Lidocaine without epinephrine at each injection site.  A spinal needle was then introduced and advanced to the above target points at the junction of the SAP and transverse processes until oss was contacted.  After negative aspiration for heme and CSF, an injectate of 1cc 0.25% marcaine and 10mg of methylprednisolone acetate was injected at each of the two levels.\par \par Fluoroscope then focused on the bilateral sacral ala on AP view, and marked at these points.  The skin and subcutaneous structures were localized using 1cc of 1.0 % lidocaine without epinephrine.  A spinal needle was then advanced under fluoroscopic guidance until oss was contacted at the ala bilaterally.  After negative aspiration for heme and CSF, an injectate of 1cc 0.25% marcaine and 10mg of methylprednisolone acetate was injected at each site.\par \par The needles were then removed and pressure was applied.  Anesthesia personnel were present throughout the procedure, monitoring vitals which were stable throughout.\par \par Marquita Jack M.D.\par

## 2022-05-24 ENCOUNTER — APPOINTMENT (OUTPATIENT)
Dept: PAIN MANAGEMENT | Facility: CLINIC | Age: 62
End: 2022-05-24
Payer: MEDICARE

## 2022-05-24 VITALS — HEIGHT: 69 IN | WEIGHT: 128 LBS | BODY MASS INDEX: 18.96 KG/M2

## 2022-05-24 PROCEDURE — 99214 OFFICE O/P EST MOD 30 MIN: CPT
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
## 2022-05-24 NOTE — ASSESSMENT
[FreeTextEntry1] : After discussing various treatment options with the patient including but not limited to oral medications, physical therapy, exercise, modalities as well as interventional spinal injections, we have decided with the following plan:\par I personally reviewed the MRI/CT scan images and agree with the radiologist's report. The radiological findings were discussed with the patient.\par The risks, benefits, contents and alternatives to injection were explained in full to the patient. Risks outlined include but are not limited to infection,sepsis, bleeding, post-dural puncture headache, nerve damage, temporary increase in pain, syncopal episode, failure to resolve symptoms, allergic reaction, symptom recurrence, and elevation of blood sugar in diabetics. Cortisone may cause immunosuppression. Patient understands the risks. All questions were answered. After discussion of options, patient requested an injection. Information regarding the injection was given to the patient. Which medications to stop prior to the injection was explained to the patient as well.\par Follow up in 1-2 weeks post injection for re-evaluation. \par Continue Home exercises, stretching, activity modification, physical therapy, and conservative care.\par Patient is presenting with acute/sub-acute radicular pain with impairment in ADLs and functionality. The pain has not responded to conservative care including nsaid therapy and/or physical therapy. There is no bleeding tendency, unstable medical condition, or systemic infection.\par \par Patient presents with axial lumbar pain that has not responded to  3 months of conservative therapy including physical therapy or NSAID therapy.  The pain is interfering with activities of daily living and functionality.   There is no radicular pain.   The pain is exacerbated by facet loading.  Positive Kemps maneuver which is defined by pain reproduction with extension and rotation of the lumbar spine to the affected side.  The patient has not had a vertebral fusion at the levels of the proposed treatment.  There is no unexplained neurologic deficit.  There is no history of systemic infection, unstable medical condition, bleeding tendency, or local infection.  The injection is being performed to diagnose the facet joint as the source of the individual's pain.\par \par b/l lumbar mbb #2\par

## 2022-05-24 NOTE — PHYSICAL EXAM
[] : no swelling [Flexion] : flexion [Extension] : extension [de-identified] : extension 30 degrees [de-identified] : extension 30 degrees

## 2022-06-06 LAB — SARS-COV-2 N GENE NPH QL NAA+PROBE: NOT DETECTED

## 2022-06-07 ENCOUNTER — APPOINTMENT (OUTPATIENT)
Dept: PAIN MANAGEMENT | Facility: CLINIC | Age: 62
End: 2022-06-07
Payer: MEDICARE

## 2022-06-07 ENCOUNTER — APPOINTMENT (OUTPATIENT)
Dept: PAIN MANAGEMENT | Facility: CLINIC | Age: 62
End: 2022-06-07

## 2022-06-07 PROCEDURE — 64493 INJ PARAVERT F JNT L/S 1 LEV: CPT | Mod: 50

## 2022-06-07 PROCEDURE — 64494 INJ PARAVERT F JNT L/S 2 LEV: CPT | Mod: 50

## 2022-06-07 PROCEDURE — J3490M: CUSTOM

## 2022-06-07 NOTE — PROCEDURE
[FreeTextEntry3] : Date of Service: 06/07/2022 \par \par Account: [id number]\par \par Patient: TRACY JENNINGS \par \par YOB: 1960\par \par Age: 61 year\par \par \par Surgeon:                                      Marquita Jack M.D.\par \par Assistant:                                    None.\par \par Pre-Operative Diagnosis:            Spondylosis of Lumbar Region without Myelopathy or Radiculopathy (M47.816)    \par \par Post Operative Diagnosis:        Same  \par \par Procedure:                 Right L4-5, L5-S1 Facet block\par \par                                     Left L4-5, L5-S1 Facet block under fluoroscopic guidance\par \par  Anesthesia:                MAC\par \par \par This procedure was carried out using fluoroscopic guidance.  The risks and benefits of the procedure were discussed extensively with the patient.  The consent of the patient was obtained and the following procedure was performed.\par \par The patient was placed in the prone position.  The patient's back was prepped and draped in a sterile fashion.  The left L4 and L5 lumbar vertebral bodies were identified and the fluoroscope left obliqued to approximately 30 degrees to reveal good "Seth-dog" anatomical view.  The junction of the superior articulate process and tranverse process at the L4 and L5 level was then identified and marked. The skin at these target points was then localized using 1 cc of 1% Lidocaine without epinephrine at each injection site.  A spinal needle was then introduced and advanced to the above target points at the junction of the SAP and transverse processes until oss was contacted.  After negative aspiration for heme and CSF, an injectate of 1cc 0.25% marcaine and 10mg of methylprednisolone acetate was injected at each of the two levels.\par \par The right L4 and L5 lumbar vertebral bodies were identified and the fluoroscope right obliqued to approximately 30 degrees to reveal good "Seth-dog" anatomical view.  The junction of the superior articulate process and tranverse process at the L4 and L5 level was then identified and marked. The skin at these target points was then localized using 1 cc of 1% Lidocaine without epinephrine at each injection site.  A spinal needle was then introduced and advanced to the above target points at the junction of the SAP and transverse processes until oss was contacted.  After negative aspiration for heme and CSF, an injectate of 1cc 0.25% marcaine and 10mg of methylprednisolone acetate was injected at each of the two levels.\par \par Fluoroscope then focused on the bilateral sacral ala on AP view, and marked at these points.  The skin and subcutaneous structures were localized using 1cc of 1.0 % lidocaine without epinephrine.  A spinal needle was then advanced under fluoroscopic guidance until oss was contacted at the ala bilaterally.  After negative aspiration for heme and CSF, an injectate of 1cc 0.25% marcaine and 10mg of methylprednisolone acetate was injected at each site.\par \par The needles were then removed and pressure was applied.  Anesthesia personnel were present throughout the procedure, monitoring vitals which were stable throughout.\par \par Marquita Jack M.D.\par

## 2022-06-27 ENCOUNTER — APPOINTMENT (OUTPATIENT)
Dept: NEPHROLOGY | Facility: CLINIC | Age: 62
End: 2022-06-27

## 2022-06-28 ENCOUNTER — APPOINTMENT (OUTPATIENT)
Dept: PAIN MANAGEMENT | Facility: CLINIC | Age: 62
End: 2022-06-28
Payer: MEDICARE

## 2022-06-28 VITALS — WEIGHT: 128 LBS | HEIGHT: 69 IN | BODY MASS INDEX: 18.96 KG/M2

## 2022-06-28 DIAGNOSIS — M50.90 CERVICAL DISC DISORDER, UNSPECIFIED, UNSPECIFIED CERVICAL REGION: ICD-10-CM

## 2022-06-28 PROCEDURE — 99214 OFFICE O/P EST MOD 30 MIN: CPT

## 2022-06-29 PROBLEM — M50.90 CERVICAL DISC DISEASE: Status: ACTIVE | Noted: 2022-04-27

## 2022-06-29 NOTE — PHYSICAL EXAM
[Flexion] : flexion [Extension] : extension [] : no swelling [de-identified] : extension 30 degrees [de-identified] : extension 30 degrees

## 2022-06-29 NOTE — HISTORY OF PRESENT ILLNESS
[Lower back] : lower back [Dull/Aching] : dull/aching [Radiating] : radiating [Throbbing] : throbbing [Constant] : constant [Leisure] : leisure [Sleep] : sleep [Meds] : meds [Walking/activity] : walking/activity [Sitting] : sitting [Standing] : standing [Disabled] : Work status: disabled [Neck] : neck [7] : 7 [] : no [FreeTextEntry1] : Headaches [FreeTextEntry6] : pressure  [FreeTextEntry7] : b/l legs

## 2022-07-01 LAB
ALBUMIN SERPL ELPH-MCNC: 4.3 G/DL
ALP BLD-CCNC: 61 U/L
ALT SERPL-CCNC: 19 U/L
ANION GAP SERPL CALC-SCNC: 11 MMOL/L
APPEARANCE: CLEAR
AST SERPL-CCNC: 23 U/L
BACTERIA: NEGATIVE
BASOPHILS # BLD AUTO: 0.02 K/UL
BASOPHILS NFR BLD AUTO: 0.3 %
BILIRUB SERPL-MCNC: 0.4 MG/DL
BILIRUBIN URINE: NEGATIVE
BLOOD URINE: NEGATIVE
BUN SERPL-MCNC: 24 MG/DL
CALCIUM SERPL-MCNC: 10.1 MG/DL
CHLORIDE SERPL-SCNC: 108 MMOL/L
CHOLEST SERPL-MCNC: 198 MG/DL
CO2 SERPL-SCNC: 24 MMOL/L
COLOR: YELLOW
CREAT SERPL-MCNC: 1.54 MG/DL
EGFR: 50 ML/MIN/1.73M2
EOSINOPHIL # BLD AUTO: 0.03 K/UL
EOSINOPHIL NFR BLD AUTO: 0.5 %
ESTIMATED AVERAGE GLUCOSE: 120 MG/DL
GLUCOSE QUALITATIVE U: NEGATIVE
GLUCOSE SERPL-MCNC: 86 MG/DL
HBA1C MFR BLD HPLC: 5.8 %
HCT VFR BLD CALC: 48.1 %
HDLC SERPL-MCNC: 85 MG/DL
HGB BLD-MCNC: 15.1 G/DL
HYALINE CASTS: 0 /LPF
IMM GRANULOCYTES NFR BLD AUTO: 0.5 %
KETONES URINE: NEGATIVE
LDLC SERPL CALC-MCNC: 94 MG/DL
LEUKOCYTE ESTERASE URINE: NEGATIVE
LYMPHOCYTES # BLD AUTO: 1.67 K/UL
LYMPHOCYTES NFR BLD AUTO: 25.8 %
MAGNESIUM SERPL-MCNC: 2.1 MG/DL
MAN DIFF?: NORMAL
MCHC RBC-ENTMCNC: 30.4 PG
MCHC RBC-ENTMCNC: 31.4 GM/DL
MCV RBC AUTO: 96.8 FL
MICROSCOPIC-UA: NORMAL
MONOCYTES # BLD AUTO: 0.58 K/UL
MONOCYTES NFR BLD AUTO: 9 %
NEUTROPHILS # BLD AUTO: 4.14 K/UL
NEUTROPHILS NFR BLD AUTO: 63.9 %
NITRITE URINE: NEGATIVE
NONHDLC SERPL-MCNC: 113 MG/DL
PH URINE: 6.5
PHOSPHATE SERPL-MCNC: 3 MG/DL
PLATELET # BLD AUTO: 185 K/UL
POTASSIUM SERPL-SCNC: 4.4 MMOL/L
PROT SERPL-MCNC: 6.6 G/DL
PROTEIN URINE: NORMAL
RBC # BLD: 4.97 M/UL
RBC # FLD: 14.8 %
RED BLOOD CELLS URINE: 3 /HPF
SODIUM SERPL-SCNC: 143 MMOL/L
SPECIFIC GRAVITY URINE: 1.03
SQUAMOUS EPITHELIAL CELLS: 0 /HPF
TACROLIMUS SERPL-MCNC: 6.2 NG/ML
TRIGL SERPL-MCNC: 96 MG/DL
UROBILINOGEN URINE: NORMAL
WBC # FLD AUTO: 6.47 K/UL
WHITE BLOOD CELLS URINE: 1 /HPF

## 2022-07-05 LAB
CMV DNA SPEC QL NAA+PROBE: ABNORMAL IU/ML
CMVPCR LOG: ABNORMAL LOG10IU/ML
CREAT SPEC-SCNC: 147 MG/DL
CREAT/PROT UR: 0.1 RATIO
PROT UR-MCNC: 17 MG/DL

## 2022-07-07 ENCOUNTER — NON-APPOINTMENT (OUTPATIENT)
Age: 62
End: 2022-07-07

## 2022-07-11 ENCOUNTER — NON-APPOINTMENT (OUTPATIENT)
Age: 62
End: 2022-07-11

## 2022-07-11 LAB — BKV DNA SPEC QL NAA+PROBE: 141 IU/ML

## 2022-07-12 ENCOUNTER — APPOINTMENT (OUTPATIENT)
Dept: PAIN MANAGEMENT | Facility: CLINIC | Age: 62
End: 2022-07-12

## 2022-07-12 DIAGNOSIS — M62.830 MUSCLE SPASM OF BACK: ICD-10-CM

## 2022-07-12 PROCEDURE — 64635 DESTROY LUMB/SAC FACET JNT: CPT | Mod: 50

## 2022-07-12 PROCEDURE — J3490M: CUSTOM

## 2022-07-12 PROCEDURE — 64636Z: CUSTOM | Mod: 50

## 2022-07-12 NOTE — PROCEDURE
[FreeTextEntry3] : Date of Service: 07/12/2022 \par \par Account: [id number]\par \par Patient: TRACY JENNINGS \par \par YOB: 1960\par \par Age: 61 year\par \par \par PREOPERATIVE DIAGNOSIS: Spondylosis of Lumbar Region without Myelopathy or Radiculopathy (M47.816)\par \par     1. \par \par         POSTOPERATIVE DIAGNOSIS: Spondylosis of Lumbar Region without Myelopathy or Radiculopathy (M47.816)\par \par     1. \par \par         PROCEDURE:\par \par         1) Right L3, L4, L5 and Left L3, L4, L5 medial branch radiofrequency ablation under fluoroscopic guidance.                        \par \par Anesthesia:                                                      MAC\par \par \par Risks, benefits and alternatives of the procedure were discussed with the patient after which they agreed to proceed.  Patient was brought into fluoroscopy suite and was placed in prone position with hip support. Back was prepped and draped in a sterile fashion x3. Anesthesia initiated.\par \par Under AP visualization, the right and left sacral ala was identified and marked. Using a 25 gauge ½ inch needle the skin and subcutaneous structures at this point were localized with 1% Lidocaine using approximately 3 cc's 1% Lidocaine.  After this, a 20 gauge 100mm [facility] radiofrequency needle with a 10mm curved tip was inserted and using depth direction depth technique under constant fluoroscopic visualization, the needle was advanced to the sacral ala until os was contacted. \par \par The camera was then redirected under AP view to visualize the right and left L4 and L5 vertebra. The camera was obliqued to approximately 30 degrees to reveal good Seth dog anatomical view. The junction of the superior articulate process and transverse process at the L4 and L5 levels  were then identified and marked. Skin and subcutaneous structures were then anesthetized with approximately 3 cc's of 1% Lidocaine at each of these levels. After which a 20 gauge 100mm [facility] radiofrequency needle with a 10mm curved tip then advanced until the junction at the SAP and transverse process was met.  The camera was then directed through the lateral view and under constant fluoroscopic visualization, the needle tips were then advanced and confirmed at the junction of the SAP and transverse process. \par \par The stylette for the most cephalad needle at the L4 level was then removed and a Marifer radiofrequency probe was then placed inside the needle. After their pedis' were checked at approximately 300 ohm, 50 hertz sensory stimulation was performed.  Patient experienced concordant pain in their low back at approximately 0.3 volts. The voltage was then increased to 1 volt. The patient reported increased low back pain symptoms without any radiation below the knee.  Stimulation was then changed to 2 hertz and increased slowly by .1 volt increments at approximately 0.5 volts, patient began to experience thumping like reproductive pain in their low back. The voltage was then increased to approximately 2.5 volts. Patient experienced increasing thumping without any sensation below their knee. This exact stimulation was then repeated for the L5 needle as well as sacral ala needle with concordant pain and no radiation below the knee. The 6 levels were then anesthetized with approximately 0.5 cc's of 1% Lidocaine. After which each area was then ablated at 80 degrees centigrade for 90 seconds each. Patient felt no pain reproduction during the ablation procedure.  After each of these levels were ablated and injected approximately 1 cc of 0.25% Bupivacaine plus 80 mg of Kenalog were then injected before the needles were removed.  Pressure was then applied to the low back. Band-aids were applied.  Patient was brought to the recovery, ambulated on their own after the procedure and reported decreased low back pain.\par \par Anesthesia personnel were present throughout the procedure. Patient was told to apply ice to the low back for 20 minutes on and 20 minutes off for focal symptoms for 24-48 hours. They should call the office if they have any questions or concerns. \par \par Marquita Jack M.D.\par

## 2022-07-23 ENCOUNTER — APPOINTMENT (OUTPATIENT)
Dept: ORTHOPEDIC SURGERY | Facility: CLINIC | Age: 62
End: 2022-07-23

## 2022-07-23 VITALS — WEIGHT: 128 LBS | HEIGHT: 69 IN | BODY MASS INDEX: 18.96 KG/M2

## 2022-07-23 PROCEDURE — 72110 X-RAY EXAM L-2 SPINE 4/>VWS: CPT

## 2022-07-23 PROCEDURE — 99214 OFFICE O/P EST MOD 30 MIN: CPT

## 2022-07-23 PROCEDURE — 72170 X-RAY EXAM OF PELVIS: CPT

## 2022-07-23 NOTE — HISTORY OF PRESENT ILLNESS
[8] : 8 [9] : 9 [Tightness] : tightness [Tingling] : tingling [Sleep] : sleep [de-identified] : 7/23/22: Here for eval of low back pain with BL lumbar radiculopathy. Pt of Dr Jack. Has had multiple epidurals/ ablations. Has N/T in buttock and down posterior legs Pain worst at night. \par Has tried PT with some improvement. NO chiro/ acupuncture\par \par H/o Kidney transplant / dialysis\par \par hAS BEEN told bone density issues \par \par xrays today:\par L spine - GRADE 1 SPONDYLOLLISTEHSSI AT l4-5 AND l5-s1 \par AP PELVIS - NO OBVIOUS FRACTURE [] : no [FreeTextEntry1] : lower back and both legs [FreeTextEntry5] : pt states that he has a L-5 herniated disc and it is causing pain on both legs and lower back. pt states that the pain from his knees travels down to his feet, mostly occurs on the left side [FreeTextEntry6] : numbness [FreeTextEntry7] : from both knees to both feet [de-identified] : motion [de-identified] : 7/12/2022 [de-identified] : Dr. Mclaughlin [de-identified] : 5/2022

## 2022-07-23 NOTE — IMAGING
[Spondylolithesis] : Spondylolithesis [AP] : anteroposterior [There are no fractures, subluxations or dislocations. No significant abnormalities are seen] : There are no fractures, subluxations or dislocations. No significant abnormalities are seen

## 2022-07-26 ENCOUNTER — APPOINTMENT (OUTPATIENT)
Dept: PAIN MANAGEMENT | Facility: CLINIC | Age: 62
End: 2022-07-26

## 2022-07-26 VITALS — BODY MASS INDEX: 18.96 KG/M2 | WEIGHT: 128 LBS | HEIGHT: 69 IN

## 2022-07-26 PROCEDURE — 99214 OFFICE O/P EST MOD 30 MIN: CPT

## 2022-07-26 NOTE — PHYSICAL EXAM
[Flexion] : flexion [Extension] : extension [] : no swelling [de-identified] : extension 30 degrees [de-identified] : extension 30 degrees

## 2022-07-26 NOTE — HISTORY OF PRESENT ILLNESS
[Neck] : neck [Lower back] : lower back [Dull/Aching] : dull/aching [Radiating] : radiating [Throbbing] : throbbing [Constant] : constant [Leisure] : leisure [Sleep] : sleep [Meds] : meds [Walking/activity] : walking/activity [Sitting] : sitting [Standing] : standing [Disabled] : Work status: disabled [9] : 9 [Shooting] : shooting [Lying in bed] : lying in bed [] : no [FreeTextEntry1] : Headaches [FreeTextEntry6] : Stiffness [FreeTextEntry7] : b/l legs

## 2022-07-26 NOTE — ASSESSMENT
[FreeTextEntry1] : After discussing various treatment options with the patient including but not limited to oral medications, physical therapy, exercise, modalities as well as interventional spinal injections, we have decided with the following plan:\par \par 1) f/u with Dr. Heredia\par \par 2) Consider MICHAEL\par \par 3) restart PT\par \par 4) f/u after surgical f/u

## 2022-07-29 ENCOUNTER — APPOINTMENT (OUTPATIENT)
Dept: TRANSPLANT | Facility: CLINIC | Age: 62
End: 2022-07-29

## 2022-08-11 ENCOUNTER — APPOINTMENT (OUTPATIENT)
Dept: ORTHOPEDIC SURGERY | Facility: CLINIC | Age: 62
End: 2022-08-11

## 2022-08-11 VITALS — WEIGHT: 128 LBS | HEIGHT: 69 IN | BODY MASS INDEX: 18.96 KG/M2

## 2022-08-11 PROCEDURE — 99203 OFFICE O/P NEW LOW 30 MIN: CPT

## 2022-08-11 PROCEDURE — 72100 X-RAY EXAM L-S SPINE 2/3 VWS: CPT

## 2022-08-11 NOTE — HISTORY OF PRESENT ILLNESS
[Left Leg] : left leg [Right Leg] : right leg [7] : 7 [Radiating] : radiating [Shooting] : shooting [Constant] : constant [Lying in bed] : lying in bed [Not working due to injury] : Work status: not working due to injury [Lower back] : lower back [de-identified] : 8/11/22: Patient is a 60 yo male c/o low back pain which has started radiating down the left leg for 2 weeks with no specific injury. He is also having n/t going down left leg. Taking tylenol as needed for pain. Hx of kidney transplant. Has seen Dr. Morales and Dr. Jack. No previous surgeries to low back.  [] : Post Surgical Visit: no [FreeTextEntry5] : patient complains of lower back pain that radiates down both legs since 2 years ago seen Dr. Morales recently \par numbness in both feet \par h/o kidney transplant, bone density issues

## 2022-08-11 NOTE — ASSESSMENT
[FreeTextEntry1] : No acute fractures on xrays today after new onset of pain\par Has  neurontin at home. Flexeril sent for spasm\par Recommend seeing Dr. Heredia, information given to patient\par Will then follow up with pain after appointment to discuss further treatment plan

## 2022-08-11 NOTE — PHYSICAL EXAM
[] : patient ambulates without assistive device [Disc space narrowing] : Disc space narrowing [Spondylolithesis] : Spondylolithesis

## 2022-08-22 ENCOUNTER — APPOINTMENT (OUTPATIENT)
Dept: ORTHOPEDIC SURGERY | Facility: CLINIC | Age: 62
End: 2022-08-22

## 2022-08-22 VITALS
WEIGHT: 128 LBS | HEIGHT: 69 IN | HEART RATE: 67 BPM | SYSTOLIC BLOOD PRESSURE: 109 MMHG | DIASTOLIC BLOOD PRESSURE: 70 MMHG | BODY MASS INDEX: 18.96 KG/M2

## 2022-08-22 DIAGNOSIS — M48.07 SPINAL STENOSIS, LUMBOSACRAL REGION: ICD-10-CM

## 2022-08-22 DIAGNOSIS — M43.16 SPONDYLOLISTHESIS, LUMBAR REGION: ICD-10-CM

## 2022-08-22 PROCEDURE — 72110 X-RAY EXAM L-2 SPINE 4/>VWS: CPT

## 2022-08-22 PROCEDURE — 99204 OFFICE O/P NEW MOD 45 MIN: CPT

## 2022-08-22 NOTE — HISTORY OF PRESENT ILLNESS
[de-identified] : Mr. TRACY JENNINGS  is a 61 year old male who presents with a chronic history of low back pain and since the end of June he has had bilateral leg pain after doing an ablation procedure for his back.  He has pain down both legs and numbness if he sits too long.  He has done physical therapy and multiple injections with mild relief.  Standing is difficult.   Normal bowel and bladder control.   Denies any recent fevers, chills, sweats, weight loss, or infection.\par \par The patients past medical history, past surgical history, medications, allergies, and social history were reviewed by me today with the patient and documented accordingly.  In addition, the patient's family history, which is noncontributory to their visit, was also reviewed.\par

## 2022-08-22 NOTE — PHYSICAL EXAM
[Antalgic] : antalgic [de-identified] : Examination of the lumbar spine reveals no midline tenderness palpation, step-offs, or skin lesions. Decreased range of motion with respect to flexion, extension, lateral bending, and rotation. No tenderness to palpation of the sciatic notch. No tenderness palpation of the bilateral greater trochanters. No pain with passive internal/external rotation of the hips. No instability of bilateral lower extremities.  Negative NIRANJAN. Negative straight leg raise bilaterally. No bowstring. Negative femoral stretch. 5 out of 5 iliopsoas, hip abductors, hips adductors, quadriceps, hamstrings, gastrocsoleus, tibialis anterior, extensor hallucis longus, peroneals. Grossly intact sensation to light touch bilateral lower extremities. 1+ patellar and Achilles reflexes. Downgoing Babinski. No clonus. Intact proprioception. Palpable pulses. No skin lesion and no edema on the right and left lower extremities. [de-identified] : Review of his lumbar spine MRI by me reveals L4-S1 spondylolisthesis with significant stenosis\par \par AP lateral flexion-extension views of lumbar spine reveals L4-S1 spondylolisthesis\par \par DEXA scan results reveal osteopenia.

## 2022-08-22 NOTE — DISCUSSION/SUMMARY
[de-identified] : I had a long discussion with the patient regarding his symptoms.  He suffers back and leg pain.  He has tried therapy and injections.  Imaging demonstrates stenosis and spinal listhesis from L4-S1.  He does have a history of a renal transplant but his bone density scan demonstrates apparent adequate bone quality although slightly diminished.  We discussed the nature and purpose of an L4-S1 laminectomy and fusion with possible supplemental pelvic fixation.  Risks and benefits were discussed at length.  He will consider his options and let me know how he would like to proceed.

## 2022-09-08 ENCOUNTER — APPOINTMENT (OUTPATIENT)
Dept: NEPHROLOGY | Facility: CLINIC | Age: 62
End: 2022-09-08

## 2022-09-08 VITALS
WEIGHT: 122 LBS | OXYGEN SATURATION: 99 % | BODY MASS INDEX: 18.07 KG/M2 | TEMPERATURE: 97.7 F | SYSTOLIC BLOOD PRESSURE: 143 MMHG | DIASTOLIC BLOOD PRESSURE: 85 MMHG | HEART RATE: 69 BPM | RESPIRATION RATE: 17 BRPM | HEIGHT: 69 IN

## 2022-09-08 LAB
ALBUMIN SERPL ELPH-MCNC: 4.5 G/DL
ALP BLD-CCNC: 59 U/L
ALT SERPL-CCNC: 10 U/L
ANION GAP SERPL CALC-SCNC: 12 MMOL/L
APPEARANCE: CLEAR
AST SERPL-CCNC: 15 U/L
BACTERIA: NEGATIVE
BASOPHILS # BLD AUTO: 0.02 K/UL
BASOPHILS NFR BLD AUTO: 0.3 %
BILIRUB SERPL-MCNC: 0.6 MG/DL
BILIRUBIN URINE: NEGATIVE
BLOOD URINE: NEGATIVE
BUN SERPL-MCNC: 25 MG/DL
CALCIUM SERPL-MCNC: 10.6 MG/DL
CALCIUM SERPL-MCNC: 10.6 MG/DL
CHLORIDE SERPL-SCNC: 103 MMOL/L
CO2 SERPL-SCNC: 25 MMOL/L
COLOR: YELLOW
CREAT SERPL-MCNC: 1.49 MG/DL
CREAT SPEC-SCNC: 171 MG/DL
CREAT/PROT UR: 0.1 RATIO
EGFR: 52 ML/MIN/1.73M2
EOSINOPHIL # BLD AUTO: 0.06 K/UL
EOSINOPHIL NFR BLD AUTO: 0.9 %
ESTIMATED AVERAGE GLUCOSE: 117 MG/DL
GLUCOSE QUALITATIVE U: NEGATIVE
GLUCOSE SERPL-MCNC: 75 MG/DL
HBA1C MFR BLD HPLC: 5.7 %
HCT VFR BLD CALC: 48.3 %
HGB BLD-MCNC: 15.8 G/DL
HYALINE CASTS: 0 /LPF
IMM GRANULOCYTES NFR BLD AUTO: 0.3 %
KETONES URINE: NEGATIVE
LDH SERPL-CCNC: 196 U/L
LEUKOCYTE ESTERASE URINE: NEGATIVE
LYMPHOCYTES # BLD AUTO: 1.85 K/UL
LYMPHOCYTES NFR BLD AUTO: 28.8 %
MAGNESIUM SERPL-MCNC: 2 MG/DL
MAN DIFF?: NORMAL
MCHC RBC-ENTMCNC: 32.2 PG
MCHC RBC-ENTMCNC: 32.7 GM/DL
MCV RBC AUTO: 98.4 FL
MICROSCOPIC-UA: NORMAL
MONOCYTES # BLD AUTO: 0.63 K/UL
MONOCYTES NFR BLD AUTO: 9.8 %
NEUTROPHILS # BLD AUTO: 3.84 K/UL
NEUTROPHILS NFR BLD AUTO: 59.9 %
NITRITE URINE: NEGATIVE
PARATHYROID HORMONE INTACT: 43 PG/ML
PH URINE: 6.5
PHOSPHATE SERPL-MCNC: 2.5 MG/DL
PLATELET # BLD AUTO: 195 K/UL
POTASSIUM SERPL-SCNC: 5.1 MMOL/L
PROT SERPL-MCNC: 6.8 G/DL
PROT UR-MCNC: 21 MG/DL
PROTEIN URINE: NORMAL
RBC # BLD: 4.91 M/UL
RBC # FLD: 14.5 %
RED BLOOD CELLS URINE: 1 /HPF
SODIUM SERPL-SCNC: 140 MMOL/L
SPECIFIC GRAVITY URINE: 1.03
SQUAMOUS EPITHELIAL CELLS: 0 /HPF
TACROLIMUS SERPL-MCNC: 4.9 NG/ML
URATE SERPL-MCNC: 5.6 MG/DL
UROBILINOGEN URINE: NORMAL
WBC # FLD AUTO: 6.42 K/UL
WHITE BLOOD CELLS URINE: 1 /HPF

## 2022-09-08 PROCEDURE — 99214 OFFICE O/P EST MOD 30 MIN: CPT

## 2022-09-08 RX ORDER — CYCLOBENZAPRINE HYDROCHLORIDE 10 MG/1
10 TABLET, FILM COATED ORAL
Refills: 0 | Status: DISCONTINUED | COMMUNITY
End: 2022-09-08

## 2022-09-08 RX ORDER — CYCLOBENZAPRINE HYDROCHLORIDE 5 MG/1
5 TABLET, FILM COATED ORAL
Qty: 30 | Refills: 0 | Status: DISCONTINUED | COMMUNITY
Start: 2022-08-11 | End: 2022-09-08

## 2022-09-08 RX ORDER — TACROLIMUS 0.2 MG/1
0.2 GRANULE, FOR SUSPENSION ORAL
Refills: 0 | Status: DISCONTINUED | COMMUNITY
End: 2022-09-08

## 2022-09-08 RX ORDER — TACROLIMUS 1 MG/1
1 CAPSULE ORAL
Refills: 0 | Status: DISCONTINUED | COMMUNITY
End: 2022-09-08

## 2022-09-08 RX ORDER — TAMSULOSIN HYDROCHLORIDE 0.4 MG/1
0.4 CAPSULE ORAL
Refills: 0 | Status: DISCONTINUED | COMMUNITY
End: 2022-09-08

## 2022-09-08 RX ORDER — PRAVASTATIN SODIUM 40 MG/1
40 TABLET ORAL
Refills: 0 | Status: DISCONTINUED | COMMUNITY
End: 2022-09-08

## 2022-09-08 RX ORDER — TACROLIMUS 0.5 MG/1
0.5 CAPSULE ORAL
Refills: 0 | Status: DISCONTINUED | COMMUNITY
End: 2022-09-08

## 2022-09-08 RX ORDER — BUSPIRONE HYDROCHLORIDE 10 MG/1
10 TABLET ORAL
Refills: 0 | Status: DISCONTINUED | COMMUNITY
End: 2022-09-08

## 2022-09-08 RX ORDER — FAMOTIDINE 40 MG/1
40 TABLET, FILM COATED ORAL
Refills: 0 | Status: DISCONTINUED | COMMUNITY
End: 2022-09-08

## 2022-09-08 RX ORDER — PREDNISONE 1 MG/1
1 TABLET ORAL
Refills: 0 | Status: DISCONTINUED | COMMUNITY
End: 2022-09-08

## 2022-09-08 RX ORDER — SULFAMETHOXAZOLE AND TRIMETHOPRIM 400; 80 MG/1; MG/1
400-80 TABLET ORAL
Refills: 0 | Status: DISCONTINUED | COMMUNITY
End: 2022-09-08

## 2022-09-08 RX ORDER — MYCOPHENOLATE MOFETIL 250 MG/1
250 CAPSULE ORAL
Refills: 0 | Status: DISCONTINUED | COMMUNITY
End: 2022-09-08

## 2022-09-08 RX ORDER — LOSARTAN POTASSIUM 100 MG/1
100 TABLET, FILM COATED ORAL
Refills: 0 | Status: DISCONTINUED | COMMUNITY
End: 2022-09-08

## 2022-09-08 RX ORDER — FAMOTIDINE 20 MG/1
20 TABLET, FILM COATED ORAL
Refills: 0 | Status: DISCONTINUED | COMMUNITY
End: 2022-09-08

## 2022-09-08 NOTE — REVIEW OF SYSTEMS
[As Noted in HPI] : as noted in HPI [Negative] : Heme/Lymph [Heart Rate Is Fast] : the heart rate was not fast [de-identified] : RLQ incision clean dry. No drainage

## 2022-09-08 NOTE — HISTORY OF PRESENT ILLNESS
[Not Working] : Not working [90: Able to carry normal activity; minor signs or symptoms of disease.] : 90: Able to carry normal activity; minor signs or symptoms of disease.  [FreeTextEntry1] : Pt is a 60 y.o male s/p DDRTx on 2/24/17.  Donor was 19 y.o DCD female with normal creatinine.  Pt had immediate but slow function.  Donor urine culture positive was for yeast.  Pt was on fluconazole early post transplant . 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 \par \par Update:\par Starting to follow with Sara Doss NP ([phone]).  Received Covid vaccination in May 2021.  Complains of back pain and neuropathy.  Now on gabapentin and baclofen.  Has medical marijuana card. Stressed out about his sick sister.  Ran out of Wellbutrin.   [de-identified] : Pt back pain has been worse.  Also has pain down legs.  Saw ortho - has spinal stenosis and laminectomy was discussed.  BP ok at home, had bump on nose and no PMD.  Last creatinine 1.5.

## 2022-09-08 NOTE — ASSESSMENT
[FreeTextEntry1] : 61 y.o male hx depression, PCKD, HTN here for follow up. He is s/p DDRTx (DCD) 2/24/17; \par \par 1.  Renal transplant - immediate function.  Baseline creatinine on ARB is about 1.7.  Tends to fluctuate, BK negative. Had biopsy 5/2017 for elevated creatinine, no rejection.  \par 2.  Immunosuppression - On tacro, pred and MMF 250mgs BId.  Tacro goal 4-6.  Reviewed medications.  Check tacro trough today. \par 3.  HTN - stable. \par 4.  Hx Bipolar/ Depression - Needs to f/u with psychiatry or PMD.  Will make appointment with therapist.  \par 5.  PKD:  Also has large liver consistent with polycystic liver.  \par 6.  Polycythemia: Last hemoglobin improved.  Has seen hematology.  \par 7.  BK: resolved.  He is on low dose tacro, pred 5 and MMF 250mgs BID.  Will repeat BK pcr \par 8.  Back pain:  hx of herniated disk, spinal stenosis, spondylolisthesis.   Following with ortho - surgery being considered.    \par 9.  nasal lesion - appears minor however pt would like to see dermatology.  \par \par F/u in 4 months\par Will set up with dermatology and will try to help him find a primary care doctor.

## 2022-09-09 ENCOUNTER — APPOINTMENT (OUTPATIENT)
Dept: ORTHOPEDIC SURGERY | Facility: CLINIC | Age: 62
End: 2022-09-09

## 2022-09-09 VITALS — BODY MASS INDEX: 18.07 KG/M2 | WEIGHT: 122 LBS | HEIGHT: 69 IN

## 2022-09-09 DIAGNOSIS — M43.17 SPONDYLOLISTHESIS, LUMBOSACRAL REGION: ICD-10-CM

## 2022-09-09 PROCEDURE — 99213 OFFICE O/P EST LOW 20 MIN: CPT

## 2022-09-09 NOTE — IMAGING
[de-identified] : Back, including spine: Palpation of the thoracic/lumbar spine is as follows: left lumbar paraspinal\par tenderness. No GT/ITB TTP Range of motion of the thoracic and lumbar spine is as follows: Diminished range of\par motion in all planes. No pain with hip F/IR Strength Testing of the thoracic and lumbar spine is as follows: motor exam\par is non-focal throughout both lower extremities Special testing of the thoracic and lumbar spine is as\par follows: Positive sitting straight leg raise on the left. Neurological testing of the thoracic and lumbar spine is as\par follows: sensory exam is non-focal throughout both lower extremities Gait and function is as follows: able to tandem\par gait \par

## 2022-09-09 NOTE — HISTORY OF PRESENT ILLNESS
[7] : 7 [9] : 9 [Localized] : localized [Constant] : constant [Sleep] : sleep [Meds] : meds [Physical therapy] : physical therapy [Walking] : walking [Lying in bed] : lying in bed [de-identified] : Patient Complaint - 2/2/22: 59 yo RHD male with hx of low back pain. Aggravated low back pain after shoveling 2 days\par ago. Pain usually left sided back pain but now radiating across back to right side. Intermittent radicular pain down left\par leg relieved with Neurontin. Denies left leg paresthesias. ambulating without discomfort, orthotics help.\par 6/14/21: Patient is a 59 yo male c/o low back pain which radiates down his left leg that has been worsening over past 4\par months. Having tingling in toes. Taking Tylenol as needed for pain. Having left arm and neck pain as well. No previous\par surgeries to low back or cervical spine. \par Pmhx: Kidney Transplant (2017), Stroke (2015)\par C MRI:\par C2-3: Disc space narrowing and chronic vertebral endplate changes. Disc bulge which causes mild left neuroforaminal\par stenosis. Central canal and left neural foramen are normal.\par C3-4: Left foraminal disc herniation/extrusion measuring 5 x 6 mm causing moderate left foraminal stenosis. The\par herniation abuts the left ventral spinal cord and causes moderate left central canal stenosis. The herniation is\par superimposed on a disc bulge which abuts the spinal cord and causes mild right foraminal stenosis. Disc space narrowing\par and chronic vertebral endplate changes.\par C4-5: Disc space narrowing and chronic vertebral end plate changes. Disc bulge which indents the thecal sac. The\par bulge narrows the neuroforamina. Disc space narrowing and chronic vertebral changes.\par C5-6: 3 mm retrolisthesis of C5 on C6. Severe disc space narrowing chronic vertebral endplate changes. Broad-based\par central disc herniation which impinges the spinal cord. Posterior ligamentum thickening. These cause mild to moderate\par central spinal canal stenosis. The herniation is superimposed on a disc bulge which effaces the lateral recesses and\par causes moderate bilateral neuroforaminal stenosis.\par C6-7: Severe disc space narrowing chronic vertebral endplate changes. Disc bulge which abuts the ventral spinal cord.\par The bulge causes moderate bilateral neuroforaminal stenosis.\par C7-T1: Disc bulge which indents the thecal sac. Moderate to severe left and mild right neuroforaminal stenosis. Disc\par space narrowing chronic vertebral endplate changes. Posterior ligamentum thickening.\par T1-2: No spinal canal or neuroforaminal stenosis.\par T2-3: Disc bulge which abuts the ventral spinal cord and mildly narrows the neuroforamina.\par Ind review multilevel foraminal stenosis and central stenosis most notable C3/4 C5/6\par \par L MRI:\par T10-11: Disc bulge indents the thecal sac. No neuroforaminal stenosis. Disc space narrowing.\par T11-12: Disc bulge indents the thecal sac. No neuroforaminal stenosis.\par T12-L1 and L1-2: No spinal canal or neuroforaminal stenosis.\par L2-3: Disc bulging indents the thecal sac. The bulge causes mild bilateral neuroforaminal stenosis.\par L3-4: Disc bulge impinges the thecal sac and causes mild central canal stenosis. The bulge causes mild bilateral\par neuroforaminal stenosis. Disc space narrowing.\par L4-5: 5 mm grade 1 anterolisthesis of L4 and L5. Broad-based central and right subarticular and foraminal disc herniation\par which causes moderate central spinal canal stenosis and effaces the right lateral recess. Severe right foraminal stenosis\par impinging the exiting right L4 nerve. The herniation is superimposed on a disc bulge which causes moderate left\par foraminal stenosis and effaces the lateral recesses. Severe disc space narrowing and chronic vertebral endplate\par changes. Facet arthropathy with facet effusions.\par L5-S1: 5 mm grade 1 anterolisthesis of L5 on S1. Severe disc space narrowing chronic vertebral endplate changes with\par bone-on-bone appearance. Type I chronic vertebral endplate changes with bone edema. Disc bulge which causes\par moderate central spinal canal stenosis. The bulge effaces the lateral recesses. Moderate to severe bilateral\par neuroforaminal stenosis impinging the exiting L5 nerves. Severe facet arthropathy.\par Ind. review- stepwise anterolisthesis L4/5, L5/S1 with central and foraminal stenosis\par pain Dr. Jack- \par LESI L4/5 on 4/12/22\par  LESI L4/5 9/2/21.\par \par ________________________________________________________________________\par \par RHDM. \par Neck pain with pain radiating to LUE localized to arm, to dorsal forearm, in to hand with N/T. \par Reports mild gait imbalance and hand dexterity issues. \par Also LBP with pain radiating to the LLE localized to the posterolateral thigh to lateral leg to foot, similar sxs on the RLE\par though less severe.\par In PT with some relief. \par 7/30/21- MRI f/u. Uses handrails up/down stairs.\par 9/15/21- feels better after LESI, pain down the LLE less severe. Still pain down the LUE\par 12/1/21- Still neck pain radiating down the LUE, also LBP with pain radiating down the LLE.\par 3/2/22- Worsening of pain in the LBP down the LLE after shoveling snow\par 4/27/22- LBP worse. Now having radic down RLE rather than LLE. Has 2nd LESI scheduled for 5/5/22; 1st LESI was 4/12/22. Has been going to PT.\par 9/9/22- LBP down the LLE worse than the RLE currently [] : no [FreeTextEntry5] : 9/9/22 TRACY JENNINGS 61 year old M here for eval of lower back. Patient is having difficulty sleeping do to the pain. Pain is radiating in both hips when he lays on that side. Patient also stated he has numbness in both legs from the knee to the feet. Patient also has cramps in the left calf/ the arches of he's feet.\par  [FreeTextEntry6] : aching  [de-identified] : PT

## 2022-09-09 NOTE — ASSESSMENT
[FreeTextEntry1] : cervical radic with element of central stenosis \par mJOA 13\par Reports several recent falls though able to tandem gait\par Is likely beginning to experience symptoms of CSM\par Pain referral for discussion of Kati, repeat LESI\par C/w PT\par \par Agree with Dr. Morales, likely needs Renal to follow, not likely a good candidate at [facility]

## 2022-09-12 ENCOUNTER — NON-APPOINTMENT (OUTPATIENT)
Age: 62
End: 2022-09-12

## 2022-09-12 LAB
BKV DNA SPEC QL NAA+PROBE: 110 IU/ML
CMV DNA SPEC QL NAA+PROBE: NOT DETECTED IU/ML
CMVPCR LOG: NOT DETECTED LOG10IU/ML

## 2022-09-13 ENCOUNTER — APPOINTMENT (OUTPATIENT)
Dept: PAIN MANAGEMENT | Facility: CLINIC | Age: 62
End: 2022-09-13

## 2022-09-13 DIAGNOSIS — M54.12 RADICULOPATHY, CERVICAL REGION: ICD-10-CM

## 2022-09-14 ENCOUNTER — APPOINTMENT (OUTPATIENT)
Dept: FAMILY MEDICINE | Facility: CLINIC | Age: 62
End: 2022-09-14

## 2022-09-14 ENCOUNTER — NON-APPOINTMENT (OUTPATIENT)
Age: 62
End: 2022-09-14

## 2022-09-27 ENCOUNTER — FORM ENCOUNTER (OUTPATIENT)
Age: 62
End: 2022-09-27

## 2022-10-04 ENCOUNTER — RX RENEWAL (OUTPATIENT)
Age: 62
End: 2022-10-04

## 2022-10-29 ENCOUNTER — EMERGENCY (EMERGENCY)
Facility: HOSPITAL | Age: 62
LOS: 0 days | Discharge: LEFT BEFORE DISCHARGE | End: 2022-10-29
Attending: STUDENT IN AN ORGANIZED HEALTH CARE EDUCATION/TRAINING PROGRAM

## 2022-10-29 VITALS
DIASTOLIC BLOOD PRESSURE: 92 MMHG | TEMPERATURE: 98 F | SYSTOLIC BLOOD PRESSURE: 130 MMHG | HEART RATE: 89 BPM | OXYGEN SATURATION: 99 % | HEIGHT: 69 IN | RESPIRATION RATE: 18 BRPM

## 2022-10-29 DIAGNOSIS — Z99.2 DEPENDENCE ON RENAL DIALYSIS: Chronic | ICD-10-CM

## 2022-10-29 DIAGNOSIS — R20.0 ANESTHESIA OF SKIN: ICD-10-CM

## 2022-10-29 DIAGNOSIS — Z94.0 KIDNEY TRANSPLANT STATUS: Chronic | ICD-10-CM

## 2022-10-29 DIAGNOSIS — Z98.89 OTHER SPECIFIED POSTPROCEDURAL STATES: Chronic | ICD-10-CM

## 2022-10-29 DIAGNOSIS — M79.602 PAIN IN LEFT ARM: ICD-10-CM

## 2022-10-29 DIAGNOSIS — Z98.890 OTHER SPECIFIED POSTPROCEDURAL STATES: ICD-10-CM

## 2022-10-29 DIAGNOSIS — Z88.0 ALLERGY STATUS TO PENICILLIN: ICD-10-CM

## 2022-10-29 DIAGNOSIS — Z53.29 PROCEDURE AND TREATMENT NOT CARRIED OUT BECAUSE OF PATIENT'S DECISION FOR OTHER REASONS: ICD-10-CM

## 2022-10-29 DIAGNOSIS — M54.50 LOW BACK PAIN, UNSPECIFIED: ICD-10-CM

## 2022-10-29 DIAGNOSIS — M79.605 PAIN IN LEFT LEG: ICD-10-CM

## 2022-10-29 DIAGNOSIS — R05.9 COUGH, UNSPECIFIED: ICD-10-CM

## 2022-10-29 LAB
APPEARANCE UR: CLEAR — SIGNIFICANT CHANGE UP
BILIRUB UR-MCNC: NEGATIVE — SIGNIFICANT CHANGE UP
COLOR SPEC: YELLOW — SIGNIFICANT CHANGE UP
DIFF PNL FLD: NEGATIVE — SIGNIFICANT CHANGE UP
GLUCOSE UR QL: NEGATIVE MG/DL — SIGNIFICANT CHANGE UP
KETONES UR-MCNC: NEGATIVE — SIGNIFICANT CHANGE UP
LEUKOCYTE ESTERASE UR-ACNC: NEGATIVE — SIGNIFICANT CHANGE UP
NITRITE UR-MCNC: NEGATIVE — SIGNIFICANT CHANGE UP
PH UR: 7 — SIGNIFICANT CHANGE UP (ref 5–8)
PROT UR-MCNC: 15 MG/DL
RBC CASTS # UR COMP ASSIST: NEGATIVE /HPF — SIGNIFICANT CHANGE UP (ref 0–4)
SP GR SPEC: 1.01 — SIGNIFICANT CHANGE UP (ref 1.01–1.02)
UROBILINOGEN FLD QL: NEGATIVE MG/DL — SIGNIFICANT CHANGE UP
WBC UR QL: NEGATIVE — SIGNIFICANT CHANGE UP

## 2022-10-29 PROCEDURE — 99283 EMERGENCY DEPT VISIT LOW MDM: CPT

## 2022-10-29 RX ORDER — ACETAMINOPHEN 500 MG
1000 TABLET ORAL ONCE
Refills: 0 | Status: DISCONTINUED | OUTPATIENT
Start: 2022-10-29 | End: 2022-10-29

## 2022-10-29 NOTE — ED PROVIDER NOTE - OBJECTIVE STATEMENT
61 y/o M with PMH presenting to the ED c/o lower back pain, L arm pain, L leg pain. Patient states he had a nerve ablation in the lumbar spine in August and afterwards developed pain down the left arm and left leg. He endorses the pain is worse with movement. States he has some numbness in his left toes since the procedure. He also endorses a cough over the past month. Denies chest pain, sob, N/V, fever, or chills. States he has not followed back with his orthopedist because he was unhappy with the care he received there.

## 2022-10-29 NOTE — ED PROVIDER NOTE - CLINICAL SUMMARY MEDICAL DECISION MAKING FREE TEXT BOX
61 y/o M presenting to the ED c/o back pain. Vitals stable. Patient in no acute distress. No midline spinal tenderness. Strengths equal and symmetric bilaterally. Plan for CT lumbar spine in setting of recent procedure and CT chest as patient now c/o left arm symptoms and cough. Will check labs, patient on tacrolimus and mycophenolate for kidney transplant, reports compliance with meds, no fever.

## 2022-10-29 NOTE — ED PROVIDER NOTE - NS ED ROS FT
CONST: no fevers, no chills  EYES: no pain, no vision changes  ENT: no sore throat, no ear pain, no change in hearing  CV: no chest pain, no leg swelling  RESP: no shortness of breath, +cough  ABD: no abdominal pain, no nausea, no vomiting, no diarrhea  : no dysuria, no flank pain, no hematuria  MSK: + back pain, + extremity pain  NEURO: no headache or additional neurologic complaints  HEME: no easy bleeding  SKIN:  no rash

## 2022-10-29 NOTE — ED ADULT TRIAGE NOTE - CHIEF COMPLAINT QUOTE
pt c/o of back pain, left leg and leg arm pain  and numbness of left toes and cough for 1 month. pt states he had back surgery in August.

## 2022-10-29 NOTE — ED PROVIDER NOTE - PHYSICAL EXAMINATION
GENERAL: Awake, alert, NAD  HEENT: NC/AT, moist mucous membranes  LUNGS: CTAB, no wheezes or crackles   CARDIAC: RRR, no m/r/g  ABDOMEN: Soft, normal BS, non tender, non distended, no rebound, no guarding  BACK: No midline spinal tenderness, no CVA tenderness  EXT: No edema, no calf tenderness, 2+ DP pulses bilaterally, no deformities.  NEURO: A&Ox3. Moving all extremities. 5/5 strengths all extremities.  SKIN: Warm and dry. No rash.  PSYCH: Normal affect.

## 2022-11-01 ENCOUNTER — RX RENEWAL (OUTPATIENT)
Age: 62
End: 2022-11-01

## 2022-11-01 ENCOUNTER — APPOINTMENT (OUTPATIENT)
Dept: DERMATOLOGY | Facility: CLINIC | Age: 62
End: 2022-11-01

## 2022-11-01 VITALS — WEIGHT: 127 LBS | HEIGHT: 69 IN | BODY MASS INDEX: 18.81 KG/M2

## 2022-11-01 DIAGNOSIS — L64.9 ANDROGENIC ALOPECIA, UNSPECIFIED: ICD-10-CM

## 2022-11-01 DIAGNOSIS — L23.9 ALLERGIC CONTACT DERMATITIS, UNSPECIFIED CAUSE: ICD-10-CM

## 2022-11-01 DIAGNOSIS — L70.0 ACNE VULGARIS: ICD-10-CM

## 2022-11-01 PROCEDURE — 99204 OFFICE O/P NEW MOD 45 MIN: CPT

## 2022-11-02 ENCOUNTER — APPOINTMENT (OUTPATIENT)
Dept: INTERNAL MEDICINE | Facility: CLINIC | Age: 62
End: 2022-11-02

## 2022-11-02 VITALS
OXYGEN SATURATION: 97 % | SYSTOLIC BLOOD PRESSURE: 130 MMHG | HEIGHT: 69 IN | BODY MASS INDEX: 17.97 KG/M2 | WEIGHT: 121.31 LBS | TEMPERATURE: 98.3 F | DIASTOLIC BLOOD PRESSURE: 80 MMHG | HEART RATE: 75 BPM

## 2022-11-02 DIAGNOSIS — E78.5 HYPERLIPIDEMIA, UNSPECIFIED: ICD-10-CM

## 2022-11-02 DIAGNOSIS — M51.26 OTHER INTERVERTEBRAL DISC DISPLACEMENT, LUMBAR REGION: ICD-10-CM

## 2022-11-02 DIAGNOSIS — E55.9 VITAMIN D DEFICIENCY, UNSPECIFIED: ICD-10-CM

## 2022-11-02 DIAGNOSIS — K21.9 GASTRO-ESOPHAGEAL REFLUX DISEASE W/OUT ESOPHAGITIS: ICD-10-CM

## 2022-11-02 DIAGNOSIS — M54.16 RADICULOPATHY, LUMBAR REGION: ICD-10-CM

## 2022-11-02 PROCEDURE — 99204 OFFICE O/P NEW MOD 45 MIN: CPT | Mod: 25

## 2022-11-02 PROCEDURE — 36415 COLL VENOUS BLD VENIPUNCTURE: CPT

## 2022-11-02 RX ORDER — LOSARTAN POTASSIUM 100 MG/1
100 TABLET, FILM COATED ORAL
Refills: 0 | Status: COMPLETED | COMMUNITY
End: 2022-11-02

## 2022-11-02 RX ORDER — SILDENAFIL 100 MG/1
100 TABLET, FILM COATED ORAL
Qty: 6 | Refills: 11 | Status: COMPLETED | COMMUNITY
Start: 2021-02-26 | End: 2022-11-02

## 2022-11-02 NOTE — REVIEW OF SYSTEMS
[Back Pain] : back pain [Headache] : no headache [Dizziness] : no dizziness [Fainting] : no fainting [Confusion] : no confusion [Memory Loss] : no memory loss [Negative] : Heme/Lymph [FreeTextEntry9] : chronic back pain

## 2022-11-02 NOTE — PHYSICAL EXAM
[No Lymphadenopathy] : no lymphadenopathy [Supple] : supple [Normal] : normal rate, regular rhythm, normal S1 and S2 and no murmur heard [No Edema] : there was no peripheral edema [No CVA Tenderness] : no CVA  tenderness [No Spinal Tenderness] : no spinal tenderness [No Focal Deficits] : no focal deficits [Normal Affect] : the affect was normal [Alert and Oriented x3] : oriented to person, place, and time [Normal Insight/Judgement] : insight and judgment were intact [de-identified] : face mask  [de-identified] : port in left extremity  [de-identified] : 4/5 on LLE and 5/5 on RLE sensory intact.

## 2022-11-02 NOTE — ASSESSMENT
[FreeTextEntry1] : #Chronic Back Pain \par #Chronic Left LE weakness\par - f/u PT, Ortho, Neuro and Pain\par - pt wants to not take any opioids \par - recent Ed visit notes reviewed\par - pt denies any acute changes in weakness or recent CVA\par \par #HTN\par Continue Medication\par DASH DIET \par Exercise.\par Lower your salt intake.\par Reduce your alcohol consumption.\par Learn relaxation methods.\par Eating is a very important part of controlling blood pressure. Recommend Total salt intake to 2.4g/day.\par Do not add salt while preparing or eating your meals.\par Try to avoid red meats, sweets and sugar containing beverages.\par Ensure you are eating 5 fruits and vegetables a day as well as whole grains.\par \par #HL\par Continue current management\par Compliant w/ medication\par No side effects\par Follow low cholesterol diet\par Discussed healthy diet and lifestyle.\par Avoid tobacco. \par Get more exercise. Try to get about 150 minutes of physical activity every week, or about 30 minutes per day for most days of the week.\par Keep a healthy weight. Losing even 10% of your body weight makes a difference in your cholesterol levels.\par Reduce the effect of negative emotions, Learn healthy ways to deal with anger, stress or other negative emotions.\par Replace saturated fat and trans fat with healthier fats.\par Bad Fats - margarine, french fries, doughnuts, cookies, pastries, crackers, processed meats, canola oil and hydrogenated oils\par Good Fats/Unsaturated fats - avocados, eggs (with out the yellow), coconut oil, Raw Nuts, [place], Flaxseed, Leafy green veg, organic butter, organic nut \par Eat heart-healthy foods like fruits, vegetables, poultry, fish and whole grains. Limit red meat, sugary products and dairy products made with whole milk.\par \par #GERD\par - continue famotidine\par Avoid foods that increase the level of acid in your stomach, including caffeinated beverages.\par Avoid foods that decrease the pressure in the lower esophagus, such as fatty foods, alcohol \par Avoid foods that slow gastric emptying, including fatty foods.\par Avoid large meals.\par Do not lie down immediately after a meal.\par Elevate the level of your head when you lie down.\par \par #PCKD\par #Renal transplant\par - continue immuosupresison medicine\par - f/u nephrology\par \par #Vitamin D Level\par - f/u vitamin d level\par .\par \par pt agrees and understands plan via teach back method. all questions answered.\par

## 2022-11-02 NOTE — HISTORY OF PRESENT ILLNESS
[FreeTextEntry8] : 58 y.o male hx depression, PCKD s/p kidney transplant 2017, HTN, chronic back pain here to establish care. \par currently on immunosuppression following nephrology Dr. Cedeno\par HTN - controlled. \par Depression mood stable denies si/hi toleratig medication well. \par Chronic Back pain, following ortho and pain - Dr. Castillo and Yan and Marcie\par Pt reports would like to see neurology for chronic muscle weakness in leg\par Pt recently went to ED few days ago for back pain d/c home on pain medication, reports doesn't want to take pain and wants help with leg weakness and back pain. Pt has no further complaints.

## 2022-11-02 NOTE — HEALTH RISK ASSESSMENT
[Former] : Former [No] : No [No falls in past year] : Patient reported no falls in the past year [0] : 1) Little interest or pleasure doing things: Not at all (0) [PHQ-2 Negative - No further assessment needed] : PHQ-2 Negative - No further assessment needed [[id number]] : 0

## 2022-11-03 ENCOUNTER — NON-APPOINTMENT (OUTPATIENT)
Age: 62
End: 2022-11-03

## 2022-11-03 LAB
25(OH)D3 SERPL-MCNC: 59.1 NG/ML
ALBUMIN SERPL ELPH-MCNC: 4.4 G/DL
ALP BLD-CCNC: 61 U/L
ALT SERPL-CCNC: 9 U/L
ANION GAP SERPL CALC-SCNC: 12 MMOL/L
AST SERPL-CCNC: 16 U/L
BASOPHILS # BLD AUTO: 0.02 K/UL
BASOPHILS NFR BLD AUTO: 0.4 %
BILIRUB SERPL-MCNC: 0.6 MG/DL
BUN SERPL-MCNC: 26 MG/DL
CALCIUM SERPL-MCNC: 10.3 MG/DL
CHLORIDE SERPL-SCNC: 104 MMOL/L
CHOLEST SERPL-MCNC: 230 MG/DL
CO2 SERPL-SCNC: 25 MMOL/L
CREAT SERPL-MCNC: 1.59 MG/DL
EGFR: 48 ML/MIN/1.73M2
EOSINOPHIL # BLD AUTO: 0.08 K/UL
EOSINOPHIL NFR BLD AUTO: 1.6 %
ESTIMATED AVERAGE GLUCOSE: 114 MG/DL
GLUCOSE SERPL-MCNC: 80 MG/DL
HBA1C MFR BLD HPLC: 5.6 %
HCT VFR BLD CALC: 46.6 %
HDLC SERPL-MCNC: 75 MG/DL
HGB BLD-MCNC: 14.7 G/DL
IMM GRANULOCYTES NFR BLD AUTO: 0.2 %
LDLC SERPL CALC-MCNC: 122 MG/DL
LYMPHOCYTES # BLD AUTO: 1.14 K/UL
LYMPHOCYTES NFR BLD AUTO: 22.8 %
MAN DIFF?: NORMAL
MCHC RBC-ENTMCNC: 31.5 GM/DL
MCHC RBC-ENTMCNC: 32.4 PG
MCV RBC AUTO: 102.6 FL
MONOCYTES # BLD AUTO: 0.45 K/UL
MONOCYTES NFR BLD AUTO: 9 %
NEUTROPHILS # BLD AUTO: 3.29 K/UL
NEUTROPHILS NFR BLD AUTO: 66 %
NONHDLC SERPL-MCNC: 155 MG/DL
PLATELET # BLD AUTO: 185 K/UL
POTASSIUM SERPL-SCNC: 4.2 MMOL/L
PROT SERPL-MCNC: 6.9 G/DL
RBC # BLD: 4.54 M/UL
RBC # FLD: 13.4 %
SODIUM SERPL-SCNC: 141 MMOL/L
TRIGL SERPL-MCNC: 165 MG/DL
TSH SERPL-ACNC: 0.92 UIU/ML
WBC # FLD AUTO: 4.99 K/UL

## 2022-11-06 ENCOUNTER — TRANSCRIPTION ENCOUNTER (OUTPATIENT)
Age: 62
End: 2022-11-06

## 2022-11-07 ENCOUNTER — TRANSCRIPTION ENCOUNTER (OUTPATIENT)
Age: 62
End: 2022-11-07

## 2022-11-07 ENCOUNTER — NON-APPOINTMENT (OUTPATIENT)
Age: 62
End: 2022-11-07

## 2022-11-07 ENCOUNTER — APPOINTMENT (OUTPATIENT)
Dept: INTERNAL MEDICINE | Facility: CLINIC | Age: 62
End: 2022-11-07

## 2022-11-07 DIAGNOSIS — U07.1 COVID-19: ICD-10-CM

## 2022-11-07 PROCEDURE — 99442: CPT

## 2022-11-07 NOTE — HISTORY OF PRESENT ILLNESS
[Other Location: e.g. School (Enter Location, City,State)___] : at [unfilled], at the time of the visit. [Medical Office: ([facility])___] : at the medical office located in  [Verbal consent obtained from patient] : the patient, [unfilled] [FreeTextEntry8] : 62 year old male here\par presents with a few days of URI symptoms including:\par Rhinitis, cough, congestion, throat irritation\par Denies fever, bodyaches earache\par Denies SOB or wheezing or CP or palpitations\par Denies NVD\par Denies HA\par + home covid test\par Pt has hx of kidney transplant elevated CR and only one kidney.\par currently on chronic immunosuppressant and not candidate for paxlovid due to interaction with medications.\par

## 2022-11-07 NOTE — PHYSICAL EXAM
[No Acute Distress] : no acute distress [Normal Voice/Communication] : normal voice/communication [de-identified] : pt able to communicate without any difficulty during tele visit.

## 2022-11-07 NOTE — HEALTH RISK ASSESSMENT
[Former] : Former [No] : No [No falls in past year] : Patient reported no falls in the past year [0] : 2) Feeling down, depressed, or hopeless: Not at all (0) [PHQ-2 Negative - No further assessment needed] : PHQ-2 Negative - No further assessment needed [[id number]] : 0

## 2022-11-07 NOTE — ASSESSMENT
[FreeTextEntry1] : #COVID 19 infection\par Rest and Drink Plenty of Fluids\par Recommend IV MAB due to history of renal transplant, penicllin allergy, low GFR and hx of one kidney and contraindications of paxlovid with immunosuppressant medications.\par Tylenol prn for fever/body aches, do not exceed 4g/day\par Reviewed documents and recent blood work. \par Reviewed all medications and allergies.\par Discussion held below about\par Continue to wear mask\par Continue to be safe in public places\par Covering cough and sneezes\par Maintaining a distance of at least six feet from other family members\par Washing hands regularly\par Disinfecting surfaces\par Self-isolating from others at home\par stay home for 5 days and quarantine \par Continue to monitor pulse ox and temp twice a day\par if oxygen less than 92% and HR >110 on pulse ox go to ED.\par If experience any of the below go to ED\par Trouble breathing.\par Persistent pain or pressure in the chest.\par New confusion.\par Inability to wake or stay awake.\par Pale, gray, or blue-colored skin, lips, or nail beds, depending on skin tone.\par Pt agrees and understands plan via teach back method\par All questions answered.\par \par Time dedicated to this patient visit today including preparing to see patient, obtaining and/or reviewing separately obtained history, performing a medically appropriate  evaluation, counseling and educating the patient/family, ordering medications, tests, or procedures, communicating with other provider(s), documenting clinical information in the health records, independently interpreting results and communicating results to the patient/family - 15 minutes.  Total\par

## 2022-11-07 NOTE — REVIEW OF SYSTEMS
[Fatigue] : fatigue [Postnasal Drip] : postnasal drip [Nasal Discharge] : nasal discharge [Sore Throat] : sore throat [Hoarseness] : hoarseness [Cough] : cough [Negative] : Psychiatric [Hearing Loss] : no hearing loss [Nosebleeds] : no nosebleeds [Shortness Of Breath] : no shortness of breath [Wheezing] : no wheezing [Dyspnea on Exertion] : not dyspnea on exertion [FreeTextEntry4] : nasal and sinus congestion

## 2022-11-08 ENCOUNTER — OUTPATIENT (OUTPATIENT)
Dept: OUTPATIENT SERVICES | Facility: HOSPITAL | Age: 62
LOS: 1 days | End: 2022-11-08

## 2022-11-08 ENCOUNTER — APPOINTMENT (OUTPATIENT)
Dept: DISASTER EMERGENCY | Facility: HOSPITAL | Age: 62
End: 2022-11-08

## 2022-11-08 VITALS
SYSTOLIC BLOOD PRESSURE: 143 MMHG | RESPIRATION RATE: 18 BRPM | OXYGEN SATURATION: 97 % | DIASTOLIC BLOOD PRESSURE: 87 MMHG | TEMPERATURE: 98 F | HEART RATE: 71 BPM

## 2022-11-08 VITALS
TEMPERATURE: 98 F | DIASTOLIC BLOOD PRESSURE: 87 MMHG | HEART RATE: 72 BPM | WEIGHT: 126.99 LBS | OXYGEN SATURATION: 98 % | RESPIRATION RATE: 18 BRPM | SYSTOLIC BLOOD PRESSURE: 164 MMHG | HEIGHT: 69 IN

## 2022-11-08 DIAGNOSIS — Z99.2 DEPENDENCE ON RENAL DIALYSIS: Chronic | ICD-10-CM

## 2022-11-08 DIAGNOSIS — Z94.0 KIDNEY TRANSPLANT STATUS: Chronic | ICD-10-CM

## 2022-11-08 DIAGNOSIS — U07.1 COVID-19: ICD-10-CM

## 2022-11-08 DIAGNOSIS — Z98.89 OTHER SPECIFIED POSTPROCEDURAL STATES: Chronic | ICD-10-CM

## 2022-11-08 RX ORDER — BEBTELOVIMAB 87.5 MG/ML
175 INJECTION, SOLUTION INTRAVENOUS ONCE
Refills: 0 | Status: COMPLETED | OUTPATIENT
Start: 2022-11-08 | End: 2022-11-08

## 2022-11-08 RX ORDER — ONDANSETRON 8 MG/1
4 TABLET, FILM COATED ORAL ONCE
Refills: 0 | Status: COMPLETED | OUTPATIENT
Start: 2022-11-08 | End: 2022-11-08

## 2022-11-08 RX ADMIN — BEBTELOVIMAB 175 MILLIGRAM(S): 87.5 INJECTION, SOLUTION INTRAVENOUS at 08:01

## 2022-11-08 RX ADMIN — ONDANSETRON 4 MILLIGRAM(S): 8 TABLET, FILM COATED ORAL at 08:10

## 2022-11-08 NOTE — MONOCLONAL ANTIBODY INFUSION - HOME MEDICATIONS
tacrolimus 0.5 mg oral capsule , 5 cap(s) orally every 12 hours  tamsulosin 0.4 mg oral capsule , 1 cap(s) orally 2 times a day  mycophenolate mofetil 250 mg oral capsule , 1000mg po twice daily  valGANciclovir 450 mg oral tablet , 1 tab(s) orally once a day  cloNIDine 0.1 mg/24 hr transdermal film, extended release ,  transdermal every 7 days  acetaminophen 325 mg oral tablet , 2 tab(s) orally every 6 hours, As needed, Mild Pain (1 - 3)  predniSONE 5 mg oral tablet , 1 tab(s) orally once a day  sulfamethoxazole-trimethoprim 400 mg-80 mg oral tablet , 1 tab(s) orally once a day  famotidine 20 mg oral tablet , 1 tab(s) orally once a day  nystatin 100,000 units/mL oral suspension , 5 milliliter(s) orally 4 times a day

## 2022-11-08 NOTE — MONOCLONAL ANTIBODY INFUSION - EXAM
CC: Monoclonal Antibody Infusion/COVID 19 Positive  62yMale    exam/findings:  T(C): 36.8 (11-08-22 @ 07:44), Max: 36.8 (11-08-22 @ 07:44)  HR: 72 (11-08-22 @ 07:44) (72 - 72)  BP: 164/87 (11-08-22 @ 07:44) (164/87 - 164/87)  RR: 18 (11-08-22 @ 07:44) (18 - 18)  SpO2: 98% (11-08-22 @ 07:44) (98% - 98%)      PE:   Appearance: NAD	  HEENT:   Normal oral mucosa,   Lymphatic: No lymphadenopathy  Cardiovascular: Normal S1 S2, No JVD, No murmurs, No edema  Respiratory: Lungs clear to auscultation	  Gastrointestinal:  Soft, Non-tender, + BS	  Skin: warm and dry  Neurologic: Non-focal  Extremities: Normal range of motion,

## 2022-11-08 NOTE — MONOCLONAL ANTIBODY INFUSION - ASSESSMENT AND PLAN
This is a 62 yrs old male with PMHx of CRF and was on dialysis for 12 yrs and then had kidney transplant in 2017, HTn, BPH and recently diagnosed with Covid-19 infection who was referred for monoclonal antibody infusion by  after testing positive for COVID 19 on 11/6/22.  Patient states he has been experiencing HA, cough, sore throat, malaise, and nasal congestion since 11/5/22. He denies any CP, SOB, chills, numbness/tingling in b/l limbs, loss of sensation or motor function, N/V/D. Pt is vaccinated and boost.  PLAN:  - Injection procedure explained to patient   - Consent for monoclonal antibody injection obtained   - Risk & benefits discussed/all questions answered  - Inject Bebtelovimab 175 mg over 1 minute.  - Observe patient for one hour post administration    I have reviewed the Bebtelovimab Emergency Use Authorization (EUA) and I have provided the patient or patient's caregiver with the following information:    1. FDA has authorized emergency use Bebtelovimab, which is not an FDA-approved biological product.  2. The patient or patient's caregiver has the option to accept or refuse administration of Bebtelovimab.  3. The significant known and potential risks and benefits of Bebtelovimab and the extent to which such risks and benefits are unknown.  4. Information on available alternative treatments and risks and benefits of those alternatives.    The patient's COVID monoclonal antibody injection administration went well without any complications. The patient tolerated the treatment without any reactions. Vitals were stable throughout the injection & post-injection administration. The pt denies any CP, fevers, chills, SOB, numbness/tingling in b/l limbs, loss of sensation or motor function, N/V/D while receiving the injection. Patient denies any symptoms an hour after post injection. Vitals were taken post injection and were stable. Pt is medically stable to be discharged home. Discharge instructions were provided to the patient with a fact sheet included. Patient was instructed to self-isolate and use infection control measures (e.g wear mask, isolate, social distance, avoid sharing personal items, clean and disinfect "high touch" surfaces, and frequent handwashing according to the CDC guidelines. The patient was informed on what symptoms to be aware of for the next couple of days, and if there are any issues to call the 24/7 clinical call center. Patient was instructed to follow up with PCP as needed.

## 2022-11-09 ENCOUNTER — NON-APPOINTMENT (OUTPATIENT)
Age: 62
End: 2022-11-09

## 2022-11-30 ENCOUNTER — RX RENEWAL (OUTPATIENT)
Age: 62
End: 2022-11-30

## 2023-01-19 ENCOUNTER — APPOINTMENT (OUTPATIENT)
Dept: NEPHROLOGY | Facility: CLINIC | Age: 63
End: 2023-01-19
Payer: MEDICARE

## 2023-01-19 VITALS
DIASTOLIC BLOOD PRESSURE: 85 MMHG | HEART RATE: 55 BPM | SYSTOLIC BLOOD PRESSURE: 133 MMHG | WEIGHT: 126 LBS | TEMPERATURE: 98.1 F | RESPIRATION RATE: 18 BRPM | OXYGEN SATURATION: 99 % | HEIGHT: 69 IN | BODY MASS INDEX: 18.66 KG/M2

## 2023-01-19 PROCEDURE — 99214 OFFICE O/P EST MOD 30 MIN: CPT

## 2023-01-19 RX ORDER — GABAPENTIN 300 MG/1
300 CAPSULE ORAL
Qty: 90 | Refills: 3 | Status: ACTIVE | COMMUNITY
Start: 2022-10-05

## 2023-01-19 NOTE — HISTORY OF PRESENT ILLNESS
[Not Working] : Not working [90: Able to carry normal activity; minor signs or symptoms of disease.] : 90: Able to carry normal activity; minor signs or symptoms of disease.  [FreeTextEntry1] : Pt is a 60 y.o male s/p DDRTx on 2/24/17.  Donor was 19 y.o DCD female with normal creatinine.  Pt had immediate but slow function.  Donor urine culture positive was for yeast.  Pt was on fluconazole early post transplant . 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 \par \par  [de-identified] : Pt feeling ok.  Had Covid in November but now ok.  Still with back pain.  Ran out of omega 3 - lipids have been high.

## 2023-01-19 NOTE — ASSESSMENT
[FreeTextEntry1] : 62 y.o male hx depression, PCKD, HTN here for follow up. He is s/p DDRTx (DCD) 2/24/17; \par \par 1.  Renal transplant - immediate function.  Baseline creatinine on ARB is about 1.7.  Tends to fluctuate, BK negative. Had biopsy 5/2017 for elevated creatinine, no rejection.  \par 2.  Immunosuppression - On tacro, pred and MMF 250mgs BId.  Tacro goal 4-6.  Reviewed medications.  Check tacro trough today. \par 3.  HTN - stable. \par 4.  Hx Bipolar/ Depression - Needs to f/u with psychiatry or PMD.  Will make appointment with therapist.  \par 5.  PKD:  Also has large liver consistent with polycystic liver.  \par 6.  Polycythemia: Last hemoglobin improved.  Has seen hematology.  \par 7.  BK: resolved.  He is on low dose tacro, pred 5 and MMF 250mgs BID.  Will repeat BK pcr \par 8.  Back pain:  hx of herniated disk, spinal stenosis, spondylolisthesis.   Following with ortho - on neurontin. \par 9.  Hyperlipidemia: cont pravachol, restarted Omega 3. \par \par Flu vaccine given. \par \par Labs in 3 months, visit in 6.

## 2023-01-19 NOTE — REVIEW OF SYSTEMS
[As Noted in HPI] : as noted in HPI [Negative] : Heme/Lymph [Heart Rate Is Fast] : the heart rate was not fast [de-identified] : RLQ incision clean dry. No drainage

## 2023-01-20 ENCOUNTER — NON-APPOINTMENT (OUTPATIENT)
Age: 63
End: 2023-01-20

## 2023-01-20 LAB
ALBUMIN SERPL ELPH-MCNC: 4.4 G/DL
ALP BLD-CCNC: 59 U/L
ALT SERPL-CCNC: 11 U/L
ANION GAP SERPL CALC-SCNC: 10 MMOL/L
APPEARANCE: CLEAR
AST SERPL-CCNC: 13 U/L
BACTERIA: NEGATIVE
BASOPHILS # BLD AUTO: 0.02 K/UL
BASOPHILS NFR BLD AUTO: 0.3 %
BILIRUB SERPL-MCNC: 0.4 MG/DL
BILIRUBIN URINE: NEGATIVE
BKV DNA SPEC QL NAA+PROBE: ABNORMAL IU/ML
BLOOD URINE: NEGATIVE
BUN SERPL-MCNC: 25 MG/DL
CALCIUM SERPL-MCNC: 10.6 MG/DL
CALCIUM SERPL-MCNC: 10.6 MG/DL
CHLORIDE SERPL-SCNC: 105 MMOL/L
CMV DNA SPEC QL NAA+PROBE: NOT DETECTED IU/ML
CMVPCR LOG: NOT DETECTED LOG10IU/ML
CO2 SERPL-SCNC: 26 MMOL/L
COLOR: NORMAL
CREAT SERPL-MCNC: 1.51 MG/DL
CREAT SPEC-SCNC: 146 MG/DL
CREAT/PROT UR: 0.1 RATIO
EGFR: 51 ML/MIN/1.73M2
EOSINOPHIL # BLD AUTO: 0.06 K/UL
EOSINOPHIL NFR BLD AUTO: 0.8 %
GLUCOSE QUALITATIVE U: NEGATIVE
GLUCOSE SERPL-MCNC: 82 MG/DL
HCT VFR BLD CALC: 43.6 %
HGB BLD-MCNC: 13.7 G/DL
HYALINE CASTS: 0 /LPF
IMM GRANULOCYTES NFR BLD AUTO: 0.1 %
KETONES URINE: NEGATIVE
LDH SERPL-CCNC: 184 U/L
LEUKOCYTE ESTERASE URINE: NEGATIVE
LYMPHOCYTES # BLD AUTO: 1.97 K/UL
LYMPHOCYTES NFR BLD AUTO: 26.7 %
MAGNESIUM SERPL-MCNC: 2.2 MG/DL
MAN DIFF?: NORMAL
MCHC RBC-ENTMCNC: 31.4 GM/DL
MCHC RBC-ENTMCNC: 32.2 PG
MCV RBC AUTO: 102.6 FL
MICROSCOPIC-UA: NORMAL
MONOCYTES # BLD AUTO: 0.68 K/UL
MONOCYTES NFR BLD AUTO: 9.2 %
NEUTROPHILS # BLD AUTO: 4.64 K/UL
NEUTROPHILS NFR BLD AUTO: 62.9 %
NITRITE URINE: NEGATIVE
PARATHYROID HORMONE INTACT: 44 PG/ML
PH URINE: 6.5
PHOSPHATE SERPL-MCNC: 4.1 MG/DL
PLATELET # BLD AUTO: 217 K/UL
POTASSIUM SERPL-SCNC: 5 MMOL/L
PROT SERPL-MCNC: 6.6 G/DL
PROT UR-MCNC: 13 MG/DL
PROTEIN URINE: NORMAL
RBC # BLD: 4.25 M/UL
RBC # FLD: 13 %
RED BLOOD CELLS URINE: 1 /HPF
SODIUM SERPL-SCNC: 141 MMOL/L
SPECIFIC GRAVITY URINE: 1.03
SQUAMOUS EPITHELIAL CELLS: 1 /HPF
TACROLIMUS SERPL-MCNC: 5.8 NG/ML
URATE SERPL-MCNC: 5.4 MG/DL
UROBILINOGEN URINE: NORMAL
WBC # FLD AUTO: 7.38 K/UL
WHITE BLOOD CELLS URINE: 0 /HPF

## 2023-02-07 ENCOUNTER — APPOINTMENT (OUTPATIENT)
Dept: DERMATOLOGY | Facility: CLINIC | Age: 63
End: 2023-02-07

## 2023-02-08 ENCOUNTER — OUTPATIENT (OUTPATIENT)
Dept: OUTPATIENT SERVICES | Facility: HOSPITAL | Age: 63
LOS: 1 days | End: 2023-02-08
Payer: MEDICARE

## 2023-02-08 ENCOUNTER — NON-APPOINTMENT (OUTPATIENT)
Age: 63
End: 2023-02-08

## 2023-02-08 ENCOUNTER — APPOINTMENT (OUTPATIENT)
Dept: INTERNAL MEDICINE | Facility: CLINIC | Age: 63
End: 2023-02-08
Payer: MEDICARE

## 2023-02-08 ENCOUNTER — APPOINTMENT (OUTPATIENT)
Dept: RADIOLOGY | Facility: CLINIC | Age: 63
End: 2023-02-08
Payer: MEDICARE

## 2023-02-08 VITALS
DIASTOLIC BLOOD PRESSURE: 80 MMHG | OXYGEN SATURATION: 96 % | BODY MASS INDEX: 18.37 KG/M2 | SYSTOLIC BLOOD PRESSURE: 125 MMHG | RESPIRATION RATE: 16 BRPM | TEMPERATURE: 98.4 F | WEIGHT: 124 LBS | HEIGHT: 69 IN | HEART RATE: 66 BPM

## 2023-02-08 DIAGNOSIS — R07.81 PLEURODYNIA: ICD-10-CM

## 2023-02-08 PROCEDURE — 99213 OFFICE O/P EST LOW 20 MIN: CPT

## 2023-02-08 PROCEDURE — 71046 X-RAY EXAM CHEST 2 VIEWS: CPT

## 2023-02-08 PROCEDURE — 71046 X-RAY EXAM CHEST 2 VIEWS: CPT | Mod: 26

## 2023-02-08 RX ORDER — CLINDAMYCIN PHOSPHATE 10 MG/ML
1 LOTION TOPICAL TWICE DAILY
Qty: 1 | Refills: 1 | Status: COMPLETED | COMMUNITY
Start: 2022-11-01 | End: 2023-02-08

## 2023-02-08 RX ORDER — HYDROCORTISONE 25 MG/G
2.5 OINTMENT TOPICAL
Qty: 1 | Refills: 1 | Status: COMPLETED | COMMUNITY
Start: 2022-11-01 | End: 2023-02-08

## 2023-02-08 NOTE — PHYSICAL EXAM
[No Acute Distress] : no acute distress [Well Nourished] : well nourished [Well Developed] : well developed [Well-Appearing] : well-appearing [No Respiratory Distress] : no respiratory distress  [No Accessory Muscle Use] : no accessory muscle use [Clear to Auscultation] : lungs were clear to auscultation bilaterally [Normal Rate] : normal rate  [Regular Rhythm] : with a regular rhythm [Normal S1, S2] : normal S1 and S2 [Normal] : affect was normal and insight and judgment were intact [de-identified] : face mask  [de-identified] : no brusing of RL chest area or LL chest area. tenderness on palpation of lower right rib cage

## 2023-02-08 NOTE — ASSESSMENT
[FreeTextEntry1] : #Rib Pain\par - f/u chest xray\par - tylenol 500mg Q 6 hours for pain - pt avoid nsaids due to kidney hx.\par - if have any difficulty breathing or cp or pressure, n/v or abdominal pain or fever - go to ED\par pt agrees and understands plan via teach back method. all questions answered.\par

## 2023-02-08 NOTE — REVIEW OF SYSTEMS
[Shortness Of Breath] : no shortness of breath [Wheezing] : no wheezing [Cough] : no cough [Dyspnea on Exertion] : not dyspnea on exertion [Negative] : Psychiatric [FreeTextEntry6] : right lower rib pain

## 2023-02-08 NOTE — HEALTH RISK ASSESSMENT
[No] : No [0] : 2) Feeling down, depressed, or hopeless: Not at all (0) [PHQ-2 Negative - No further assessment needed] : PHQ-2 Negative - No further assessment needed [Former] : Former

## 2023-02-08 NOTE — HISTORY OF PRESENT ILLNESS
[FreeTextEntry8] : 62 M here s/p fall 1/6/2023\par Pt denies any LOC or Head Trauma\par Pt reports fall happened [employer] playing [employer]\par Pt reports got up from table and went to get up to get a chair and tripped over platform and fell onto a folding hair he was holding.\par Pt denies any acute chest pain or sob.\par Pt denies any wheezing. \par Pt reports pain is in Right lower rib area for 2 days.\par Pt reports pain is 8/10 constant and mild radiation to back.\par Pt took tylenol for pain no relief and neurontin did not help either.\par Pt denies any alleviating factors, pt reports pain when touches it. no rash area.\par Pt also reports pain when sleep on right side. \par Pt has chronic L5 herniated disc - 2010 \par pt reports saw ortho and pain in 2012 - epidural x  6 times. mild relief.\par back pain is not radiating\par No fever, no rash, no weakness or sensory changes\par No urinary retention or overflow incontinence, no bowel movement changes or saddle paresthesia.\par No history of cancer\par Pt has no further complaints\par \par \par

## 2023-04-03 ENCOUNTER — APPOINTMENT (OUTPATIENT)
Dept: ORTHOPEDIC SURGERY | Facility: CLINIC | Age: 63
End: 2023-04-03

## 2023-05-09 ENCOUNTER — APPOINTMENT (OUTPATIENT)
Dept: TRANSPLANT | Facility: CLINIC | Age: 63
End: 2023-05-09

## 2023-05-11 ENCOUNTER — NON-APPOINTMENT (OUTPATIENT)
Age: 63
End: 2023-05-11

## 2023-05-11 LAB
ALBUMIN SERPL ELPH-MCNC: 4.7 G/DL
ALP BLD-CCNC: 59 U/L
ALT SERPL-CCNC: 14 U/L
ANION GAP SERPL CALC-SCNC: 12 MMOL/L
APPEARANCE: CLEAR
AST SERPL-CCNC: 22 U/L
BACTERIA: NEGATIVE /HPF
BASOPHILS # BLD AUTO: 0.02 K/UL
BASOPHILS NFR BLD AUTO: 0.2 %
BILIRUB SERPL-MCNC: 0.6 MG/DL
BILIRUBIN URINE: NEGATIVE
BLOOD URINE: NEGATIVE
BUN SERPL-MCNC: 27 MG/DL
CALCIUM SERPL-MCNC: 10.5 MG/DL
CALCIUM SERPL-MCNC: 10.5 MG/DL
CAST: 0 /LPF
CHLORIDE SERPL-SCNC: 106 MMOL/L
CMV DNA SPEC QL NAA+PROBE: NOT DETECTED IU/ML
CMVPCR LOG: NOT DETECTED LOG10IU/ML
CO2 SERPL-SCNC: 22 MMOL/L
COLOR: YELLOW
CREAT SERPL-MCNC: 1.6 MG/DL
CREAT SPEC-SCNC: 112 MG/DL
CREAT/PROT UR: 0.2 RATIO
EGFR: 48 ML/MIN/1.73M2
EOSINOPHIL # BLD AUTO: 0.06 K/UL
EOSINOPHIL NFR BLD AUTO: 0.6 %
EPITHELIAL CELLS: 0 /HPF
GLUCOSE QUALITATIVE U: NEGATIVE MG/DL
GLUCOSE SERPL-MCNC: 96 MG/DL
HCT VFR BLD CALC: 42.7 %
HGB BLD-MCNC: 13.7 G/DL
IMM GRANULOCYTES NFR BLD AUTO: 0.2 %
KETONES URINE: NEGATIVE MG/DL
LEUKOCYTE ESTERASE URINE: NEGATIVE
LYMPHOCYTES # BLD AUTO: 1.04 K/UL
LYMPHOCYTES NFR BLD AUTO: 10.8 %
MAN DIFF?: NORMAL
MCHC RBC-ENTMCNC: 31.3 PG
MCHC RBC-ENTMCNC: 32.1 GM/DL
MCV RBC AUTO: 97.5 FL
MICROSCOPIC-UA: NORMAL
MONOCYTES # BLD AUTO: 0.61 K/UL
MONOCYTES NFR BLD AUTO: 6.3 %
NEUTROPHILS # BLD AUTO: 7.89 K/UL
NEUTROPHILS NFR BLD AUTO: 81.9 %
NITRITE URINE: NEGATIVE
PARATHYROID HORMONE INTACT: 65 PG/ML
PH URINE: 7
PLATELET # BLD AUTO: 226 K/UL
POTASSIUM SERPL-SCNC: 4.6 MMOL/L
PROT SERPL-MCNC: 7.1 G/DL
PROT UR-MCNC: 20 MG/DL
PROTEIN URINE: NORMAL MG/DL
RBC # BLD: 4.38 M/UL
RBC # FLD: 14 %
RED BLOOD CELLS URINE: 1 /HPF
SODIUM SERPL-SCNC: 140 MMOL/L
SPECIFIC GRAVITY URINE: 1.02
TACROLIMUS SERPL-MCNC: 11.5 NG/ML
UROBILINOGEN URINE: 0.2 MG/DL
WBC # FLD AUTO: 9.64 K/UL
WHITE BLOOD CELLS URINE: 0 /HPF

## 2023-05-12 LAB — BKV DNA SPEC QL NAA+PROBE: NOT DETECTED IU/ML

## 2023-05-15 NOTE — ED ADULT TRIAGE NOTE - NS ED NURSE DIRECT TO ROOM YN
61 yo female hx of arthritis gout DM recently on steroids for gout flare up to the right foot prescribed by podiatrist in [place]. reports swelling and pain, atraumatic. no associated fever or chills. no numbness or tingling. reports that shec  can not take NSAIDs allopurinol due to kidney issues, took tylenol with some relief yesterday. No

## 2023-06-02 ENCOUNTER — APPOINTMENT (OUTPATIENT)
Dept: INTERNAL MEDICINE | Facility: CLINIC | Age: 63
End: 2023-06-02
Payer: MEDICARE

## 2023-06-02 VITALS
TEMPERATURE: 98 F | WEIGHT: 116 LBS | HEIGHT: 66 IN | RESPIRATION RATE: 16 BRPM | BODY MASS INDEX: 18.64 KG/M2 | SYSTOLIC BLOOD PRESSURE: 128 MMHG | HEART RATE: 70 BPM | OXYGEN SATURATION: 98 % | DIASTOLIC BLOOD PRESSURE: 81 MMHG

## 2023-06-02 DIAGNOSIS — R05.9 COUGH, UNSPECIFIED: ICD-10-CM

## 2023-06-02 DIAGNOSIS — R29.898 OTHER SYMPTOMS AND SIGNS INVOLVING THE MUSCULOSKELETAL SYSTEM: ICD-10-CM

## 2023-06-02 PROCEDURE — 99214 OFFICE O/P EST MOD 30 MIN: CPT

## 2023-06-02 NOTE — PHYSICAL EXAM
[Normal] : normal rate, regular rhythm, normal S1 and S2 and no murmur heard [Normal Posterior Cervical Nodes] : no posterior cervical lymphadenopathy [Normal Anterior Cervical Nodes] : no anterior cervical lymphadenopathy [No CVA Tenderness] : no CVA  tenderness [No Spinal Tenderness] : no spinal tenderness [Coordination Grossly Intact] : coordination grossly intact [Normal Affect] : the affect was normal [No Focal Deficits] : no focal deficits [Alert and Oriented x3] : oriented to person, place, and time [Normal Insight/Judgement] : insight and judgment were intact [de-identified] : port in left extremity  [de-identified] : 4/5 strength in left lower extremity and 5/5 on RLE sensory intact.

## 2023-06-02 NOTE — HEALTH RISK ASSESSMENT
[No] : No [No falls in past year] : Patient reported no falls in the past year [0] : 2) Feeling down, depressed, or hopeless: Not at all (0) [PHQ-2 Negative - No further assessment needed] : PHQ-2 Negative - No further assessment needed [Never] : Never [[id number]] : 0

## 2023-06-02 NOTE — REVIEW OF SYSTEMS
[Cough] : cough [Muscle Weakness] : muscle weakness [Back Pain] : back pain [Negative] : Psychiatric [Shortness Of Breath] : no shortness of breath [Wheezing] : no wheezing [Dyspnea on Exertion] : not dyspnea on exertion [Joint Pain] : no joint pain [Joint Stiffness] : no joint stiffness [Muscle Pain] : no muscle pain [Joint Swelling] : no joint swelling [FreeTextEntry6] : 1 episode of cough - resolved [FreeTextEntry9] : chronic back pain

## 2023-06-02 NOTE — ASSESSMENT
[FreeTextEntry1] : #Other cough\par - 1 episode of cough black sputum, no blood - continue to monitor no orher URI sx denies a cough lasting longer than three weeks, unexplained weight loss, night sweats or a fever, and loss of appetite.\par - rto if reoccurs\par #Chronic Back Pain\par - pt stopped acupuncture and f/u pain management\par #Chronic LLE weakness \par - f/u neurology\par - [facility] will [facility]\par - any acute weakness in left upper or lower extremity or new onset of difficulty with ambulation or speech or upper extremity weakness - discussed go to ED and contact pcp.\par \par pt agrees and understands plan via teach back method. all questions answered.\par \par Time dedicated to this patient visit today including preparing to see patient, obtaining and/or reviewing separately obtained history, performing a medically appropriate examination and/or evaluation, counseling and educating the patient/family, ordering medications, tests, or procedures, communicating with other provider(s), documenting clinical information in the health records, independently interpreting results and communicating results to the patient/family - 35 minutes.  Total\par

## 2023-06-19 ENCOUNTER — APPOINTMENT (OUTPATIENT)
Dept: NEPHROLOGY | Facility: CLINIC | Age: 63
End: 2023-06-19

## 2023-06-27 ENCOUNTER — APPOINTMENT (OUTPATIENT)
Dept: NEPHROLOGY | Facility: CLINIC | Age: 63
End: 2023-06-27

## 2023-09-30 NOTE — ED PROVIDER NOTE - CADM POA CENTRAL LINE
Send Summary:   Discharge Summary Providers:  Provider Role Provider Name   · Attending Daron Garcia       Note Recipients: Daron Garcia MD - [id number] [PREFERRED]       Discharge:    Summary:   Admission Date: .20-Sep-2023 06:08:00   Discharge Date: 21-Sep-2023   Attending Physician at Discharge: Daron Garcia   Admission Reason: osteoarthritis of left knee   Final Discharge Diagnoses: 1.Osteoarthritis       2.Obesity with BMI 41.5   Procedures: Date: 20-Sep-2023 09:40:00  Procedure Name: Left total Knee Arthroplasty   Condition at Discharge: Satisfactory   Disposition at Discharge: Home Health Care - New   Vital Signs:        T   P  R  BP   MAP  SpO2   Value  36.2  81  18  158/78   106  93%  Date/Time 9/21 11:35 9/21 11:35 9/21 11:35 9/21 11:35  9/21 11:35 9/21 11:35  Range  (35.9C - 36.5C )  (77 - 99 )  (16 - 18 )  (118 - 189 )/ (58 - 92 )  (80 - 120 )  (91% - 95% )    Date:            Weight/Scale Type:  Height:   20-Sep-2023 11:58  113.1  kg / bed  165.1  cm  Hospital Course:    Patient is a 70 year old female with severe osteoarthritis of left knee.  On 9/20/23, patient underwent uncomplicated left total knee replacement by Dr Daron Garcia.   spinal anesthesia was utilized and estimated blood loss intraoperatively was 50cc.  Patient received one dose of IV antibiotics preoperatively and 3 additional doses were administered post operatively.  For DVT prophylaxis, patient was ordered lovenox  injections and bilateral sCDs.  Physical and occupational therapy were initiated with full weight bearing status.    Patient progressed well with therapy.  Her hemoglobin level was stable at 13.2 and she remained afebrile.  Blood sugar levels ranged from 145 to 271.  Her home diabetic medications were resumed and patient was placed on sliding scale insulin coverage.   On POD #1, patient was medically stable and safe for discharge home with [facility] followup.  At time of discharge, patient was ambulating 50 feet with use of  wheeled walker and stand by assistance.  Her left knee range of motion was 5 to 90 degrees.  Due  to cost of lovenox injections, patient was sent home with home going script of eliquis 2.5mg twice a day for 30 days.  In addition, bilateral parish hose compression stockings were given along with instructions to wear at home for 4 to 6 with weeks.  For  pain management, oxycodone and tramadol scripts were provided.  Anticipate need for extended use of narcotic medications as well as higher MED requirements based on usual post operative incisional pain following total joint replacement surgery.  Patient  will followup in office with Dr. Garcia in 3 weeks.      Immunizations:    Immunizations:  08-Apr-2021   SARS-CoV-2 (COVID-19): Immunizations, 08-Apr-2021  11-Mar-2021   SARS-CoV-2 (COVID-19): Immunizations, 11-Mar-2021           Medical History:   DVT (deep venous thrombosis):    Diabetes mellitus:    HTN (hypertension):    COPD (chronic obstructive pulmonary disease):       Discharge Information:    and Continuing Care:   Lab Results - Pending:    Surgical Pathology Drawn at 20-Sep-2023 09:00:00  Radiology Results - Pending: None   Discharge Instructions:    Activity:           activity as tolerated   with walker.          May shower..            May not drive.            Weight-bearing Instructions: weight-bearing as tolerated.      Nutrition/Diet:           resume normal diet          Encourage Fluids:   Increase your water and fiber intake to help prevent constipation    Wound Care:           Wound Site:   left knee          Change Dressing:   remove dressing in one week          Cover With:   no dressing, leave open to air,  if no drainage noted          Other Instructions:   Staples to be removed in two weeks    Additional Orders:           Blood Glucose Monitoring:   Maintain good control of blood sugar levels to help prevent complications          Additional Instructions:   .  Continue to apply ice to incision 4  to 5 times a day for 20 minutes    Take over the counter stool softener while you are taking pain medications.  Stop stool softener if you experience diarrhea.    Do NOT take any non steroid anti-inflammatory medications (NSAIDS) such as ibuprofen, aleve, or naproxen.    Wear bilateral parish hose compression stockings to lower legs for next 4 to 6 weeks      Home Care Certification:           Home Care Agency:    Home Team [phone]          Skilled Disciplines Ordered:   PT    Home Care Services:           Home Care Skilled Service:   Rehab (PT/OT/SP eval and treat),  wound care    Follow Up Appointments:    Follow-Up Appointment 01:           Physician/Dept/Service:   Dr Garcia    Discharge Medications: Home Medication   oxyCODONE 5 mg oral tablet - 1-2  tab(s) orally every 6 hours x 7 days  as needed for severe incisional pain G89.18   traMADol 50 mg oral tablet - 1 tab(s) orally every 4 to 6 hours x 7 days as needed for moderate pain G89.18  Eliquis 2.5 mg oral tablet - 1 tab(s) orally 2 times a day x 30 days   losartan 25 mg oral tablet - 1 tab(s) oral once a day  DULoxetine 60 mg oral delayed release capsule - 1 tab(s) oral once a day  glipiZIDE 10 mg oral tablet - 1 tab(s) oral twice a day  metFORMIN 500 mg oral tablet - 1 tab(s) oral 3 times a day  Trulicity Pen - 1 price subcutaneous once a week on saturdays  Tylenol 500 mg oral tablet - 2 tab(s) orally every 8 hours  Colace 100 mg oral capsule - 1 cap(s) orally once a day     PRN Medication   MiraLax oral powder for reconstitution - orally once a day, As Needed for constipation   take dose if you do NOT have bowel movement by Sunday Sept 24th     DNR Status:   ·  Code Status Code Status order at time of discharge: Full Code       Electronic Signatures:  Lala Castellon (APRN-CNS)  (Signed 21-Sep-2023 15:41)   Authored: Send Summary, Summary Content, Immunizations,  Medical History, Ongoing Care, DNR Status, Note Completion      Last Updated: 21-Sep-2023  15:41 by Lala Castellon (APRN-CNS)    No

## 2023-10-02 ENCOUNTER — RX RENEWAL (OUTPATIENT)
Age: 63
End: 2023-10-02

## 2023-10-02 RX ORDER — TAMSULOSIN HYDROCHLORIDE 0.4 MG/1
0.4 CAPSULE ORAL
Qty: 90 | Refills: 2 | Status: ACTIVE | COMMUNITY
Start: 2019-01-14 | End: 1900-01-01

## 2023-10-16 ENCOUNTER — APPOINTMENT (OUTPATIENT)
Dept: INTERNAL MEDICINE | Facility: CLINIC | Age: 63
End: 2023-10-16

## 2023-10-28 ENCOUNTER — INPATIENT (INPATIENT)
Facility: HOSPITAL | Age: 63
LOS: 4 days | Discharge: LEFT BEFORE DISCHARGE | DRG: 552 | End: 2023-11-02
Attending: INTERNAL MEDICINE | Admitting: INTERNAL MEDICINE
Payer: MEDICARE

## 2023-10-28 ENCOUNTER — NON-APPOINTMENT (OUTPATIENT)
Age: 63
End: 2023-10-28

## 2023-10-28 VITALS
HEART RATE: 77 BPM | OXYGEN SATURATION: 98 % | WEIGHT: 130.07 LBS | RESPIRATION RATE: 18 BRPM | TEMPERATURE: 98 F | HEIGHT: 66 IN | SYSTOLIC BLOOD PRESSURE: 118 MMHG | DIASTOLIC BLOOD PRESSURE: 80 MMHG

## 2023-10-28 DIAGNOSIS — Z99.2 DEPENDENCE ON RENAL DIALYSIS: Chronic | ICD-10-CM

## 2023-10-28 DIAGNOSIS — Z98.89 OTHER SPECIFIED POSTPROCEDURAL STATES: Chronic | ICD-10-CM

## 2023-10-28 DIAGNOSIS — Z94.0 KIDNEY TRANSPLANT STATUS: Chronic | ICD-10-CM

## 2023-10-28 LAB
ALBUMIN SERPL ELPH-MCNC: 4.6 G/DL — SIGNIFICANT CHANGE UP (ref 3.3–5)
ALBUMIN SERPL ELPH-MCNC: 4.6 G/DL — SIGNIFICANT CHANGE UP (ref 3.3–5)
ALP SERPL-CCNC: 53 U/L — SIGNIFICANT CHANGE UP (ref 40–120)
ALP SERPL-CCNC: 53 U/L — SIGNIFICANT CHANGE UP (ref 40–120)
ALT FLD-CCNC: 14 U/L — SIGNIFICANT CHANGE UP (ref 10–45)
ALT FLD-CCNC: 14 U/L — SIGNIFICANT CHANGE UP (ref 10–45)
ANION GAP SERPL CALC-SCNC: 11 MMOL/L — SIGNIFICANT CHANGE UP (ref 5–17)
ANION GAP SERPL CALC-SCNC: 11 MMOL/L — SIGNIFICANT CHANGE UP (ref 5–17)
APPEARANCE UR: CLEAR — SIGNIFICANT CHANGE UP
APPEARANCE UR: CLEAR — SIGNIFICANT CHANGE UP
APTT BLD: 29.6 SEC — SIGNIFICANT CHANGE UP (ref 24.5–35.6)
APTT BLD: 29.6 SEC — SIGNIFICANT CHANGE UP (ref 24.5–35.6)
AST SERPL-CCNC: 39 U/L — SIGNIFICANT CHANGE UP (ref 10–40)
AST SERPL-CCNC: 39 U/L — SIGNIFICANT CHANGE UP (ref 10–40)
BACTERIA # UR AUTO: NEGATIVE — SIGNIFICANT CHANGE UP
BACTERIA # UR AUTO: NEGATIVE — SIGNIFICANT CHANGE UP
BASE EXCESS BLDV CALC-SCNC: -0.4 MMOL/L — SIGNIFICANT CHANGE UP (ref -2–3)
BASE EXCESS BLDV CALC-SCNC: -0.4 MMOL/L — SIGNIFICANT CHANGE UP (ref -2–3)
BASOPHILS # BLD AUTO: 0.01 K/UL — SIGNIFICANT CHANGE UP (ref 0–0.2)
BASOPHILS # BLD AUTO: 0.01 K/UL — SIGNIFICANT CHANGE UP (ref 0–0.2)
BASOPHILS NFR BLD AUTO: 0.1 % — SIGNIFICANT CHANGE UP (ref 0–2)
BASOPHILS NFR BLD AUTO: 0.1 % — SIGNIFICANT CHANGE UP (ref 0–2)
BILIRUB SERPL-MCNC: 0.7 MG/DL — SIGNIFICANT CHANGE UP (ref 0.2–1.2)
BILIRUB SERPL-MCNC: 0.7 MG/DL — SIGNIFICANT CHANGE UP (ref 0.2–1.2)
BILIRUB UR-MCNC: NEGATIVE — SIGNIFICANT CHANGE UP
BILIRUB UR-MCNC: NEGATIVE — SIGNIFICANT CHANGE UP
BUN SERPL-MCNC: 25 MG/DL — HIGH (ref 7–23)
BUN SERPL-MCNC: 25 MG/DL — HIGH (ref 7–23)
CA-I SERPL-SCNC: 1.36 MMOL/L — HIGH (ref 1.15–1.33)
CA-I SERPL-SCNC: 1.36 MMOL/L — HIGH (ref 1.15–1.33)
CALCIUM SERPL-MCNC: 10.7 MG/DL — HIGH (ref 8.4–10.5)
CALCIUM SERPL-MCNC: 10.7 MG/DL — HIGH (ref 8.4–10.5)
CHLORIDE BLDV-SCNC: 105 MMOL/L — SIGNIFICANT CHANGE UP (ref 96–108)
CHLORIDE BLDV-SCNC: 105 MMOL/L — SIGNIFICANT CHANGE UP (ref 96–108)
CHLORIDE SERPL-SCNC: 103 MMOL/L — SIGNIFICANT CHANGE UP (ref 96–108)
CHLORIDE SERPL-SCNC: 103 MMOL/L — SIGNIFICANT CHANGE UP (ref 96–108)
CO2 BLDV-SCNC: 27 MMOL/L — HIGH (ref 22–26)
CO2 BLDV-SCNC: 27 MMOL/L — HIGH (ref 22–26)
CO2 SERPL-SCNC: 22 MMOL/L — SIGNIFICANT CHANGE UP (ref 22–31)
CO2 SERPL-SCNC: 22 MMOL/L — SIGNIFICANT CHANGE UP (ref 22–31)
COLOR SPEC: YELLOW — SIGNIFICANT CHANGE UP
COLOR SPEC: YELLOW — SIGNIFICANT CHANGE UP
CREAT SERPL-MCNC: 1.59 MG/DL — HIGH (ref 0.5–1.3)
CREAT SERPL-MCNC: 1.59 MG/DL — HIGH (ref 0.5–1.3)
DIFF PNL FLD: NEGATIVE — SIGNIFICANT CHANGE UP
DIFF PNL FLD: NEGATIVE — SIGNIFICANT CHANGE UP
EGFR: 48 ML/MIN/1.73M2 — LOW
EGFR: 48 ML/MIN/1.73M2 — LOW
EOSINOPHIL # BLD AUTO: 0.01 K/UL — SIGNIFICANT CHANGE UP (ref 0–0.5)
EOSINOPHIL # BLD AUTO: 0.01 K/UL — SIGNIFICANT CHANGE UP (ref 0–0.5)
EOSINOPHIL NFR BLD AUTO: 0.1 % — SIGNIFICANT CHANGE UP (ref 0–6)
EOSINOPHIL NFR BLD AUTO: 0.1 % — SIGNIFICANT CHANGE UP (ref 0–6)
EPI CELLS # UR: 1 /HPF — SIGNIFICANT CHANGE UP
EPI CELLS # UR: 1 /HPF — SIGNIFICANT CHANGE UP
GAS PNL BLDV: 135 MMOL/L — LOW (ref 136–145)
GAS PNL BLDV: 135 MMOL/L — LOW (ref 136–145)
GAS PNL BLDV: SIGNIFICANT CHANGE UP
GAS PNL BLDV: SIGNIFICANT CHANGE UP
GLUCOSE BLDV-MCNC: 86 MG/DL — SIGNIFICANT CHANGE UP (ref 70–99)
GLUCOSE BLDV-MCNC: 86 MG/DL — SIGNIFICANT CHANGE UP (ref 70–99)
GLUCOSE SERPL-MCNC: 93 MG/DL — SIGNIFICANT CHANGE UP (ref 70–99)
GLUCOSE SERPL-MCNC: 93 MG/DL — SIGNIFICANT CHANGE UP (ref 70–99)
GLUCOSE UR QL: NEGATIVE — SIGNIFICANT CHANGE UP
GLUCOSE UR QL: NEGATIVE — SIGNIFICANT CHANGE UP
HCO3 BLDV-SCNC: 26 MMOL/L — SIGNIFICANT CHANGE UP (ref 22–29)
HCO3 BLDV-SCNC: 26 MMOL/L — SIGNIFICANT CHANGE UP (ref 22–29)
HCT VFR BLD CALC: 42.7 % — SIGNIFICANT CHANGE UP (ref 39–50)
HCT VFR BLD CALC: 42.7 % — SIGNIFICANT CHANGE UP (ref 39–50)
HCT VFR BLDA CALC: 42 % — SIGNIFICANT CHANGE UP (ref 39–51)
HCT VFR BLDA CALC: 42 % — SIGNIFICANT CHANGE UP (ref 39–51)
HGB BLD CALC-MCNC: 13.9 G/DL — SIGNIFICANT CHANGE UP (ref 12.6–17.4)
HGB BLD CALC-MCNC: 13.9 G/DL — SIGNIFICANT CHANGE UP (ref 12.6–17.4)
HGB BLD-MCNC: 13.9 G/DL — SIGNIFICANT CHANGE UP (ref 13–17)
HGB BLD-MCNC: 13.9 G/DL — SIGNIFICANT CHANGE UP (ref 13–17)
HYALINE CASTS # UR AUTO: 7 /LPF — HIGH (ref 0–2)
HYALINE CASTS # UR AUTO: 7 /LPF — HIGH (ref 0–2)
IMM GRANULOCYTES NFR BLD AUTO: 0.3 % — SIGNIFICANT CHANGE UP (ref 0–0.9)
IMM GRANULOCYTES NFR BLD AUTO: 0.3 % — SIGNIFICANT CHANGE UP (ref 0–0.9)
INR BLD: 1 RATIO — SIGNIFICANT CHANGE UP (ref 0.85–1.18)
INR BLD: 1 RATIO — SIGNIFICANT CHANGE UP (ref 0.85–1.18)
KETONES UR-MCNC: NEGATIVE — SIGNIFICANT CHANGE UP
KETONES UR-MCNC: NEGATIVE — SIGNIFICANT CHANGE UP
LACTATE BLDV-MCNC: 1.2 MMOL/L — SIGNIFICANT CHANGE UP (ref 0.5–2)
LACTATE BLDV-MCNC: 1.2 MMOL/L — SIGNIFICANT CHANGE UP (ref 0.5–2)
LEUKOCYTE ESTERASE UR-ACNC: NEGATIVE — SIGNIFICANT CHANGE UP
LEUKOCYTE ESTERASE UR-ACNC: NEGATIVE — SIGNIFICANT CHANGE UP
LIDOCAIN IGE QN: 49 U/L — SIGNIFICANT CHANGE UP (ref 7–60)
LIDOCAIN IGE QN: 49 U/L — SIGNIFICANT CHANGE UP (ref 7–60)
LYMPHOCYTES # BLD AUTO: 1.35 K/UL — SIGNIFICANT CHANGE UP (ref 1–3.3)
LYMPHOCYTES # BLD AUTO: 1.35 K/UL — SIGNIFICANT CHANGE UP (ref 1–3.3)
LYMPHOCYTES # BLD AUTO: 19.9 % — SIGNIFICANT CHANGE UP (ref 13–44)
LYMPHOCYTES # BLD AUTO: 19.9 % — SIGNIFICANT CHANGE UP (ref 13–44)
MAGNESIUM SERPL-MCNC: 2.2 MG/DL — SIGNIFICANT CHANGE UP (ref 1.6–2.6)
MAGNESIUM SERPL-MCNC: 2.2 MG/DL — SIGNIFICANT CHANGE UP (ref 1.6–2.6)
MCHC RBC-ENTMCNC: 32.2 PG — SIGNIFICANT CHANGE UP (ref 27–34)
MCHC RBC-ENTMCNC: 32.2 PG — SIGNIFICANT CHANGE UP (ref 27–34)
MCHC RBC-ENTMCNC: 32.6 GM/DL — SIGNIFICANT CHANGE UP (ref 32–36)
MCHC RBC-ENTMCNC: 32.6 GM/DL — SIGNIFICANT CHANGE UP (ref 32–36)
MCV RBC AUTO: 98.8 FL — SIGNIFICANT CHANGE UP (ref 80–100)
MCV RBC AUTO: 98.8 FL — SIGNIFICANT CHANGE UP (ref 80–100)
MONOCYTES # BLD AUTO: 0.48 K/UL — SIGNIFICANT CHANGE UP (ref 0–0.9)
MONOCYTES # BLD AUTO: 0.48 K/UL — SIGNIFICANT CHANGE UP (ref 0–0.9)
MONOCYTES NFR BLD AUTO: 7.1 % — SIGNIFICANT CHANGE UP (ref 2–14)
MONOCYTES NFR BLD AUTO: 7.1 % — SIGNIFICANT CHANGE UP (ref 2–14)
NEUTROPHILS # BLD AUTO: 4.9 K/UL — SIGNIFICANT CHANGE UP (ref 1.8–7.4)
NEUTROPHILS # BLD AUTO: 4.9 K/UL — SIGNIFICANT CHANGE UP (ref 1.8–7.4)
NEUTROPHILS NFR BLD AUTO: 72.5 % — SIGNIFICANT CHANGE UP (ref 43–77)
NEUTROPHILS NFR BLD AUTO: 72.5 % — SIGNIFICANT CHANGE UP (ref 43–77)
NITRITE UR-MCNC: NEGATIVE — SIGNIFICANT CHANGE UP
NITRITE UR-MCNC: NEGATIVE — SIGNIFICANT CHANGE UP
NRBC # BLD: 0 /100 WBCS — SIGNIFICANT CHANGE UP (ref 0–0)
NRBC # BLD: 0 /100 WBCS — SIGNIFICANT CHANGE UP (ref 0–0)
PCO2 BLDV: 48 MMHG — SIGNIFICANT CHANGE UP (ref 42–55)
PCO2 BLDV: 48 MMHG — SIGNIFICANT CHANGE UP (ref 42–55)
PH BLDV: 7.34 — SIGNIFICANT CHANGE UP (ref 7.32–7.43)
PH BLDV: 7.34 — SIGNIFICANT CHANGE UP (ref 7.32–7.43)
PH UR: 5.5 — SIGNIFICANT CHANGE UP (ref 5–8)
PH UR: 5.5 — SIGNIFICANT CHANGE UP (ref 5–8)
PHOSPHATE SERPL-MCNC: 3.9 MG/DL — SIGNIFICANT CHANGE UP (ref 2.5–4.5)
PHOSPHATE SERPL-MCNC: 3.9 MG/DL — SIGNIFICANT CHANGE UP (ref 2.5–4.5)
PLATELET # BLD AUTO: 207 K/UL — SIGNIFICANT CHANGE UP (ref 150–400)
PLATELET # BLD AUTO: 207 K/UL — SIGNIFICANT CHANGE UP (ref 150–400)
PO2 BLDV: 32 MMHG — SIGNIFICANT CHANGE UP (ref 25–45)
PO2 BLDV: 32 MMHG — SIGNIFICANT CHANGE UP (ref 25–45)
POTASSIUM BLDV-SCNC: 5.3 MMOL/L — HIGH (ref 3.5–5.1)
POTASSIUM BLDV-SCNC: 5.3 MMOL/L — HIGH (ref 3.5–5.1)
POTASSIUM SERPL-MCNC: 7.4 MMOL/L — CRITICAL HIGH (ref 3.5–5.3)
POTASSIUM SERPL-MCNC: 7.4 MMOL/L — CRITICAL HIGH (ref 3.5–5.3)
POTASSIUM SERPL-SCNC: 7.4 MMOL/L — CRITICAL HIGH (ref 3.5–5.3)
POTASSIUM SERPL-SCNC: 7.4 MMOL/L — CRITICAL HIGH (ref 3.5–5.3)
PROT SERPL-MCNC: 7.5 G/DL — SIGNIFICANT CHANGE UP (ref 6–8.3)
PROT SERPL-MCNC: 7.5 G/DL — SIGNIFICANT CHANGE UP (ref 6–8.3)
PROT UR-MCNC: ABNORMAL
PROT UR-MCNC: ABNORMAL
PROTHROM AB SERPL-ACNC: 10.5 SEC — SIGNIFICANT CHANGE UP (ref 9.5–13)
PROTHROM AB SERPL-ACNC: 10.5 SEC — SIGNIFICANT CHANGE UP (ref 9.5–13)
RBC # BLD: 4.32 M/UL — SIGNIFICANT CHANGE UP (ref 4.2–5.8)
RBC # BLD: 4.32 M/UL — SIGNIFICANT CHANGE UP (ref 4.2–5.8)
RBC # FLD: 13.3 % — SIGNIFICANT CHANGE UP (ref 10.3–14.5)
RBC # FLD: 13.3 % — SIGNIFICANT CHANGE UP (ref 10.3–14.5)
RBC CASTS # UR COMP ASSIST: 3 /HPF — SIGNIFICANT CHANGE UP (ref 0–4)
RBC CASTS # UR COMP ASSIST: 3 /HPF — SIGNIFICANT CHANGE UP (ref 0–4)
SAO2 % BLDV: 51.3 % — LOW (ref 67–88)
SAO2 % BLDV: 51.3 % — LOW (ref 67–88)
SODIUM SERPL-SCNC: 136 MMOL/L — SIGNIFICANT CHANGE UP (ref 135–145)
SODIUM SERPL-SCNC: 136 MMOL/L — SIGNIFICANT CHANGE UP (ref 135–145)
SP GR SPEC: 1.04 — HIGH (ref 1.01–1.02)
SP GR SPEC: 1.04 — HIGH (ref 1.01–1.02)
UROBILINOGEN FLD QL: NEGATIVE — SIGNIFICANT CHANGE UP
UROBILINOGEN FLD QL: NEGATIVE — SIGNIFICANT CHANGE UP
WBC # BLD: 6.77 K/UL — SIGNIFICANT CHANGE UP (ref 3.8–10.5)
WBC # BLD: 6.77 K/UL — SIGNIFICANT CHANGE UP (ref 3.8–10.5)
WBC # FLD AUTO: 6.77 K/UL — SIGNIFICANT CHANGE UP (ref 3.8–10.5)
WBC # FLD AUTO: 6.77 K/UL — SIGNIFICANT CHANGE UP (ref 3.8–10.5)
WBC UR QL: 1 /HPF — SIGNIFICANT CHANGE UP (ref 0–5)
WBC UR QL: 1 /HPF — SIGNIFICANT CHANGE UP (ref 0–5)

## 2023-10-28 PROCEDURE — 74176 CT ABD & PELVIS W/O CONTRAST: CPT | Mod: 26,MA

## 2023-10-28 PROCEDURE — 99285 EMERGENCY DEPT VISIT HI MDM: CPT

## 2023-10-28 PROCEDURE — 76776 US EXAM K TRANSPL W/DOPPLER: CPT | Mod: 26,RT

## 2023-10-28 RX ORDER — HYDROMORPHONE HYDROCHLORIDE 2 MG/ML
0.5 INJECTION INTRAMUSCULAR; INTRAVENOUS; SUBCUTANEOUS ONCE
Refills: 0 | Status: DISCONTINUED | OUTPATIENT
Start: 2023-10-28 | End: 2023-10-28

## 2023-10-28 RX ORDER — ACETAMINOPHEN 500 MG
1000 TABLET ORAL ONCE
Refills: 0 | Status: COMPLETED | OUTPATIENT
Start: 2023-10-28 | End: 2023-10-28

## 2023-10-28 RX ORDER — ONDANSETRON 8 MG/1
4 TABLET, FILM COATED ORAL ONCE
Refills: 0 | Status: COMPLETED | OUTPATIENT
Start: 2023-10-28 | End: 2023-10-28

## 2023-10-28 RX ORDER — SODIUM CHLORIDE 9 MG/ML
1000 INJECTION INTRAMUSCULAR; INTRAVENOUS; SUBCUTANEOUS ONCE
Refills: 0 | Status: COMPLETED | OUTPATIENT
Start: 2023-10-28 | End: 2023-10-28

## 2023-10-28 RX ADMIN — HYDROMORPHONE HYDROCHLORIDE 0.5 MILLIGRAM(S): 2 INJECTION INTRAMUSCULAR; INTRAVENOUS; SUBCUTANEOUS at 20:03

## 2023-10-28 RX ADMIN — ONDANSETRON 4 MILLIGRAM(S): 8 TABLET, FILM COATED ORAL at 20:03

## 2023-10-28 NOTE — ED PROVIDER NOTE - OBJECTIVE STATEMENT
This is a 63 year old male with pmh of kidney transplant 2017 with Dr. Pao Zamorano, pancreatitis, L5 herniated disc s/p discetomy, HTN presenting with RLQ pain since 3am about 15 hours prior to presentation. Reports that he had pain in the RLQ gradually worsening throughout the day associated with nausea and urinary frequency. Denies any fever, but endorses chills. No vomiting. Denies any other abdominal surgical history than the kidney transplant. No dysuria or hematuria. No bowel/bladder incontinence, no saddle anesthesia.

## 2023-10-28 NOTE — ED PROVIDER NOTE - ATTENDING CONTRIBUTION TO CARE
Patient with right lower quadrant pain in the setting of reported urinary frequency at 0300 and renal transplant in 2017. No fever or chills. No nausea/vomiting.  right lower quadrant with mild tenderness to palpation  no rebound/guarding  non-tachycardic  mmm  trachea midline  non-tachycardic  non-tachypneic  no gross deformity of extremities, no asymmetry  left av fistula to upper arm   Fransisco Almanza MD, FACEP: In this physician's medical judgement based on clinical history and physical exam the patient's signs and symptoms lead to differential diagnoses which includes but is not limited to: transplant rejection, cystitis, uti, pyelonephritis, appendicitis, ureterolithiasis    Historical features, symptoms, and clinical exam not consistent with: sepsis     Labs were ordered and independently reviewed by me.  EKG was ordered and independently reviewed by me.  Imaging was ordered and reviewed by me.      Appropriate medications for the patient's presenting complaints were ordered, and effects were reassessed.     Patient's records including prior hospital visit, med and medical history were reviewed.   Escalation to admission/observation was considered.    Will follow up on labs, therapeutics, imaging, reassess and disposition as clinically indicated.  *The above represents an initial assessment/impression. Please refer to my progress notes below for potential changes in patient clinical course*

## 2023-10-28 NOTE — ED ADULT NURSE NOTE - NSFALLUNIVINTERV_ED_ALL_ED
Bed/Stretcher in lowest position, wheels locked, appropriate side rails in place/Call bell, personal items and telephone in reach/Instruct patient to call for assistance before getting out of bed/chair/stretcher/Non-slip footwear applied when patient is off stretcher/[place] to call system/Physically safe environment - no spills, clutter or unnecessary equipment/Purposeful proactive rounding/Room/bathroom lighting operational, light cord in reach

## 2023-10-28 NOTE — ED PROVIDER NOTE - CLINICAL SUMMARY MEDICAL DECISION MAKING FREE TEXT BOX
This is a 63 year old male with pmh of kidney transplant 2017 with Dr. Pao Zamorano, pancreatitis, L5 herniated disc s/p discetomy, HTN presenting with RLQ pain since 3am about 15 hours prior to presentation. Vitals wnl. Afebrile. RLQ ttp + and guarding. No flank pain. Urinary frequency however no dysuria. DDx includes but not limited to appendicitis, pyelonephritis, urinary tract infection. Will obtain labs, and consider CT scan w/ IV contrast however will touch base with transplant nephro team regarding this imaging. Will obtain urinalysis. Dispo pending labs, imaging and reassessment. This is a 63 year old male with pmh of kidney transplant 2017 with Dr. Pao Zamorano, pancreatitis, L5 herniated disc s/p discetomy, HTN presenting with RLQ pain since 3am about 15 hours prior to presentation. Vitals wnl. Afebrile. RLQ ttp + and guarding. No flank pain. Urinary frequency however no dysuria. DDx includes but not limited to appendicitis, pyelonephritis, urinary tract infection, rejection of the transplanted kidney. Will obtain labs, and consider CT scan w/o IV contrast however will touch base with transplant nephro team regarding this imaging. Will obtain urinalysis. Will obtain ultrasound to eval for hyperemia, hydronephrosis of the transplanted kidney. Dispo pending labs, imaging and reassessment.

## 2023-10-28 NOTE — ED ADULT NURSE NOTE - NS ED NURSE RECORD ANOTHER HT AND WT
Please contact patient regarding the controlled substance authorization.. I received a call from triage nurse at contact center.    Yes

## 2023-10-28 NOTE — ED ADULT NURSE NOTE - OBJECTIVE STATEMENT
Pt is a 63 year old male with pmh of kidney transplant 2017 with Dr. Pao Zamorano, pancreatitis, L5 herniated disc s/p discetomy, HTN presenting with RLQ pain x 3am. Pt associates symptoms with nausea and chills. Pt states rlq pain has been worsening and associates with urinary frequency. Pt noted with old HD fistula to L upper arm, pink band in place. Denies fevers, vomiting,

## 2023-10-29 DIAGNOSIS — N39.0 URINARY TRACT INFECTION, SITE NOT SPECIFIED: ICD-10-CM

## 2023-10-29 DIAGNOSIS — K86.89 OTHER SPECIFIED DISEASES OF PANCREAS: ICD-10-CM

## 2023-10-29 DIAGNOSIS — K21.9 GASTRO-ESOPHAGEAL REFLUX DISEASE WITHOUT ESOPHAGITIS: ICD-10-CM

## 2023-10-29 DIAGNOSIS — Z94.0 KIDNEY TRANSPLANT STATUS: ICD-10-CM

## 2023-10-29 DIAGNOSIS — M54.50 LOW BACK PAIN, UNSPECIFIED: ICD-10-CM

## 2023-10-29 DIAGNOSIS — I10 ESSENTIAL (PRIMARY) HYPERTENSION: ICD-10-CM

## 2023-10-29 DIAGNOSIS — E78.5 HYPERLIPIDEMIA, UNSPECIFIED: ICD-10-CM

## 2023-10-29 DIAGNOSIS — F41.9 ANXIETY DISORDER, UNSPECIFIED: ICD-10-CM

## 2023-10-29 DIAGNOSIS — K59.00 CONSTIPATION, UNSPECIFIED: ICD-10-CM

## 2023-10-29 DIAGNOSIS — R10.31 RIGHT LOWER QUADRANT PAIN: ICD-10-CM

## 2023-10-29 LAB
24R-OH-CALCIDIOL SERPL-MCNC: 40.5 NG/ML — SIGNIFICANT CHANGE UP (ref 30–80)
24R-OH-CALCIDIOL SERPL-MCNC: 40.5 NG/ML — SIGNIFICANT CHANGE UP (ref 30–80)
A1C WITH ESTIMATED AVERAGE GLUCOSE RESULT: 5.5 % — SIGNIFICANT CHANGE UP (ref 4–5.6)
A1C WITH ESTIMATED AVERAGE GLUCOSE RESULT: 5.5 % — SIGNIFICANT CHANGE UP (ref 4–5.6)
ALBUMIN SERPL ELPH-MCNC: 3.9 G/DL — SIGNIFICANT CHANGE UP (ref 3.3–5)
ALBUMIN SERPL ELPH-MCNC: 3.9 G/DL — SIGNIFICANT CHANGE UP (ref 3.3–5)
ALP SERPL-CCNC: 57 U/L — SIGNIFICANT CHANGE UP (ref 40–120)
ALP SERPL-CCNC: 57 U/L — SIGNIFICANT CHANGE UP (ref 40–120)
ALT FLD-CCNC: 8 U/L — LOW (ref 10–45)
ALT FLD-CCNC: 8 U/L — LOW (ref 10–45)
ANION GAP SERPL CALC-SCNC: 12 MMOL/L — SIGNIFICANT CHANGE UP (ref 5–17)
ANION GAP SERPL CALC-SCNC: 12 MMOL/L — SIGNIFICANT CHANGE UP (ref 5–17)
ANION GAP SERPL CALC-SCNC: 13 MMOL/L — SIGNIFICANT CHANGE UP (ref 5–17)
ANION GAP SERPL CALC-SCNC: 13 MMOL/L — SIGNIFICANT CHANGE UP (ref 5–17)
APTT BLD: 30.4 SEC — SIGNIFICANT CHANGE UP (ref 24.5–35.6)
APTT BLD: 30.4 SEC — SIGNIFICANT CHANGE UP (ref 24.5–35.6)
AST SERPL-CCNC: 19 U/L — SIGNIFICANT CHANGE UP (ref 10–40)
AST SERPL-CCNC: 19 U/L — SIGNIFICANT CHANGE UP (ref 10–40)
BASOPHILS # BLD AUTO: 0.02 K/UL — SIGNIFICANT CHANGE UP (ref 0–0.2)
BASOPHILS # BLD AUTO: 0.02 K/UL — SIGNIFICANT CHANGE UP (ref 0–0.2)
BASOPHILS NFR BLD AUTO: 0.3 % — SIGNIFICANT CHANGE UP (ref 0–2)
BASOPHILS NFR BLD AUTO: 0.3 % — SIGNIFICANT CHANGE UP (ref 0–2)
BILIRUB SERPL-MCNC: 0.4 MG/DL — SIGNIFICANT CHANGE UP (ref 0.2–1.2)
BILIRUB SERPL-MCNC: 0.4 MG/DL — SIGNIFICANT CHANGE UP (ref 0.2–1.2)
BUN SERPL-MCNC: 26 MG/DL — HIGH (ref 7–23)
BUN SERPL-MCNC: 26 MG/DL — HIGH (ref 7–23)
BUN SERPL-MCNC: 27 MG/DL — HIGH (ref 7–23)
BUN SERPL-MCNC: 27 MG/DL — HIGH (ref 7–23)
CALCIUM SERPL-MCNC: 10.9 MG/DL — HIGH (ref 8.4–10.5)
CALCIUM SERPL-MCNC: 10.9 MG/DL — HIGH (ref 8.4–10.5)
CALCIUM SERPL-MCNC: 9.5 MG/DL — SIGNIFICANT CHANGE UP (ref 8.4–10.5)
CALCIUM SERPL-MCNC: 9.5 MG/DL — SIGNIFICANT CHANGE UP (ref 8.4–10.5)
CALCIUM SERPL-MCNC: 9.6 MG/DL — SIGNIFICANT CHANGE UP (ref 8.4–10.5)
CALCIUM SERPL-MCNC: 9.6 MG/DL — SIGNIFICANT CHANGE UP (ref 8.4–10.5)
CHLORIDE SERPL-SCNC: 103 MMOL/L — SIGNIFICANT CHANGE UP (ref 96–108)
CHLORIDE SERPL-SCNC: 103 MMOL/L — SIGNIFICANT CHANGE UP (ref 96–108)
CHLORIDE SERPL-SCNC: 106 MMOL/L — SIGNIFICANT CHANGE UP (ref 96–108)
CHLORIDE SERPL-SCNC: 106 MMOL/L — SIGNIFICANT CHANGE UP (ref 96–108)
CHOLEST SERPL-MCNC: 195 MG/DL — SIGNIFICANT CHANGE UP
CHOLEST SERPL-MCNC: 195 MG/DL — SIGNIFICANT CHANGE UP
CO2 SERPL-SCNC: 21 MMOL/L — LOW (ref 22–31)
CO2 SERPL-SCNC: 21 MMOL/L — LOW (ref 22–31)
CO2 SERPL-SCNC: 23 MMOL/L — SIGNIFICANT CHANGE UP (ref 22–31)
CO2 SERPL-SCNC: 23 MMOL/L — SIGNIFICANT CHANGE UP (ref 22–31)
CREAT ?TM UR-MCNC: 85 MG/DL — SIGNIFICANT CHANGE UP
CREAT ?TM UR-MCNC: 85 MG/DL — SIGNIFICANT CHANGE UP
CREAT SERPL-MCNC: 1.5 MG/DL — HIGH (ref 0.5–1.3)
CREAT SERPL-MCNC: 1.5 MG/DL — HIGH (ref 0.5–1.3)
CREAT SERPL-MCNC: 1.61 MG/DL — HIGH (ref 0.5–1.3)
CREAT SERPL-MCNC: 1.61 MG/DL — HIGH (ref 0.5–1.3)
EGFR: 47 ML/MIN/1.73M2 — LOW
EGFR: 47 ML/MIN/1.73M2 — LOW
EGFR: 51 ML/MIN/1.73M2 — LOW
EGFR: 51 ML/MIN/1.73M2 — LOW
EOSINOPHIL # BLD AUTO: 0.06 K/UL — SIGNIFICANT CHANGE UP (ref 0–0.5)
EOSINOPHIL # BLD AUTO: 0.06 K/UL — SIGNIFICANT CHANGE UP (ref 0–0.5)
EOSINOPHIL NFR BLD AUTO: 0.9 % — SIGNIFICANT CHANGE UP (ref 0–6)
EOSINOPHIL NFR BLD AUTO: 0.9 % — SIGNIFICANT CHANGE UP (ref 0–6)
ESTIMATED AVERAGE GLUCOSE: 111 MG/DL — SIGNIFICANT CHANGE UP (ref 68–114)
ESTIMATED AVERAGE GLUCOSE: 111 MG/DL — SIGNIFICANT CHANGE UP (ref 68–114)
GLUCOSE SERPL-MCNC: 78 MG/DL — SIGNIFICANT CHANGE UP (ref 70–99)
GLUCOSE SERPL-MCNC: 78 MG/DL — SIGNIFICANT CHANGE UP (ref 70–99)
GLUCOSE SERPL-MCNC: 82 MG/DL — SIGNIFICANT CHANGE UP (ref 70–99)
GLUCOSE SERPL-MCNC: 82 MG/DL — SIGNIFICANT CHANGE UP (ref 70–99)
HCT VFR BLD CALC: 39.6 % — SIGNIFICANT CHANGE UP (ref 39–50)
HCT VFR BLD CALC: 39.6 % — SIGNIFICANT CHANGE UP (ref 39–50)
HDLC SERPL-MCNC: 62 MG/DL — SIGNIFICANT CHANGE UP
HDLC SERPL-MCNC: 62 MG/DL — SIGNIFICANT CHANGE UP
HGB BLD-MCNC: 13.1 G/DL — SIGNIFICANT CHANGE UP (ref 13–17)
HGB BLD-MCNC: 13.1 G/DL — SIGNIFICANT CHANGE UP (ref 13–17)
IMM GRANULOCYTES NFR BLD AUTO: 0.3 % — SIGNIFICANT CHANGE UP (ref 0–0.9)
IMM GRANULOCYTES NFR BLD AUTO: 0.3 % — SIGNIFICANT CHANGE UP (ref 0–0.9)
INR BLD: 0.94 RATIO — SIGNIFICANT CHANGE UP (ref 0.85–1.18)
INR BLD: 0.94 RATIO — SIGNIFICANT CHANGE UP (ref 0.85–1.18)
LIPID PNL WITH DIRECT LDL SERPL: 117 MG/DL — HIGH
LIPID PNL WITH DIRECT LDL SERPL: 117 MG/DL — HIGH
LYMPHOCYTES # BLD AUTO: 1.91 K/UL — SIGNIFICANT CHANGE UP (ref 1–3.3)
LYMPHOCYTES # BLD AUTO: 1.91 K/UL — SIGNIFICANT CHANGE UP (ref 1–3.3)
LYMPHOCYTES # BLD AUTO: 28.3 % — SIGNIFICANT CHANGE UP (ref 13–44)
LYMPHOCYTES # BLD AUTO: 28.3 % — SIGNIFICANT CHANGE UP (ref 13–44)
MAGNESIUM SERPL-MCNC: 2 MG/DL — SIGNIFICANT CHANGE UP (ref 1.6–2.6)
MAGNESIUM SERPL-MCNC: 2 MG/DL — SIGNIFICANT CHANGE UP (ref 1.6–2.6)
MCHC RBC-ENTMCNC: 32.8 PG — SIGNIFICANT CHANGE UP (ref 27–34)
MCHC RBC-ENTMCNC: 32.8 PG — SIGNIFICANT CHANGE UP (ref 27–34)
MCHC RBC-ENTMCNC: 33.1 GM/DL — SIGNIFICANT CHANGE UP (ref 32–36)
MCHC RBC-ENTMCNC: 33.1 GM/DL — SIGNIFICANT CHANGE UP (ref 32–36)
MCV RBC AUTO: 99.2 FL — SIGNIFICANT CHANGE UP (ref 80–100)
MCV RBC AUTO: 99.2 FL — SIGNIFICANT CHANGE UP (ref 80–100)
MONOCYTES # BLD AUTO: 0.61 K/UL — SIGNIFICANT CHANGE UP (ref 0–0.9)
MONOCYTES # BLD AUTO: 0.61 K/UL — SIGNIFICANT CHANGE UP (ref 0–0.9)
MONOCYTES NFR BLD AUTO: 9 % — SIGNIFICANT CHANGE UP (ref 2–14)
MONOCYTES NFR BLD AUTO: 9 % — SIGNIFICANT CHANGE UP (ref 2–14)
NEUTROPHILS # BLD AUTO: 4.13 K/UL — SIGNIFICANT CHANGE UP (ref 1.8–7.4)
NEUTROPHILS # BLD AUTO: 4.13 K/UL — SIGNIFICANT CHANGE UP (ref 1.8–7.4)
NEUTROPHILS NFR BLD AUTO: 61.2 % — SIGNIFICANT CHANGE UP (ref 43–77)
NEUTROPHILS NFR BLD AUTO: 61.2 % — SIGNIFICANT CHANGE UP (ref 43–77)
NON HDL CHOLESTEROL: 133 MG/DL — HIGH
NON HDL CHOLESTEROL: 133 MG/DL — HIGH
NRBC # BLD: 0 /100 WBCS — SIGNIFICANT CHANGE UP (ref 0–0)
NRBC # BLD: 0 /100 WBCS — SIGNIFICANT CHANGE UP (ref 0–0)
OSMOLALITY UR: 618 MOS/KG — SIGNIFICANT CHANGE UP (ref 300–900)
OSMOLALITY UR: 618 MOS/KG — SIGNIFICANT CHANGE UP (ref 300–900)
PHOSPHATE SERPL-MCNC: 3.1 MG/DL — SIGNIFICANT CHANGE UP (ref 2.5–4.5)
PHOSPHATE SERPL-MCNC: 3.1 MG/DL — SIGNIFICANT CHANGE UP (ref 2.5–4.5)
PLATELET # BLD AUTO: 184 K/UL — SIGNIFICANT CHANGE UP (ref 150–400)
PLATELET # BLD AUTO: 184 K/UL — SIGNIFICANT CHANGE UP (ref 150–400)
POTASSIUM SERPL-MCNC: 4.5 MMOL/L — SIGNIFICANT CHANGE UP (ref 3.5–5.3)
POTASSIUM SERPL-MCNC: 4.5 MMOL/L — SIGNIFICANT CHANGE UP (ref 3.5–5.3)
POTASSIUM SERPL-MCNC: 5.4 MMOL/L — HIGH (ref 3.5–5.3)
POTASSIUM SERPL-MCNC: 5.4 MMOL/L — HIGH (ref 3.5–5.3)
POTASSIUM SERPL-SCNC: 4.5 MMOL/L — SIGNIFICANT CHANGE UP (ref 3.5–5.3)
POTASSIUM SERPL-SCNC: 4.5 MMOL/L — SIGNIFICANT CHANGE UP (ref 3.5–5.3)
POTASSIUM SERPL-SCNC: 5.4 MMOL/L — HIGH (ref 3.5–5.3)
POTASSIUM SERPL-SCNC: 5.4 MMOL/L — HIGH (ref 3.5–5.3)
POTASSIUM UR-SCNC: 14 MMOL/L — SIGNIFICANT CHANGE UP
POTASSIUM UR-SCNC: 14 MMOL/L — SIGNIFICANT CHANGE UP
PROT ?TM UR-MCNC: 8 MG/DL — SIGNIFICANT CHANGE UP (ref 0–12)
PROT ?TM UR-MCNC: 8 MG/DL — SIGNIFICANT CHANGE UP (ref 0–12)
PROT SERPL-MCNC: 6.2 G/DL — SIGNIFICANT CHANGE UP (ref 6–8.3)
PROT SERPL-MCNC: 6.2 G/DL — SIGNIFICANT CHANGE UP (ref 6–8.3)
PROT/CREAT UR-RTO: 0.1 RATIO — SIGNIFICANT CHANGE UP (ref 0–0.2)
PROT/CREAT UR-RTO: 0.1 RATIO — SIGNIFICANT CHANGE UP (ref 0–0.2)
PROTHROM AB SERPL-ACNC: 10.4 SEC — SIGNIFICANT CHANGE UP (ref 9.5–13)
PROTHROM AB SERPL-ACNC: 10.4 SEC — SIGNIFICANT CHANGE UP (ref 9.5–13)
PTH-INTACT FLD-MCNC: 60 PG/ML — SIGNIFICANT CHANGE UP (ref 15–65)
PTH-INTACT FLD-MCNC: 60 PG/ML — SIGNIFICANT CHANGE UP (ref 15–65)
RBC # BLD: 3.99 M/UL — LOW (ref 4.2–5.8)
RBC # BLD: 3.99 M/UL — LOW (ref 4.2–5.8)
RBC # FLD: 13.4 % — SIGNIFICANT CHANGE UP (ref 10.3–14.5)
RBC # FLD: 13.4 % — SIGNIFICANT CHANGE UP (ref 10.3–14.5)
SODIUM SERPL-SCNC: 139 MMOL/L — SIGNIFICANT CHANGE UP (ref 135–145)
SODIUM UR-SCNC: 167 MMOL/L — SIGNIFICANT CHANGE UP
SODIUM UR-SCNC: 167 MMOL/L — SIGNIFICANT CHANGE UP
TACROLIMUS SERPL-MCNC: 5.6 NG/ML — SIGNIFICANT CHANGE UP
TACROLIMUS SERPL-MCNC: 5.6 NG/ML — SIGNIFICANT CHANGE UP
TRIGL SERPL-MCNC: 86 MG/DL — SIGNIFICANT CHANGE UP
TRIGL SERPL-MCNC: 86 MG/DL — SIGNIFICANT CHANGE UP
TSH SERPL-MCNC: 2.24 UIU/ML — SIGNIFICANT CHANGE UP (ref 0.27–4.2)
TSH SERPL-MCNC: 2.24 UIU/ML — SIGNIFICANT CHANGE UP (ref 0.27–4.2)
UUN UR-MCNC: 655 MG/DL — SIGNIFICANT CHANGE UP
UUN UR-MCNC: 655 MG/DL — SIGNIFICANT CHANGE UP
WBC # BLD: 6.75 K/UL — SIGNIFICANT CHANGE UP (ref 3.8–10.5)
WBC # BLD: 6.75 K/UL — SIGNIFICANT CHANGE UP (ref 3.8–10.5)
WBC # FLD AUTO: 6.75 K/UL — SIGNIFICANT CHANGE UP (ref 3.8–10.5)
WBC # FLD AUTO: 6.75 K/UL — SIGNIFICANT CHANGE UP (ref 3.8–10.5)

## 2023-10-29 PROCEDURE — 99223 1ST HOSP IP/OBS HIGH 75: CPT

## 2023-10-29 PROCEDURE — 99255 IP/OBS CONSLTJ NEW/EST HI 80: CPT

## 2023-10-29 RX ORDER — MYCOPHENOLATE MOFETIL 250 MG/1
250 CAPSULE ORAL
Refills: 0 | Status: DISCONTINUED | OUTPATIENT
Start: 2023-10-29 | End: 2023-11-02

## 2023-10-29 RX ORDER — TACROLIMUS 5 MG/1
2 CAPSULE ORAL DAILY
Refills: 0 | Status: DISCONTINUED | OUTPATIENT
Start: 2023-10-29 | End: 2023-11-02

## 2023-10-29 RX ORDER — OMEGA-3 ACID ETHYL ESTERS 1 G
2 CAPSULE ORAL
Refills: 0 | Status: DISCONTINUED | OUTPATIENT
Start: 2023-10-29 | End: 2023-11-02

## 2023-10-29 RX ORDER — HYDROMORPHONE HYDROCHLORIDE 2 MG/ML
0.5 INJECTION INTRAMUSCULAR; INTRAVENOUS; SUBCUTANEOUS ONCE
Refills: 0 | Status: DISCONTINUED | OUTPATIENT
Start: 2023-10-29 | End: 2023-10-29

## 2023-10-29 RX ORDER — HEPARIN SODIUM 5000 [USP'U]/ML
5000 INJECTION INTRAVENOUS; SUBCUTANEOUS EVERY 8 HOURS
Refills: 0 | Status: DISCONTINUED | OUTPATIENT
Start: 2023-10-29 | End: 2023-11-02

## 2023-10-29 RX ORDER — CEFTRIAXONE 500 MG/1
1000 INJECTION, POWDER, FOR SOLUTION INTRAMUSCULAR; INTRAVENOUS EVERY 24 HOURS
Refills: 0 | Status: DISCONTINUED | OUTPATIENT
Start: 2023-10-30 | End: 2023-10-30

## 2023-10-29 RX ORDER — CEFTRIAXONE 500 MG/1
1000 INJECTION, POWDER, FOR SOLUTION INTRAMUSCULAR; INTRAVENOUS ONCE
Refills: 0 | Status: COMPLETED | OUTPATIENT
Start: 2023-10-29 | End: 2023-10-29

## 2023-10-29 RX ORDER — TAMSULOSIN HYDROCHLORIDE 0.4 MG/1
0.4 CAPSULE ORAL AT BEDTIME
Refills: 0 | Status: DISCONTINUED | OUTPATIENT
Start: 2023-10-29 | End: 2023-11-02

## 2023-10-29 RX ORDER — SODIUM ZIRCONIUM CYCLOSILICATE 10 G/10G
5 POWDER, FOR SUSPENSION ORAL ONCE
Refills: 0 | Status: COMPLETED | OUTPATIENT
Start: 2023-10-29 | End: 2023-10-29

## 2023-10-29 RX ORDER — HYDROMORPHONE HYDROCHLORIDE 2 MG/ML
0.5 INJECTION INTRAMUSCULAR; INTRAVENOUS; SUBCUTANEOUS ONCE
Refills: 0 | Status: DISCONTINUED | OUTPATIENT
Start: 2023-10-29 | End: 2023-10-30

## 2023-10-29 RX ORDER — FAMOTIDINE 10 MG/ML
20 INJECTION INTRAVENOUS DAILY
Refills: 0 | Status: DISCONTINUED | OUTPATIENT
Start: 2023-10-29 | End: 2023-11-02

## 2023-10-29 RX ORDER — ACETAMINOPHEN 500 MG
650 TABLET ORAL EVERY 6 HOURS
Refills: 0 | Status: DISCONTINUED | OUTPATIENT
Start: 2023-10-29 | End: 2023-11-01

## 2023-10-29 RX ORDER — LANOLIN ALCOHOL/MO/W.PET/CERES
3 CREAM (GRAM) TOPICAL AT BEDTIME
Refills: 0 | Status: DISCONTINUED | OUTPATIENT
Start: 2023-10-29 | End: 2023-11-02

## 2023-10-29 RX ORDER — TACROLIMUS 5 MG/1
2.5 CAPSULE ORAL AT BEDTIME
Refills: 0 | Status: DISCONTINUED | OUTPATIENT
Start: 2023-10-29 | End: 2023-11-02

## 2023-10-29 RX ORDER — SODIUM CHLORIDE 9 MG/ML
1000 INJECTION INTRAMUSCULAR; INTRAVENOUS; SUBCUTANEOUS
Refills: 0 | Status: DISCONTINUED | OUTPATIENT
Start: 2023-10-29 | End: 2023-11-02

## 2023-10-29 RX ORDER — INFLUENZA VIRUS VACCINE 15; 15; 15; 15 UG/.5ML; UG/.5ML; UG/.5ML; UG/.5ML
0.7 SUSPENSION INTRAMUSCULAR ONCE
Refills: 0 | Status: DISCONTINUED | OUTPATIENT
Start: 2023-10-29 | End: 2023-11-02

## 2023-10-29 RX ORDER — SENNA PLUS 8.6 MG/1
2 TABLET ORAL AT BEDTIME
Refills: 0 | Status: DISCONTINUED | OUTPATIENT
Start: 2023-10-29 | End: 2023-11-02

## 2023-10-29 RX ORDER — GABAPENTIN 400 MG/1
100 CAPSULE ORAL THREE TIMES A DAY
Refills: 0 | Status: DISCONTINUED | OUTPATIENT
Start: 2023-10-29 | End: 2023-10-30

## 2023-10-29 RX ORDER — LOSARTAN POTASSIUM 100 MG/1
100 TABLET, FILM COATED ORAL DAILY
Refills: 0 | Status: DISCONTINUED | OUTPATIENT
Start: 2023-10-29 | End: 2023-11-01

## 2023-10-29 RX ORDER — ONDANSETRON 8 MG/1
4 TABLET, FILM COATED ORAL EVERY 8 HOURS
Refills: 0 | Status: DISCONTINUED | OUTPATIENT
Start: 2023-10-29 | End: 2023-11-02

## 2023-10-29 RX ORDER — ATORVASTATIN CALCIUM 80 MG/1
10 TABLET, FILM COATED ORAL AT BEDTIME
Refills: 0 | Status: DISCONTINUED | OUTPATIENT
Start: 2023-10-29 | End: 2023-11-02

## 2023-10-29 RX ORDER — BUPROPION HYDROCHLORIDE 150 MG/1
150 TABLET, EXTENDED RELEASE ORAL DAILY
Refills: 0 | Status: DISCONTINUED | OUTPATIENT
Start: 2023-10-29 | End: 2023-11-02

## 2023-10-29 RX ORDER — POLYETHYLENE GLYCOL 3350 17 G/17G
17 POWDER, FOR SOLUTION ORAL DAILY
Refills: 0 | Status: DISCONTINUED | OUTPATIENT
Start: 2023-10-29 | End: 2023-11-02

## 2023-10-29 RX ADMIN — HEPARIN SODIUM 5000 UNIT(S): 5000 INJECTION INTRAVENOUS; SUBCUTANEOUS at 21:39

## 2023-10-29 RX ADMIN — Medication 650 MILLIGRAM(S): at 10:21

## 2023-10-29 RX ADMIN — ATORVASTATIN CALCIUM 10 MILLIGRAM(S): 80 TABLET, FILM COATED ORAL at 21:40

## 2023-10-29 RX ADMIN — HEPARIN SODIUM 5000 UNIT(S): 5000 INJECTION INTRAVENOUS; SUBCUTANEOUS at 13:57

## 2023-10-29 RX ADMIN — SODIUM CHLORIDE 1000 MILLILITER(S): 9 INJECTION INTRAMUSCULAR; INTRAVENOUS; SUBCUTANEOUS at 00:28

## 2023-10-29 RX ADMIN — GABAPENTIN 100 MILLIGRAM(S): 400 CAPSULE ORAL at 21:40

## 2023-10-29 RX ADMIN — SENNA PLUS 2 TABLET(S): 8.6 TABLET ORAL at 21:39

## 2023-10-29 RX ADMIN — SODIUM CHLORIDE 75 MILLILITER(S): 9 INJECTION INTRAMUSCULAR; INTRAVENOUS; SUBCUTANEOUS at 03:03

## 2023-10-29 RX ADMIN — HYDROMORPHONE HYDROCHLORIDE 0.5 MILLIGRAM(S): 2 INJECTION INTRAMUSCULAR; INTRAVENOUS; SUBCUTANEOUS at 00:28

## 2023-10-29 RX ADMIN — CEFTRIAXONE 100 MILLIGRAM(S): 500 INJECTION, POWDER, FOR SOLUTION INTRAMUSCULAR; INTRAVENOUS at 00:28

## 2023-10-29 RX ADMIN — HYDROMORPHONE HYDROCHLORIDE 0.5 MILLIGRAM(S): 2 INJECTION INTRAMUSCULAR; INTRAVENOUS; SUBCUTANEOUS at 05:05

## 2023-10-29 RX ADMIN — Medication 2 GRAM(S): at 17:08

## 2023-10-29 RX ADMIN — Medication 1 TABLET(S): at 12:07

## 2023-10-29 RX ADMIN — MYCOPHENOLATE MOFETIL 250 MILLIGRAM(S): 250 CAPSULE ORAL at 17:08

## 2023-10-29 RX ADMIN — LOSARTAN POTASSIUM 100 MILLIGRAM(S): 100 TABLET, FILM COATED ORAL at 05:33

## 2023-10-29 RX ADMIN — Medication 650 MILLIGRAM(S): at 11:21

## 2023-10-29 RX ADMIN — HEPARIN SODIUM 5000 UNIT(S): 5000 INJECTION INTRAVENOUS; SUBCUTANEOUS at 05:36

## 2023-10-29 RX ADMIN — TACROLIMUS 2.5 MILLIGRAM(S): 5 CAPSULE ORAL at 22:41

## 2023-10-29 RX ADMIN — MYCOPHENOLATE MOFETIL 250 MILLIGRAM(S): 250 CAPSULE ORAL at 05:35

## 2023-10-29 RX ADMIN — FAMOTIDINE 20 MILLIGRAM(S): 10 INJECTION INTRAVENOUS at 12:07

## 2023-10-29 RX ADMIN — Medication 5 MILLIGRAM(S): at 05:34

## 2023-10-29 RX ADMIN — GABAPENTIN 100 MILLIGRAM(S): 400 CAPSULE ORAL at 05:33

## 2023-10-29 RX ADMIN — SODIUM ZIRCONIUM CYCLOSILICATE 5 GRAM(S): 10 POWDER, FOR SUSPENSION ORAL at 03:03

## 2023-10-29 RX ADMIN — TAMSULOSIN HYDROCHLORIDE 0.4 MILLIGRAM(S): 0.4 CAPSULE ORAL at 17:12

## 2023-10-29 RX ADMIN — Medication 2 GRAM(S): at 05:35

## 2023-10-29 RX ADMIN — GABAPENTIN 100 MILLIGRAM(S): 400 CAPSULE ORAL at 13:58

## 2023-10-29 RX ADMIN — TACROLIMUS 2 MILLIGRAM(S): 5 CAPSULE ORAL at 12:06

## 2023-10-29 RX ADMIN — HYDROMORPHONE HYDROCHLORIDE 0.5 MILLIGRAM(S): 2 INJECTION INTRAMUSCULAR; INTRAVENOUS; SUBCUTANEOUS at 04:35

## 2023-10-29 NOTE — H&P ADULT - PROBLEM SELECTOR PLAN 9
h/o anx/dep/bipolar disorder  clarify psych meds in am h/o anx/dep/bipolar disorder  cont home wellbutrin

## 2023-10-29 NOTE — H&P ADULT - NSHPPHYSICALEXAM_GEN_ALL_CORE
T(C): 36.4 (10-29-23 @ 01:25), Max: 36.6 (10-28-23 @ 17:15)  HR: 60 (10-29-23 @ 01:25) (60 - 77)  BP: 144/83 (10-29-23 @ 01:25) (118/80 - 144/83)  RR: 18 (10-29-23 @ 01:25) (18 - 18)  SpO2: 98% (10-29-23 @ 01:25) (98% - 98%)  GENERAL: NAD, lying in bed   EYES: EOMI, PERRLA; conjunctiva and sclera clear  ENMT: Moist oral mucosa, no pharyngeal injection or exudates   NECK: Supple, no palpable masses; no JVD  RESPIRATORY: Normal respiratory effort; lungs are clear to auscultation bilaterally  CARDIOVASCULAR: Regular rate and rhythm, normal S1 and S2, no murmur/rub/gallop; No lower extremity edema; Peripheral pulses are 2+ bilaterally  ABDOMEN:  tender to palpation, normoactive bowel sounds, no rebound/guarding   MUSCULOSKELETAL:  paraspinal tenderness  PSYCH: A+O to person, place, and time; affect appropriate  NEUROLOGY: CN 2-12 are intact and symmetric; no gross motor or sensory deficits   SKIN: No rashes; no palpable lesions

## 2023-10-29 NOTE — H&P ADULT - PROBLEM SELECTOR PLAN 1
afebrile, no leukocytosis, hds  Ua shows bland sediment  ct showing "wall thickening and perivesicular fat stranding."  s/p 1 L lr bolus + rocephin in er  monitor for fever, changes in white count  cont empirical ceftriaxone; adjust according to final c+s  transplant id consult in am

## 2023-10-29 NOTE — H&P ADULT - PROBLEM SELECTOR PLAN 4
h/o chronic lbp s/p back surgery  outpatient ortho documentation reviewed  no associated red flag symps/signs   analgesics + muscle relaxants as need   mr spine if no resolution of pain  pt/ot eval + sw/cm consult for disposition  ortho consult in am  pain mgmt consult in am

## 2023-10-29 NOTE — PROGRESS NOTE ADULT - ASSESSMENT
62 yo 59 kg m w pmh pld + pkd s/p renal transplant, htn, hld, gerd, chronic lbp s/p back surgery, alc abuse, pancreatitis, anx/dep/bipolar disorder, p/w right flank/back/lower quadrant pain; in er, unclear etiology; possibly multifactorial eg cystitis, constipation, pancreatitis, referred chronic lower back pain; admit to medicine for further mgmt

## 2023-10-29 NOTE — H&P ADULT - PROBLEM SELECTOR PLAN 5
baseline cr ~1.7; currently at baseline   a/w hyper ca  Monitor strict I/Os, daily weights, UO, BUN/Cr, volume status, acid-base balance, electrolytes  follow up urine studies, transplant us, tacro lvl; mbd work up  avoid nephrotoxic agents; appropriate dose adjustments for all renally cleared medications   judicious IVF as needed  cont home prednisone (w bactrim for pjp ppx), tacrolimus 2 morning + 2.5 evening, mycophenolate   transplant nephro consult in am h/o PLD + PKD  baseline cr ~1.7; currently at baseline   a/w hyper ca  Monitor strict I/Os, daily weights, UO, BUN/Cr, volume status, acid-base balance, electrolytes  follow up urine studies, transplant us, tacro lvl; mbd work up  avoid nephrotoxic agents; appropriate dose adjustments for all renally cleared medications   judicious IVF as needed  cont home prednisone (w bactrim for pjp ppx), tacrolimus 2 morning + 2.5 evening, mycophenolate   transplant nephro consult in am

## 2023-10-29 NOTE — H&P ADULT - PROBLEM SELECTOR PLAN 2
imaging shows, "Moderate fecal burden in the right and transverse colons" - may be contributing to pain  monitor/trend daily bm  start bowel regimen  adjust to maintain goal 1-2 bm/day

## 2023-10-29 NOTE — CONSULT NOTE ADULT - ASSESSMENT
63 year old male PMH PLD + PKD s/p renal transplant, htn, hld, gerd, chronic lbp s/p back surgery, alc abuse, pancreatitis, anx/dep/bipolar disorder, p/w right flank/back/lower quadrant pain    UA (10/28) 1 WBC    CT A/P (10/28) Bladder wall thickening and perivesicular fat stranding concerning for cystitis. Mild increase main pancreatic duct dilatation without evidence of pancreatic head mass.    Transplant Kidney US (10/28) No renal mass, hydronephrosis or calculi. No perinephric fluid.     63 year old male PMH PLD + PKD s/p renal transplant, htn, hld, gerd, chronic lbp s/p back surgery, alc abuse, pancreatitis, anx/dep/bipolar disorder, p/w right flank/back/lower quadrant pain    UA (10/28) 1 WBC    CT A/P (10/28) Bladder wall thickening and perivesicular fat stranding concerning for cystitis. Mild increase main pancreatic duct dilatation without evidence of pancreatic head mass.    Transplant Kidney US (10/28) No renal mass, hydronephrosis or calculi. No perinephric fluid.    Lower suspicion for UTI / Transplant Pyelo as etiology of RLQ abdominal pain   Reasonable to continue Ceftriaxone pending UCx    #Abdominal Pain, Abnormal CT A/P (bladder wall thickening, pancreatic duct dilatation)  --Continue Ceftriaxone 1g IV Q24H pending UCx  --Continue to follow CBC with diff  --Continue to follow temperature curve  --Follow up on preliminary blood cultures  --Follow up on MRCP    #Renal Transplant Recipient  --Continue Bactrim SS PO Q24H for PCP PPx    I will continue to follow. Please feel free to contact me with any further questions.    Bebeot Richey M.D.  [facility] Division of Infectious Disease  8AM-5PM Monday - Friday: Available on [employer] Teams  After Hours and Holidays (or if no response on Microsoft Teams): Please contact the Infectious Diseases Office at [phone]    The above assessment and plan were discussed with medicine NP 63 year old male PMH PLD + PKD s/p renal transplant, htn, hld, gerd, chronic lbp s/p back surgery, alc abuse, pancreatitis, anx/dep/bipolar disorder, p/w right flank/back/lower quadrant pain    UA (10/28) 1 WBC    CT A/P (10/28) Bladder wall thickening and perivesicular fat stranding concerning for cystitis. Mild increase main pancreatic duct dilatation without evidence of pancreatic head mass.    Transplant Kidney US (10/28) No renal mass, hydronephrosis or calculi. No perinephric fluid.    Lower suspicion for UTI / Transplant Pyelo as etiology of RLQ abdominal pain   Reasonable to continue Ceftriaxone pending UCx    #Abdominal Pain, Abnormal CT A/P (bladder wall thickening, pancreatic duct dilatation)  --Continue Ceftriaxone 1g IV Q24H pending UCx  --Continue to follow CBC with diff  --Continue to follow temperature curve  --Follow up on preliminary blood cultures  --Follow up on MRCP    #Renal Transplant Recipient  --Continue Bactrim SS PO Q24H for PCP PPx    I will be away starting tomorrow. Dr Beatriz Suarez will be covering tomorrow.     Bebeto Richey M.D.  [facility] Division of Infectious Disease  8AM-5PM Monday - Friday: Available on [employer] Teams  After Hours and Holidays (or if no response on Microsoft Teams): Please contact the Infectious Diseases Office at [phone]     The above assessment and plan were discussed with medicine NP 63 year old male PMH PLD + PKD s/p renal transplant, htn, hld, gerd, chronic lbp s/p back surgery, alc abuse, pancreatitis, anx/dep/bipolar disorder, p/w right flank/back/lower quadrant pain    UA (10/28) 1 WBC    CT A/P (10/28) Bladder wall thickening and perivesicular fat stranding concerning for cystitis. Mild increase main pancreatic duct dilatation without evidence of pancreatic head mass.    Transplant Kidney US (10/28) No renal mass, hydronephrosis or calculi. No perinephric fluid.    Lower suspicion for UTI / Transplant Pyelo as etiology of RLQ abdominal pain   Reasonable to continue Ceftriaxone pending UCx    #Abdominal Pain, Abnormal CT A/P (bladder wall thickening, pancreatic duct dilatation)  --Continue Ceftriaxone 1g IV Q24H pending UCx  --Continue to follow CBC with diff  --Continue to follow temperature curve  --Follow up on preliminary blood cultures  --Follow up on MRCP    #Renal Transplant Recipient  --On Bactrim SS PO Q24H for PCP PPx    I will be away starting tomorrow. Dr Beatriz Suarez will be covering tomorrow.     Bebeto Richey M.D.  [facility] Division of Infectious Disease  8AM-5PM Monday - Friday: Available on [employer] Teams  After Hours and Holidays (or if no response on Microsoft Teams): Please contact the Infectious Diseases Office at [phone]     The above assessment and plan were discussed with medicine NP

## 2023-10-29 NOTE — PROGRESS NOTE ADULT - PROBLEM SELECTOR PLAN 5
h/o PLD + PKD  baseline cr ~1.7; currently at baseline   a/w hyper ca  Monitor strict I/Os, daily weights, UO, BUN/Cr, volume status, acid-base balance, electrolytes  follow up urine studies, transplant us, tacro lvl; mbd work up  avoid nephrotoxic agents; appropriate dose adjustments for all renally cleared medications   judicious IVF as needed  cont home prednisone (w bactrim for pjp ppx), tacrolimus 2 morning + 2.5 evening, mycophenolate   transplant nephro consult

## 2023-10-29 NOTE — PROGRESS NOTE ADULT - PROBLEM SELECTOR PLAN 4
h/o chronic lbp s/p back surgery  outpatient ortho documentation reviewed  no associated red flag symps/signs   analgesics + muscle relaxants as need   mr spine if no resolution of pain  pt/ot eval + sw/cm consult for disposition  ortho consult in am  pain mgmt consult PRN

## 2023-10-29 NOTE — H&P ADULT - ASSESSMENT
62 yo 59 kg m w pmh pld + pkd s/p renal transplant, htn, hld, gerd, chronic lbp s/p back surgery, alc abuse, pancreatitis, anx/dep/bipolar disorder, p/w right flank/back/lower quadrant pain; admit to medicine for further mgmt    uti  Febrile, tachycardic, leukocytosis, lactic acidosis (resolved); meets severe sepsis criteria  Ua with nitrites but no bacteria + pyuria with leukocyte esterase;   ct showing b/l pyelonephritis, possible small renal abscess  s/p 1 L lr bolus + vanc, zosyn  follow up blood and urine cultures; renal/bladder us; freq bladder scan  monitor for fever, changes in white count  start empirical ceftriaxone; adjust according to final c+s  antipyretics, antiemetics, analgesics as needed  encourage po intake; judicious ivf + lytes as needed in the mean time       constipation  imaging shows   monitor/trend daily bm  start bowel regimen  adjust to maintain goal 1-2 bm/day      enlarge panc duct  h/o alc abuse, pancreatitis      chronic lbp   h/o chronic lbp s/p back surgery    64 yo 59 kg m w pmh pld + pkd s/p renal transplant, htn, hld, gerd, chronic lbp s/p back surgery, alc abuse, pancreatitis, anx/dep/bipolar disorder, p/w right flank/back/lower quadrant pain; in er, unclear etiology; possibly multifactorial eg cystitis, constipation, pancreatitis, referred chronic lower back pain; admit to medicine for further mgmt

## 2023-10-29 NOTE — PROGRESS NOTE ADULT - SUBJECTIVE AND OBJECTIVE BOX
Name of Patient : TRACY JENNINGS  MRN: [id number]  Date of visit: 10-29-23       Subjective: Patient seen and examined. No new events except as noted.     REVIEW OF SYSTEMS:    CONSTITUTIONAL: No weakness, fevers or chills  EYES/ENT: No visual changes;  No vertigo or throat pain   NECK: No pain or stiffness  RESPIRATORY: No cough, wheezing, hemoptysis; No shortness of breath  CARDIOVASCULAR: No chest pain or palpitations  GASTROINTESTINAL: No abdominal or epigastric pain. No nausea, vomiting, or hematemesis; No diarrhea or constipation. No melena or hematochezia.  GENITOURINARY: No dysuria, frequency or hematuria  NEUROLOGICAL: No numbness or weakness  SKIN: No itching, burning, rashes, or lesions   All other review of systems is negative unless indicated above.    MEDICATIONS:  MEDICATIONS  (STANDING):  atorvastatin 10 milliGRAM(s) Oral at bedtime  buPROPion XL (24-Hour) . 150 milliGRAM(s) Oral daily  cefTRIAXone   IVPB 1000 milliGRAM(s) IV Intermittent every 24 hours  famotidine    Tablet 20 milliGRAM(s) Oral daily  gabapentin 100 milliGRAM(s) Oral three times a day  heparin   Injectable 5000 Unit(s) SubCutaneous every 8 hours  HYDROmorphone  Injectable 0.5 milliGRAM(s) IV Push once  influenza  Vaccine (HIGH DOSE) 0.7 milliLiter(s) IntraMuscular once  losartan 100 milliGRAM(s) Oral daily  mycophenolate mofetil 250 milliGRAM(s) Oral two times a day  omega-3-Acid Ethyl Esters 2 Gram(s) Oral two times a day  polyethylene glycol 3350 17 Gram(s) Oral daily  predniSONE   Tablet 5 milliGRAM(s) Oral daily  senna 2 Tablet(s) Oral at bedtime  sodium chloride 0.9%. 1000 milliLiter(s) (75 mL/Hr) IV Continuous <Continuous>  tacrolimus 2.5 milliGRAM(s) Oral at bedtime  tacrolimus 2 milliGRAM(s) Oral daily  tamsulosin 0.4 milliGRAM(s) Oral at bedtime  trimethoprim   80 mG/sulfamethoxazole 400 mG 1 Tablet(s) Oral daily      PHYSICAL EXAM:  T(C): 36.8 (10-29-23 @ 16:52), Max: 36.8 (10-29-23 @ 16:52)  HR: 60 (10-29-23 @ 16:52) (50 - 60)  BP: 125/79 (10-29-23 @ 16:52) (107/62 - 157/78)  RR: 18 (10-29-23 @ 16:52) (18 - 18)  SpO2: 96% (10-29-23 @ 16:52) (96% - 98%)  Wt(kg): --  I&O's Summary    28 Oct 2023 07:01  -  29 Oct 2023 07:00  --------------------------------------------------------  IN: 100 mL / OUT: 450 mL / NET: -350 mL    29 Oct 2023 07:01  -  29 Oct 2023 20:23  --------------------------------------------------------  IN: 1130 mL / OUT: 1000 mL / NET: 130 mL          Appearance: Normal	  HEENT:  PERRLA   Lymphatic: No lymphadenopathy   Cardiovascular: Normal S1 S2, no JVD  Respiratory: normal effort , clear  Gastrointestinal:  Soft, Non-tender  Skin: No rashes,  warm to touch  Psychiatry:  Mood & affect appropriate  Musculuskeletal: No edema    recent labs, Imaging and EKGs personally reviewed     10-28-23 @ 07:01  -  10-29-23 @ 07:00  --------------------------------------------------------  IN: 100 mL / OUT: 450 mL / NET: -350 mL    10-29-23 @ 07:01  -  10-29-23 @ 20:23  --------------------------------------------------------  IN: 1130 mL / OUT: 1000 mL / NET: 130 mL                            13.1   6.75  )-----------( 184      ( 29 Oct 2023 07:06 )             39.6               10-29    139  |  106  |  27<H>  ----------------------------<  82  4.5   |  21<L>  |  1.50<H>    Ca    9.5      29 Oct 2023 07:05  Phos  3.1     10-29  Mg     2.0     10-29    TPro  6.2  /  Alb  3.9  /  TBili  0.4  /  DBili  x   /  AST  19  /  ALT  8<L>  /  AlkPhos  57  10-29    PT/INR - ( 29 Oct 2023 07:14 )   PT: 10.4 sec;   INR: 0.94 ratio         PTT - ( 29 Oct 2023 07:14 )  PTT:30.4 sec                   Urinalysis Basic - ( 29 Oct 2023 07:05 )    Color: x / Appearance: x / SG: x / pH: x  Gluc: 82 mg/dL / Ketone: x  / Bili: x / Urobili: x   Blood: x / Protein: x / Nitrite: x   Leuk Esterase: x / RBC: x / WBC x   Sq Epi: x / Non Sq Epi: x / Bacteria: x

## 2023-10-29 NOTE — H&P ADULT - NSCORESITESY/N_GEN_A_CORE_RD
Children younger than 13 must be in the rear seat of a vehicle when available and properly restrained.  If you have an active [facility] account, please look for your well child questionnaire to come to your [facility] account before your next well child visit.  
No

## 2023-10-29 NOTE — CONSULT NOTE ADULT - SUBJECTIVE AND OBJECTIVE BOX
Patient is a 63y old  Male who presents with a chief complaint of right flank/back/lower quadrant pain (29 Oct 2023 01:55)      HPI:  62 yo 59 kg m w pmh pld + pkd s/p renal transplant, htn, hld, gerd, chronic lbp s/p back surgery, alc abuse, pancreatitis, anx/dep/bipolar disorder, p/w right flank/back/lower quadrant pain; 7/10 in intensity; patient contacted outpatient providers office, letting staff there know back pain on the same side as the transplanted kidney. Denies any associated fever, chills, rigors, n+v, dysuria, bowel/bladder incontinence, no saddle anesthesia. as pain remained intractable, he presents to [facility] er for further evaluation. of note, Pt has had laminectomy in recent months and had questions regarding pain management.       in er, unclear etiology; possibly multifactorial eg cystitis, constipation, pancreatitis, referred chronic lower back pain; admit to medicine for further mgmt (29 Oct 2023 01:55)     prior hospital charts reviewed [  ]  primary team notes reviewed [  ]  other consultant notes reviewed [  ]    PAST MEDICAL & SURGICAL HISTORY:  Hypertension      Bipolar 1 disorder      Alcoholism /alcohol abuse      Acid reflux      Polycystic kidney disease  with ESRD s/p kidney txp 2/24/17      S/P breast lumpectomy  right, 1996      Dialysis patient  Left Arm A V Graft ( 2004 )      H/O kidney transplant          Allergies  penicillin (Hives)    ANTIMICROBIALS (past 90 days)  MEDICATIONS  (STANDING):  cefTRIAXone   IVPB   100 mL/Hr IV Intermittent (10-29-23 @ 00:28)      ANTIMICROBIALS:    cefTRIAXone   IVPB 1000 every 24 hours  trimethoprim   80 mG/sulfamethoxazole 400 mG 1 daily    OTHER MEDS: MEDICATIONS  (STANDING):  acetaminophen     Tablet .. 650 every 6 hours PRN  aluminum hydroxide/magnesium hydroxide/simethicone Suspension 30 every 4 hours PRN  atorvastatin 10 at bedtime  buPROPion XL (24-Hour) . 150 daily  famotidine    Tablet 20 daily  gabapentin 100 three times a day  heparin   Injectable 5000 every 8 hours  HYDROmorphone  Injectable 0.5 once  influenza  Vaccine (HIGH DOSE) 0.7 once  losartan 100 daily  melatonin 3 at bedtime PRN  mycophenolate mofetil 250 two times a day  ondansetron Injectable 4 every 8 hours PRN  polyethylene glycol 3350 17 daily  predniSONE   Tablet 5 daily  senna 2 at bedtime  tacrolimus 2 daily  tacrolimus 2.5 at bedtime  tamsulosin 0.4 at bedtime    SOCIAL HISTORY:   hx smoking  non-smoker    FAMILY HISTORY:  Family history of cancer (Grandparent)      REVIEW OF SYSTEMS  [  ] ROS unobtainable because:    [  ] All other systems negative except as noted below:	    Constitutional:  [ ] fever [ ] chills  [ ] weight loss  [ ] weakness  Skin:  [ ] rash [ ] phlebitis	  Eyes: [ ] icterus [ ] pain  [ ] discharge	  ENMT: [ ] sore throat  [ ] thrush [ ] ulcers [ ] exudates  Respiratory: [ ] dyspnea [ ] hemoptysis [ ] cough [ ] sputum	  Cardiovascular:  [ ] chest pain [ ] palpitations [ ] edema	  Gastrointestinal:  [ ] nausea [ ] vomiting [ ] diarrhea [ ] constipation [ ] pain	  Genitourinary:  [ ] dysuria [ ] frequency [ ] hematuria [ ] discharge [ ] flank pain  [ ] incontinence  Musculoskeletal:  [ ] myalgias [ ] arthralgias [ ] arthritis  [ ] back pain  Neurological:  [ ] headache [ ] seizures  [ ] confusion/altered mental status  Psychiatric:  [ ] anxiety [ ] depression	  Hematology/Lymphatics:  [ ] lymphadenopathy  Endocrine:  [ ] adrenal [ ] thyroid  Allergic/Immunologic:	 [ ] transplant [ ] seasonal    Vital Signs Last 24 Hrs  T(F): 97.8 (10-29-23 @ 08:30), Max: 97.8 (10-28-23 @ 17:15)  Vital Signs Last 24 Hrs  HR: 50 (10-29-23 @ 08:30) (50 - 77)  BP: 107/62 (10-29-23 @ 08:30) (107/62 - 157/78)  RR: 18 (10-29-23 @ 08:30)  SpO2: 96% (10-29-23 @ 08:30) (96% - 98%)  Wt(kg): --    PHYSICAL EXAMINATION:  General: Alert and Awake, NAD  HEENT: PERRL, EOMI, No subconjunctival hemorrhages, Oropharynx Clear, MMM  Neck: Supple, No HARLEY  Cardiac: RRR, No M/R/G  Resp: CTAB, No Wh/Rh/Ra  Abdomen: NBS, NT/ND, No HSM, No rigidity or guarding  MSK: No LE edema. No stigmata of IE. No evidence of phlebitis. No evidence of synovitis.  : No montalvo  Skin: No rashes or lesions. Skin is warm and dry to the touch.   Neuro: Alert and Awake. CN 2-12 Grossly intact. Moves all four extremities spontaneously.  Psych: Calm, Pleasant, Cooperative                          13.1   6.75  )-----------( 184      ( 29 Oct 2023 07:06 )             39.6     10-29    139  |  106  |  27<H>  ----------------------------<  82  4.5   |  21<L>  |  1.50<H>    Ca    9.5      29 Oct 2023 07:05  Phos  3.1     10-29  Mg     2.0     10-29    TPro  6.2  /  Alb  3.9  /  TBili  0.4  /  DBili  x   /  AST  19  /  ALT  8<L>  /  AlkPhos  57  10-29    Urinalysis Basic - ( 29 Oct 2023 07:05 )    Color: x / Appearance: x / SG: x / pH: x  Gluc: 82 mg/dL / Ketone: x  / Bili: x / Urobili: x   Blood: x / Protein: x / Nitrite: x   Leuk Esterase: x / RBC: x / WBC x   Sq Epi: x / Non Sq Epi: x / Bacteria: x    RADIOLOGY:    <The imaging below has been reviewed and visualized by me independently. Findings as detailed in report below>    < from: US Trans Kidney w/ Doppler, Right (10.28.23 @ 23:27) >  IMPRESSION:  Patent vessels.  No evidence of a significant renal artery stenosis.    < end of copied text >       Patient is a 63y old  Male who presents with a chief complaint of right flank/back/lower quadrant pain (29 Oct 2023 01:55)    HPI:  64 yo 59 kg m w pmh pld + pkd s/p renal transplant, htn, hld, gerd, chronic lbp s/p back surgery, alc abuse, pancreatitis, anx/dep/bipolar disorder, p/w right flank/back/lower quadrant pain; 7/10 in intensity; patient contacted outpatient providers office, letting staff there know back pain on the same side as the transplanted kidney. Denies any associated fever, chills, rigors, n+v, dysuria, bowel/bladder incontinence, no saddle anesthesia. as pain remained intractable, he presents to [facility] er for further evaluation. of note, Pt has had laminectomy in recent months and had questions regarding pain management.       in er, unclear etiology; possibly multifactorial eg cystitis, constipation, pancreatitis, referred chronic lower back pain; admit to medicine for further mgmt (29 Oct 2023 01:55)     prior hospital charts reviewed [  ]  primary team notes reviewed [  ]  other consultant notes reviewed [  ]    PAST MEDICAL & SURGICAL HISTORY:  Hypertension      Bipolar 1 disorder      Alcoholism /alcohol abuse      Acid reflux      Polycystic kidney disease  with ESRD s/p kidney txp 2/24/17      S/P breast lumpectomy  right, 1996      Dialysis patient  Left Arm A V Graft ( 2004 )      H/O kidney transplant          Allergies  penicillin (Hives)    ANTIMICROBIALS (past 90 days)  MEDICATIONS  (STANDING):  cefTRIAXone   IVPB   100 mL/Hr IV Intermittent (10-29-23 @ 00:28)      ANTIMICROBIALS:    cefTRIAXone   IVPB 1000 every 24 hours  trimethoprim   80 mG/sulfamethoxazole 400 mG 1 daily    OTHER MEDS: MEDICATIONS  (STANDING):  acetaminophen     Tablet .. 650 every 6 hours PRN  aluminum hydroxide/magnesium hydroxide/simethicone Suspension 30 every 4 hours PRN  atorvastatin 10 at bedtime  buPROPion XL (24-Hour) . 150 daily  famotidine    Tablet 20 daily  gabapentin 100 three times a day  heparin   Injectable 5000 every 8 hours  HYDROmorphone  Injectable 0.5 once  influenza  Vaccine (HIGH DOSE) 0.7 once  losartan 100 daily  melatonin 3 at bedtime PRN  mycophenolate mofetil 250 two times a day  ondansetron Injectable 4 every 8 hours PRN  polyethylene glycol 3350 17 daily  predniSONE   Tablet 5 daily  senna 2 at bedtime  tacrolimus 2 daily  tacrolimus 2.5 at bedtime  tamsulosin 0.4 at bedtime    SOCIAL HISTORY:   hx smoking  non-smoker    FAMILY HISTORY:  Family history of cancer (Grandparent)      REVIEW OF SYSTEMS  [  ] ROS unobtainable because:    [  ] All other systems negative except as noted below:	    Constitutional:  [ ] fever [ ] chills  [ ] weight loss  [ ] weakness  Skin:  [ ] rash [ ] phlebitis	  Eyes: [ ] icterus [ ] pain  [ ] discharge	  ENMT: [ ] sore throat  [ ] thrush [ ] ulcers [ ] exudates  Respiratory: [ ] dyspnea [ ] hemoptysis [ ] cough [ ] sputum	  Cardiovascular:  [ ] chest pain [ ] palpitations [ ] edema	  Gastrointestinal:  [ ] nausea [ ] vomiting [ ] diarrhea [ ] constipation [ ] pain	  Genitourinary:  [ ] dysuria [ ] frequency [ ] hematuria [ ] discharge [ ] flank pain  [ ] incontinence  Musculoskeletal:  [ ] myalgias [ ] arthralgias [ ] arthritis  [ ] back pain  Neurological:  [ ] headache [ ] seizures  [ ] confusion/altered mental status  Psychiatric:  [ ] anxiety [ ] depression	  Hematology/Lymphatics:  [ ] lymphadenopathy  Endocrine:  [ ] adrenal [ ] thyroid  Allergic/Immunologic:	 [ ] transplant [ ] seasonal    Vital Signs Last 24 Hrs  T(F): 97.8 (10-29-23 @ 08:30), Max: 97.8 (10-28-23 @ 17:15)  Vital Signs Last 24 Hrs  HR: 50 (10-29-23 @ 08:30) (50 - 77)  BP: 107/62 (10-29-23 @ 08:30) (107/62 - 157/78)  RR: 18 (10-29-23 @ 08:30)  SpO2: 96% (10-29-23 @ 08:30) (96% - 98%)  Wt(kg): --    PHYSICAL EXAMINATION:  General: Alert and Awake, NAD  HEENT: PERRL, EOMI, No subconjunctival hemorrhages, Oropharynx Clear, MMM  Neck: Supple, No HARLEY  Cardiac: RRR, No M/R/G  Resp: CTAB, No Wh/Rh/Ra  Abdomen: NBS, NT/ND, No HSM, No rigidity or guarding  MSK: No LE edema. No stigmata of IE. No evidence of phlebitis. No evidence of synovitis.  : No montalvo  Skin: No rashes or lesions. Skin is warm and dry to the touch.   Neuro: Alert and Awake. CN 2-12 Grossly intact. Moves all four extremities spontaneously.  Psych: Calm, Pleasant, Cooperative                          13.1   6.75  )-----------( 184      ( 29 Oct 2023 07:06 )             39.6     10-29    139  |  106  |  27<H>  ----------------------------<  82  4.5   |  21<L>  |  1.50<H>    Ca    9.5      29 Oct 2023 07:05  Phos  3.1     10-29  Mg     2.0     10-29    TPro  6.2  /  Alb  3.9  /  TBili  0.4  /  DBili  x   /  AST  19  /  ALT  8<L>  /  AlkPhos  57  10-29    Urinalysis Basic - ( 29 Oct 2023 07:05 )    Color: x / Appearance: x / SG: x / pH: x  Gluc: 82 mg/dL / Ketone: x  / Bili: x / Urobili: x   Blood: x / Protein: x / Nitrite: x   Leuk Esterase: x / RBC: x / WBC x   Sq Epi: x / Non Sq Epi: x / Bacteria: x    RADIOLOGY:    <The imaging below has been reviewed and visualized by me independently. Findings as detailed in report below>    < from: US Trans Kidney w/ Doppler, Right (10.28.23 @ 23:27) >  IMPRESSION:  Patent vessels.  No evidence of a significant renal artery stenosis.    < end of copied text >

## 2023-10-29 NOTE — PROGRESS NOTE ADULT - PROBLEM SELECTOR PLAN 1
afebrile, no leukocytosis, hds  Ua shows bland sediment  ct showing "wall thickening and perivesicular fat stranding."  COnt rocephin   monitor for fever, changes in white count  cont empirical ceftriaxone; adjust according to final c+s  transplant id called

## 2023-10-29 NOTE — H&P ADULT - NSHPREVIEWOFSYSTEMS_GEN_ALL_CORE
CONSTITUTIONAL: No fever. no weakness  ENMT:  No sinus or throat pain  RESPIRATORY: No cough, wheezing, chills or hemoptysis; No shortness of breath  CARDIOVASCULAR: No chest pain, palpitations, dizziness, or leg swelling  GASTROINTESTINAL: rlq pain. No nausea, vomiting, or hematemesis; No diarrhea or constipation. No melena or hematochezia.  GENITOURINARY: No dysuria or incontinence. +right flank pain  NEUROLOGICAL: No headaches, memory loss, loss of strength, numbness, or tremors  SKIN: No rashes,  No hives or eczema  ENDOCRINE: No heat or cold intolerance; No hair loss  MUSCULOSKELETAL: No joint pain or swelling; right lower bvack pain+  PSYCHIATRIC: No depression, anxiety, mood swings, or difficulty sleeping  HEME/LYMPH: No easy bruising, or bleeding gums; no enlarged LN

## 2023-10-29 NOTE — H&P ADULT - PROBLEM SELECTOR PLAN 3
h/o alc abuse, pancreatitis  imaging shows, "Main pancreatic duct dilation measuring up to 6 mm in the head, previously measuring 4 mm. No definite pancreatic head mass versus noncontrast exam. No peripancreatic inflammatory changes."  lipase lvl wnl at ~49  lfts wnl  serial abdominal exams and abdominal imaging as needed for now  mrcp if pain doest resolve  gi consult in am

## 2023-10-29 NOTE — H&P ADULT - HISTORY OF PRESENT ILLNESS
64 yo 59 kg m w pmh pld + pkd s/p renal transplant, htn, hld, gerd, chronic lbp s/p back surgery, alc abuse, pancreatitis, anx/dep/bipolar disorder, p/w right flank/back/lower quadrant pain;     in er, unclear etiology; possibly multifactorial eg cystitis, constipation, pancreatitis, referred chronic lower back pain; admit to medicine for further mgmt    64 yo 59 kg m w pmh pld + pkd s/p renal transplant, htn, hld, gerd, chronic lbp s/p back surgery, alc abuse, pancreatitis, anx/dep/bipolar disorder, p/w right flank/back/lower quadrant pain; 7/10 in intensity; patient contacted outpatient providers office, letting staff there know back pain on the same side as the transplanted kidney. Denies any associated fever, chills, rigors, n+v, dysuria, bowel/bladder incontinence, no saddle anesthesia. as pain remained intractable, he presents to [facility] er for further evaluation. of note, Pt has had laminectomy in recent months and had questions regarding pain management.       in er, unclear etiology; possibly multifactorial eg cystitis, constipation, pancreatitis, referred chronic lower back pain; admit to medicine for further mgmt

## 2023-10-29 NOTE — PROGRESS NOTE ADULT - PROBLEM SELECTOR PLAN 3
h/o alc abuse, pancreatitis  imaging shows, "Main pancreatic duct dilation measuring up to 6 mm in the head, previously measuring 4 mm. No definite pancreatic head mass versus noncontrast exam. No peripancreatic inflammatory changes."  lipase lvl wnl at ~49  lfts wnl  serial abdominal exams and abdominal imaging as needed for now  mrcp if pain doest resolve  MRCP ordered

## 2023-10-30 LAB
ALBUMIN SERPL ELPH-MCNC: 4.1 G/DL — SIGNIFICANT CHANGE UP (ref 3.3–5)
ALBUMIN SERPL ELPH-MCNC: 4.1 G/DL — SIGNIFICANT CHANGE UP (ref 3.3–5)
ALP SERPL-CCNC: 57 U/L — SIGNIFICANT CHANGE UP (ref 40–120)
ALP SERPL-CCNC: 57 U/L — SIGNIFICANT CHANGE UP (ref 40–120)
ALT FLD-CCNC: 10 U/L — SIGNIFICANT CHANGE UP (ref 10–45)
ALT FLD-CCNC: 10 U/L — SIGNIFICANT CHANGE UP (ref 10–45)
ANION GAP SERPL CALC-SCNC: 13 MMOL/L — SIGNIFICANT CHANGE UP (ref 5–17)
ANION GAP SERPL CALC-SCNC: 13 MMOL/L — SIGNIFICANT CHANGE UP (ref 5–17)
AST SERPL-CCNC: 14 U/L — SIGNIFICANT CHANGE UP (ref 10–40)
AST SERPL-CCNC: 14 U/L — SIGNIFICANT CHANGE UP (ref 10–40)
BASOPHILS # BLD AUTO: 0.02 K/UL — SIGNIFICANT CHANGE UP (ref 0–0.2)
BASOPHILS # BLD AUTO: 0.02 K/UL — SIGNIFICANT CHANGE UP (ref 0–0.2)
BASOPHILS NFR BLD AUTO: 0.3 % — SIGNIFICANT CHANGE UP (ref 0–2)
BASOPHILS NFR BLD AUTO: 0.3 % — SIGNIFICANT CHANGE UP (ref 0–2)
BILIRUB SERPL-MCNC: 0.5 MG/DL — SIGNIFICANT CHANGE UP (ref 0.2–1.2)
BILIRUB SERPL-MCNC: 0.5 MG/DL — SIGNIFICANT CHANGE UP (ref 0.2–1.2)
BUN SERPL-MCNC: 25 MG/DL — HIGH (ref 7–23)
BUN SERPL-MCNC: 25 MG/DL — HIGH (ref 7–23)
CALCIUM SERPL-MCNC: 9.8 MG/DL — SIGNIFICANT CHANGE UP (ref 8.4–10.5)
CALCIUM SERPL-MCNC: 9.8 MG/DL — SIGNIFICANT CHANGE UP (ref 8.4–10.5)
CHLORIDE SERPL-SCNC: 104 MMOL/L — SIGNIFICANT CHANGE UP (ref 96–108)
CHLORIDE SERPL-SCNC: 104 MMOL/L — SIGNIFICANT CHANGE UP (ref 96–108)
CO2 SERPL-SCNC: 21 MMOL/L — LOW (ref 22–31)
CO2 SERPL-SCNC: 21 MMOL/L — LOW (ref 22–31)
CREAT SERPL-MCNC: 1.35 MG/DL — HIGH (ref 0.5–1.3)
CREAT SERPL-MCNC: 1.35 MG/DL — HIGH (ref 0.5–1.3)
CULTURE RESULTS: SIGNIFICANT CHANGE UP
CULTURE RESULTS: SIGNIFICANT CHANGE UP
EGFR: 58 ML/MIN/1.73M2 — LOW
EGFR: 58 ML/MIN/1.73M2 — LOW
EOSINOPHIL # BLD AUTO: 0.07 K/UL — SIGNIFICANT CHANGE UP (ref 0–0.5)
EOSINOPHIL # BLD AUTO: 0.07 K/UL — SIGNIFICANT CHANGE UP (ref 0–0.5)
EOSINOPHIL NFR BLD AUTO: 1.1 % — SIGNIFICANT CHANGE UP (ref 0–6)
EOSINOPHIL NFR BLD AUTO: 1.1 % — SIGNIFICANT CHANGE UP (ref 0–6)
GLUCOSE SERPL-MCNC: 74 MG/DL — SIGNIFICANT CHANGE UP (ref 70–99)
GLUCOSE SERPL-MCNC: 74 MG/DL — SIGNIFICANT CHANGE UP (ref 70–99)
HCT VFR BLD CALC: 39.9 % — SIGNIFICANT CHANGE UP (ref 39–50)
HCT VFR BLD CALC: 39.9 % — SIGNIFICANT CHANGE UP (ref 39–50)
HGB BLD-MCNC: 13.5 G/DL — SIGNIFICANT CHANGE UP (ref 13–17)
HGB BLD-MCNC: 13.5 G/DL — SIGNIFICANT CHANGE UP (ref 13–17)
IMM GRANULOCYTES NFR BLD AUTO: 0.5 % — SIGNIFICANT CHANGE UP (ref 0–0.9)
IMM GRANULOCYTES NFR BLD AUTO: 0.5 % — SIGNIFICANT CHANGE UP (ref 0–0.9)
LYMPHOCYTES # BLD AUTO: 1.83 K/UL — SIGNIFICANT CHANGE UP (ref 1–3.3)
LYMPHOCYTES # BLD AUTO: 1.83 K/UL — SIGNIFICANT CHANGE UP (ref 1–3.3)
LYMPHOCYTES # BLD AUTO: 28.5 % — SIGNIFICANT CHANGE UP (ref 13–44)
LYMPHOCYTES # BLD AUTO: 28.5 % — SIGNIFICANT CHANGE UP (ref 13–44)
MCHC RBC-ENTMCNC: 32.9 PG — SIGNIFICANT CHANGE UP (ref 27–34)
MCHC RBC-ENTMCNC: 32.9 PG — SIGNIFICANT CHANGE UP (ref 27–34)
MCHC RBC-ENTMCNC: 33.8 GM/DL — SIGNIFICANT CHANGE UP (ref 32–36)
MCHC RBC-ENTMCNC: 33.8 GM/DL — SIGNIFICANT CHANGE UP (ref 32–36)
MCV RBC AUTO: 97.3 FL — SIGNIFICANT CHANGE UP (ref 80–100)
MCV RBC AUTO: 97.3 FL — SIGNIFICANT CHANGE UP (ref 80–100)
MONOCYTES # BLD AUTO: 0.46 K/UL — SIGNIFICANT CHANGE UP (ref 0–0.9)
MONOCYTES # BLD AUTO: 0.46 K/UL — SIGNIFICANT CHANGE UP (ref 0–0.9)
MONOCYTES NFR BLD AUTO: 7.2 % — SIGNIFICANT CHANGE UP (ref 2–14)
MONOCYTES NFR BLD AUTO: 7.2 % — SIGNIFICANT CHANGE UP (ref 2–14)
NEUTROPHILS # BLD AUTO: 4 K/UL — SIGNIFICANT CHANGE UP (ref 1.8–7.4)
NEUTROPHILS # BLD AUTO: 4 K/UL — SIGNIFICANT CHANGE UP (ref 1.8–7.4)
NEUTROPHILS NFR BLD AUTO: 62.4 % — SIGNIFICANT CHANGE UP (ref 43–77)
NEUTROPHILS NFR BLD AUTO: 62.4 % — SIGNIFICANT CHANGE UP (ref 43–77)
NRBC # BLD: 0 /100 WBCS — SIGNIFICANT CHANGE UP (ref 0–0)
NRBC # BLD: 0 /100 WBCS — SIGNIFICANT CHANGE UP (ref 0–0)
PLATELET # BLD AUTO: 180 K/UL — SIGNIFICANT CHANGE UP (ref 150–400)
PLATELET # BLD AUTO: 180 K/UL — SIGNIFICANT CHANGE UP (ref 150–400)
POTASSIUM SERPL-MCNC: 4.1 MMOL/L — SIGNIFICANT CHANGE UP (ref 3.5–5.3)
POTASSIUM SERPL-MCNC: 4.1 MMOL/L — SIGNIFICANT CHANGE UP (ref 3.5–5.3)
POTASSIUM SERPL-SCNC: 4.1 MMOL/L — SIGNIFICANT CHANGE UP (ref 3.5–5.3)
POTASSIUM SERPL-SCNC: 4.1 MMOL/L — SIGNIFICANT CHANGE UP (ref 3.5–5.3)
PROT SERPL-MCNC: 6.4 G/DL — SIGNIFICANT CHANGE UP (ref 6–8.3)
PROT SERPL-MCNC: 6.4 G/DL — SIGNIFICANT CHANGE UP (ref 6–8.3)
RBC # BLD: 4.1 M/UL — LOW (ref 4.2–5.8)
RBC # BLD: 4.1 M/UL — LOW (ref 4.2–5.8)
RBC # FLD: 13.2 % — SIGNIFICANT CHANGE UP (ref 10.3–14.5)
RBC # FLD: 13.2 % — SIGNIFICANT CHANGE UP (ref 10.3–14.5)
SODIUM SERPL-SCNC: 138 MMOL/L — SIGNIFICANT CHANGE UP (ref 135–145)
SODIUM SERPL-SCNC: 138 MMOL/L — SIGNIFICANT CHANGE UP (ref 135–145)
SPECIMEN SOURCE: SIGNIFICANT CHANGE UP
SPECIMEN SOURCE: SIGNIFICANT CHANGE UP
WBC # BLD: 6.41 K/UL — SIGNIFICANT CHANGE UP (ref 3.8–10.5)
WBC # BLD: 6.41 K/UL — SIGNIFICANT CHANGE UP (ref 3.8–10.5)
WBC # FLD AUTO: 6.41 K/UL — SIGNIFICANT CHANGE UP (ref 3.8–10.5)
WBC # FLD AUTO: 6.41 K/UL — SIGNIFICANT CHANGE UP (ref 3.8–10.5)

## 2023-10-30 PROCEDURE — 74183 MRI ABD W/O CNTR FLWD CNTR: CPT | Mod: 26

## 2023-10-30 PROCEDURE — 99233 SBSQ HOSP IP/OBS HIGH 50: CPT

## 2023-10-30 PROCEDURE — 99222 1ST HOSP IP/OBS MODERATE 55: CPT

## 2023-10-30 RX ORDER — PSYLLIUM SEED (WITH DEXTROSE)
1 POWDER (GRAM) ORAL
Refills: 0 | Status: DISCONTINUED | OUTPATIENT
Start: 2023-10-30 | End: 2023-11-02

## 2023-10-30 RX ORDER — HYDROMORPHONE HYDROCHLORIDE 2 MG/ML
0.5 INJECTION INTRAMUSCULAR; INTRAVENOUS; SUBCUTANEOUS ONCE
Refills: 0 | Status: DISCONTINUED | OUTPATIENT
Start: 2023-10-30 | End: 2023-10-30

## 2023-10-30 RX ORDER — GABAPENTIN 400 MG/1
300 CAPSULE ORAL THREE TIMES A DAY
Refills: 0 | Status: DISCONTINUED | OUTPATIENT
Start: 2023-10-30 | End: 2023-11-02

## 2023-10-30 RX ADMIN — HYDROMORPHONE HYDROCHLORIDE 0.5 MILLIGRAM(S): 2 INJECTION INTRAMUSCULAR; INTRAVENOUS; SUBCUTANEOUS at 16:12

## 2023-10-30 RX ADMIN — GABAPENTIN 100 MILLIGRAM(S): 400 CAPSULE ORAL at 14:03

## 2023-10-30 RX ADMIN — BUPROPION HYDROCHLORIDE 150 MILLIGRAM(S): 150 TABLET, EXTENDED RELEASE ORAL at 11:58

## 2023-10-30 RX ADMIN — CEFTRIAXONE 100 MILLIGRAM(S): 500 INJECTION, POWDER, FOR SOLUTION INTRAMUSCULAR; INTRAVENOUS at 06:15

## 2023-10-30 RX ADMIN — Medication 1 TABLET(S): at 11:58

## 2023-10-30 RX ADMIN — Medication 2 GRAM(S): at 06:17

## 2023-10-30 RX ADMIN — HYDROMORPHONE HYDROCHLORIDE 0.5 MILLIGRAM(S): 2 INJECTION INTRAMUSCULAR; INTRAVENOUS; SUBCUTANEOUS at 15:42

## 2023-10-30 RX ADMIN — Medication 5 MILLIGRAM(S): at 06:16

## 2023-10-30 RX ADMIN — TACROLIMUS 2.5 MILLIGRAM(S): 5 CAPSULE ORAL at 21:32

## 2023-10-30 RX ADMIN — ATORVASTATIN CALCIUM 10 MILLIGRAM(S): 80 TABLET, FILM COATED ORAL at 21:33

## 2023-10-30 RX ADMIN — GABAPENTIN 300 MILLIGRAM(S): 400 CAPSULE ORAL at 21:32

## 2023-10-30 RX ADMIN — HEPARIN SODIUM 5000 UNIT(S): 5000 INJECTION INTRAVENOUS; SUBCUTANEOUS at 21:32

## 2023-10-30 RX ADMIN — Medication 2 GRAM(S): at 17:57

## 2023-10-30 RX ADMIN — HYDROMORPHONE HYDROCHLORIDE 0.5 MILLIGRAM(S): 2 INJECTION INTRAMUSCULAR; INTRAVENOUS; SUBCUTANEOUS at 02:00

## 2023-10-30 RX ADMIN — HEPARIN SODIUM 5000 UNIT(S): 5000 INJECTION INTRAVENOUS; SUBCUTANEOUS at 06:16

## 2023-10-30 RX ADMIN — TACROLIMUS 2 MILLIGRAM(S): 5 CAPSULE ORAL at 11:58

## 2023-10-30 RX ADMIN — MYCOPHENOLATE MOFETIL 250 MILLIGRAM(S): 250 CAPSULE ORAL at 06:16

## 2023-10-30 RX ADMIN — HEPARIN SODIUM 5000 UNIT(S): 5000 INJECTION INTRAVENOUS; SUBCUTANEOUS at 14:03

## 2023-10-30 RX ADMIN — TAMSULOSIN HYDROCHLORIDE 0.4 MILLIGRAM(S): 0.4 CAPSULE ORAL at 21:32

## 2023-10-30 RX ADMIN — SENNA PLUS 2 TABLET(S): 8.6 TABLET ORAL at 21:34

## 2023-10-30 RX ADMIN — MYCOPHENOLATE MOFETIL 250 MILLIGRAM(S): 250 CAPSULE ORAL at 17:57

## 2023-10-30 RX ADMIN — HYDROMORPHONE HYDROCHLORIDE 0.5 MILLIGRAM(S): 2 INJECTION INTRAMUSCULAR; INTRAVENOUS; SUBCUTANEOUS at 01:01

## 2023-10-30 RX ADMIN — FAMOTIDINE 20 MILLIGRAM(S): 10 INJECTION INTRAVENOUS at 11:58

## 2023-10-30 RX ADMIN — GABAPENTIN 100 MILLIGRAM(S): 400 CAPSULE ORAL at 06:17

## 2023-10-30 RX ADMIN — LOSARTAN POTASSIUM 100 MILLIGRAM(S): 100 TABLET, FILM COATED ORAL at 09:50

## 2023-10-30 NOTE — DIETITIAN INITIAL EVALUATION ADULT - PERTINENT LABORATORY DATA
10-30    138  |  104  |  25<H>  ----------------------------<  74  4.1   |  21<L>  |  1.35<H>    Ca    9.8      30 Oct 2023 07:12  Phos  3.1     10-29  Mg     2.0     10-29    TPro  6.4  /  Alb  4.1  /  TBili  0.5  /  DBili  x   /  AST  14  /  ALT  10  /  AlkPhos  57  10-30  A1C with Estimated Average Glucose Result: 5.5 % (10-29-23 @ 07:08)

## 2023-10-30 NOTE — DIETITIAN INITIAL EVALUATION ADULT - ENERGY INTAKE
Fair (50-75%) In-house pt reports appetite and PO intake remain fair. Consumed small yogurt for breakfast, didn't feel hungry. Pt consuming lunch during visit, sandwich. No acute GI distress noted.

## 2023-10-30 NOTE — DIETITIAN INITIAL EVALUATION ADULT - PERTINENT MEDS FT
MEDICATIONS  (STANDING):  atorvastatin 10 milliGRAM(s) Oral at bedtime  buPROPion XL (24-Hour) . 150 milliGRAM(s) Oral daily  cefTRIAXone   IVPB 1000 milliGRAM(s) IV Intermittent every 24 hours  famotidine    Tablet 20 milliGRAM(s) Oral daily  gabapentin 300 milliGRAM(s) Oral three times a day  heparin   Injectable 5000 Unit(s) SubCutaneous every 8 hours  influenza  Vaccine (HIGH DOSE) 0.7 milliLiter(s) IntraMuscular once  losartan 100 milliGRAM(s) Oral daily  mycophenolate mofetil 250 milliGRAM(s) Oral two times a day  omega-3-Acid Ethyl Esters 2 Gram(s) Oral two times a day  polyethylene glycol 3350 17 Gram(s) Oral daily  predniSONE   Tablet 5 milliGRAM(s) Oral daily  senna 2 Tablet(s) Oral at bedtime  sodium chloride 0.9%. 1000 milliLiter(s) (75 mL/Hr) IV Continuous <Continuous>  tacrolimus 2.5 milliGRAM(s) Oral at bedtime  tacrolimus 2 milliGRAM(s) Oral daily  tamsulosin 0.4 milliGRAM(s) Oral at bedtime  trimethoprim   80 mG/sulfamethoxazole 400 mG 1 Tablet(s) Oral daily    MEDICATIONS  (PRN):  acetaminophen     Tablet .. 650 milliGRAM(s) Oral every 6 hours PRN Temp greater or equal to 38C (100.4F), Mild Pain (1 - 3)  aluminum hydroxide/magnesium hydroxide/simethicone Suspension 30 milliLiter(s) Oral every 4 hours PRN Dyspepsia  melatonin 3 milliGRAM(s) Oral at bedtime PRN Insomnia  ondansetron Injectable 4 milliGRAM(s) IV Push every 8 hours PRN Nausea and/or Vomiting  psyllium Powder 1 Packet(s) Oral two times a day PRN constipation

## 2023-10-30 NOTE — PROGRESS NOTE ADULT - ASSESSMENT
63 year old male PMH PLD + PKD s/p renal transplant, htn, hld, gerd, chronic lbp s/p back surgery, alc abuse, pancreatitis, anx/dep/bipolar disorder, p/w right flank/back/lower quadrant pain    UA (10/28) 1 WBC; UC <10 Mixed kodak- taken PRIOR to antibiotic initiation  CT A/P (10/28) Bladder wall thickening and perivesicular fat stranding concerning for cystitis. Mild increase main pancreatic duct dilatation without evidence of pancreatic head mass.  Transplant Kidney US (10/28) No renal mass, hydronephrosis or calculi. No perinephric fluid.        #Abdominal Pain, Abnormal CT A/P (bladder wall thickening, pancreatic duct dilatation)  UA with 1 WBC and UC with <10K taken PRIOR to antibiotic initiation.   lipase negative  - No evidence of UTI as etiology of abdominal pain. Can stop ceftriaxone  --Continue to follow CBC with diff  --Continue to follow temperature curve  --Follow up on MRCP- if unrevealing, consider MR spine.   - Send CMV PCR in plasma for completeness    #Renal Transplant Recipient  --On Bactrim SS PO Q24H for PCP PPx        Thank you for involving us in the care of this patient  Please call or page with additional questions  Pager; #[phone]  Teams: from 8 am to 5 pm  Beatriz Suarez MD   63 year old male PMH PLD + PKD s/p renal transplant, htn, hld, gerd, chronic lbp s/p back surgery, alc abuse, pancreatitis, anx/dep/bipolar disorder, p/w right flank/back/lower quadrant pain    UA (10/28) 1 WBC; UC <10 Mixed kodak- taken PRIOR to antibiotic initiation  CT A/P (10/28) Bladder wall thickening and perivesicular fat stranding concerning for cystitis. Mild increase main pancreatic duct dilatation without evidence of pancreatic head mass.  Transplant Kidney US (10/28) No renal mass, hydronephrosis or calculi. No perinephric fluid.        #Abdominal Pain, Abnormal CT A/P (bladder wall thickening, pancreatic duct dilatation)  UA with 1 WBC and UC with <10K taken PRIOR to antibiotic initiation.   lipase negative  - No evidence of UTI as etiology of abdominal pain. Can stop ceftriaxone  --Continue to follow CBC with diff  --Continue to follow temperature curve  --Follow up on MRCP-   -- pain appears to be lumbosacral with radiation to the left leg- would favor Spinal MRi-     #Renal Transplant Recipient  --On Bactrim SS PO Q24H for PCP PPx        Thank you for involving us in the care of this patient  ID will not continue following  Please call or page with additional questions  Pager; #[phone]  Teams: from 8 am to 5 pm  Beatriz Suarez MD

## 2023-10-30 NOTE — DIETITIAN INITIAL EVALUATION ADULT - OTHER INFO
Weight: Pt reports weight used to be ~ 147lbs while on dialysis, dropped to 130lbs following renal transplant (2017) and remained stable at 130lbs since then. Current dosing weight is 129.8lbs

## 2023-10-30 NOTE — DIETITIAN INITIAL EVALUATION ADULT - PROBLEM SELECTOR PLAN 5
h/o PLD + PKD  baseline cr ~1.7; currently at baseline   a/w hyper ca  Monitor strict I/Os, daily weights, UO, BUN/Cr, volume status, acid-base balance, electrolytes  follow up urine studies, transplant us, tacro lvl; mbd work up  avoid nephrotoxic agents; appropriate dose adjustments for all renally cleared medications   judicious IVF as needed  cont home prednisone (w bactrim for pjp ppx), tacrolimus 2 morning + 2.5 evening, mycophenolate   transplant nephro consult in am

## 2023-10-30 NOTE — DIETITIAN INITIAL EVALUATION ADULT - REASON FOR ADMISSION
Right lower quadrant abdominal pain    Chart reviewed, events noted. This is a " 63 year old male with PCKD s/p DDRT in 2017 at [facility] ( Dr Chung) , HTN, chronic lbp s/p back surgery, h/o alcohol  abuse, pancreatitis, anx/dep/bipolar disorder, p/w right flank/back/lower quadrant pain; 7/10 in intensity; patient contacted outpatient providers office, letting staff there know back pain on the same side as the transplanted kidney. Denies any associated fever, chills, rigors, n+v, dysuria, bowel/bladder incontinence, no saddle anesthesia. as pain remained intractable, he presents to [facility] er for further evaluation. of note, Pt has had laminectomy in recent months and had questions regarding pain management"

## 2023-10-30 NOTE — DIETITIAN INITIAL EVALUATION ADULT - ADD RECOMMEND
1) Continue current diet as tolerated. 2) Encourage PO intake of nutrient dense foods. RD to add Ensure Plus High Protein 1x daily to supplement PO intake, monitor acceptance. 3) RD to remain available and follow-up as medically appropriate.

## 2023-10-30 NOTE — PROGRESS NOTE ADULT - SUBJECTIVE AND OBJECTIVE BOX
Patient is a 63y old  Male who presents with a chief complaint of right flank/back/lower quadrant pain (29 Oct 2023 01:55)    HPI:      Afebrile, UC <10K mixed organisms  MRCP pending            Vital Signs Last 24 Hrs  T(C): 36.7 (30 Oct 2023 17:18), Max: 36.8 (30 Oct 2023 09:21)  T(F): 98.1 (30 Oct 2023 17:18), Max: 98.2 (30 Oct 2023 09:21)  HR: 69 (30 Oct 2023 17:18) (55 - 69)  BP: 116/76 (30 Oct 2023 17:18) (105/54 - 142/80)  BP(mean): --  RR: 18 (30 Oct 2023 17:18) (18 - 18)  SpO2: 96% (30 Oct 2023 17:18) (96% - 97%)    Parameters below as of 30 Oct 2023 17:18  Patient On (Oxygen Delivery Method): room air      PHYSICAL EXAMINATION:  General: Alert and Awake, NAD  HEENT: PERRL, EOMI, No subconjunctival hemorrhages, Oropharynx Clear, MMM  Neck: Supple, No HARLEY  Cardiac: RRR, No M/R/G  Resp: CTAB, No Wh/Rh/Ra  Abdomen: NBS, NT/ND, No HSM, No rigidity or guarding  MSK: No LE edema. No stigmata of IE. No evidence of phlebitis. No evidence of synovitis.  : No montalvo  Skin: No rashes or lesions. Skin is warm and dry to the touch.   Neuro: Alert and Awake. CN 2-12 Grossly intact. Moves all four extremities spontaneously.  Psych: Calm, Pleasant, Cooperative          ____________________________________________________  ROS  GENERAL: denies chills, , night sweats, weight loss.   PSYCH: denies depression, anxiety, suicidal ideation, hallucination, and delusions  SKIN: no rash or lesions; no color changes, no abnormal nevi,no  dryness, and nojaundice    EYES: denies visual changes, floaters, pain, inflammation, blurred vision, and discharge  ENT: denies tinnitus, vertigo, epistaxis, oral lesion, and decreased acuity  PULM: denies, hemoptysis, pleurisy  CVS: denies angina, palpitations,+ orthopnea, no syncope, or heart murmur  GI: denies constipation, diarrhea, melena, abdominal pain, nausea.   : denies dysuria, frequency, discharge, incontinence, stones or macroscopic hematuria  MS: no arthralgias, no erythema or swelling, no myalgias, noedema, or lower back pain.   CNS: denies numbness, dizziness, seizure, or tremor  ENDO: denies heat/cold intolerance, polyuria, polydipsia, malaise.    HEME: denies bruising, bleeding, lymphadenopathy, anemia, and calf pain    Allergies  penicillin (Hives)    __________________________________________________  MEDS:  MEDICATIONS  (STANDING):  acetaminophen     Tablet .. 650 every 6 hours PRN  aluminum hydroxide/magnesium hydroxide/simethicone Suspension 30 every 4 hours PRN  atorvastatin 10 at bedtime  buPROPion XL (24-Hour) . 150 daily  famotidine    Tablet 20 daily  gabapentin 300 three times a day  heparin   Injectable 5000 every 8 hours  influenza  Vaccine (HIGH DOSE) 0.7 once  losartan 100 daily  melatonin 3 at bedtime PRN  mycophenolate mofetil 250 two times a day  ondansetron Injectable 4 every 8 hours PRN  polyethylene glycol 3350 17 daily  predniSONE   Tablet 5 daily  psyllium Powder 1 two times a day PRN  senna 2 at bedtime  tacrolimus 2.5 at bedtime  tacrolimus 2 daily  tamsulosin 0.4 at bedtime    _________________________________________________  ANTIMICOBIALS  cefTRIAXone   IVPB 1000 every 24 hours  influenza  Vaccine (HIGH DOSE) 0.7 once  mycophenolate mofetil 250 two times a day  tacrolimus 2.5 at bedtime  tacrolimus 2 daily  trimethoprim   80 mG/sulfamethoxazole 400 mG 1 daily      GENERAL LABS              13.5                 x    | x    | x            6.41  >-----------< 180     ------------------------< x                     39.9                 x    | x    | x                                            Ca x     Mg x     Ph x          Urinalysis Basic - ( 30 Oct 2023 07:12 )    Color: x / Appearance: x / SG: x / pH: x  Gluc: 74 mg/dL / Ketone: x  / Bili: x / Urobili: x   Blood: x / Protein: x / Nitrite: x   Leuk Esterase: x / RBC: x / WBC x   Sq Epi: x / Non Sq Epi: x / Bacteria: x        _________________________________________________  MICROBIOLOGY  -----------    Culture - Urine (collected 28 Oct 2023 20:15)  Source: Clean Catch Clean Catch (Midstream)  Final Report (30 Oct 2023 07:30):    <10,000 CFU/mL Normal Urogenital Theresa                    Fungitell:   _______________________________________________  PERTINENT IMAGING             Patient is a 63y old  Male who presents with a chief complaint of right flank/back/lower quadrant pain (29 Oct 2023 01:55)    HPI:      Afebrile, UC <10K mixed organisms  MRCP pending  Denies graft site pain  Describes lumbosacral bilateral pain but with radiation to left leg down  No point spinal tenderness. Has no hardware inpalce             Vital Signs Last 24 Hrs  T(C): 36.7 (30 Oct 2023 17:18), Max: 36.8 (30 Oct 2023 09:21)  T(F): 98.1 (30 Oct 2023 17:18), Max: 98.2 (30 Oct 2023 09:21)  HR: 69 (30 Oct 2023 17:18) (55 - 69)  BP: 116/76 (30 Oct 2023 17:18) (105/54 - 142/80)  BP(mean): --  RR: 18 (30 Oct 2023 17:18) (18 - 18)  SpO2: 96% (30 Oct 2023 17:18) (96% - 97%)    Parameters below as of 30 Oct 2023 17:18  Patient On (Oxygen Delivery Method): room air      PHYSICAL EXAMINATION:  General: Alert and Awake, NAD  HEENT: PERRL, EOMI, No subconjunctival hemorrhages, Oropharynx Clear, MMM  Neck: Supple, No HARLEY  Cardiac: RRR, No M/R/G  Resp: CTAB, No Wh/Rh/Ra  Abdomen: NBS, NT/ND, No HSM, No rigidity or guarding  MSK: No LE edema. No stigmata of IE  No spinal tenderness (no point tenderness) no CVA tenderness  : No montalvo  Skin: No rashes or lesions. Skin is warm and dry to the touch.   Neuro: Alert and Awake. CN 2-12 Grossly intact. Moves all four extremities spontaneously.  Psych: Calm, Pleasant, Cooperative          ____________________________________________________  ROS  GENERAL: denies chills, , night sweats, weight loss.   PSYCH: denies depression, anxiety, suicidal ideation, hallucination, and delusions  SKIN: no rash or lesions; no color changes, no abnormal nevi,no  dryness, and nojaundice    EYES: denies visual changes, floaters, pain, inflammation, blurred vision, and discharge  ENT: denies tinnitus, vertigo, epistaxis, oral lesion, and decreased acuity  PULM: denies, hemoptysis, pleurisy  CVS: denies angina, palpitations,+ orthopnea, no syncope, or heart murmur  GI: denies constipation, diarrhea, melena, abdominal pain, nausea.   : denies dysuria, frequency, discharge, incontinence, stones or macroscopic hematuria  MS: no arthralgias, no erythema or swelling, no myalgias, noedema, or lower back pain.   CNS: denies numbness, dizziness, seizure, or tremor  ENDO: denies heat/cold intolerance, polyuria, polydipsia, malaise.    HEME: denies bruising, bleeding, lymphadenopathy, anemia, and calf pain    Allergies  penicillin (Hives)    __________________________________________________  MEDS:  MEDICATIONS  (STANDING):  acetaminophen     Tablet .. 650 every 6 hours PRN  aluminum hydroxide/magnesium hydroxide/simethicone Suspension 30 every 4 hours PRN  atorvastatin 10 at bedtime  buPROPion XL (24-Hour) . 150 daily  famotidine    Tablet 20 daily  gabapentin 300 three times a day  heparin   Injectable 5000 every 8 hours  influenza  Vaccine (HIGH DOSE) 0.7 once  losartan 100 daily  melatonin 3 at bedtime PRN  mycophenolate mofetil 250 two times a day  ondansetron Injectable 4 every 8 hours PRN  polyethylene glycol 3350 17 daily  predniSONE   Tablet 5 daily  psyllium Powder 1 two times a day PRN  senna 2 at bedtime  tacrolimus 2.5 at bedtime  tacrolimus 2 daily  tamsulosin 0.4 at bedtime    _________________________________________________  ANTIMICOBIALS  cefTRIAXone   IVPB 1000 every 24 hours  influenza  Vaccine (HIGH DOSE) 0.7 once  mycophenolate mofetil 250 two times a day  tacrolimus 2.5 at bedtime  tacrolimus 2 daily  trimethoprim   80 mG/sulfamethoxazole 400 mG 1 daily      GENERAL LABS              13.5                 x    | x    | x            6.41  >-----------< 180     ------------------------< x                     39.9                 x    | x    | x                                            Ca x     Mg x     Ph x          Urinalysis Basic - ( 30 Oct 2023 07:12 )    Color: x / Appearance: x / SG: x / pH: x  Gluc: 74 mg/dL / Ketone: x  / Bili: x / Urobili: x   Blood: x / Protein: x / Nitrite: x   Leuk Esterase: x / RBC: x / WBC x   Sq Epi: x / Non Sq Epi: x / Bacteria: x        _________________________________________________  MICROBIOLOGY  -----------    Culture - Urine (collected 28 Oct 2023 20:15)  Source: Clean Catch Clean Catch (Midstream)  Final Report (30 Oct 2023 07:30):    <10,000 CFU/mL Normal Urogenital Theresa                    Fungitell:   _______________________________________________  PERTINENT IMAGING

## 2023-10-30 NOTE — CONSULT NOTE ADULT - ASSESSMENT
63 year old male with PCKD s/p DDRT in 2017 at [facility] ( Dr Chung) , HTN, chronic lbp s/p back surgery, h/o alcohol  abuse, pancreatitis, anx/dep/bipolar disorder, admitted with right flank/back/lower quadrant pain    1.Allograft function is at baseline   2.IS meds- continue Prograft 2mg in am and 2.5 mg in pm . goal level is 4-6. continue Cellcept 250 mg po bid and  prednisone 5 mg po daily.   3.Right flank pain- CT A/P Bladder wall thickening and perivesicular fat stranding concerning for cystitis.  pancreatic duct dilatation.  UA was wnl. Seen by ID and started on Ceftriaxone.

## 2023-10-30 NOTE — PROGRESS NOTE ADULT - ASSESSMENT
64 yo 59 kg m w pmh pld + pkd s/p renal transplant, htn, hld, gerd, chronic lbp s/p back surgery, alc abuse, pancreatitis, anx/dep/bipolar disorder, p/w right flank/back/lower quadrant pain; in er, unclear etiology; possibly multifactorial eg cystitis, constipation, pancreatitis, referred chronic lower back pain; admit to medicine for further mgmt       Acute UTI.   - Afebrile, no leukocytosis, HDS   - UA with bland sediment  - CT with "wall thickening and perivesicular fat stranding."  - Continues on Rocephin   - UCx negative. F/u Transplant ID recs   - Monitor for fever, changes in white count  - Transplant ID eval appreciated; F/u recs     Constipation.   - Imaging with, "Moderate fecal burden in the right and transverse colons" - may be contributing to pain  monitor/trend daily bm  - C/w bowel regimen  - Adjust to maintain goal 1-2 bm/day.    Pancreatic duct dilated.   - H/O alc abuse, pancreatitis  - CT with "Main pancreatic duct dilation measuring up to 6 mm in the head, previously measuring 4 mm. No definite pancreatic head mass versus noncontrast exam. No peripancreatic inflammatory changes."  - Lipase WNL, LFTs WNL    - Serial abdominal exams and abdominal imaging as needed for now  - Check MRCP ordered.  - Trend Hepatic panel     Chronic lower back pain.   - H/O chronic LBP s/p back surgery  - No associated red flag symps/signs   - Analgesics + muscle relaxants as need   - PT/OT eval + sw/cm consult for disposition  - MR Spine, Ortho eval if pain worsens   - Chronic pain management evaluation consulted; F/u recs     Renal transplant recipient.   - H/O PLD + PKD  - Baseline cr ~1.7; currently at baseline   - Monitor strict I/Os, daily weights, UO, BUN/Cr, volume status, acid-base balance, electrolytes  - F/u urine studies, transplant us, tacro lvl; mbd work up  - Avoid nephrotoxic agents; appropriate dose adjustments for all renally cleared medications   - IVF as needed  - Cont home prednisone (w bactrim for pjp ppx), tacrolimus 2 morning + 2.5 evening, mycophenolate   - Transplant renal consulted; F/u recs     HTN (hypertension).   - Cont home cozaar.  - Monitor BP, VS and patient closely     HLD (hyperlipidemia).   - Cont home omega 3 + pravachol.    GERD (gastroesophageal reflux disease).   - Cont home famotidine.    Anxiety and depression.   - H/O anx/dep/bipolar disorder  - Cont home wellbutrin.    PPX    Discussed with Attending and ACP.   62 yo 59 kg m w pmh pld + pkd s/p renal transplant, htn, hld, gerd, chronic lbp s/p back surgery, alc abuse, pancreatitis, anx/dep/bipolar disorder, p/w right flank/back/lower quadrant pain; in er, unclear etiology; possibly multifactorial eg cystitis, constipation, pancreatitis, referred chronic lower back pain; admit to medicine for further mgmt       Acute UTI.   - Afebrile, no leukocytosis, HDS   - UA with bland sediment  - CT with "wall thickening and perivesicular fat stranding."  - Continues on Rocephin   - UCx negative. F/u Transplant ID recs   - Monitor for fever, changes in white count  - Transplant ID eval appreciated; F/u recs     Constipation.   - Imaging with, "Moderate fecal burden in the right and transverse colons" - may be contributing to pain  monitor/trend daily bm  - C/w bowel regimen  - Adjust to maintain goal 1-2 bm/day.    Pancreatic duct dilated.   - H/O alc abuse, pancreatitis  - CT with "Main pancreatic duct dilation measuring up to 6 mm in the head, previously measuring 4 mm. No definite pancreatic head mass versus noncontrast exam. No peripancreatic inflammatory changes."  - Lipase WNL, LFTs WNL    - Serial abdominal exams and abdominal imaging as needed for now  - Check MRCP ordered.  - Trend Hepatic panel     Chronic lower back pain.   - H/O chronic LBP s/p back surgery  - No associated red flag symps/signs   - Analgesics + muscle relaxants as need --> gabapentin increased, monitor   - PT/OT eval + sw/cm consult for disposition  - MR Spine, Ortho eval if pain worsens   - Chronic pain management evaluation consulted; F/u recs     Renal transplant recipient.   - H/O PLD + PKD  - Baseline cr ~1.7; currently at baseline   - Monitor strict I/Os, daily weights, UO, BUN/Cr, volume status, acid-base balance, electrolytes  - F/u urine studies, transplant us, tacro lvl; mbd work up  - Avoid nephrotoxic agents; appropriate dose adjustments for all renally cleared medications   - IVF as needed  - Cont home prednisone (w bactrim for pjp ppx), tacrolimus 2 morning + 2.5 evening, mycophenolate   - Transplant renal consulted; F/u recs     HTN (hypertension).   - Cont home cozaar.  - Monitor BP, VS and patient closely     HLD (hyperlipidemia).   - Cont home omega 3 + pravachol.    GERD (gastroesophageal reflux disease).   - Cont home famotidine.    Anxiety and depression.   - H/O anx/dep/bipolar disorder  - Cont home wellbutrin.    PPX    Discussed with Attending and ACP.

## 2023-10-30 NOTE — DIETITIAN INITIAL EVALUATION ADULT - ORAL INTAKE PTA/DIET HISTORY
Pt reports fair PO intake and appetite, typically consumes 2 meals daily (usually skips lunch). NKFA. Pt denies chewing/swallowing difficulty, nausea, vomiting, diarrhea, constipation. Used to drink Nepro shakes while on hemodialysis but hasn't been supplementing with nutrition supplements since.

## 2023-10-30 NOTE — CONSULT NOTE ADULT - SUBJECTIVE AND OBJECTIVE BOX
[facility] DIVISION OF KIDNEY DISEASES AND HYPERTENSION -- INITIAL CONSULT NOTE  --------------------------------------------------------------------------------  HPI:  63 year old male with PCKD s/p DDRT in 2017 at [facility] ( Dr Chung) , HTN, chronic lbp s/p back surgery, h/o alcohol  abuse, pancreatitis, anx/dep/bipolar disorder, p/w right flank/back/lower quadrant pain; 7/10 in intensity; patient contacted outpatient providers office, letting staff there know back pain on the same side as the transplanted kidney. Denies any associated fever, chills, rigors, n+v, dysuria, bowel/bladder incontinence, no saddle anesthesia. as pain remained intractable, he presents to [facility] er for further evaluation. of note, Pt has had laminectomy in recent months and had questions regarding pain management.       in er, unclear etiology; possibly multifactorial eg cystitis, constipation, pancreatitis, referred chronic lower back pain; admit to medicine for further mgmt        PAST HISTORY  --------------------------------------------------------------------------------  PAST MEDICAL & SURGICAL HISTORY:  Hypertension      Bipolar 1 disorder      Alcoholism /alcohol abuse      Acid reflux      Polycystic kidney disease  with ESRD s/p kidney txp 2/24/17      S/P breast lumpectomy  right, 1996      Dialysis patient  Left Arm A V Graft ( 2004 )      H/O kidney transplant        FAMILY HISTORY:  Family history of cancer (Grandparent)      PAST SOCIAL HISTORY:    ALLERGIES & MEDICATIONS  --------------------------------------------------------------------------------  Allergies    penicillin (Hives)    Intolerances      Standing Inpatient Medications  atorvastatin 10 milliGRAM(s) Oral at bedtime  buPROPion XL (24-Hour) . 150 milliGRAM(s) Oral daily  cefTRIAXone   IVPB 1000 milliGRAM(s) IV Intermittent every 24 hours  famotidine    Tablet 20 milliGRAM(s) Oral daily  gabapentin 300 milliGRAM(s) Oral three times a day  heparin   Injectable 5000 Unit(s) SubCutaneous every 8 hours  influenza  Vaccine (HIGH DOSE) 0.7 milliLiter(s) IntraMuscular once  losartan 100 milliGRAM(s) Oral daily  mycophenolate mofetil 250 milliGRAM(s) Oral two times a day  omega-3-Acid Ethyl Esters 2 Gram(s) Oral two times a day  polyethylene glycol 3350 17 Gram(s) Oral daily  predniSONE   Tablet 5 milliGRAM(s) Oral daily  senna 2 Tablet(s) Oral at bedtime  sodium chloride 0.9%. 1000 milliLiter(s) IV Continuous <Continuous>  tacrolimus 2.5 milliGRAM(s) Oral at bedtime  tacrolimus 2 milliGRAM(s) Oral daily  tamsulosin 0.4 milliGRAM(s) Oral at bedtime  trimethoprim   80 mG/sulfamethoxazole 400 mG 1 Tablet(s) Oral daily    PRN Inpatient Medications  acetaminophen     Tablet .. 650 milliGRAM(s) Oral every 6 hours PRN  aluminum hydroxide/magnesium hydroxide/simethicone Suspension 30 milliLiter(s) Oral every 4 hours PRN  melatonin 3 milliGRAM(s) Oral at bedtime PRN  ondansetron Injectable 4 milliGRAM(s) IV Push every 8 hours PRN  psyllium Powder 1 Packet(s) Oral two times a day PRN      REVIEW OF SYSTEMS  --------------------------------------------------------------------------------  Gen: No weight changes, fatigue, fevers/chills, weakness  Skin: No rashes  Head/Eyes/Ears/Mouth: No headache; Normal hearing; Normal vision w/o blurriness; No sinus pain/discomfort, sore throat  Respiratory: No dyspnea, cough, wheezing, hemoptysis  CV: No chest pain, PND, orthopnea  GI: No abdominal pain, diarrhea, constipation, nausea, vomiting, melena, hematochezia  : No increased frequency, dysuria, hematuria, nocturia  MSK: No joint pain/swelling; no back pain; no edema  Neuro: No dizziness/lightheadedness, weakness, seizures, numbness, tingling  Heme: No easy bruising or bleeding  Endo: No heat/cold intolerance  Psych: No significant nervousness, anxiety, stress, depression    All other systems were reviewed and are negative, except as noted.    VITALS/PHYSICAL EXAM  --------------------------------------------------------------------------------  T(C): 36.8 (10-30-23 @ 13:22), Max: 36.8 (10-29-23 @ 16:52)  HR: 60 (10-30-23 @ 13:22) (55 - 63)  BP: 119/83 (10-30-23 @ 13:22) (105/54 - 142/80)  RR: 18 (10-30-23 @ 13:22) (18 - 18)  SpO2: 97% (10-30-23 @ 13:22) (96% - 97%)  Wt(kg): --  Height (cm): 167.6 (10-28-23 @ 17:15)  Weight (kg): 59 (10-28-23 @ 17:15)  BMI (kg/m2): 21 (10-28-23 @ 17:15)  BSA (m2): 1.67 (10-28-23 @ 17:15)      10-29-23 @ 07:01  -  10-30-23 @ 07:00  --------------------------------------------------------  IN: 1270 mL / OUT: 2050 mL / NET: -780 mL    10-30-23 @ 07:01  -  10-30-23 @ 14:26  --------------------------------------------------------  IN: 220 mL / OUT: 600 mL / NET: -380 mL      Physical Exam:  	Gen: NAD, well-appearing  	HEENT: PERRL, supple neck, clear oropharynx  	Pulm: CTA B/L  	CV: RRR, S1S2; no rub  	Back: No spinal or CVA tenderness; no sacral edema  	Abd: +BS, soft, nontender/nondistended                      Transplant:  	: No suprapubic tenderness  	UE: Warm, FROM, no clubbing, intact strength; no edema; no asterixis  	LE: Warm, FROM, no clubbing, intact strength; no edema  	Neuro: No focal deficits, intact gait  	Psych: Normal affect and mood  	Skin: Warm, without rashes  	Vascular access:    LABS/STUDIES  --------------------------------------------------------------------------------              13.5   6.41  >-----------<  180      [10-30-23 @ 07:15]              39.9     138  |  104  |  25  ----------------------------<  74      [10-30-23 @ 07:12]  4.1   |  21  |  1.35        Ca     9.8     [10-30-23 @ 07:12]      Mg     2.0     [10-29-23 @ 07:05]      Phos  3.1     [10-29-23 @ 07:05]    TPro  6.4  /  Alb  4.1  /  TBili  0.5  /  DBili  x   /  AST  14  /  ALT  10  /  AlkPhos  57  [10-30-23 @ 07:12]    PT/INR: PT 10.4 , INR 0.94       [10-29-23 @ 07:14]  PTT: 30.4       [10-29-23 @ 07:14]      Creatinine Trend:  SCr 1.35 [10-30 @ 07:12]  SCr 1.50 [10-29 @ 07:05]  SCr 1.61 [10-29 @ 00:37]  SCr 1.59 [10-28 @ 20:16]    Urinalysis - [10-30-23 @ 07:12]      Color  / Appearance  / SG  / pH       Gluc 74 / Ketone   / Bili  / Urobili        Blood  / Protein  / Leuk Est  / Nitrite       RBC  / WBC  / Hyaline  / Gran  / Sq Epi  / Non Sq Epi  / Bacteria     Urine Creatinine 85      [10-29-23 @ 08:17]  Urine Protein 8      [10-29-23 @ 08:17]  Urine Sodium 167      [10-29-23 @ 08:17]  Urine Urea Nitrogen 655      [10-29-23 @ 08:17]  Urine Potassium 14      [10-29-23 @ 08:17]  Urine Osmolality 618      [10-29-23 @ 08:15]    PTH -- (Ca 9.6)      [10-29-23 @ 07:14]   60  Vitamin D (25OH) 40.5      [10-29-23 @ 07:14]  TSH 2.24      [10-29-23 @ 07:14]  Lipid: chol 195, TG 86, HDL 62, LDL --      [10-29-23 @ 07:05]        TacrolimusTacrolimus (), Serum: 5.6 ng/mL (10-29 @ 07:08)    Cyclosporine  Sirolimus  Mycophenolate  BK PCR  CMV PCR  Parvo PCR  EBV PCR

## 2023-10-30 NOTE — PROGRESS NOTE ADULT - SUBJECTIVE AND OBJECTIVE BOX
Name of Patient : TRACY JENNINGS  MRN: [id number]  Date of visit: 10-30-23 @ 16:17      Subjective: Patient seen and examined. No new events except as noted.   Patient seen earlier this AM. Lying down in bed. Planned for MRCP.     REVIEW OF SYSTEMS:    CONSTITUTIONAL: No weakness, fevers or chills  EYES/ENT: No visual changes;  No vertigo or throat pain   NECK: No pain or stiffness  RESPIRATORY: No cough, wheezing, hemoptysis; No shortness of breath  CARDIOVASCULAR: No chest pain or palpitations  GASTROINTESTINAL: No abdominal or epigastric pain. No nausea, vomiting, or hematemesis; No diarrhea or constipation. No melena or hematochezia.  GENITOURINARY: No dysuria, frequency or hematuria  NEUROLOGICAL: No numbness or weakness  SKIN: No itching, burning, rashes, or lesions   All other review of systems is negative unless indicated above.    MEDICATIONS:  MEDICATIONS  (STANDING):  atorvastatin 10 milliGRAM(s) Oral at bedtime  buPROPion XL (24-Hour) . 150 milliGRAM(s) Oral daily  cefTRIAXone   IVPB 1000 milliGRAM(s) IV Intermittent every 24 hours  famotidine    Tablet 20 milliGRAM(s) Oral daily  gabapentin 300 milliGRAM(s) Oral three times a day  heparin   Injectable 5000 Unit(s) SubCutaneous every 8 hours  influenza  Vaccine (HIGH DOSE) 0.7 milliLiter(s) IntraMuscular once  losartan 100 milliGRAM(s) Oral daily  mycophenolate mofetil 250 milliGRAM(s) Oral two times a day  omega-3-Acid Ethyl Esters 2 Gram(s) Oral two times a day  polyethylene glycol 3350 17 Gram(s) Oral daily  predniSONE   Tablet 5 milliGRAM(s) Oral daily  senna 2 Tablet(s) Oral at bedtime  sodium chloride 0.9%. 1000 milliLiter(s) (75 mL/Hr) IV Continuous <Continuous>  tacrolimus 2 milliGRAM(s) Oral daily  tacrolimus 2.5 milliGRAM(s) Oral at bedtime  tamsulosin 0.4 milliGRAM(s) Oral at bedtime  trimethoprim   80 mG/sulfamethoxazole 400 mG 1 Tablet(s) Oral daily      PHYSICAL EXAM:  T(C): 36.8 (10-30-23 @ 13:22), Max: 36.8 (10-29-23 @ 16:52)  HR: 60 (10-30-23 @ 13:22) (55 - 63)  BP: 119/83 (10-30-23 @ 13:22) (105/54 - 142/80)  RR: 18 (10-30-23 @ 13:22) (18 - 18)  SpO2: 97% (10-30-23 @ 13:22) (96% - 97%)  Wt(kg): --  I&O's Summary    29 Oct 2023 07:01  -  30 Oct 2023 07:00  --------------------------------------------------------  IN: 1270 mL / OUT: 2050 mL / NET: -780 mL    30 Oct 2023 07:01  -  30 Oct 2023 16:17  --------------------------------------------------------  IN: 540 mL / OUT: 1000 mL / NET: -460 mL          Appearance: Normal	  HEENT:  PERRLA   Lymphatic: No lymphadenopathy   Cardiovascular: Normal S1 S2, no JVD  Respiratory: normal effort , clear  Gastrointestinal:  Soft, Non-tender  Skin: No rashes,  warm to touch  Psychiatry:  Mood & affect appropriate  Musculuskeletal: No edema      All labs, Imaging and EKGs personally reviewed                 10-29-23 @ 07:01  -  10-30-23 @ 07:00  --------------------------------------------------------  IN: 1270 mL / OUT: 2050 mL / NET: -780 mL    10-30-23 @ 07:01  -  10-30-23 @ 16:17  --------------------------------------------------------  IN: 540 mL / OUT: 1000 mL / NET: -460 mL             Name of Patient : TRACY JENNINGS  MRN: [id number]  Date of visit: 10-30-23 @ 16:17      Subjective: Patient seen and examined. No new events except as noted.   Patient seen earlier this AM. Lying down in bed. Planned for MRCP.     REVIEW OF SYSTEMS:    CONSTITUTIONAL: No weakness, fevers or chills  EYES/ENT: No visual changes;  No vertigo or throat pain   NECK: No pain or stiffness  RESPIRATORY: No cough, wheezing, hemoptysis; No shortness of breath  CARDIOVASCULAR: No chest pain or palpitations  GASTROINTESTINAL: No abdominal or epigastric pain. No nausea, vomiting, or hematemesis; No diarrhea or constipation. No melena or hematochezia.  GENITOURINARY: No dysuria, frequency or hematuria  NEUROLOGICAL: No numbness or weakness  SKIN: No itching, burning, rashes, or lesions   All other review of systems is negative unless indicated above.    MEDICATIONS:  MEDICATIONS  (STANDING):  atorvastatin 10 milliGRAM(s) Oral at bedtime  buPROPion XL (24-Hour) . 150 milliGRAM(s) Oral daily  cefTRIAXone   IVPB 1000 milliGRAM(s) IV Intermittent every 24 hours  famotidine    Tablet 20 milliGRAM(s) Oral daily  gabapentin 300 milliGRAM(s) Oral three times a day  heparin   Injectable 5000 Unit(s) SubCutaneous every 8 hours  influenza  Vaccine (HIGH DOSE) 0.7 milliLiter(s) IntraMuscular once  losartan 100 milliGRAM(s) Oral daily  mycophenolate mofetil 250 milliGRAM(s) Oral two times a day  omega-3-Acid Ethyl Esters 2 Gram(s) Oral two times a day  polyethylene glycol 3350 17 Gram(s) Oral daily  predniSONE   Tablet 5 milliGRAM(s) Oral daily  senna 2 Tablet(s) Oral at bedtime  sodium chloride 0.9%. 1000 milliLiter(s) (75 mL/Hr) IV Continuous <Continuous>  tacrolimus 2 milliGRAM(s) Oral daily  tacrolimus 2.5 milliGRAM(s) Oral at bedtime  tamsulosin 0.4 milliGRAM(s) Oral at bedtime  trimethoprim   80 mG/sulfamethoxazole 400 mG 1 Tablet(s) Oral daily      PHYSICAL EXAM:  T(C): 36.8 (10-30-23 @ 13:22), Max: 36.8 (10-29-23 @ 16:52)  HR: 60 (10-30-23 @ 13:22) (55 - 63)  BP: 119/83 (10-30-23 @ 13:22) (105/54 - 142/80)  RR: 18 (10-30-23 @ 13:22) (18 - 18)  SpO2: 97% (10-30-23 @ 13:22) (96% - 97%)  Wt(kg): --  I&O's Summary    29 Oct 2023 07:01  -  30 Oct 2023 07:00  --------------------------------------------------------  IN: 1270 mL / OUT: 2050 mL / NET: -780 mL    30 Oct 2023 07:01  -  30 Oct 2023 16:17  --------------------------------------------------------  IN: 540 mL / OUT: 1000 mL / NET: -460 mL          Appearance: Awake, lying down in bed  HEENT: Eyes are open   Lymphatic: No lymphadenopathy   Cardiovascular: Normal   Respiratory: normal effort , clear  Gastrointestinal:  Soft, Non-tender  Skin: No rashes,  warm to touch  Psychiatry:  Mood & affect appropriate  Musculoskeletal: No edema          10-29-23 @ 07:01  -  10-30-23 @ 07:00  --------------------------------------------------------  IN: 1270 mL / OUT: 2050 mL / NET: -780 mL    10-30-23 @ 07:01  -  10-30-23 @ 16:17  --------------------------------------------------------  IN: 540 mL / OUT: 1000 mL / NET: -460 mL                                13.5   6.41  )-----------( 180      ( 30 Oct 2023 07:15 )             39.9               10-30    138  |  104  |  25<H>  ----------------------------<  74  4.1   |  21<L>  |  1.35<H>    Ca    9.8      30 Oct 2023 07:12  Phos  3.1     10-29  Mg     2.0     10-29    TPro  6.4  /  Alb  4.1  /  TBili  0.5  /  DBili  x   /  AST  14  /  ALT  10  /  AlkPhos  57  10-30    PT/INR - ( 29 Oct 2023 07:14 )   PT: 10.4 sec;   INR: 0.94 ratio         PTT - ( 29 Oct 2023 07:14 )  PTT:30.4 sec                   Urinalysis Basic - ( 30 Oct 2023 07:12 )    Color: x / Appearance: x / SG: x / pH: x  Gluc: 74 mg/dL / Ketone: x  / Bili: x / Urobili: x   Blood: x / Protein: x / Nitrite: x   Leuk Esterase: x / RBC: x / WBC x   Sq Epi: x / Non Sq Epi: x / Bacteria: x      Culture - Urine (10.28.23 @ 20:15)   Specimen Source: Clean Catch Clean Catch (Midstream)  Culture Results:   <10,000 CFU/mL Normal Urogenital Theresa

## 2023-10-30 NOTE — DIETITIAN INITIAL EVALUATION ADULT - NSFNSADHERENCEPTAFT_GEN_A_CORE
Pt s/p renal transplant 2017, on prednisone and tacrolimus. Transplant following in-house    HbA1c: 5.5% (10/29)

## 2023-10-31 LAB
ANION GAP SERPL CALC-SCNC: 13 MMOL/L — SIGNIFICANT CHANGE UP (ref 5–17)
ANION GAP SERPL CALC-SCNC: 13 MMOL/L — SIGNIFICANT CHANGE UP (ref 5–17)
BASOPHILS # BLD AUTO: 0.02 K/UL — SIGNIFICANT CHANGE UP (ref 0–0.2)
BASOPHILS # BLD AUTO: 0.02 K/UL — SIGNIFICANT CHANGE UP (ref 0–0.2)
BASOPHILS NFR BLD AUTO: 0.3 % — SIGNIFICANT CHANGE UP (ref 0–2)
BASOPHILS NFR BLD AUTO: 0.3 % — SIGNIFICANT CHANGE UP (ref 0–2)
BUN SERPL-MCNC: 31 MG/DL — HIGH (ref 7–23)
BUN SERPL-MCNC: 31 MG/DL — HIGH (ref 7–23)
CALCIUM SERPL-MCNC: 10.1 MG/DL — SIGNIFICANT CHANGE UP (ref 8.4–10.5)
CALCIUM SERPL-MCNC: 10.1 MG/DL — SIGNIFICANT CHANGE UP (ref 8.4–10.5)
CHLORIDE SERPL-SCNC: 104 MMOL/L — SIGNIFICANT CHANGE UP (ref 96–108)
CHLORIDE SERPL-SCNC: 104 MMOL/L — SIGNIFICANT CHANGE UP (ref 96–108)
CO2 SERPL-SCNC: 21 MMOL/L — LOW (ref 22–31)
CO2 SERPL-SCNC: 21 MMOL/L — LOW (ref 22–31)
CREAT SERPL-MCNC: 1.7 MG/DL — HIGH (ref 0.5–1.3)
CREAT SERPL-MCNC: 1.7 MG/DL — HIGH (ref 0.5–1.3)
EGFR: 44 ML/MIN/1.73M2 — LOW
EGFR: 44 ML/MIN/1.73M2 — LOW
EOSINOPHIL # BLD AUTO: 0.07 K/UL — SIGNIFICANT CHANGE UP (ref 0–0.5)
EOSINOPHIL # BLD AUTO: 0.07 K/UL — SIGNIFICANT CHANGE UP (ref 0–0.5)
EOSINOPHIL NFR BLD AUTO: 1.2 % — SIGNIFICANT CHANGE UP (ref 0–6)
EOSINOPHIL NFR BLD AUTO: 1.2 % — SIGNIFICANT CHANGE UP (ref 0–6)
GLUCOSE SERPL-MCNC: 92 MG/DL — SIGNIFICANT CHANGE UP (ref 70–99)
GLUCOSE SERPL-MCNC: 92 MG/DL — SIGNIFICANT CHANGE UP (ref 70–99)
HCT VFR BLD CALC: 42.9 % — SIGNIFICANT CHANGE UP (ref 39–50)
HCT VFR BLD CALC: 42.9 % — SIGNIFICANT CHANGE UP (ref 39–50)
HGB BLD-MCNC: 14.4 G/DL — SIGNIFICANT CHANGE UP (ref 13–17)
HGB BLD-MCNC: 14.4 G/DL — SIGNIFICANT CHANGE UP (ref 13–17)
IMM GRANULOCYTES NFR BLD AUTO: 0.2 % — SIGNIFICANT CHANGE UP (ref 0–0.9)
IMM GRANULOCYTES NFR BLD AUTO: 0.2 % — SIGNIFICANT CHANGE UP (ref 0–0.9)
LYMPHOCYTES # BLD AUTO: 1.76 K/UL — SIGNIFICANT CHANGE UP (ref 1–3.3)
LYMPHOCYTES # BLD AUTO: 1.76 K/UL — SIGNIFICANT CHANGE UP (ref 1–3.3)
LYMPHOCYTES # BLD AUTO: 29.8 % — SIGNIFICANT CHANGE UP (ref 13–44)
LYMPHOCYTES # BLD AUTO: 29.8 % — SIGNIFICANT CHANGE UP (ref 13–44)
MCHC RBC-ENTMCNC: 32.9 PG — SIGNIFICANT CHANGE UP (ref 27–34)
MCHC RBC-ENTMCNC: 32.9 PG — SIGNIFICANT CHANGE UP (ref 27–34)
MCHC RBC-ENTMCNC: 33.6 GM/DL — SIGNIFICANT CHANGE UP (ref 32–36)
MCHC RBC-ENTMCNC: 33.6 GM/DL — SIGNIFICANT CHANGE UP (ref 32–36)
MCV RBC AUTO: 97.9 FL — SIGNIFICANT CHANGE UP (ref 80–100)
MCV RBC AUTO: 97.9 FL — SIGNIFICANT CHANGE UP (ref 80–100)
MONOCYTES # BLD AUTO: 0.58 K/UL — SIGNIFICANT CHANGE UP (ref 0–0.9)
MONOCYTES # BLD AUTO: 0.58 K/UL — SIGNIFICANT CHANGE UP (ref 0–0.9)
MONOCYTES NFR BLD AUTO: 9.8 % — SIGNIFICANT CHANGE UP (ref 2–14)
MONOCYTES NFR BLD AUTO: 9.8 % — SIGNIFICANT CHANGE UP (ref 2–14)
MYCOPHENOLATE SERPL-MCNC: 1.2 UG/ML — SIGNIFICANT CHANGE UP (ref 1–3.5)
MYCOPHENOLATE SERPL-MCNC: 1.2 UG/ML — SIGNIFICANT CHANGE UP (ref 1–3.5)
MYCOPHENOLIC ACID GLUCURONIDE: 16 UG/ML — SIGNIFICANT CHANGE UP (ref 15–125)
MYCOPHENOLIC ACID GLUCURONIDE: 16 UG/ML — SIGNIFICANT CHANGE UP (ref 15–125)
NEUTROPHILS # BLD AUTO: 3.47 K/UL — SIGNIFICANT CHANGE UP (ref 1.8–7.4)
NEUTROPHILS # BLD AUTO: 3.47 K/UL — SIGNIFICANT CHANGE UP (ref 1.8–7.4)
NEUTROPHILS NFR BLD AUTO: 58.7 % — SIGNIFICANT CHANGE UP (ref 43–77)
NEUTROPHILS NFR BLD AUTO: 58.7 % — SIGNIFICANT CHANGE UP (ref 43–77)
NRBC # BLD: 0 /100 WBCS — SIGNIFICANT CHANGE UP (ref 0–0)
NRBC # BLD: 0 /100 WBCS — SIGNIFICANT CHANGE UP (ref 0–0)
PLATELET # BLD AUTO: 203 K/UL — SIGNIFICANT CHANGE UP (ref 150–400)
PLATELET # BLD AUTO: 203 K/UL — SIGNIFICANT CHANGE UP (ref 150–400)
POTASSIUM SERPL-MCNC: 4.2 MMOL/L — SIGNIFICANT CHANGE UP (ref 3.5–5.3)
POTASSIUM SERPL-MCNC: 4.2 MMOL/L — SIGNIFICANT CHANGE UP (ref 3.5–5.3)
POTASSIUM SERPL-SCNC: 4.2 MMOL/L — SIGNIFICANT CHANGE UP (ref 3.5–5.3)
POTASSIUM SERPL-SCNC: 4.2 MMOL/L — SIGNIFICANT CHANGE UP (ref 3.5–5.3)
RBC # BLD: 4.38 M/UL — SIGNIFICANT CHANGE UP (ref 4.2–5.8)
RBC # BLD: 4.38 M/UL — SIGNIFICANT CHANGE UP (ref 4.2–5.8)
RBC # FLD: 13.2 % — SIGNIFICANT CHANGE UP (ref 10.3–14.5)
RBC # FLD: 13.2 % — SIGNIFICANT CHANGE UP (ref 10.3–14.5)
SODIUM SERPL-SCNC: 138 MMOL/L — SIGNIFICANT CHANGE UP (ref 135–145)
SODIUM SERPL-SCNC: 138 MMOL/L — SIGNIFICANT CHANGE UP (ref 135–145)
WBC # BLD: 5.91 K/UL — SIGNIFICANT CHANGE UP (ref 3.8–10.5)
WBC # BLD: 5.91 K/UL — SIGNIFICANT CHANGE UP (ref 3.8–10.5)
WBC # FLD AUTO: 5.91 K/UL — SIGNIFICANT CHANGE UP (ref 3.8–10.5)
WBC # FLD AUTO: 5.91 K/UL — SIGNIFICANT CHANGE UP (ref 3.8–10.5)

## 2023-10-31 PROCEDURE — 72157 MRI CHEST SPINE W/O & W/DYE: CPT | Mod: 26

## 2023-10-31 PROCEDURE — 72156 MRI NECK SPINE W/O & W/DYE: CPT | Mod: 26

## 2023-10-31 PROCEDURE — 93010 ELECTROCARDIOGRAM REPORT: CPT

## 2023-10-31 PROCEDURE — 72158 MRI LUMBAR SPINE W/O & W/DYE: CPT | Mod: 26

## 2023-10-31 RX ORDER — HYDROMORPHONE HYDROCHLORIDE 2 MG/ML
0.5 INJECTION INTRAMUSCULAR; INTRAVENOUS; SUBCUTANEOUS ONCE
Refills: 0 | Status: DISCONTINUED | OUTPATIENT
Start: 2023-10-31 | End: 2023-10-31

## 2023-10-31 RX ORDER — SODIUM CHLORIDE 9 MG/ML
1000 INJECTION INTRAMUSCULAR; INTRAVENOUS; SUBCUTANEOUS
Refills: 0 | Status: DISCONTINUED | OUTPATIENT
Start: 2023-10-31 | End: 2023-11-02

## 2023-10-31 RX ADMIN — Medication 5 MILLIGRAM(S): at 05:18

## 2023-10-31 RX ADMIN — Medication 1 TABLET(S): at 11:29

## 2023-10-31 RX ADMIN — GABAPENTIN 300 MILLIGRAM(S): 400 CAPSULE ORAL at 21:49

## 2023-10-31 RX ADMIN — HYDROMORPHONE HYDROCHLORIDE 0.5 MILLIGRAM(S): 2 INJECTION INTRAMUSCULAR; INTRAVENOUS; SUBCUTANEOUS at 18:56

## 2023-10-31 RX ADMIN — TAMSULOSIN HYDROCHLORIDE 0.4 MILLIGRAM(S): 0.4 CAPSULE ORAL at 21:47

## 2023-10-31 RX ADMIN — ATORVASTATIN CALCIUM 10 MILLIGRAM(S): 80 TABLET, FILM COATED ORAL at 21:46

## 2023-10-31 RX ADMIN — GABAPENTIN 300 MILLIGRAM(S): 400 CAPSULE ORAL at 14:59

## 2023-10-31 RX ADMIN — SENNA PLUS 2 TABLET(S): 8.6 TABLET ORAL at 21:48

## 2023-10-31 RX ADMIN — HEPARIN SODIUM 5000 UNIT(S): 5000 INJECTION INTRAVENOUS; SUBCUTANEOUS at 14:59

## 2023-10-31 RX ADMIN — MYCOPHENOLATE MOFETIL 250 MILLIGRAM(S): 250 CAPSULE ORAL at 18:56

## 2023-10-31 RX ADMIN — HYDROMORPHONE HYDROCHLORIDE 0.5 MILLIGRAM(S): 2 INJECTION INTRAMUSCULAR; INTRAVENOUS; SUBCUTANEOUS at 05:46

## 2023-10-31 RX ADMIN — TACROLIMUS 2 MILLIGRAM(S): 5 CAPSULE ORAL at 11:28

## 2023-10-31 RX ADMIN — HYDROMORPHONE HYDROCHLORIDE 0.5 MILLIGRAM(S): 2 INJECTION INTRAMUSCULAR; INTRAVENOUS; SUBCUTANEOUS at 06:10

## 2023-10-31 RX ADMIN — MYCOPHENOLATE MOFETIL 250 MILLIGRAM(S): 250 CAPSULE ORAL at 05:18

## 2023-10-31 RX ADMIN — Medication 2 GRAM(S): at 05:18

## 2023-10-31 RX ADMIN — TACROLIMUS 2.5 MILLIGRAM(S): 5 CAPSULE ORAL at 21:49

## 2023-10-31 RX ADMIN — Medication 1 PACKET(S): at 11:28

## 2023-10-31 RX ADMIN — BUPROPION HYDROCHLORIDE 150 MILLIGRAM(S): 150 TABLET, EXTENDED RELEASE ORAL at 11:29

## 2023-10-31 RX ADMIN — FAMOTIDINE 20 MILLIGRAM(S): 10 INJECTION INTRAVENOUS at 11:29

## 2023-10-31 RX ADMIN — LOSARTAN POTASSIUM 100 MILLIGRAM(S): 100 TABLET, FILM COATED ORAL at 05:18

## 2023-10-31 RX ADMIN — Medication 2 GRAM(S): at 18:56

## 2023-10-31 RX ADMIN — SODIUM CHLORIDE 50 MILLILITER(S): 9 INJECTION INTRAMUSCULAR; INTRAVENOUS; SUBCUTANEOUS at 12:08

## 2023-10-31 RX ADMIN — GABAPENTIN 300 MILLIGRAM(S): 400 CAPSULE ORAL at 05:18

## 2023-10-31 NOTE — PROGRESS NOTE ADULT - ASSESSMENT
64 yo 59 kg m w pmh pld + pkd s/p renal transplant, htn, hld, gerd, chronic lbp s/p back surgery, alc abuse, pancreatitis, anx/dep/bipolar disorder, p/w right flank/back/lower quadrant pain; in er, unclear etiology; possibly multifactorial eg cystitis, constipation, pancreatitis, referred chronic lower back pain; admit to medicine for further mgmt       Acute UTI --> Ruled Out   - Afebrile, no leukocytosis, HDS   - UA with bland sediment  - CT with "wall thickening and perivesicular fat stranding."  - UCx negative, As per Transplant ID, Rocephin has been DC'd   - Monitor for fever, changes in white count  - Transplant ID eval appreciated; F/u recs     Acute on Chronic lower back pain.   - H/O chronic LBP s/p back surgery  - No associated red flag symps/signs   - Analgesics + muscle relaxants as need --> gabapentin increased, monitor   - PT/OT eval    - MR C/T/LS Spine ordered, F/u results   - Ortho eval if pain worsens   - Chronic pain management evaluation consulted; F/u recs       Constipation.   - Imaging with, "Moderate fecal burden in the right and transverse colons" - may be contributing to pain  monitor/trend daily bm  - C/w bowel regimen  - Adjust to maintain goal 1-2 bm/day.    Pancreatic duct dilated.   - H/O alc abuse, pancreatitis  - CT with "Main pancreatic duct dilation measuring up to 6 mm in the head, previously measuring 4 mm. No definite pancreatic head mass versus noncontrast exam. No peripancreatic inflammatory changes."  - Lipase WNL, LFTs WNL    - Serial abdominal exams  - MRCP with Polycystic liver and kidneys, negative for pancreatic mass   - Trend Hepatic panel     Renal transplant recipient.   - H/O PLD + PKD  - Baseline cr ~1.7; currently at baseline   - Monitor strict I/Os, daily weights, UO, BUN/Cr, volume status, acid-base balance, electrolytes  - F/u urine studies, transplant us, tacro lvl; mbd work up  - Avoid nephrotoxic agents; appropriate dose adjustments for all renally cleared medications  - Cont home prednisone (w bactrim for pjp ppx), tacrolimus 2 morning + 2.5 evening, mycophenolate   - Cr up-trending. Started on gentle IVF for hydration. Check bladder scan to R/o retention   - Transplant renal evaluation appreciated; F/u recs     HTN (hypertension).   - Cont home cozaar.  - Monitor BP, VS and patient closely     HLD (hyperlipidemia).   - Cont home omega 3 + pravachol.    GERD (gastroesophageal reflux disease).   - Cont home famotidine.    Anxiety and depression.   - H/O anx/dep/bipolar disorder  - Cont home wellbutrin.    PPX    Discussed with Attending and ACP.

## 2023-10-31 NOTE — PHYSICAL THERAPY INITIAL EVALUATION ADULT - NSPTDISCHREC_GEN_A_CORE
OPT.  Pt demonstrated abilities to ambulate independently and safely.  Pt recommended for OPT to improve gait, balance, general strength and endurance./Outpatient PT

## 2023-10-31 NOTE — PHYSICAL THERAPY INITIAL EVALUATION ADULT - GENERAL OBSERVATIONS, REHAB EVAL
Rx Refill Note  Requested Prescriptions     Pending Prescriptions Disp Refills    Insulin Pen Needle (BD Pen Needle Maribel 2nd Gen) 32G X 4 MM misc 400 each 1     Sig: use 1 each 4 (Four) Times a Day for insulin injections      Last office visit with prescribing clinician: 8/24/2023   Last telemedicine visit with prescribing clinician: Visit date not found   Next office visit with prescribing clinician: 2/26/2024                         Would you like a call back once the refill request has been completed: [] Yes [] No    If the office needs to give you a call back, can they leave a voicemail: [] Yes [] No    Abimbola Lacey MA  10/06/23, 11:24 EDT    
Pt rec'd seated in chair, +IV, in NAD.  Pt cleared for PT session by FRANKIE Gutierrez.

## 2023-10-31 NOTE — PROVIDER CONTACT NOTE (OTHER) - ACTION/TREATMENT ORDERED:
Pt educated about the importance of this medication. Pt states" I am well aware it is to prevent blood clots" Will; continue with plan of care.
Provider aware. Pt refusing heparin injection. Pt educated on importance of heparin injection administration and states understanding but is still refusing.

## 2023-10-31 NOTE — PHYSICAL THERAPY INITIAL EVALUATION ADULT - ADDITIONAL COMMENTS
Pt lives in an apartment alone with no steps to enter or inside.  Pt is independent with ADLs and ambulation, owns a cane but does not use it.  Pt drives.

## 2023-10-31 NOTE — PHYSICAL THERAPY INITIAL EVALUATION ADULT - PERTINENT HX OF CURRENT PROBLEM, REHAB EVAL
Pt is a 63 year old male with PMH significant for pld + pkd s/p renal transplant, htn, hld, gerd, chronic LBP s/p back surgery, EtOH abuse, pancreatitis, anx/dep/bipolar disorder.  Pt presents with Right flank/back/LQ pain 7/10 in intensity.  Of note, pt reported pain n the same side as transplanted kidney.  Pt admitted to medicine for further management.  Pt found to have acute UTI, and constipation.  Pt describes lumbosacral bilateral pain but with radiation to left leg down.  Pt pending MRI and possible ortho consult.  US Kidney with Doppler (10/28): Patent vessels. No evidence of a significant renal artery stenosis.  MR MRCP (10/30): Polycystic liver and kidneys. No pancreatic mass. Pt is a 63 year old male with PMH significant for pld + pkd s/p renal transplant, htn, hld, gerd, chronic LBP s/p back surgery, EtOH abuse, pancreatitis, anx/dep/bipolar disorder.  Pt presents with Right flank/back/LQ pain 7/10 in intensity.  Of note, pt reported pain n the same side as transplanted kidney.  Pt admitted to medicine for further management.  Pt found to have acute UTI, and constipation.  Pt describes lumbosacral bilateral pain but with radiation to left leg down.  Pt pending MRI and possible ortho consult, spoke with DILCIA Crawford, pt cleared for PT evaluation, no ortho consult is needed for pt.  US Kidney with Doppler (10/28): Patent vessels. No evidence of a significant renal artery stenosis.  MR MRCP (10/30): Polycystic liver and kidneys. No pancreatic mass. Pt is a 63 year old male with PMH significant for pld + pkd s/p renal transplant, htn, hld, gerd, chronic LBP s/p back surgery, EtOH abuse, pancreatitis, anx/dep/bipolar disorder.  Pt presents with Right flank/back/LQ pain 7/10 in intensity.  Of note, pt reported pain on the same side as transplanted kidney.  Pt admitted to medicine for further management.  Pt found to have acute UTI, and constipation.  Pt describes lumbosacral bilateral pain but with radiation to left leg down.  Pt pending MRI and possible ortho consult, spoke with DILCIA Crawford, pt cleared for PT evaluation, no ortho consult is needed for pt.  US Kidney with Doppler (10/28): Patent vessels. No evidence of a significant renal artery stenosis.  MR MRCP (10/30): Polycystic liver and kidneys. No pancreatic mass.

## 2023-10-31 NOTE — PROGRESS NOTE ADULT - SUBJECTIVE AND OBJECTIVE BOX
Name of Patient : TRACY JENNINGS  MRN: [id number]  Date of visit: 10-31-23 @ 12:31      Subjective: Patient seen and examined. No new events except as noted.   Patient seen earlier this AM. Sitting OOB TC. Reports his pain is a 3 out of 10. S/P MRCP yesterday.   UCx negative. Off of Rocephin per ID.   Pending MR C/T/LS spine.     REVIEW OF SYSTEMS:    CONSTITUTIONAL: Generalized weakness   EYES/ENT: No visual changes;  No vertigo or throat pain   NECK: No pain or stiffness  RESPIRATORY: No cough, wheezing, hemoptysis; No shortness of breath  CARDIOVASCULAR: No chest pain or palpitations  GASTROINTESTINAL: No abdominal or epigastric pain. No nausea, vomiting, or hematemesis; No diarrhea or constipation. No melena or hematochezia.  GENITOURINARY: No dysuria, frequency or hematuria  NEUROLOGICAL: + Back pain; No numbness or weakness  SKIN: No itching, burning, rashes, or lesions   All other review of systems is negative unless indicated above.    MEDICATIONS:  MEDICATIONS  (STANDING):  atorvastatin 10 milliGRAM(s) Oral at bedtime  buPROPion XL (24-Hour) . 150 milliGRAM(s) Oral daily  famotidine    Tablet 20 milliGRAM(s) Oral daily  gabapentin 300 milliGRAM(s) Oral three times a day  heparin   Injectable 5000 Unit(s) SubCutaneous every 8 hours  influenza  Vaccine (HIGH DOSE) 0.7 milliLiter(s) IntraMuscular once  losartan 100 milliGRAM(s) Oral daily  mycophenolate mofetil 250 milliGRAM(s) Oral two times a day  omega-3-Acid Ethyl Esters 2 Gram(s) Oral two times a day  polyethylene glycol 3350 17 Gram(s) Oral daily  predniSONE   Tablet 5 milliGRAM(s) Oral daily  senna 2 Tablet(s) Oral at bedtime  sodium chloride 0.9%. 1000 milliLiter(s) (75 mL/Hr) IV Continuous <Continuous>  sodium chloride 0.9%. 1000 milliLiter(s) (50 mL/Hr) IV Continuous <Continuous>  tacrolimus 2.5 milliGRAM(s) Oral at bedtime  tacrolimus 2 milliGRAM(s) Oral daily  tamsulosin 0.4 milliGRAM(s) Oral at bedtime  trimethoprim   80 mG/sulfamethoxazole 400 mG 1 Tablet(s) Oral daily      PHYSICAL EXAM:  T(C): 36.6 (10-31-23 @ 09:06), Max: 36.8 (10-30-23 @ 13:22)  HR: 62 (10-31-23 @ 09:06) (60 - 69)  BP: 111/72 (10-31-23 @ 09:06) (107/68 - 119/83)  RR: 18 (10-31-23 @ 09:06) (18 - 18)  SpO2: 97% (10-31-23 @ 09:06) (96% - 97%)  Wt(kg): --  I&O's Summary    30 Oct 2023 07:01  -  31 Oct 2023 07:00  --------------------------------------------------------  IN: 780 mL / OUT: 2200 mL / NET: -1420 mL    31 Oct 2023 07:01  -  31 Oct 2023 12:31  --------------------------------------------------------  IN: 240 mL / OUT: 300 mL / NET: -60 mL          Appearance: Awake, Sitting OOB TC   HEENT: Eyes are open   Lymphatic: No lymphadenopathy   Cardiovascular: Normal   Respiratory: normal effort , clear  Gastrointestinal:  Soft, Non-tender  Skin: No rashes,  warm to touch  Psychiatry:  Mood & affect appropriate  Musculoskeletal: + Back pain; No edema          10-30-23 @ 07:01  -  10-31-23 @ 07:00  --------------------------------------------------------  IN: 780 mL / OUT: 2200 mL / NET: -1420 mL    10-31-23 @ 07:01  -  10-31-23 @ 12:31  --------------------------------------------------------  IN: 240 mL / OUT: 300 mL / NET: -60 mL                            14.4   5.91  )-----------( 203      ( 31 Oct 2023 06:41 )             42.9               10-31    138  |  104  |  31<H>  ----------------------------<  92  4.2   |  21<L>  |  1.70<H>    Ca    10.1      31 Oct 2023 06:43    TPro  6.4  /  Alb  4.1  /  TBili  0.5  /  DBili  x   /  AST  14  /  ALT  10  /  AlkPhos  57  10-30                       Urinalysis Basic - ( 31 Oct 2023 06:43 )    Color: x / Appearance: x / SG: x / pH: x  Gluc: 92 mg/dL / Ketone: x  / Bili: x / Urobili: x   Blood: x / Protein: x / Nitrite: x   Leuk Esterase: x / RBC: x / WBC x   Sq Epi: x / Non Sq Epi: x / Bacteria: x          Culture - Urine (10.28.23 @ 20:15)   Specimen Source: Clean Catch Clean Catch (Midstream)  Culture Results: <10,000 CFU/mL Normal Urogenital Theresa    < from: MR MRCP w/wo IV Cont (10.30.23 @ 23:25) >    PROCEDURE:  MRI of the abdomen was performed.  MRCP was performed.    FINDINGS:  LOWER CHEST: Within normal limits.    LIVER: Mildly enlarged. Polycystic with multiple cystic lesions in every   segment measuring up to 5 cm. Several cysts with T1 hyperintensity   indicating intracystic hemorrhage. No solid liver mass.  BILE DUCTS: Normal caliber.  GALLBLADDER: Within normal limits.  SPLEEN: Within normal limits.  PANCREAS: Pancreatic duct is mildly prominent, measuring up to 4 mm.   Normal pancreatic parenchyma without solid or cystic mass.  ADRENALS: Within normal limits.  KIDNEYS/URETERS: Polycystic kidneys. Several cysts with T1 hyperintensity   indicating intracystic hemorrhage. No solid renal mass. No hydronephrosis.    VISUALIZED PORTIONS:  BOWEL: Within normal limits.  PERITONEUM: No ascites.  VESSELS: Within normal limits.  RETROPERITONEUM/LYMPH NODES: No lymphadenopathy.  ABDOMINAL WALL: Small umbilical hernia containing fat and fluid.  BONES: Within normal limits.    IMPRESSION:  Polycystic liver and kidneys.    No pancreatic mass.        --- End of Report ---    < end of copied text >

## 2023-10-31 NOTE — PROVIDER CONTACT NOTE (OTHER) - ASSESSMENT
Pt refusing heparin injection. Pt educated on importance of heparin injection administration and states understanding but is still refusing.

## 2023-10-31 NOTE — PROVIDER CONTACT NOTE (OTHER) - ASSESSMENT
Pt refusing Heparin. Pt educated about the importance of this medication. Pt states" I am well aware it is to prevent blood clots"

## 2023-11-01 LAB
ALBUMIN SERPL ELPH-MCNC: 4 G/DL — SIGNIFICANT CHANGE UP (ref 3.3–5)
ALBUMIN SERPL ELPH-MCNC: 4 G/DL — SIGNIFICANT CHANGE UP (ref 3.3–5)
ALP SERPL-CCNC: 57 U/L — SIGNIFICANT CHANGE UP (ref 40–120)
ALP SERPL-CCNC: 57 U/L — SIGNIFICANT CHANGE UP (ref 40–120)
ALT FLD-CCNC: 9 U/L — LOW (ref 10–45)
ALT FLD-CCNC: 9 U/L — LOW (ref 10–45)
ANION GAP SERPL CALC-SCNC: 14 MMOL/L — SIGNIFICANT CHANGE UP (ref 5–17)
ANION GAP SERPL CALC-SCNC: 14 MMOL/L — SIGNIFICANT CHANGE UP (ref 5–17)
AST SERPL-CCNC: 14 U/L — SIGNIFICANT CHANGE UP (ref 10–40)
AST SERPL-CCNC: 14 U/L — SIGNIFICANT CHANGE UP (ref 10–40)
BILIRUB SERPL-MCNC: 0.5 MG/DL — SIGNIFICANT CHANGE UP (ref 0.2–1.2)
BILIRUB SERPL-MCNC: 0.5 MG/DL — SIGNIFICANT CHANGE UP (ref 0.2–1.2)
BUN SERPL-MCNC: 31 MG/DL — HIGH (ref 7–23)
BUN SERPL-MCNC: 31 MG/DL — HIGH (ref 7–23)
CALCIUM SERPL-MCNC: 9.9 MG/DL — SIGNIFICANT CHANGE UP (ref 8.4–10.5)
CALCIUM SERPL-MCNC: 9.9 MG/DL — SIGNIFICANT CHANGE UP (ref 8.4–10.5)
CHLORIDE SERPL-SCNC: 105 MMOL/L — SIGNIFICANT CHANGE UP (ref 96–108)
CHLORIDE SERPL-SCNC: 105 MMOL/L — SIGNIFICANT CHANGE UP (ref 96–108)
CMV DNA CSF QL NAA+PROBE: SIGNIFICANT CHANGE UP IU/ML
CMV DNA CSF QL NAA+PROBE: SIGNIFICANT CHANGE UP IU/ML
CMV DNA SPEC NAA+PROBE-LOG#: SIGNIFICANT CHANGE UP LOG10IU/ML
CMV DNA SPEC NAA+PROBE-LOG#: SIGNIFICANT CHANGE UP LOG10IU/ML
CO2 SERPL-SCNC: 18 MMOL/L — LOW (ref 22–31)
CO2 SERPL-SCNC: 18 MMOL/L — LOW (ref 22–31)
CREAT SERPL-MCNC: 1.53 MG/DL — HIGH (ref 0.5–1.3)
CREAT SERPL-MCNC: 1.53 MG/DL — HIGH (ref 0.5–1.3)
CRP SERPL-MCNC: <3 MG/L — SIGNIFICANT CHANGE UP (ref 0–4)
CRP SERPL-MCNC: <3 MG/L — SIGNIFICANT CHANGE UP (ref 0–4)
EGFR: 50 ML/MIN/1.73M2 — LOW
EGFR: 50 ML/MIN/1.73M2 — LOW
ERYTHROCYTE [SEDIMENTATION RATE] IN BLOOD: 15 MM/HR — SIGNIFICANT CHANGE UP (ref 0–20)
ERYTHROCYTE [SEDIMENTATION RATE] IN BLOOD: 15 MM/HR — SIGNIFICANT CHANGE UP (ref 0–20)
GLUCOSE SERPL-MCNC: 83 MG/DL — SIGNIFICANT CHANGE UP (ref 70–99)
GLUCOSE SERPL-MCNC: 83 MG/DL — SIGNIFICANT CHANGE UP (ref 70–99)
HCT VFR BLD CALC: 41.5 % — SIGNIFICANT CHANGE UP (ref 39–50)
HCT VFR BLD CALC: 41.5 % — SIGNIFICANT CHANGE UP (ref 39–50)
HGB BLD-MCNC: 13.7 G/DL — SIGNIFICANT CHANGE UP (ref 13–17)
HGB BLD-MCNC: 13.7 G/DL — SIGNIFICANT CHANGE UP (ref 13–17)
MCHC RBC-ENTMCNC: 32.3 PG — SIGNIFICANT CHANGE UP (ref 27–34)
MCHC RBC-ENTMCNC: 32.3 PG — SIGNIFICANT CHANGE UP (ref 27–34)
MCHC RBC-ENTMCNC: 33 GM/DL — SIGNIFICANT CHANGE UP (ref 32–36)
MCHC RBC-ENTMCNC: 33 GM/DL — SIGNIFICANT CHANGE UP (ref 32–36)
MCV RBC AUTO: 97.9 FL — SIGNIFICANT CHANGE UP (ref 80–100)
MCV RBC AUTO: 97.9 FL — SIGNIFICANT CHANGE UP (ref 80–100)
NRBC # BLD: 0 /100 WBCS — SIGNIFICANT CHANGE UP (ref 0–0)
NRBC # BLD: 0 /100 WBCS — SIGNIFICANT CHANGE UP (ref 0–0)
PLATELET # BLD AUTO: 170 K/UL — SIGNIFICANT CHANGE UP (ref 150–400)
PLATELET # BLD AUTO: 170 K/UL — SIGNIFICANT CHANGE UP (ref 150–400)
POTASSIUM SERPL-MCNC: 4.3 MMOL/L — SIGNIFICANT CHANGE UP (ref 3.5–5.3)
POTASSIUM SERPL-MCNC: 4.3 MMOL/L — SIGNIFICANT CHANGE UP (ref 3.5–5.3)
POTASSIUM SERPL-SCNC: 4.3 MMOL/L — SIGNIFICANT CHANGE UP (ref 3.5–5.3)
POTASSIUM SERPL-SCNC: 4.3 MMOL/L — SIGNIFICANT CHANGE UP (ref 3.5–5.3)
PROT SERPL-MCNC: 6.4 G/DL — SIGNIFICANT CHANGE UP (ref 6–8.3)
PROT SERPL-MCNC: 6.4 G/DL — SIGNIFICANT CHANGE UP (ref 6–8.3)
RBC # BLD: 4.24 M/UL — SIGNIFICANT CHANGE UP (ref 4.2–5.8)
RBC # BLD: 4.24 M/UL — SIGNIFICANT CHANGE UP (ref 4.2–5.8)
RBC # FLD: 13.4 % — SIGNIFICANT CHANGE UP (ref 10.3–14.5)
RBC # FLD: 13.4 % — SIGNIFICANT CHANGE UP (ref 10.3–14.5)
SODIUM SERPL-SCNC: 137 MMOL/L — SIGNIFICANT CHANGE UP (ref 135–145)
SODIUM SERPL-SCNC: 137 MMOL/L — SIGNIFICANT CHANGE UP (ref 135–145)
TACROLIMUS SERPL-MCNC: 4.8 NG/ML — SIGNIFICANT CHANGE UP
TACROLIMUS SERPL-MCNC: 4.8 NG/ML — SIGNIFICANT CHANGE UP
WBC # BLD: 5.94 K/UL — SIGNIFICANT CHANGE UP (ref 3.8–10.5)
WBC # BLD: 5.94 K/UL — SIGNIFICANT CHANGE UP (ref 3.8–10.5)
WBC # FLD AUTO: 5.94 K/UL — SIGNIFICANT CHANGE UP (ref 3.8–10.5)
WBC # FLD AUTO: 5.94 K/UL — SIGNIFICANT CHANGE UP (ref 3.8–10.5)

## 2023-11-01 PROCEDURE — 72100 X-RAY EXAM L-S SPINE 2/3 VWS: CPT | Mod: 26

## 2023-11-01 PROCEDURE — 72074 X-RAY EXAM THORAC SPINE4/>VW: CPT | Mod: 26

## 2023-11-01 PROCEDURE — 72040 X-RAY EXAM NECK SPINE 2-3 VW: CPT | Mod: 26

## 2023-11-01 RX ORDER — OXYCODONE HYDROCHLORIDE 5 MG/1
5 TABLET ORAL EVERY 6 HOURS
Refills: 0 | Status: DISCONTINUED | OUTPATIENT
Start: 2023-11-01 | End: 2023-11-02

## 2023-11-01 RX ORDER — ACETAMINOPHEN 500 MG
650 TABLET ORAL ONCE
Refills: 0 | Status: COMPLETED | OUTPATIENT
Start: 2023-11-01 | End: 2023-11-01

## 2023-11-01 RX ORDER — SODIUM CHLORIDE 9 MG/ML
1000 INJECTION INTRAMUSCULAR; INTRAVENOUS; SUBCUTANEOUS
Refills: 0 | Status: DISCONTINUED | OUTPATIENT
Start: 2023-11-01 | End: 2023-11-02

## 2023-11-01 RX ORDER — LOSARTAN POTASSIUM 100 MG/1
100 TABLET, FILM COATED ORAL DAILY
Refills: 0 | Status: DISCONTINUED | OUTPATIENT
Start: 2023-11-01 | End: 2023-11-02

## 2023-11-01 RX ORDER — ACETAMINOPHEN 500 MG
650 TABLET ORAL EVERY 6 HOURS
Refills: 0 | Status: DISCONTINUED | OUTPATIENT
Start: 2023-11-01 | End: 2023-11-02

## 2023-11-01 RX ORDER — HYDROMORPHONE HYDROCHLORIDE 2 MG/ML
0.5 INJECTION INTRAMUSCULAR; INTRAVENOUS; SUBCUTANEOUS ONCE
Refills: 0 | Status: DISCONTINUED | OUTPATIENT
Start: 2023-11-01 | End: 2023-11-01

## 2023-11-01 RX ADMIN — Medication 5 MILLIGRAM(S): at 05:44

## 2023-11-01 RX ADMIN — HEPARIN SODIUM 5000 UNIT(S): 5000 INJECTION INTRAVENOUS; SUBCUTANEOUS at 21:38

## 2023-11-01 RX ADMIN — SENNA PLUS 2 TABLET(S): 8.6 TABLET ORAL at 21:37

## 2023-11-01 RX ADMIN — TACROLIMUS 2 MILLIGRAM(S): 5 CAPSULE ORAL at 11:30

## 2023-11-01 RX ADMIN — MYCOPHENOLATE MOFETIL 250 MILLIGRAM(S): 250 CAPSULE ORAL at 18:35

## 2023-11-01 RX ADMIN — HEPARIN SODIUM 5000 UNIT(S): 5000 INJECTION INTRAVENOUS; SUBCUTANEOUS at 05:43

## 2023-11-01 RX ADMIN — HYDROMORPHONE HYDROCHLORIDE 0.5 MILLIGRAM(S): 2 INJECTION INTRAMUSCULAR; INTRAVENOUS; SUBCUTANEOUS at 06:21

## 2023-11-01 RX ADMIN — FAMOTIDINE 20 MILLIGRAM(S): 10 INJECTION INTRAVENOUS at 11:31

## 2023-11-01 RX ADMIN — GABAPENTIN 300 MILLIGRAM(S): 400 CAPSULE ORAL at 21:37

## 2023-11-01 RX ADMIN — MYCOPHENOLATE MOFETIL 250 MILLIGRAM(S): 250 CAPSULE ORAL at 05:41

## 2023-11-01 RX ADMIN — Medication 2 GRAM(S): at 05:44

## 2023-11-01 RX ADMIN — TAMSULOSIN HYDROCHLORIDE 0.4 MILLIGRAM(S): 0.4 CAPSULE ORAL at 21:37

## 2023-11-01 RX ADMIN — SODIUM CHLORIDE 50 MILLILITER(S): 9 INJECTION INTRAMUSCULAR; INTRAVENOUS; SUBCUTANEOUS at 18:34

## 2023-11-01 RX ADMIN — HYDROMORPHONE HYDROCHLORIDE 0.5 MILLIGRAM(S): 2 INJECTION INTRAMUSCULAR; INTRAVENOUS; SUBCUTANEOUS at 05:51

## 2023-11-01 RX ADMIN — GABAPENTIN 300 MILLIGRAM(S): 400 CAPSULE ORAL at 13:53

## 2023-11-01 RX ADMIN — GABAPENTIN 300 MILLIGRAM(S): 400 CAPSULE ORAL at 05:45

## 2023-11-01 RX ADMIN — HEPARIN SODIUM 5000 UNIT(S): 5000 INJECTION INTRAVENOUS; SUBCUTANEOUS at 13:54

## 2023-11-01 RX ADMIN — POLYETHYLENE GLYCOL 3350 17 GRAM(S): 17 POWDER, FOR SOLUTION ORAL at 11:31

## 2023-11-01 RX ADMIN — Medication 1 TABLET(S): at 11:31

## 2023-11-01 RX ADMIN — ATORVASTATIN CALCIUM 10 MILLIGRAM(S): 80 TABLET, FILM COATED ORAL at 21:38

## 2023-11-01 RX ADMIN — TACROLIMUS 2.5 MILLIGRAM(S): 5 CAPSULE ORAL at 21:39

## 2023-11-01 RX ADMIN — BUPROPION HYDROCHLORIDE 150 MILLIGRAM(S): 150 TABLET, EXTENDED RELEASE ORAL at 11:31

## 2023-11-01 RX ADMIN — LOSARTAN POTASSIUM 100 MILLIGRAM(S): 100 TABLET, FILM COATED ORAL at 05:43

## 2023-11-01 RX ADMIN — Medication 2 GRAM(S): at 18:35

## 2023-11-01 NOTE — OCCUPATIONAL THERAPY INITIAL EVALUATION ADULT - PERTINENT HX OF CURRENT PROBLEM, REHAB EVAL
64 yo 59 kg m w pmh pld + pkd s/p renal transplant, htn, hld, gerd, chronic lbp s/p back surgery, alc abuse, pancreatitis, anx/dep/bipolar disorder, p/w right flank/back/lower quadrant pain; in er, unclear etiology; possibly multifactorial eg cystitis, constipation, pancreatitis, referred chronic lower back pain; admit to medicine for further mgmt.   CERVICAL SPINE:   C3-C4 endplate changes include degenerative features but also demonstrate differential vasogenic edema and enhancement,   indeterminate for advanced disc degeneration versus superimposed infection. Consider correlation with CT to evaluate for erosive change. No associated abscess collection is recognized. No comparison cervical spine imaging is found. If available from another location, recommend   direct comparison. Moderate to severe cervical degenerative disc disease includes C5-C6 broad left central disc protrusion. (disc herniation) causes ventral cord deformity and multilevel advanced degenerative foraminal compromise. Small focal lesion along the left ventral ependymal margin at the cervical medullary junction results in deformity of the left ventral cord surface, an indeterminant finding, possibly related to the arthropathy at the C1-C2 articulation. Reversal of the normal cervical lordosis suggestive of disc pathology and / or muscle spasm.  THORACIC SPINE:   T2-T3 broad right central disc protrusion (disc herniation)  causes ventral cord deformity. Grade 1 anterior listhesis T10 on T11 with diffuse disc bulge and  superimposed shallow left central disc protrusion (disc herniation).  No abnomal enhancement to suggest infection.  LUMBAR SPINE:   Grade 1 anterior listhesis L3 on L4 contributes with additional degenerative features to high grade right foraminal compromise and intermediate grade central canal compromise. Grade 1 anterior listhesis L4 on L5 with spondylolysis contributes to high grade central canal and left foraminal compromise ; fluid signal intensity within the disc space without enhancement is indeterminate ; when compared to sequence of body CT there appears to be progressive sclerotic degenerative changes at this level ; recommend follow-up evaluation to exclude infection. Transitional morphology L5-S1.

## 2023-11-01 NOTE — OCCUPATIONAL THERAPY INITIAL EVALUATION ADULT - RANGE OF MOTION EXAMINATION, LOWER EXTREMITY
Discussion/Summary  AFTER DR. FENG REVIEWED MRI, THROMBUS NOTED PREVIOUSLY FROM DECEMBER. PAIN LIKELY FROM NATURAL REABSORPTION OF VEIN. PT STATES SHE WILL BE FOLLOWING UP WITH PCP FROM NOW ON.     Results/Data    11Jan2018 06:11PM   MRI FEMUR RT WO CON   MRI FEMUR RT WO CON: Accession #    [id number]    EXAM DATE:  1/11/2018 6:11 PM    PROCEDURE: MRI FEMUR RT WO CON    CLINICAL INDICATION: Age:  64 years . Gender:  Female.  Stated history: \" S/P RT LEG SCLEROTHERAPY, RT LEG PAIN\" Additional history:  None.,   Right leg   pain    COMPARISON: Venous Doppler exam 12/19/2017    TECHNIQUE:  Noncontrast multi planar multisequence MRI of the right femur was performed.    FINDINGS:  Mild edema in the subcutaneous fat along the course of the greater saphenous vein,  within   the range of expected given recent sclerotherapy a known history of thrombus.  There is   also intraluminal T1 hyperintense mildly T2 hyperintense probable multifocal thrombus  which   corresponds the ultrasound findings.  No discrete fluid collection.    Muscle signal is normal without edema or atrophy.  No focal soft tissue masses are  identified.    There is mild ostial arthrosis the right hip with small subchondral cysts and small  marginal   osteophytes.    Bone marrow signal is normal with the exception of mild subchondral edema in the  patella.  No   bone lesions are identified.    IMPRESSION:  1.  Redemonstration of multifocal thrombus in the greater saphenous vein with mild  edema in the   immediately adjacent subcutaneous fat.  No discrete fluid collection.  2.  Mild osteoarthrosis of the right hip and patellofemoral compartment.    ****  F I N A L  ****    Transcribed By: GEMA   01/12/18 7:40 am    Dictated By:            NORBERTO SAUCEDO MD    Electronically Reviewed and Approved By:           NORBERTO SAUCEDO MD  01/12/18 10:39  am     Signatures   Electronically signed by : Peggy Gallegos R.N.; Jan 16 2018 11:30AM CST     bilateral LE Active ROM was WFL  (within functional limits)

## 2023-11-01 NOTE — PROGRESS NOTE ADULT - ASSESSMENT
62 yo 59 kg m w pmh pld + pkd s/p renal transplant, htn, hld, gerd, chronic lbp s/p back surgery, alc abuse, pancreatitis, anx/dep/bipolar disorder, p/w right flank/back/lower quadrant pain; in er, unclear etiology; possibly multifactorial eg cystitis, constipation, pancreatitis, referred chronic lower back pain; admit to medicine for further mgmt       Acute UTI --> Ruled Out   - Afebrile, no leukocytosis, HDS   - UA with bland sediment  - CT with "wall thickening and perivesicular fat stranding."  - UCx negative, As per Transplant ID, Rocephin has been DC'd   - Monitor for fever, changes in white count  - Transplant ID eval appreciated; F/u recs     Acute on Chronic lower back pain.   - H/O chronic LBP s/p back surgery  - No associated red flag symps/signs   - Analgesics + muscle relaxants as need --> gabapentin increased, monitor   - MR C/T/LS Spine w/ Vasogenic edema and enhancement, disc herniation versus infection, No abscess, degenerative disc disease --> F/u NSGY recs   - OrthoSpine eval consulted; F/u recs  - Transplant ID follow up requested; F/u recs   - Pain control    - PT/OT eval      Constipation.   - Imaging with, "Moderate fecal burden in the right and transverse colons" - may be contributing to pain  monitor/trend daily bm  - C/w bowel regimen  - Adjust to maintain goal 1-2 bm/day.    Pancreatic duct dilated.   - H/O alc abuse, pancreatitis  - CT with "Main pancreatic duct dilation measuring up to 6 mm in the head, previously measuring 4 mm. No definite pancreatic head mass versus noncontrast exam. No peripancreatic inflammatory changes."  - Lipase WNL, LFTs WNL    - Serial abdominal exams  - MRCP with Polycystic liver and kidneys, negative for pancreatic mass   - Trend Hepatic panel     Renal transplant recipient.   - H/O PLD + PKD  - Baseline cr ~1.7; currently at baseline   - Monitor strict I/Os, daily weights, UO, BUN/Cr, volume status, acid-base balance, electrolytes  - F/u urine studies, transplant us, tacro lvl; mbd work up  - Avoid nephrotoxic agents; appropriate dose adjustments for all renally cleared medications  - Cont home prednisone (w bactrim for pjp ppx), tacrolimus 2 morning + 2.5 evening, mycophenolate   - Cr up-trending. C/w gentle IVF for hydration. Check bladder scan to R/o retention   - Transplant renal evaluation appreciated; F/u recs     HTN (hypertension).   - Cont home cozaar.  - Monitor BP, VS and patient closely     HLD (hyperlipidemia).   - Cont home omega 3 + pravachol.    GERD (gastroesophageal reflux disease).   - Cont home famotidine.    Anxiety and depression.   - H/O anx/dep/bipolar disorder  - Cont home wellbutrin.    PPX    Discussed with Attending and ACP.   62 yo 59 kg m w pmh pld + pkd s/p renal transplant, htn, hld, gerd, chronic lbp s/p back surgery, alc abuse, pancreatitis, anx/dep/bipolar disorder, p/w right flank/back/lower quadrant pain; in er, unclear etiology; possibly multifactorial eg cystitis, constipation, pancreatitis, referred chronic lower back pain; admit to medicine for further mgmt       Acute UTI --> Ruled Out   - Afebrile, no leukocytosis, HDS   - UA with bland sediment  - CT with "wall thickening and perivesicular fat stranding."  - UCx negative, As per Transplant ID, Rocephin has been DC'd   - Monitor for fever, changes in white count  - Transplant ID eval appreciated; F/u recs     Acute on Chronic lower back pain.   - H/O chronic LBP s/p back surgery  - No associated red flag symps/signs   - Analgesics + muscle relaxants as need --> gabapentin increased, monitor   - MR C/T/LS Spine w/ Vasogenic edema and enhancement, disc herniation versus infection, No abscess, degenerative disc disease --> F/u NSGY recs   - OrthoSpine eval consulted; F/u recs  - Transplant ID follow up requested; F/u recs   - Pain control  --> Chronic pain consulted   - PT/OT eval      Constipation.   - Imaging with, "Moderate fecal burden in the right and transverse colons" - may be contributing to pain  monitor/trend daily bm  - C/w bowel regimen  - Adjust to maintain goal 1-2 bm/day.    Pancreatic duct dilated.   - H/O alc abuse, pancreatitis  - CT with "Main pancreatic duct dilation measuring up to 6 mm in the head, previously measuring 4 mm. No definite pancreatic head mass versus noncontrast exam. No peripancreatic inflammatory changes."  - Lipase WNL, LFTs WNL    - Serial abdominal exams  - MRCP with Polycystic liver and kidneys, negative for pancreatic mass   - Trend Hepatic panel     Renal transplant recipient.   - H/O PLD + PKD  - Baseline cr ~1.7; currently at baseline   - Monitor strict I/Os, daily weights, UO, BUN/Cr, volume status, acid-base balance, electrolytes  - F/u urine studies, transplant us, tacro lvl; mbd work up  - Avoid nephrotoxic agents; appropriate dose adjustments for all renally cleared medications  - Cont home prednisone (w bactrim for pjp ppx), tacrolimus 2 morning + 2.5 evening, mycophenolate   - Cr up-trending. C/w gentle IVF for hydration. Check bladder scan to R/o retention   - Transplant renal evaluation appreciated; F/u recs     HTN (hypertension).   - Cont home cozaar.  - Monitor BP, VS and patient closely     HLD (hyperlipidemia).   - Cont home omega 3 + pravachol.    GERD (gastroesophageal reflux disease).   - Cont home famotidine.    Anxiety and depression.   - H/O anx/dep/bipolar disorder  - Cont home wellbutrin.    PPX    Discussed with Attending and ACP.   62 yo 59 kg m w pmh pld + pkd s/p renal transplant, htn, hld, gerd, chronic lbp s/p back surgery, alc abuse, pancreatitis, anx/dep/bipolar disorder, p/w right flank/back/lower quadrant pain; in er, unclear etiology; possibly multifactorial eg cystitis, constipation, pancreatitis, referred chronic lower back pain; admit to medicine for further mgmt       Acute UTI --> Ruled Out   - Afebrile, no leukocytosis, HDS   - UA with bland sediment  - CT with "wall thickening and perivesicular fat stranding."  - UCx negative, As per Transplant ID, Rocephin has been DC'd   - Monitor for fever, changes in white count  - Transplant ID eval appreciated; F/u recs     Acute on Chronic lower back pain.   - H/O chronic LBP s/p back surgery  - No associated red flag symps/signs   - Analgesics + muscle relaxants as need --> gabapentin increased, monitor   - MR C/T/LS Spine w/ Vasogenic edema and enhancement, disc herniation versus infection, No abscess, degenerative disc disease --> F/u Ortho recs   - OrthoSpine eval consulted; F/u recs  - Transplant ID follow up requested; F/u recs   - Pain control  --> Chronic pain consulted   - PT/OT eval      Constipation.   - Imaging with, "Moderate fecal burden in the right and transverse colons" - may be contributing to pain  monitor/trend daily bm  - C/w bowel regimen  - Adjust to maintain goal 1-2 bm/day.    Pancreatic duct dilated.   - H/O alc abuse, pancreatitis  - CT with "Main pancreatic duct dilation measuring up to 6 mm in the head, previously measuring 4 mm. No definite pancreatic head mass versus noncontrast exam. No peripancreatic inflammatory changes."  - Lipase WNL, LFTs WNL    - Serial abdominal exams  - MRCP with Polycystic liver and kidneys, negative for pancreatic mass   - Trend Hepatic panel     Renal transplant recipient.   - H/O PLD + PKD  - Baseline cr ~1.7; currently at baseline   - Monitor strict I/Os, daily weights, UO, BUN/Cr, volume status, acid-base balance, electrolytes  - F/u urine studies, transplant us, tacro lvl; mbd work up  - Avoid nephrotoxic agents; appropriate dose adjustments for all renally cleared medications  - Cont home prednisone (w bactrim for pjp ppx), tacrolimus 2 morning + 2.5 evening, mycophenolate   - Cr up-trending. C/w gentle IVF for hydration. Check bladder scan to R/o retention   - Transplant renal evaluation appreciated; F/u recs     HTN (hypertension).   - Cont home cozaar.  - Monitor BP, VS and patient closely     HLD (hyperlipidemia).   - Cont home omega 3 + pravachol.    GERD (gastroesophageal reflux disease).   - Cont home famotidine.    Anxiety and depression.   - H/O anx/dep/bipolar disorder  - Cont home wellbutrin.    PPX    Discussed with Attending and ACP.   62 yo 59 kg m w pmh pld + pkd s/p renal transplant, htn, hld, gerd, chronic lbp s/p back surgery, alc abuse, pancreatitis, anx/dep/bipolar disorder, p/w right flank/back/lower quadrant pain; in er, unclear etiology; possibly multifactorial eg cystitis, constipation, pancreatitis, referred chronic lower back pain; admit to medicine for further mgmt       Acute UTI --> Ruled Out   - Afebrile, no leukocytosis, HDS   - UA with bland sediment  - CT with "wall thickening and perivesicular fat stranding."  - UCx negative, As per Transplant ID, Rocephin has been DC'd   - Monitor for fever, changes in white count  - Transplant ID eval appreciated; F/u recs     Acute on Chronic lower back pain.   - H/O chronic LBP s/p back surgery  - No associated red flag symps/signs   - Analgesics + muscle relaxants as need --> gabapentin increased, monitor   - MR C/T/LS Spine w/ Vasogenic edema and enhancement, disc herniation versus infection, No abscess, degenerative disc disease --> F/u Ortho recs   - OrthoSpine eval consulted; F/u recs  - Transplant ID follow up requested; F/u recs --> Discussed with Transplant ID. Monitor off of ABX at this time. Checking ESR and CRP. Spine eval consulted --> Discussed with team. Checking Xrays as per ortho.   - Pain control  --> Chronic pain consulted   - PT/OT eval      Constipation.   - Imaging with, "Moderate fecal burden in the right and transverse colons" - may be contributing to pain  monitor/trend daily bm  - C/w bowel regimen  - Adjust to maintain goal 1-2 bm/day.    Pancreatic duct dilated.   - H/O alc abuse, pancreatitis  - CT with "Main pancreatic duct dilation measuring up to 6 mm in the head, previously measuring 4 mm. No definite pancreatic head mass versus noncontrast exam. No peripancreatic inflammatory changes."  - Lipase WNL, LFTs WNL    - Serial abdominal exams  - MRCP with Polycystic liver and kidneys, negative for pancreatic mass   - Trend Hepatic panel     Renal transplant recipient.   - H/O PLD + PKD  - Baseline cr ~1.7; currently at baseline   - Monitor strict I/Os, daily weights, UO, BUN/Cr, volume status, acid-base balance, electrolytes  - F/u urine studies, transplant us, tacro lvl; mbd work up  - Avoid nephrotoxic agents; appropriate dose adjustments for all renally cleared medications  - Cont home prednisone (w bactrim for pjp ppx), tacrolimus 2 morning + 2.5 evening, mycophenolate   - Cr up-trending. C/w gentle IVF for hydration. Check bladder scan to R/o retention   - Transplant renal evaluation appreciated; F/u recs     HTN (hypertension).   - Cont home cozaar.  - Monitor BP, VS and patient closely     HLD (hyperlipidemia).   - Cont home omega 3 + pravachol.    GERD (gastroesophageal reflux disease).   - Cont home famotidine.    Anxiety and depression.   - H/O anx/dep/bipolar disorder  - Cont home wellbutrin.    PPX    Discussed with Attending, Transplant ID, Ortho, RN and ACP.

## 2023-11-01 NOTE — PROGRESS NOTE ADULT - SUBJECTIVE AND OBJECTIVE BOX
[facility] DIVISION OF KIDNEY DISEASES AND HYPERTENSION   FOLLOW UP NOTE    --------------------------------------------------------------------------------    SUBJECTIVE / ROS / INTERVAL EVENTS:  -Patient seen and examined at bedside.  -c/o back pain but otherwise doing well      PAST HISTORY  --------------------------------------------------------------------------------  No significant changes to PMH, PSH, FHx, SHx, unless otherwise noted    ALLERGIES & MEDICATIONS  --------------------------------------------------------------------------------  Allergies    penicillin (Hives)    Intolerances      Standing Inpatient Medications  atorvastatin 10 milliGRAM(s) Oral at bedtime  buPROPion XL (24-Hour) . 150 milliGRAM(s) Oral daily  famotidine    Tablet 20 milliGRAM(s) Oral daily  gabapentin 300 milliGRAM(s) Oral three times a day  heparin   Injectable 5000 Unit(s) SubCutaneous every 8 hours  influenza  Vaccine (HIGH DOSE) 0.7 milliLiter(s) IntraMuscular once  losartan 100 milliGRAM(s) Oral daily  mycophenolate mofetil 250 milliGRAM(s) Oral two times a day  omega-3-Acid Ethyl Esters 2 Gram(s) Oral two times a day  polyethylene glycol 3350 17 Gram(s) Oral daily  predniSONE   Tablet 5 milliGRAM(s) Oral daily  senna 2 Tablet(s) Oral at bedtime  sodium chloride 0.9%. 1000 milliLiter(s) IV Continuous <Continuous>  sodium chloride 0.9%. 1000 milliLiter(s) IV Continuous <Continuous>  sodium chloride 0.9%. 1000 milliLiter(s) IV Continuous <Continuous>  tacrolimus 2.5 milliGRAM(s) Oral at bedtime  tacrolimus 2 milliGRAM(s) Oral daily  tamsulosin 0.4 milliGRAM(s) Oral at bedtime  trimethoprim   80 mG/sulfamethoxazole 400 mG 1 Tablet(s) Oral daily    PRN Inpatient Medications  acetaminophen     Tablet .. 650 milliGRAM(s) Oral every 6 hours PRN  aluminum hydroxide/magnesium hydroxide/simethicone Suspension 30 milliLiter(s) Oral every 4 hours PRN  melatonin 3 milliGRAM(s) Oral at bedtime PRN  ondansetron Injectable 4 milliGRAM(s) IV Push every 8 hours PRN  psyllium Powder 1 Packet(s) Oral two times a day PRN      VITALS  --------------------------------------------------------------------------------  T(C): 36.8 (11-01-23 @ 12:25), Max: 36.8 (11-01-23 @ 12:25)  HR: 78 (11-01-23 @ 12:25) (56 - 78)  BP: 105/71 (11-01-23 @ 12:25) (105/71 - 123/76)  RR: 18 (11-01-23 @ 12:25) (18 - 18)  SpO2: 98% (11-01-23 @ 12:25) (96% - 98%)  Wt(kg): --      10-31-23 @ 07:01  -  11-01-23 @ 07:00  --------------------------------------------------------  IN: 700 mL / OUT: 1350 mL / NET: -650 mL    11-01-23 @ 07:01  -  11-01-23 @ 14:54  --------------------------------------------------------  IN: 0 mL / OUT: 200 mL / NET: -200 mL      PHYSICAL EXAM:  General: no acute distress  Neuro: no focal deficits  HEENT: anicteric, no JVD  Pulmonary: lungs CTA B/L  Cardiovascular/Chest: +S1S2, RRR  GI/Abdomen: soft, non-distended, non-tender, +bowel sounds. Transplant site non-tender, no edema  Extremities: no LE pitting edema  Skin: Warm and dry  Dialysis access: L AVF    LABS/STUDIES  --------------------------------------------------------------------------------              13.7   5.94  >-----------<  170      [11-01-23 @ 07:24]              41.5     137  |  105  |  31  ----------------------------<  83      [11-01-23 @ 07:25]  4.3   |  18  |  1.53        Ca     9.9     [11-01-23 @ 07:25]    TPro  6.4  /  Alb  4.0  /  TBili  0.5  /  DBili  x   /  AST  14  /  ALT  9   /  AlkPhos  57  [11-01-23 @ 07:25]          Creatinine Trend:  SCr 1.53 [11-01 @ 07:25]  SCr 1.70 [10-31 @ 06:43]  SCr 1.35 [10-30 @ 07:12]  SCr 1.50 [10-29 @ 07:05]  SCr 1.61 [10-29 @ 00:37]    Urinalysis - [11-01-23 @ 07:25]      Color  / Appearance  / SG  / pH       Gluc 83 / Ketone   / Bili  / Urobili        Blood  / Protein  / Leuk Est  / Nitrite       RBC  / WBC  / Hyaline  / Gran  / Sq Epi  / Non Sq Epi  / Bacteria     Urine Creatinine 85      [10-29-23 @ 08:17]  Urine Protein 8      [10-29-23 @ 08:17]  Urine Sodium 167      [10-29-23 @ 08:17]  Urine Urea Nitrogen 655      [10-29-23 @ 08:17]  Urine Potassium 14      [10-29-23 @ 08:17]  Urine Osmolality 618      [10-29-23 @ 08:15]        TacrolimusTacrolimus (), Serum: 5.6 ng/mL (10-29 @ 07:08)    Cyclosporine  Sirolimus  MycophenolateMycophenolic Acid, Serum: 1.2 ug/mL (10-28 @ 20:35)

## 2023-11-01 NOTE — PROGRESS NOTE ADULT - ASSESSMENT
63 year old male with PCKD s/p DDRT in 2017 at [facility] ( Dr Chung) , HTN, chronic lbp s/p back surgery, h/o alcohol  abuse, pancreatitis, anx/dep/bipolar disorder, admitted with right flank/back/lower quadrant pain    1. Allograft function is at baseline   2. IS meds- continue Prograf 2mg in am and 2.5 mg in pm . goal level is 4-6. Continue Cellcept 250 mg po bid and  prednisone 5 mg po daily.   3. Right flank pain- CT A/P Bladder wall thickening and perivesicular fat stranding concerning for cystitis.  pancreatic duct dilatation.  UCx negative-discontinued CTX per ID  4. MR C/T/LS Spine w/ Vasogenic edema and enhancement, disc herniation versus infection, No abscess, degenerative disc disease --> F/u NSGY recs and OrthoSpine recs

## 2023-11-01 NOTE — PROGRESS NOTE ADULT - SUBJECTIVE AND OBJECTIVE BOX
Name of Patient : TRACY JENNINGS  MRN: [id number]  Date of visit: 11-01-23 @ 13:54      Subjective: Patient seen and examined. No new events except as noted.   Patient seen earlier this AM. Ambulating around floor and then in room. RN at the bedside.   Reports his pain is better controlled. States that his pain is a 2 out of 10.   S/P MR yesterday.   Refusing DVT PPX    REVIEW OF SYSTEMS:  CONSTITUTIONAL: Generalized weakness   EYES/ENT: No visual changes;  No vertigo or throat pain   NECK: No pain or stiffness  RESPIRATORY: No cough, wheezing, hemoptysis; No shortness of breath  CARDIOVASCULAR: No chest pain or palpitations  GASTROINTESTINAL: No abdominal or epigastric pain. No nausea, vomiting, or hematemesis; No diarrhea or constipation. No melena or hematochezia.  GENITOURINARY: No dysuria, frequency or hematuria  NEUROLOGICAL: + Back pain better controlled; No numbness or weakness  SKIN: No itching, burning, rashes, or lesions   All other review of systems is negative unless indicated above.    MEDICATIONS:  MEDICATIONS  (STANDING):  atorvastatin 10 milliGRAM(s) Oral at bedtime  buPROPion XL (24-Hour) . 150 milliGRAM(s) Oral daily  famotidine    Tablet 20 milliGRAM(s) Oral daily  gabapentin 300 milliGRAM(s) Oral three times a day  heparin   Injectable 5000 Unit(s) SubCutaneous every 8 hours  influenza  Vaccine (HIGH DOSE) 0.7 milliLiter(s) IntraMuscular once  losartan 100 milliGRAM(s) Oral daily  mycophenolate mofetil 250 milliGRAM(s) Oral two times a day  omega-3-Acid Ethyl Esters 2 Gram(s) Oral two times a day  polyethylene glycol 3350 17 Gram(s) Oral daily  predniSONE   Tablet 5 milliGRAM(s) Oral daily  senna 2 Tablet(s) Oral at bedtime  sodium chloride 0.9%. 1000 milliLiter(s) (75 mL/Hr) IV Continuous <Continuous>  sodium chloride 0.9%. 1000 milliLiter(s) (50 mL/Hr) IV Continuous <Continuous>  sodium chloride 0.9%. 1000 milliLiter(s) (50 mL/Hr) IV Continuous <Continuous>  tacrolimus 2.5 milliGRAM(s) Oral at bedtime  tacrolimus 2 milliGRAM(s) Oral daily  tamsulosin 0.4 milliGRAM(s) Oral at bedtime  trimethoprim   80 mG/sulfamethoxazole 400 mG 1 Tablet(s) Oral daily      PHYSICAL EXAM:  T(C): 36.8 (11-01-23 @ 12:25), Max: 36.8 (11-01-23 @ 12:25)  HR: 78 (11-01-23 @ 12:25) (56 - 78)  BP: 105/71 (11-01-23 @ 12:25) (105/71 - 123/76)  RR: 18 (11-01-23 @ 12:25) (18 - 18)  SpO2: 98% (11-01-23 @ 12:25) (96% - 98%)  Wt(kg): --  I&O's Summary    31 Oct 2023 07:01  -  01 Nov 2023 07:00  --------------------------------------------------------  IN: 700 mL / OUT: 1350 mL / NET: -650 mL    01 Nov 2023 07:01  -  01 Nov 2023 13:54  --------------------------------------------------------  IN: 0 mL / OUT: 200 mL / NET: -200 mL          Appearance: Awake, Ambulating around floor and then Sitting in chair 	  HEENT: Eyes are open; Glasses   Lymphatic: No lymphadenopathy   Cardiovascular: Normal    Respiratory: normal effort , clear  Gastrointestinal:  Soft, Non-tender  Skin: No rashes,  warm to touch  Psychiatry:  Mood & affect appropriate  Musculoskeletal: + Back pain; No edema        10-31-23 @ 07:01  -  11-01-23 @ 07:00  --------------------------------------------------------  IN: 700 mL / OUT: 1350 mL / NET: -650 mL    11-01-23 @ 07:01  -  11-01-23 @ 13:54  --------------------------------------------------------  IN: 0 mL / OUT: 200 mL / NET: -200 mL        < from: MR Cervical Spine w/wo IV Cont (10.31.23 @ 18:14) >    IMPRESSION:    1. CERVICAL SPINE:   C3-C4 endplate changes include degenerative features   but also demonstrate differential vasogenic edema and enhancement,   indeterminate for advanced disc degeneration versus superimposed   infection. Consider correlation with CT to evaluate for erosive change.   No associated abscess collection is recognized. No comparison cervical   spine imaging is found. If available from another location, recommend   direct comparison. Moderate to severe cervical degenerative disc disease   includes C5-C6 broad left central disc protrusion. (disc herniation)    causes ventral cord deformity and multilevel advanced degenerative   foraminal compromise. Small focal lesion along the left ventral ependymal   margin at the cervical medullary junction results in deformity of the   left ventral cord surface, an indeterminant finding, possibly related to   the arthropathy at the C1-C2 articulation. Reversal of the normal   cervical lordosis suggestive of disc pathology and / or muscle spasm    2. THORACIC SPINE:   T2-T3 broad right central disc protrusion (disc   herniation)  causes ventral cord deformity. Grade1 anterior listhesis   T10 on T11 with diffuse disc bulge and  superimposed shallow left central   disc protrusion (disc herniation).  No abnomal enhancement to suggest   infection.    3. LUMBAR SPINE:   Grade 1 anterior listhesis L3 on L4 contributes with   additional degenerative features to high grade right foraminal compromise   and intermediate grade central canal compromise. Grade 1 anterior   listhesis L4 on L5 with spondylolysis contributes to high grade central   canal and left foraminalcompromise ; fluid signal intensity within the   disc space without enhancement is indeterminate ; when compared to   sequence of body CT there appears to be progressive sclerotic   degenerative changes at this level ; recommend follow-up evaluation to   exclude infection. Transitional morphology L5-S1    --- End of Report ---    < end of copied text >

## 2023-11-02 ENCOUNTER — TRANSCRIPTION ENCOUNTER (OUTPATIENT)
Age: 63
End: 2023-11-02

## 2023-11-02 VITALS
OXYGEN SATURATION: 94 % | SYSTOLIC BLOOD PRESSURE: 109 MMHG | HEART RATE: 61 BPM | DIASTOLIC BLOOD PRESSURE: 71 MMHG | TEMPERATURE: 98 F | RESPIRATION RATE: 18 BRPM

## 2023-11-02 LAB
ALBUMIN SERPL ELPH-MCNC: 4 G/DL — SIGNIFICANT CHANGE UP (ref 3.3–5)
ALBUMIN SERPL ELPH-MCNC: 4 G/DL — SIGNIFICANT CHANGE UP (ref 3.3–5)
ALP SERPL-CCNC: 54 U/L — SIGNIFICANT CHANGE UP (ref 40–120)
ALP SERPL-CCNC: 54 U/L — SIGNIFICANT CHANGE UP (ref 40–120)
ALT FLD-CCNC: 12 U/L — SIGNIFICANT CHANGE UP (ref 10–45)
ALT FLD-CCNC: 12 U/L — SIGNIFICANT CHANGE UP (ref 10–45)
ANION GAP SERPL CALC-SCNC: 12 MMOL/L — SIGNIFICANT CHANGE UP (ref 5–17)
ANION GAP SERPL CALC-SCNC: 12 MMOL/L — SIGNIFICANT CHANGE UP (ref 5–17)
AST SERPL-CCNC: 13 U/L — SIGNIFICANT CHANGE UP (ref 10–40)
AST SERPL-CCNC: 13 U/L — SIGNIFICANT CHANGE UP (ref 10–40)
BASOPHILS # BLD AUTO: 0.01 K/UL — SIGNIFICANT CHANGE UP (ref 0–0.2)
BASOPHILS # BLD AUTO: 0.01 K/UL — SIGNIFICANT CHANGE UP (ref 0–0.2)
BASOPHILS NFR BLD AUTO: 0.2 % — SIGNIFICANT CHANGE UP (ref 0–2)
BASOPHILS NFR BLD AUTO: 0.2 % — SIGNIFICANT CHANGE UP (ref 0–2)
BILIRUB SERPL-MCNC: 0.6 MG/DL — SIGNIFICANT CHANGE UP (ref 0.2–1.2)
BILIRUB SERPL-MCNC: 0.6 MG/DL — SIGNIFICANT CHANGE UP (ref 0.2–1.2)
BUN SERPL-MCNC: 30 MG/DL — HIGH (ref 7–23)
BUN SERPL-MCNC: 30 MG/DL — HIGH (ref 7–23)
CALCIUM SERPL-MCNC: 9.6 MG/DL — SIGNIFICANT CHANGE UP (ref 8.4–10.5)
CALCIUM SERPL-MCNC: 9.6 MG/DL — SIGNIFICANT CHANGE UP (ref 8.4–10.5)
CHLORIDE SERPL-SCNC: 108 MMOL/L — SIGNIFICANT CHANGE UP (ref 96–108)
CHLORIDE SERPL-SCNC: 108 MMOL/L — SIGNIFICANT CHANGE UP (ref 96–108)
CO2 SERPL-SCNC: 19 MMOL/L — LOW (ref 22–31)
CO2 SERPL-SCNC: 19 MMOL/L — LOW (ref 22–31)
CREAT SERPL-MCNC: 1.37 MG/DL — HIGH (ref 0.5–1.3)
CREAT SERPL-MCNC: 1.37 MG/DL — HIGH (ref 0.5–1.3)
CRP SERPL-MCNC: <3 MG/L — SIGNIFICANT CHANGE UP (ref 0–4)
CRP SERPL-MCNC: <3 MG/L — SIGNIFICANT CHANGE UP (ref 0–4)
EGFR: 57 ML/MIN/1.73M2 — LOW
EGFR: 57 ML/MIN/1.73M2 — LOW
EOSINOPHIL # BLD AUTO: 0.07 K/UL — SIGNIFICANT CHANGE UP (ref 0–0.5)
EOSINOPHIL # BLD AUTO: 0.07 K/UL — SIGNIFICANT CHANGE UP (ref 0–0.5)
EOSINOPHIL NFR BLD AUTO: 1.2 % — SIGNIFICANT CHANGE UP (ref 0–6)
EOSINOPHIL NFR BLD AUTO: 1.2 % — SIGNIFICANT CHANGE UP (ref 0–6)
ERYTHROCYTE [SEDIMENTATION RATE] IN BLOOD: 9 MM/HR — SIGNIFICANT CHANGE UP (ref 0–20)
ERYTHROCYTE [SEDIMENTATION RATE] IN BLOOD: 9 MM/HR — SIGNIFICANT CHANGE UP (ref 0–20)
GLUCOSE SERPL-MCNC: 79 MG/DL — SIGNIFICANT CHANGE UP (ref 70–99)
GLUCOSE SERPL-MCNC: 79 MG/DL — SIGNIFICANT CHANGE UP (ref 70–99)
HCT VFR BLD CALC: 39.9 % — SIGNIFICANT CHANGE UP (ref 39–50)
HCT VFR BLD CALC: 39.9 % — SIGNIFICANT CHANGE UP (ref 39–50)
HGB BLD-MCNC: 12.8 G/DL — LOW (ref 13–17)
HGB BLD-MCNC: 12.8 G/DL — LOW (ref 13–17)
IMM GRANULOCYTES NFR BLD AUTO: 0.2 % — SIGNIFICANT CHANGE UP (ref 0–0.9)
IMM GRANULOCYTES NFR BLD AUTO: 0.2 % — SIGNIFICANT CHANGE UP (ref 0–0.9)
LYMPHOCYTES # BLD AUTO: 1.73 K/UL — SIGNIFICANT CHANGE UP (ref 1–3.3)
LYMPHOCYTES # BLD AUTO: 1.73 K/UL — SIGNIFICANT CHANGE UP (ref 1–3.3)
LYMPHOCYTES # BLD AUTO: 28.8 % — SIGNIFICANT CHANGE UP (ref 13–44)
LYMPHOCYTES # BLD AUTO: 28.8 % — SIGNIFICANT CHANGE UP (ref 13–44)
MCHC RBC-ENTMCNC: 32 PG — SIGNIFICANT CHANGE UP (ref 27–34)
MCHC RBC-ENTMCNC: 32 PG — SIGNIFICANT CHANGE UP (ref 27–34)
MCHC RBC-ENTMCNC: 32.1 GM/DL — SIGNIFICANT CHANGE UP (ref 32–36)
MCHC RBC-ENTMCNC: 32.1 GM/DL — SIGNIFICANT CHANGE UP (ref 32–36)
MCV RBC AUTO: 99.8 FL — SIGNIFICANT CHANGE UP (ref 80–100)
MCV RBC AUTO: 99.8 FL — SIGNIFICANT CHANGE UP (ref 80–100)
MONOCYTES # BLD AUTO: 0.58 K/UL — SIGNIFICANT CHANGE UP (ref 0–0.9)
MONOCYTES # BLD AUTO: 0.58 K/UL — SIGNIFICANT CHANGE UP (ref 0–0.9)
MONOCYTES NFR BLD AUTO: 9.7 % — SIGNIFICANT CHANGE UP (ref 2–14)
MONOCYTES NFR BLD AUTO: 9.7 % — SIGNIFICANT CHANGE UP (ref 2–14)
NEUTROPHILS # BLD AUTO: 3.6 K/UL — SIGNIFICANT CHANGE UP (ref 1.8–7.4)
NEUTROPHILS # BLD AUTO: 3.6 K/UL — SIGNIFICANT CHANGE UP (ref 1.8–7.4)
NEUTROPHILS NFR BLD AUTO: 59.9 % — SIGNIFICANT CHANGE UP (ref 43–77)
NEUTROPHILS NFR BLD AUTO: 59.9 % — SIGNIFICANT CHANGE UP (ref 43–77)
NRBC # BLD: 0 /100 WBCS — SIGNIFICANT CHANGE UP (ref 0–0)
NRBC # BLD: 0 /100 WBCS — SIGNIFICANT CHANGE UP (ref 0–0)
PLATELET # BLD AUTO: 177 K/UL — SIGNIFICANT CHANGE UP (ref 150–400)
PLATELET # BLD AUTO: 177 K/UL — SIGNIFICANT CHANGE UP (ref 150–400)
POTASSIUM SERPL-MCNC: 4.4 MMOL/L — SIGNIFICANT CHANGE UP (ref 3.5–5.3)
POTASSIUM SERPL-MCNC: 4.4 MMOL/L — SIGNIFICANT CHANGE UP (ref 3.5–5.3)
POTASSIUM SERPL-SCNC: 4.4 MMOL/L — SIGNIFICANT CHANGE UP (ref 3.5–5.3)
POTASSIUM SERPL-SCNC: 4.4 MMOL/L — SIGNIFICANT CHANGE UP (ref 3.5–5.3)
PROT SERPL-MCNC: 6.2 G/DL — SIGNIFICANT CHANGE UP (ref 6–8.3)
PROT SERPL-MCNC: 6.2 G/DL — SIGNIFICANT CHANGE UP (ref 6–8.3)
RBC # BLD: 4 M/UL — LOW (ref 4.2–5.8)
RBC # BLD: 4 M/UL — LOW (ref 4.2–5.8)
RBC # FLD: 13.6 % — SIGNIFICANT CHANGE UP (ref 10.3–14.5)
RBC # FLD: 13.6 % — SIGNIFICANT CHANGE UP (ref 10.3–14.5)
SODIUM SERPL-SCNC: 139 MMOL/L — SIGNIFICANT CHANGE UP (ref 135–145)
SODIUM SERPL-SCNC: 139 MMOL/L — SIGNIFICANT CHANGE UP (ref 135–145)
TACROLIMUS SERPL-MCNC: 5.2 NG/ML — SIGNIFICANT CHANGE UP
TACROLIMUS SERPL-MCNC: 5.2 NG/ML — SIGNIFICANT CHANGE UP
WBC # BLD: 6 K/UL — SIGNIFICANT CHANGE UP (ref 3.8–10.5)
WBC # BLD: 6 K/UL — SIGNIFICANT CHANGE UP (ref 3.8–10.5)
WBC # FLD AUTO: 6 K/UL — SIGNIFICANT CHANGE UP (ref 3.8–10.5)
WBC # FLD AUTO: 6 K/UL — SIGNIFICANT CHANGE UP (ref 3.8–10.5)

## 2023-11-02 RX ORDER — BUPROPION HYDROCHLORIDE 150 MG/1
1 TABLET, EXTENDED RELEASE ORAL
Refills: 0 | DISCHARGE

## 2023-11-02 RX ORDER — FAMOTIDINE 10 MG/ML
1 INJECTION INTRAVENOUS
Qty: 0 | Refills: 0 | DISCHARGE
Start: 2023-11-02

## 2023-11-02 RX ORDER — SENNA PLUS 8.6 MG/1
2 TABLET ORAL
Qty: 0 | Refills: 0 | DISCHARGE
Start: 2023-11-02

## 2023-11-02 RX ORDER — TACROLIMUS 5 MG/1
2 CAPSULE ORAL
Qty: 0 | Refills: 0 | DISCHARGE
Start: 2023-11-02

## 2023-11-02 RX ORDER — MYCOPHENOLATE MOFETIL 250 MG/1
1 CAPSULE ORAL
Qty: 0 | Refills: 0 | DISCHARGE
Start: 2023-11-02

## 2023-11-02 RX ORDER — TAMSULOSIN HYDROCHLORIDE 0.4 MG/1
1 CAPSULE ORAL
Qty: 0 | Refills: 0 | DISCHARGE
Start: 2023-11-02

## 2023-11-02 RX ORDER — GABAPENTIN 400 MG/1
1 CAPSULE ORAL
Qty: 0 | Refills: 0 | DISCHARGE
Start: 2023-11-02

## 2023-11-02 RX ORDER — LOSARTAN POTASSIUM 100 MG/1
1 TABLET, FILM COATED ORAL
Qty: 0 | Refills: 0 | DISCHARGE
Start: 2023-11-02

## 2023-11-02 RX ORDER — OMEGA-3 ACID ETHYL ESTERS 1 G
1 CAPSULE ORAL
Refills: 0 | DISCHARGE

## 2023-11-02 RX ORDER — MYCOPHENOLATE MOFETIL 250 MG/1
1 CAPSULE ORAL
Refills: 0 | DISCHARGE

## 2023-11-02 RX ORDER — TACROLIMUS 5 MG/1
1 CAPSULE ORAL
Refills: 0 | DISCHARGE

## 2023-11-02 RX ORDER — POLYETHYLENE GLYCOL 3350 17 G/17G
17 POWDER, FOR SOLUTION ORAL
Qty: 0 | Refills: 0 | DISCHARGE
Start: 2023-11-02

## 2023-11-02 RX ORDER — OMEGA-3 ACID ETHYL ESTERS 1 G
2 CAPSULE ORAL
Qty: 0 | Refills: 0 | DISCHARGE
Start: 2023-11-02

## 2023-11-02 RX ORDER — LOSARTAN POTASSIUM 100 MG/1
1 TABLET, FILM COATED ORAL
Refills: 0 | DISCHARGE

## 2023-11-02 RX ORDER — TAMSULOSIN HYDROCHLORIDE 0.4 MG/1
1 CAPSULE ORAL
Refills: 0 | DISCHARGE

## 2023-11-02 RX ORDER — TACROLIMUS 5 MG/1
2 CAPSULE ORAL
Refills: 0 | DISCHARGE

## 2023-11-02 RX ORDER — TACROLIMUS 5 MG/1
5 CAPSULE ORAL
Qty: 0 | Refills: 0 | DISCHARGE
Start: 2023-11-02

## 2023-11-02 RX ORDER — FAMOTIDINE 10 MG/ML
1 INJECTION INTRAVENOUS
Refills: 0 | DISCHARGE

## 2023-11-02 RX ORDER — BUPROPION HYDROCHLORIDE 150 MG/1
1 TABLET, EXTENDED RELEASE ORAL
Qty: 0 | Refills: 0 | DISCHARGE
Start: 2023-11-02

## 2023-11-02 RX ADMIN — OXYCODONE HYDROCHLORIDE 5 MILLIGRAM(S): 5 TABLET ORAL at 06:08

## 2023-11-02 RX ADMIN — LOSARTAN POTASSIUM 100 MILLIGRAM(S): 100 TABLET, FILM COATED ORAL at 06:00

## 2023-11-02 RX ADMIN — Medication 2 GRAM(S): at 05:58

## 2023-11-02 RX ADMIN — HEPARIN SODIUM 5000 UNIT(S): 5000 INJECTION INTRAVENOUS; SUBCUTANEOUS at 05:57

## 2023-11-02 RX ADMIN — OXYCODONE HYDROCHLORIDE 5 MILLIGRAM(S): 5 TABLET ORAL at 06:38

## 2023-11-02 RX ADMIN — GABAPENTIN 300 MILLIGRAM(S): 400 CAPSULE ORAL at 05:59

## 2023-11-02 RX ADMIN — MYCOPHENOLATE MOFETIL 250 MILLIGRAM(S): 250 CAPSULE ORAL at 06:00

## 2023-11-02 RX ADMIN — Medication 5 MILLIGRAM(S): at 05:59

## 2023-11-02 NOTE — DISCHARGE NOTE PROVIDER - NSDCMRMEDTOKEN_GEN_ALL_CORE_FT
buPROPion 75 mg oral tablet: 1 tab(s) orally 2 times a day  famotidine 20 mg oral tablet: 1 tab(s) orally 2 times a day  Flomax 0.4 mg oral capsule: 1 cap(s) orally once a day (at bedtime)  losartan 100 mg oral tablet: 1 tab(s) orally once a day  mycophenolate mofetil 250 mg oral capsule: 1 cap(s) orally 2 times a day  Omega-3 1000 mg oral capsule: 1 cap(s) orally 2 times a day  pravastatin 20 mg oral tablet: 1 tab(s) orally once a day (at bedtime)  predniSONE 5 mg oral tablet: 1 tab(s) orally once a day  sulfamethoxazole-trimethoprim 400 mg-80 mg oral tablet: 1 tab(s) orally once a day  tacrolimus 0.5 mg oral capsule: 1 cap(s) orally once a day (at bedtime)  tacrolimus 1 mg oral capsule: 2 cap(s) orally 2 times a day   buPROPion 150 mg/24 hours (XL) oral tablet, extended release: 1 tab(s) orally once a day  famotidine 20 mg oral tablet: 1 tab(s) orally once a day  gabapentin 300 mg oral capsule: 1 cap(s) orally 3 times a day  losartan 100 mg oral tablet: 1 tab(s) orally once a day  mycophenolate mofetil 250 mg oral capsule: 1 cap(s) orally 2 times a day  omega-3 polyunsaturated fatty acids ethyl esters 1000 mg oral capsule: 2 cap(s) orally 2 times a day  polyethylene glycol 3350 oral powder for reconstitution: 17 gram(s) orally once a day  pravastatin 20 mg oral tablet: 1 tab(s) orally once a day (at bedtime)  predniSONE 5 mg oral tablet: 1 tab(s) orally once a day  senna leaf extract oral tablet: 2 tab(s) orally once a day (at bedtime)  sulfamethoxazole-trimethoprim 400 mg-80 mg oral tablet: 1 tab(s) orally once a day  tacrolimus 0.5 mg oral capsule: 5 cap(s) orally once a day (at bedtime)  tacrolimus 1 mg oral capsule: 2 cap(s) orally once a day  tamsulosin 0.4 mg oral capsule: 1 cap(s) orally once a day (at bedtime)

## 2023-11-02 NOTE — PROGRESS NOTE ADULT - SUBJECTIVE AND OBJECTIVE BOX
Name of Patient : TRACY JENNINGS  MRN: [id number]  Date of visit: 11-02-23 @ 11:57      Subjective: Patient seen and examined. No new events except as noted.   Patient seen earlier this AM. Ambulating around room. Pending Orthopedics evaluation.   Patient reports his pain is better controlled.     REVIEW OF SYSTEMS:    CONSTITUTIONAL: No weakness, fevers or chills  EYES/ENT: No visual changes;  No vertigo or throat pain   NECK: No pain or stiffness  RESPIRATORY: No cough, wheezing, hemoptysis; No shortness of breath  CARDIOVASCULAR: No chest pain or palpitations  GASTROINTESTINAL: No abdominal or epigastric pain. No nausea, vomiting, or hematemesis; No diarrhea or constipation. No melena or hematochezia.  GENITOURINARY: No dysuria, frequency or hematuria  NEUROLOGICAL: + Back pain better controlled; No numbness or weakness  SKIN: No itching, burning, rashes, or lesions   All other review of systems is negative unless indicated above.    MEDICATIONS:  MEDICATIONS  (STANDING):  atorvastatin 10 milliGRAM(s) Oral at bedtime  buPROPion XL (24-Hour) . 150 milliGRAM(s) Oral daily  famotidine    Tablet 20 milliGRAM(s) Oral daily  gabapentin 300 milliGRAM(s) Oral three times a day  heparin   Injectable 5000 Unit(s) SubCutaneous every 8 hours  influenza  Vaccine (HIGH DOSE) 0.7 milliLiter(s) IntraMuscular once  losartan 100 milliGRAM(s) Oral daily  mycophenolate mofetil 250 milliGRAM(s) Oral two times a day  omega-3-Acid Ethyl Esters 2 Gram(s) Oral two times a day  polyethylene glycol 3350 17 Gram(s) Oral daily  predniSONE   Tablet 5 milliGRAM(s) Oral daily  senna 2 Tablet(s) Oral at bedtime  sodium chloride 0.9%. 1000 milliLiter(s) (75 mL/Hr) IV Continuous <Continuous>  sodium chloride 0.9%. 1000 milliLiter(s) (50 mL/Hr) IV Continuous <Continuous>  sodium chloride 0.9%. 1000 milliLiter(s) (50 mL/Hr) IV Continuous <Continuous>  tacrolimus 2.5 milliGRAM(s) Oral at bedtime  tacrolimus 2 milliGRAM(s) Oral daily  tamsulosin 0.4 milliGRAM(s) Oral at bedtime  trimethoprim   80 mG/sulfamethoxazole 400 mG 1 Tablet(s) Oral daily      PHYSICAL EXAM:  T(C): 36.7 (11-02-23 @ 09:03), Max: 36.8 (11-01-23 @ 12:25)  HR: 61 (11-02-23 @ 09:03) (61 - 90)  BP: 109/71 (11-02-23 @ 09:03) (105/71 - 136/79)  RR: 18 (11-02-23 @ 09:03) (18 - 18)  SpO2: 94% (11-02-23 @ 09:03) (93% - 99%)  Wt(kg): --  I&O's Summary    01 Nov 2023 07:01  -  02 Nov 2023 07:00  --------------------------------------------------------  IN: 480 mL / OUT: 1650 mL / NET: -1170 mL    02 Nov 2023 07:01  -  02 Nov 2023 11:57  --------------------------------------------------------  IN: 200 mL / OUT: 200 mL / NET: 0 mL          Appearance: Awake, Ambulating around room   HEENT: Eyes are open; Glasses   Lymphatic: No lymphadenopathy   Cardiovascular: Normal    Respiratory: normal effort , clear  Gastrointestinal:  Soft, Non-tender  Skin: No rashes,  warm to touch  Psychiatry:  Mood & affect appropriate  Musculoskeletal: + Back pain; No edema      11-01-23 @ 07:01  -  11-02-23 @ 07:00  --------------------------------------------------------  IN: 480 mL / OUT: 1650 mL / NET: -1170 mL    11-02-23 @ 07:01  -  11-02-23 @ 11:57  --------------------------------------------------------  IN: 200 mL / OUT: 200 mL / NET: 0 mL                                12.8   6.00  )-----------( 177      ( 02 Nov 2023 06:55 )             39.9               11-02    139  |  108  |  30<H>  ----------------------------<  79  4.4   |  19<L>  |  1.37<H>    Ca    9.6      02 Nov 2023 06:55    TPro  6.2  /  Alb  4.0  /  TBili  0.6  /  DBili  x   /  AST  13  /  ALT  12  /  AlkPhos  54  11-02                       Urinalysis Basic - ( 02 Nov 2023 06:55 )    Color: x / Appearance: x / SG: x / pH: x  Gluc: 79 mg/dL / Ketone: x  / Bili: x / Urobili: x   Blood: x / Protein: x / Nitrite: x   Leuk Esterase: x / RBC: x / WBC x   Sq Epi: x / Non Sq Epi: x / Bacteria: x      < from: Xray Cervical Spine AP and Lateral Only (11.01.23 @ 18:23) >  IMPRESSION:  Suboptimal evaluation secondary to patient body habitus and positioning.   Within this limitation:    No acute displaced fracture.  Reversal of the normal cervical lordosis with advanced discogenic   spondylosis. Mild degenerative retrolisthesis atC5-C6 and C6-C7.    Grade 1 anterolisthesis at L4-L5 and L5-S1 without definite pars   interarticularis defects.  Subtle compensatory grade 1 retrolisthesis at L2-L3.    --- End of Report ---    < end of copied text >

## 2023-11-02 NOTE — DISCHARGE NOTE PROVIDER - HOSPITAL COURSE
HPI:  62 yo 59 kg m w pmh pld + pkd s/p renal transplant, htn, hld, gerd, chronic lbp s/p back surgery, alc abuse, pancreatitis, anx/dep/bipolar disorder, p/w right flank/back/lower quadrant pain; 7/10 in intensity; patient contacted outpatient providers office, letting staff there know back pain on the same side as the transplanted kidney. Denies any associated fever, chills, rigors, n+v, dysuria, bowel/bladder incontinence, no saddle anesthesia. as pain remained intractable, he presents to [facility] er for further evaluation. of note, Pt has had laminectomy in recent months and had questions regarding pain management.       in er, unclear etiology; possibly multifactorial eg cystitis, constipation, pancreatitis, referred chronic lower back pain; admit to medicine for further mgmt (29 Oct 2023 01:55)    Hospital Course: 62 yo 59 kg m w pmh pld + pkd s/p renal transplant, htn, hld, gerd, chronic lbp s/p back surgery, alc abuse, pancreatitis, anx/dep/bipolar disorder, p/w right flank/back/lower quadrant pain; in er, unclear etiology; possibly multifactorial eg cystitis, constipation, pancreatitis, referred chronic lower back pain; admit to medicine for further mgmt . Patient with Flank pain , UTI was ruled out CT with "wall thickening and perivesicular fat stranding."  - UCx negative; patient was seen by transplant and As per Transplant ID, Rocephin has been DC'd   Patient with Acute on Chronic lower back pain. H/O chronic LBP s/p back surgery  - No associated red flag symps/signs  Analgesics + muscle relaxants as need --> gabapentin increased, monitor   - MR C/T/LS Spine w/ Vasogenic edema and enhancement, disc herniation versus infection, No abscess, degenerative disc disease --> F/u Ortho recs   - patient seen by ortho spine ..............................Transplant ID follow up requested; F/u recs --> Discussed with Transplant ID. Monitor off of ABX at this time. Checking ESR and CRP.   -Patient with Constipation. Imaging with, "Moderate fecal burden in the right and transverse colons" - may be contributing to pain  monitor/trend daily bm C/w bowel regimen. Adjust to maintain goal 1-2 bm/day.  Pancreatic duct dilated. H/O alc abuse, pancreatitisCT with "Main pancreatic duct dilation measuring up to 6 mm in the head, previously measuring 4 mm. No definite pancreatic head mass versus noncontrast exam. No peripancreatic inflammatory changes."Lipase WNL, LFTs WNL  - MRCP with Polycystic liver and kidneys, negative for pancreatic mass   History of Renal transplant recipient. H/O PLD + PKDBaseline cr ~1.7; currently at baseline   Avoid nephrotoxic agents; appropriate dose adjustments for all renally cleared medications Cont home prednisone (w bactrim for pjp ppx), tacrolimus 2 morning + 2.5 evening, mycophenolate   -           Important Medication Changes and Reason:    Active or Pending Issues Requiring Follow-up:    Advanced Directives:   [ ] Full code  [ ] DNR  [ ] Hospice    Discharge Diagnoses:           HPI:  62 yo 59 kg m w pmh pld + pkd s/p renal transplant, htn, hld, gerd, chronic lbp s/p back surgery, alc abuse, pancreatitis, anx/dep/bipolar disorder, p/w right flank/back/lower quadrant pain; 7/10 in intensity; patient contacted outpatient providers office, letting staff there know back pain on the same side as the transplanted kidney. Denies any associated fever, chills, rigors, n+v, dysuria, bowel/bladder incontinence, no saddle anesthesia. as pain remained intractable, he presents to [facility] er for further evaluation. of note, Pt has had laminectomy in recent months and had questions regarding pain management.       in er, unclear etiology; possibly multifactorial eg cystitis, constipation, pancreatitis, referred chronic lower back pain; admit to medicine for further mgmt (29 Oct 2023 01:55)    Hospital Course: 62 yo 59 kg m w pmh pld + pkd s/p renal transplant, htn, hld, gerd, chronic lbp s/p back surgery, alc abuse, pancreatitis, anx/dep/bipolar disorder, p/w right flank/back/lower quadrant pain; in er, unclear etiology; possibly multifactorial eg cystitis, constipation, pancreatitis, referred chronic lower back pain; admit to medicine for further mgmt . Patient with Flank pain , UTI was ruled out CT with "wall thickening and perivesicular fat stranding."  - UCx negative; patient was seen by transplant and As per Transplant ID, Rocephin has been DC'd   Patient with Acute on Chronic lower back pain. H/O chronic LBP s/p back surgery  - No associated red flag symps/signs  Analgesics + muscle relaxants as need --> gabapentin increased, monitor   - MR C/T/LS Spine w/ Vasogenic edema and enhancement, disc herniation versus infection, No abscess, degenerative disc disease --> F/u Ortho recs   - patient seen by ortho spine ..............................Transplant ID follow up requested; F/u recs --> Discussed with Transplant ID. Monitor off of ABX at this time. Checking ESR and CRP.   -Patient with Constipation. Imaging with, "Moderate fecal burden in the right and transverse colons" - may be contributing to pain  monitor/trend daily bm C/w bowel regimen. Adjust to maintain goal 1-2 bm/day.  Pancreatic duct dilated. H/O alc abuse, pancreatitisCT with "Main pancreatic duct dilation measuring up to 6 mm in the head, previously measuring 4 mm. No definite pancreatic head mass versus noncontrast exam. No peripancreatic inflammatory changes."Lipase WNL, LFTs WNL  - MRCP with Polycystic liver and kidneys, negative for pancreatic mass   History of Renal transplant recipient. H/O PLD + PKDBaseline cr ~1.7; currently at baseline   Avoid nephrotoxic agents; appropriate dose adjustments for all renally cleared medications Cont home prednisone (w bactrim for pjp ppx), tacrolimus 2 morning + 2.5 evening, mycophenolate   He needs Ortho spine, ID clearance for disc but he left on AMA.              Important Medication Changes and Reason:    Active or Pending Issues Requiring Follow-up:    Advanced Directives:   [ ] Full code  [ ] DNR  [ ] Hospice    Discharge Diagnoses:

## 2023-11-02 NOTE — PROGRESS NOTE ADULT - PROVIDER SPECIALTY LIST ADULT
Internal Medicine
Transplant Nephrology
Transplant ID
Internal Medicine

## 2023-11-02 NOTE — PROGRESS NOTE ADULT - REASON FOR ADMISSION
right flank/back/lower quadrant pain

## 2023-11-02 NOTE — DISCHARGE NOTE PROVIDER - PROVIDER TOKENS
PROVIDER:[TOKEN:[[id number]]] PROVIDER:[TOKEN:[[id number]]],PROVIDER:[TOKEN:[[id number]]] PROVIDER:[TOKEN:[[id number]]],PROVIDER:[TOKEN:[[id number]]],PROVIDER:[TOKEN:[[id number]]]

## 2023-11-02 NOTE — DISCHARGE NOTE PROVIDER - NSDCCPCAREPLAN_GEN_ALL_CORE_FT
PRINCIPAL DISCHARGE DIAGNOSIS  Diagnosis: Right lower quadrant pain  Assessment and Plan of Treatment: resolved, he signed out AMA      SECONDARY DISCHARGE DIAGNOSES  Diagnosis: HTN (hypertension)  Assessment and Plan of Treatment: controlled, cont current home meds    Diagnosis: Renal transplant recipient  Assessment and Plan of Treatment: cont current home meds, f/up with transplant Nephrology, signed out AMA    Diagnosis: Chronic lower back pain  Assessment and Plan of Treatment: awaiting for ortho spine recomm, but signed AMA    Diagnosis: Acute UTI  Assessment and Plan of Treatment: resolved, signed out AMA

## 2023-11-02 NOTE — CONSULT NOTE ADULT - SUBJECTIVE AND OBJECTIVE BOX
Patient is a 63y Male who presents c/o chronic lumbar back pain, pt c/o mild pain, denies any trauma or falls. Pt has a hx of renal transplant and is on immosuppresive therapies. Denies pain/injury elsewhere. Denies numbness/tingling/paresthesias/weakness. Denies bowel/bladder incontinence. Denies fevers/chills. No other complaints at this time.    HEALTH ISSUES - PROBLEM Dx:  Acute UTI    Constipation    Pancreatic duct dilated    Chronic lower back pain    HTN (hypertension)    HLD (hyperlipidemia)    GERD (gastroesophageal reflux disease)    Anxiety and depression    Renal transplant recipient            MEDICATIONS  (STANDING):  atorvastatin 10 milliGRAM(s) Oral at bedtime  buPROPion XL (24-Hour) . 150 milliGRAM(s) Oral daily  famotidine    Tablet 20 milliGRAM(s) Oral daily  gabapentin 300 milliGRAM(s) Oral three times a day  heparin   Injectable 5000 Unit(s) SubCutaneous every 8 hours  influenza  Vaccine (HIGH DOSE) 0.7 milliLiter(s) IntraMuscular once  losartan 100 milliGRAM(s) Oral daily  mycophenolate mofetil 250 milliGRAM(s) Oral two times a day  omega-3-Acid Ethyl Esters 2 Gram(s) Oral two times a day  polyethylene glycol 3350 17 Gram(s) Oral daily  predniSONE   Tablet 5 milliGRAM(s) Oral daily  senna 2 Tablet(s) Oral at bedtime  sodium chloride 0.9%. 1000 milliLiter(s) IV Continuous <Continuous>  sodium chloride 0.9%. 1000 milliLiter(s) IV Continuous <Continuous>  sodium chloride 0.9%. 1000 milliLiter(s) IV Continuous <Continuous>  tacrolimus 2.5 milliGRAM(s) Oral at bedtime  tacrolimus 2 milliGRAM(s) Oral daily  tamsulosin 0.4 milliGRAM(s) Oral at bedtime  trimethoprim   80 mG/sulfamethoxazole 400 mG 1 Tablet(s) Oral daily      Allergies    penicillin (Hives)    Intolerances        PAST MEDICAL & SURGICAL HISTORY:  CKD (chronic kidney disease), stage 4 (severe)    Hypertension    Bipolar 1 disorder    Alcoholism /alcohol abuse    Acid reflux    Dialysis patient  Since 2005    Polycystic kidney disease  with ESRD s/p kidney txp 2/24/17    S/P breast lumpectomy  right, 1996    Dialysis patient  Left Arm A V Graft ( 2004 )    H/O kidney transplant                              12.8   6.00  )-----------( 177      ( 02 Nov 2023 06:55 )             39.9       02 Nov 2023 06:55    139    |  108    |  30     ----------------------------<  79     4.4     |  19     |  1.37     Ca    9.6        02 Nov 2023 06:55    TPro  6.2    /  Alb  4.0    /  TBili  0.6    /  DBili  x      /  AST  13     /  ALT  12     /  AlkPhos  54     02 Nov 2023 06:55          Urinalysis Basic - ( 02 Nov 2023 06:55 )    Color: x / Appearance: x / SG: x / pH: x  Gluc: 79 mg/dL / Ketone: x  / Bili: x / Urobili: x   Blood: x / Protein: x / Nitrite: x   Leuk Esterase: x / RBC: x / WBC x   Sq Epi: x / Non Sq Epi: x / Bacteria: x        Vital Signs Last 24 Hrs  T(C): 36.7 (11-02-23 @ 09:03), Max: 36.7 (11-01-23 @ 20:53)  T(F): 98.1 (11-02-23 @ 09:03), Max: 98.1 (11-02-23 @ 09:03)  HR: 61 (11-02-23 @ 09:03) (61 - 90)  BP: 109/71 (11-02-23 @ 09:03) (109/71 - 136/79)  BP(mean): --  RR: 18 (11-02-23 @ 09:03) (18 - 18)  SpO2: 94% (11-02-23 @ 09:03) (93% - 99%)    Gen: NAD    Spine PE:  Skin intact  No gross deformity  No midnline TTP C/T/L/S spine  No bony step offs  No paraspinal muscle ttp/hypertonicity   Negative clonus  Negative babinski  Negative lux               Deltoid        Bicep        Tricep          Wrist Ext    Wrist Flex    Finger Flex          Finger Abd  R            5/5          5/5           5/5              5/5                5/5	           5/5                         5/5  L             5/5           5/5          5/5              5/5                5/5                   5/5                        5/5                Hip Flex       Quad     Ankle DF        Ankle PF       Toe Ext        Hamstring    R            5/5              5/5          5/5                 5/5                 5/5	                5/5  L            5/5              5/5           5/5                 5/5                 5/5                5/5    Sensory:            C5         C6         C7      C8       T1        (0=absent, 1=impaired, 2=normal, NT=not testable)  R         2            2           2        2         2  L          2            2           2        2         2               L2          L3         L4      L5       S1         (0=absent, 1=impaired, 2=normal, NT=not testable)  R         2            2            2        2        2  L          2            2           2        2         2      Imaging:    ACC: [id number] EXAM:  MR SPINE LUMBAR WAW IC   ORDERED BY:  ESTER ZUNIGA     ACC: [id number] EXAM:  MR SPINE CERVICAL WAW IC   ORDERED BY:  ESTER ZUNIGA     ACC: [id number] EXAM:  MR SPINE THORACIC WAW IC   ORDERED BY:  ESTER ZUNIGA     PROCEDURE DATE:  10/31/2023        INTERPRETATION:  Three examinations were performed:  1.  MR cervical spine with and without gadolinium  2.  MR thoracic spine with and without gadolinium  3.  MR lumbar spine with and without gadolinium      CLINICAL INFORMATION (all three examinations):    infx/abscess  MMR    Admitting Dxs: R10.31 ABD FLANK PAIN  ADDITIONAL CLINICAL INFORMATION:   Osteomyelitis vertebra M46.26   (accession [id number]), Osteomyelitis vertebra M46.26 (accession [id number]),   Not Applicable (accession [id number])    TECHNIQUE (all three examinations): Sagittal STIR images, sagittal   T1-weighted images T2-weighted and post gadolinium fat saturated sagittal   T1-weighted images were obtained.   Additional axial T1 and T2 weighted   images were obtained.  CONTRAST:    6 cc administered  ;  1.5 cc discarded    COMPARISON:   Body CT 10/28/2023, also 2019 and 2017    FINDINGS:    CERVICAL SPINE    Cervical vertebral alignment demonstrates irregular broad reversal of the   cervical lordosis. This vertebral malalignment has an apex at C4. This is   associated with slight retrolisthesis of C4 on C5 and C5 on C6. Cervical   vertebral body heights are maintained.  Marrow signal intensity within   cervical vertebral bodies and posterior elements is heterogeneous most   prominently at the C3-C4 intervertebral disc level. This includes   marginal osteophytes and endplate irregularity and subchondral sclerosis.   There is also subchondral edema pattern with enhancement found within   each vertebral body deep to each endplate. The disc space demonstrates   disc height loss and remains low signal intensity on the long TR images   without significant intrasubstance enhancement. Broad shallow left   central disc protrusion mildly deforms the left ventral cordsurface. No   associated focal cord signal intensity changes recognized. No epidural   space collection is seen. The margin of the disc bordering the epidural   space enhances.    At the remaining cervical vertebra there is more limited marrow   heterogeneity including degenerative endplate changes throughout the mid   cervical spine, absent marrow edema and absent abnormal vertebral   enhancement.  No osseous expansion or epidural disease is found.    Paraspinal soft tissues appear intact.    The remaining cervical intervertebral discs demonstrate degenerative   features with disc height loss and signal intensity loss on the long TR   images. There is mild disc space enhancement at C2-C3 absent central   canal or foraminal compromise. At theC5-C6 intervertebral disc level   broad left central disc protrusion flattens the left ventral cord   surface, absent cord signal intensity change or enhancement. At C4-C5,   C6-C7 and C7-T1 more shallow disc bulge and ventral canal osteophytes   result in low-grade deformity of the ventral thecal sac without direct   compromise of the cervical cord.  Degenerative foraminal compromise from   disc material and uncovertebral joint osteophytes is widespread and   overall intermediate in magnitude. This is at least moderate in magnitude   bilaterally at C3-C4, to the left at C5-C6 and to the left at C7-T1,   somewhat lower greater to remaining cervical intervertebral disc levels.    The cervical cord demonstrates deformity from extrinsic disc material as   described above.  No cord signal intensity change is seen.  No intrinsic   cord lesion is found. No cord expansion or cord volume loss is found.  No   abnormal enhancement occurs within the cervical cord.    The cervicomedullary junction isare significant for arthropathy at the   C1-C2 articulation. Posterior to the odontoid there is thickening and   mild signal heterogeneity at the level of the transverse ligament of the   odontoid along the ependymal surface eccentric to the left of midline   there is a small cystic structure demonstrating rim enhancement. This   measures 0.5 cm in maximum dimension. The lesion elevates the ependymal   margin in mildly deforms the left ventral surface of the cervical   medullary junction. This deformity is not associated with signal   intensity change.    THORACIC SPINE    Thoracic vertebral alignment demonstrates a shallow thick scoliosis   levoconvex in the upper thoracic spine and dextroconvex in the lower   thoracic spine. There is also grade 1 anterior listhesis of  T10 on T11.   Diffuse disc bulge and an T10-T11 disc space aligns with the T11 superior   endplate. There is a superimposed shallow left central disc protrusion   that further deforms the ventral thecal sac and extends downward in the   canal at the T11 superior endplate. However, this does not appear to   directly compromise of ventral cord surface. The neural foramina at this   level are at least mildly narrowed by a vertebral malalignment and disc   bulging.    Thoracic vertebral body heights are maintained. Marrow signal intensity   within thoracic vertebral bodies and posterior elements is only mildly   heterogeneous from degenerative type endplate changes.  There is no   abnormal vertebral enhancement.  No osseous expansion or epidural disease   is found.  Paraspinal soft tissues appear intact. Transitional morphology   is present at the thoracic lumbar junction. The T12 vertebral body has   hypoplastic ribs.    Thoracic intervertebral discs demonstrate a widespread pattern of disc   degeneration with signal intensity loss on the long TR images. There is   also variable disc height loss. At T2-T3 broad right central disc   protrusion mildly deforms the right ventral cord surface, absent cord   signalintensity change. At this level disc bulging and facet arthropathy   result in moderate right and more mild left degenerative foraminal   compromise. At the remaining thoracic intervertebral disc levels no   significant degenerative central canal or foraminal compromise is   recognized.    The thoracic cord maintains demonstrates deformity from extrinsic disc   pathology as noted above.  No cord signal intensity change is seen.  No   intrinsic cord lesion is found.  No cord expansion or cord volume loss is   found.  No abnormal enhancement occurs within the thoracic canal.    LUMBAR SPINE    LUMBAR VERTEBRA AND ALIGNMENT:  Lumbar vertebral alignment is significant for grade 1 anterolisthesis L3   on L4. Spondylolysis is not recognized. The L3-L4 intervertebral disc   demonstrates mildly heterogeneous signal intensity and disc height loss   without enhancement. Diffuse disc bulge and aligns with the L4 superior   endplate. There is superimposed broad shallow right central subarticular   and foraminal disc protrusion contributing with the vertebral   malalignment to severe right foraminal compromise. Degenerative features   result in mild to moderate left foraminal compromise. There is deformity   along the ventral thecal sac and narrowing of the dorsal lateral thecal   sac by facet arthropathy and ligamentum flavum hypertrophy contributing   at the L4 superior endplate level to moderate degenerative central canal   stenosis, noting partial effacement of CSF between individual nerveroots   of cauda equina within the narrowed thecal sac.    There is more slight grade 1 anterolisthesis L4 on L5. Spondylolysis   defects are present at this level. Advanced facet arthropathy is present   at this level flattening the thecal sac into the coronal plane and   effacement of intervening CSF consistent with advanced central canal   stenosis. Disc bulging at L4-L5, facet arthropathy and the vertebral   malalignment contribute to moderate to severe right and severe left   foraminal compromise. There is fluid signal intensity within the disc   space. However, there is only mild marginal enhancement.    Lumbar vertebral body heights are maintained.  Marrow signal intensity   within lumbar vertebra is heterogeneous from the degenerativeendplate   changes as noted above  No  osseous expansion or epidural disease is   identified.    SACRUM/VISUALIZED PELVIC BONES:   The sacrum is significant for   transitional morphology of the lumbar sacral junction. The L5-S1 disc   space is hypoplastic. There is secure union on each side. No central   canal or foraminal compromise is noted. The distal thecal sac is seen   rapidly tapering at the mid S1 level. Visualized pelvic bones appear   intact.  SACROILIAC JOINTS:  The sacroiliac joints are incompletely visualized, as   seen intact.    PARASPINAL SOFT TISSUES:    No lesion is identified.  VISUALIZED ABDOMINAL AND PELVIC SOFT TISSUES:   No lesion is identified.    LUMBAR INTERVERTEBRAL DISC LEVELS:    General comments:   Lumbar intervertebral discs demonstrate variable disc   degeneration. Disc height loss greatest at L3-L4. There is variable   signal intensity loss in the long TR images.  No pathologic disc space    enhancement is found.    T12-L1:   No central canal or foraminal stenosis.  L1-L2:    No central canal stenosis. Shallow right foraminal annular   fissure is hyperintense on the long TR images and contributed to his   facet arthropathy to mild-to-moderate right foraminal compromise. The   slightly lower grade left foraminal compromise.  L2-L3:    No central canal stenosis. Diffuse disc bulge with facet   arthropathy contributes to mild-to-moderate right and moderate left   degenerative foraminal compromise.  L3-L4:    See above  L4-L5:    See above  L5-S1:   See above    CNS STRUCTURES:  The distal cord maintains intact morphology and signal intensity.  The   conus normally positioned at T12-L1.   Nerve roots of the cauda equina   appear intact. No pathologic enhancement occurs within the thecal sac.      No nerve root nodularity, clumping or dural adherence is recognized.    ADDITIONAL FINDINGS:   Renal parenchyma is replaced by cystic spaces.   Cystic lesions are also found in the liver. The findings are typical for   a autosomal dominant polycystic kidney disorder. Transplant kidney is   found in the right iliac fossa. See separate body imaging report.      IMPRESSION:    1. CERVICAL SPINE:   C3-C4 endplate changes include degenerative features   but also demonstrate differential vasogenic edema and enhancement,   indeterminate for advanced disc degeneration versus superimposed   infection. Consider correlation with CT to evaluate for erosive change.   No associated abscess collection is recognized. No comparison cervical   spine imaging is found. If available from another location, recommend   direct comparison. Moderate to severe cervical degenerative disc disease   includes C5-C6 broad left central disc protrusion. (disc herniation)    causes ventral cord deformity and multilevel advanced degenerative   foraminal compromise. Small focal lesion along the left ventral ependymal   margin at the cervical medullary junction results in deformity of the   left ventral cord surface, an indeterminant finding, possibly related to   the arthropathy at the C1-C2 articulation. Reversal of the normal   cervical lordosis suggestive of disc pathology and / or muscle spasm    2. THORACIC SPINE:   T2-T3 broad right central disc protrusion (disc   herniation)  causes ventral cord deformity. Grade1 anterior listhesis   T10 on T11 with diffuse disc bulge and  superimposed shallow left central   disc protrusion (disc herniation).  No abnomal enhancement to suggest   infection.    3. LUMBAR SPINE:   Grade 1 anterior listhesis L3 on L4 contributes with   additional degenerative features to high grade right foraminal compromise   and intermediate grade central canal compromise. Grade 1 anterior   listhesis L4 on L5 with spondylolysis contributes to high grade central   canal and left foraminalcompromise ; fluid signal intensity within the   disc space without enhancement is indeterminate ; when compared to   sequence of body CT there appears to be progressive sclerotic   degenerative changes at this level ; recommend follow-up evaluation to   exclude infection. Transitional morphology L5-S1    --- End of Report ---            JULITO NEVAREZ MD; Attending Radiologist  This document has been electronically signed. Nov 1 2023 10:39AM      ACC: [id number] EXAM:  CT ABDOMEN AND PELVIS   ORDERED BY: AMRIT KEMP     PROCEDURE DATE:  10/28/2023          INTERPRETATION:  CLINICAL INFORMATION: Right lower quadrant pain    COMPARISON: CT abdomen pelvis 5/23/2019    CONTRAST/COMPLICATIONS:  IV Contrast: NONE  Oral Contrast: NONE  Complications: None reported at time of study completion    PROCEDURE:  CT of the Abdomen and Pelvis was performed.  Sagittal and coronal reformats were performed.    FINDINGS:  LOWER CHEST: Bilateral lower lobe linear atelectasis. Coronary artery   calcifications.    LIVER: Redemonstration of innumerable hepatic cysts and scattered   calcifications..  BILE DUCTS: Normal caliber.  GALLBLADDER: Within normal limits.  SPLEEN: Within normal limits.  PANCREAS: Main pancreatic duct dilation measuring up to 6 mm in the head,   previously measuring 4 mm. No definite pancreatic head mass versus   noncontrast exam. No peripancreatic inflammatory changes.  ADRENALS: Within normal limits.  KIDNEYS/URETERS: Enlarged bilateral multicystic native kidneys. Right   lower quadrant transplant kidney is unremarkable without hydronephrosis.    BLADDER: Wall thickening and perivesicular fat stranding.  REPRODUCTIVE ORGANS: Prostate within normal limits.    BOWEL: No bowel obstruction. Appendix is normal. Moderate fecal burden in   the right and transverse colons. No colonic wall thickening.  PERITONEUM: No ascites. No free air or intraperitoneal collection.  VESSELS: Within normal limits.  RETROPERITONEUM/LYMPH NODES: No lymphadenopathy.  ABDOMINAL WALL: Within normal limits.  BONES: Degenerative changes. Grade 1 anterior spondylolisthesis of L5 on   S1 secondary to bilateral pars interarticularis defects at L5.    IMPRESSION:  Normal appendix. No bowel wall thickening or acute inflammatory changes.    Bladder wall thickening and perivesicular fat stranding concerning for   cystitis. Please correlate clinically with urinalysis and urine culture.   No hydronephrosis or perinephric stranding in the native kidneys orright   lower quadrant renal transplant.    Mild increase main pancreatic duct dilatation without evidence of   pancreatic head mass. Further evaluation with contrast-enhanced   pancreatic MRI/MRCP if clinically indicated.      --- End of Report ---           DANIEL BAUER MD; Resident Radiologist  This document has been electronically signed.  CHERYL MILLER MD; Attending Radiologist  This document has been electronically signed. Oct 28 2023 10:19PM      A/P: 63y Male with lumbar stenosis, with MRI read L4-5 fluid signal within disc space with sclerotic degen changes, no abscess  Pain control  ESR/CRP 9/3  WBAT   Medical management per primary team   abd per ID   No acute orthopaedic surgical intervention indicated at this time. This patient is orthopaedically stable for discharge.   Patient to follow up with Dr. Chaidez as an outpatient for further evaluation and management as needed  All of the patient's questions and concerns were answered and addressed.

## 2023-11-02 NOTE — PROGRESS NOTE ADULT - ASSESSMENT
64 yo 59 kg m w pmh pld + pkd s/p renal transplant, htn, hld, gerd, chronic lbp s/p back surgery, alc abuse, pancreatitis, anx/dep/bipolar disorder, p/w right flank/back/lower quadrant pain; in er, unclear etiology; possibly multifactorial eg cystitis, constipation, pancreatitis, referred chronic lower back pain; admit to medicine for further mgmt       Acute UTI --> Ruled Out   - Afebrile, no leukocytosis, HDS   - UA with bland sediment  - CT with "wall thickening and perivesicular fat stranding"  - UCx negative, As per Transplant ID, Rocephin has been DC'd   - Monitor for fever, changes in white count  - Transplant ID eval appreciated; F/u recs     Acute on Chronic lower back pain.   - H/O chronic LBP s/p back surgery  - No associated red flag symps/signs   - Analgesics + muscle relaxants as need --> gabapentin increased, monitor   - MR C/T/LS Spine w/ Vasogenic edema and enhancement, disc herniation versus infection, No abscess, degenerative disc disease --> F/u Ortho recs   - X-rays as noted; Ortho consult and follow up requested   - Transplant ID evaluation appreciated; F/u recs --> Discussed with Transplant ID. ESR and CRP negative X 2    - OrthoSpine eval consulted; Discussed with Ortho x 2 --> Pending official recs. --> ? Discitis / Osteomyelitis as per Orhto. Per ID, patient may require biopsy. Imaging findings reviewed in detail and at length with patient. Patient is aware that imaging is concerning for possible infection and is refusing further work up at this time or waiting to be seen by specialists. Patient is aware that Ortho spine consult is pending, Trasplant ID follow up is pending and that he may have an active infection in his spine however he continues to refuse staying in patient for continued care. Patient understands that he may require IV antibiotics and/or biopsy for management of MR findings. Patient states that he "will sign himself out." Patient understands risks associated to signing out AMA without completion of work up - including severe infection with may lead to death.   - Pain control  --> Chronic pain consulted   - PT/OT eval      Constipation.   - Imaging with, "Moderate fecal burden in the right and transverse colons" - may be contributing to pain  monitor/trend daily   - C/w bowel regimen -- reports having BM   - Adjust to maintain goal 1-2 BM per day     Pancreatic duct dilated.   - H/O alc abuse, pancreatitis  - CT with "Main pancreatic duct dilation measuring up to 6 mm in the head, previously measuring 4 mm. No definite pancreatic head mass versus noncontrast exam. No peripancreatic inflammatory changes."  - Lipase WNL, LFTs WNL    - Serial abdominal exams  - MRCP with Polycystic liver and kidneys, negative for pancreatic mass   - Trend Hepatic panel   - Outpatient GI follow u p    Renal transplant recipient.   - H/O PLD + PKD  - Baseline cr ~1.7; currently at baseline   - Monitor strict I/Os, daily weights, UO, BUN/Cr, volume status, acid-base balance, electrolytes  - F/u urine studies, transplant us, tacro lvl; mbd work up  - Avoid nephrotoxic agents; appropriate dose adjustments for all renally cleared medications  - Cont home prednisone (w bactrim for pjp ppx), tacrolimus 2 morning + 2.5 evening, mycophenolate   - Cr down-trending. C/w gentle IVF for hydration. Check bladder scan to R/o retention   - Transplant renal evaluation appreciated; F/u recs -->Outpatient follow up    HTN (hypertension).   - Cont home cozaar.  - Monitor BP, VS and patient closely     HLD (hyperlipidemia).   - Cont home omega 3 + pravachol.    GERD (gastroesophageal reflux disease).   - Cont home famotidine.    Anxiety and depression.   - H/O anx/dep/bipolar disorder  - Cont home wellbutrin.    PPX    Discussed with Attending, Transplant ID, Ortho and ACP.

## 2023-11-02 NOTE — DISCHARGE NOTE PROVIDER - CARE PROVIDER_API CALL
Harmony Farahir  Internal Medicine  [address] [place] [address]  Phone: [phone]  Fax: [phone]  Follow Up Time:    Harmony Farah  Internal Medicine  [address] [place] [address]  Phone: [phone]  Fax: [phone]  Follow Up Time:     Tevin Chaidez  Orthopaedic Surgery  [address] [place] [address], [facility] [address]  [place] [address]  Phone: [phone]  Fax: [phone]  Follow Up Time:    Harmony Farah  Internal Medicine  [address] [place] [address]  Phone: [phone]  Fax: [phone]  Follow Up Time:     eTvin Chaidez  Orthopaedic Surgery  [address] [facility], [address]  [place] [address]  Phone: [phone]  Fax: [phone]  Follow Up Time:     Jaun Cedeno  Nephrology  [address] [place] [address]  Phone: [phone]  Fax: [phone]  Follow Up Time:

## 2023-11-02 NOTE — DISCHARGE NOTE PROVIDER - CARE PROVIDERS DIRECT ADDRESSES
,DirectAddress_Unknown ,DirectAddress_Unknown,DirectAddress_Unknown ,DirectAddress_Unknown,DirectAddress_Unknown,[email or web address]

## 2023-11-13 PROCEDURE — 81001 URINALYSIS AUTO W/SCOPE: CPT

## 2023-11-13 PROCEDURE — 83036 HEMOGLOBIN GLYCOSYLATED A1C: CPT

## 2023-11-13 PROCEDURE — 72158 MRI LUMBAR SPINE W/O & W/DYE: CPT

## 2023-11-13 PROCEDURE — 83935 ASSAY OF URINE OSMOLALITY: CPT

## 2023-11-13 PROCEDURE — 82435 ASSAY OF BLOOD CHLORIDE: CPT

## 2023-11-13 PROCEDURE — 82310 ASSAY OF CALCIUM: CPT

## 2023-11-13 PROCEDURE — 96375 TX/PRO/DX INJ NEW DRUG ADDON: CPT

## 2023-11-13 PROCEDURE — 85025 COMPLETE CBC W/AUTO DIFF WBC: CPT

## 2023-11-13 PROCEDURE — 82570 ASSAY OF URINE CREATININE: CPT

## 2023-11-13 PROCEDURE — 97116 GAIT TRAINING THERAPY: CPT

## 2023-11-13 PROCEDURE — 72156 MRI NECK SPINE W/O & W/DYE: CPT

## 2023-11-13 PROCEDURE — 85014 HEMATOCRIT: CPT

## 2023-11-13 PROCEDURE — 72074 X-RAY EXAM THORAC SPINE4/>VW: CPT

## 2023-11-13 PROCEDURE — 80197 ASSAY OF TACROLIMUS: CPT

## 2023-11-13 PROCEDURE — 80180 DRUG SCRN QUAN MYCOPHENOLATE: CPT

## 2023-11-13 PROCEDURE — 85027 COMPLETE CBC AUTOMATED: CPT

## 2023-11-13 PROCEDURE — 84300 ASSAY OF URINE SODIUM: CPT

## 2023-11-13 PROCEDURE — 83605 ASSAY OF LACTIC ACID: CPT

## 2023-11-13 PROCEDURE — 84443 ASSAY THYROID STIM HORMONE: CPT

## 2023-11-13 PROCEDURE — 99285 EMERGENCY DEPT VISIT HI MDM: CPT

## 2023-11-13 PROCEDURE — 85730 THROMBOPLASTIN TIME PARTIAL: CPT

## 2023-11-13 PROCEDURE — 83690 ASSAY OF LIPASE: CPT

## 2023-11-13 PROCEDURE — 72157 MRI CHEST SPINE W/O & W/DYE: CPT

## 2023-11-13 PROCEDURE — 97165 OT EVAL LOW COMPLEX 30 MIN: CPT

## 2023-11-13 PROCEDURE — 83970 ASSAY OF PARATHORMONE: CPT

## 2023-11-13 PROCEDURE — 85610 PROTHROMBIN TIME: CPT

## 2023-11-13 PROCEDURE — 82947 ASSAY GLUCOSE BLOOD QUANT: CPT

## 2023-11-13 PROCEDURE — 82803 BLOOD GASES ANY COMBINATION: CPT

## 2023-11-13 PROCEDURE — 85018 HEMOGLOBIN: CPT

## 2023-11-13 PROCEDURE — 82306 VITAMIN D 25 HYDROXY: CPT

## 2023-11-13 PROCEDURE — 84295 ASSAY OF SERUM SODIUM: CPT

## 2023-11-13 PROCEDURE — 82330 ASSAY OF CALCIUM: CPT

## 2023-11-13 PROCEDURE — 74176 CT ABD & PELVIS W/O CONTRAST: CPT | Mod: MA

## 2023-11-13 PROCEDURE — 86140 C-REACTIVE PROTEIN: CPT

## 2023-11-13 PROCEDURE — 72100 X-RAY EXAM L-S SPINE 2/3 VWS: CPT

## 2023-11-13 PROCEDURE — A9585: CPT

## 2023-11-13 PROCEDURE — 72040 X-RAY EXAM NECK SPINE 2-3 VW: CPT

## 2023-11-13 PROCEDURE — 84100 ASSAY OF PHOSPHORUS: CPT

## 2023-11-13 PROCEDURE — 84133 ASSAY OF URINE POTASSIUM: CPT

## 2023-11-13 PROCEDURE — 80061 LIPID PANEL: CPT

## 2023-11-13 PROCEDURE — 97110 THERAPEUTIC EXERCISES: CPT

## 2023-11-13 PROCEDURE — 84132 ASSAY OF SERUM POTASSIUM: CPT

## 2023-11-13 PROCEDURE — 97162 PT EVAL MOD COMPLEX 30 MIN: CPT

## 2023-11-13 PROCEDURE — 93005 ELECTROCARDIOGRAM TRACING: CPT

## 2023-11-13 PROCEDURE — 84156 ASSAY OF PROTEIN URINE: CPT

## 2023-11-13 PROCEDURE — 36415 COLL VENOUS BLD VENIPUNCTURE: CPT

## 2023-11-13 PROCEDURE — 80053 COMPREHEN METABOLIC PANEL: CPT

## 2023-11-13 PROCEDURE — 80048 BASIC METABOLIC PNL TOTAL CA: CPT

## 2023-11-13 PROCEDURE — 96374 THER/PROPH/DIAG INJ IV PUSH: CPT

## 2023-11-13 PROCEDURE — 74183 MRI ABD W/O CNTR FLWD CNTR: CPT

## 2023-11-13 PROCEDURE — 85652 RBC SED RATE AUTOMATED: CPT

## 2023-11-13 PROCEDURE — 87086 URINE CULTURE/COLONY COUNT: CPT

## 2023-11-13 PROCEDURE — 83735 ASSAY OF MAGNESIUM: CPT

## 2023-11-13 PROCEDURE — 76776 US EXAM K TRANSPL W/DOPPLER: CPT

## 2023-11-13 PROCEDURE — 84540 ASSAY OF URINE/UREA-N: CPT

## 2024-01-25 ENCOUNTER — APPOINTMENT (OUTPATIENT)
Dept: INTERNAL MEDICINE | Facility: CLINIC | Age: 64
End: 2024-01-25
Payer: MEDICARE

## 2024-01-25 VITALS
SYSTOLIC BLOOD PRESSURE: 128 MMHG | HEIGHT: 66 IN | OXYGEN SATURATION: 99 % | BODY MASS INDEX: 19.61 KG/M2 | RESPIRATION RATE: 16 BRPM | DIASTOLIC BLOOD PRESSURE: 80 MMHG | HEART RATE: 70 BPM | TEMPERATURE: 98 F | WEIGHT: 122 LBS

## 2024-01-25 DIAGNOSIS — J01.90 ACUTE SINUSITIS, UNSPECIFIED: ICD-10-CM

## 2024-01-25 DIAGNOSIS — R35.0 FREQUENCY OF MICTURITION: ICD-10-CM

## 2024-01-25 LAB
BILIRUB UR QL STRIP: NEGATIVE
GLUCOSE UR-MCNC: NEGATIVE
HCG UR QL: 0.2 EU/DL
HGB UR QL STRIP.AUTO: NEGATIVE
KETONES UR-MCNC: NEGATIVE
LEUKOCYTE ESTERASE UR QL STRIP: NEGATIVE
NITRITE UR QL STRIP: NEGATIVE
PH UR STRIP: 7
PROT UR STRIP-MCNC: NEGATIVE
SP GR UR STRIP: 1.02

## 2024-01-25 PROCEDURE — 36415 COLL VENOUS BLD VENIPUNCTURE: CPT

## 2024-01-25 PROCEDURE — G2211 COMPLEX E/M VISIT ADD ON: CPT

## 2024-01-25 PROCEDURE — 99214 OFFICE O/P EST MOD 30 MIN: CPT

## 2024-01-25 PROCEDURE — 81003 URINALYSIS AUTO W/O SCOPE: CPT | Mod: QW

## 2024-01-25 RX ORDER — CYCLOBENZAPRINE HYDROCHLORIDE 10 MG/1
10 TABLET, FILM COATED ORAL
Qty: 30 | Refills: 0 | Status: COMPLETED | COMMUNITY
Start: 2020-08-14 | End: 2024-01-25

## 2024-01-25 RX ORDER — BUSPIRONE HYDROCHLORIDE 10 MG/1
10 TABLET ORAL
Qty: 90 | Refills: 3 | Status: COMPLETED | COMMUNITY
Start: 2018-01-29 | End: 2024-01-25

## 2024-01-25 RX ORDER — FAMOTIDINE 20 MG/1
20 TABLET, FILM COATED ORAL
Qty: 90 | Refills: 2 | Status: COMPLETED | COMMUNITY
Start: 2020-04-22 | End: 2024-01-25

## 2024-01-25 RX ORDER — BUPROPION HYDROCHLORIDE 75 MG/1
75 TABLET, FILM COATED ORAL TWICE DAILY
Qty: 90 | Refills: 0 | Status: COMPLETED | COMMUNITY
Start: 2017-04-10 | End: 2024-01-25

## 2024-01-26 ENCOUNTER — APPOINTMENT (OUTPATIENT)
Dept: INTERNAL MEDICINE | Facility: CLINIC | Age: 64
End: 2024-01-26

## 2024-01-26 ENCOUNTER — NON-APPOINTMENT (OUTPATIENT)
Age: 64
End: 2024-01-26

## 2024-01-26 LAB
ANION GAP SERPL CALC-SCNC: 12 MMOL/L
BUN SERPL-MCNC: 25 MG/DL
CALCIUM SERPL-MCNC: 10.5 MG/DL
CHLORIDE SERPL-SCNC: 104 MMOL/L
CO2 SERPL-SCNC: 24 MMOL/L
CREAT SERPL-MCNC: 1.54 MG/DL
EGFR: 50 ML/MIN/1.73M2
GLUCOSE SERPL-MCNC: 102 MG/DL
HCT VFR BLD CALC: 45.8 %
HGB BLD-MCNC: 14.7 G/DL
INFLUENZA A RESULT: NOT DETECTED
INFLUENZA B RESULT: NOT DETECTED
MCHC RBC-ENTMCNC: 31.6 PG
MCHC RBC-ENTMCNC: 32.1 GM/DL
MCV RBC AUTO: 98.5 FL
PLATELET # BLD AUTO: 187 K/UL
POTASSIUM SERPL-SCNC: 5.5 MMOL/L
RBC # BLD: 4.65 M/UL
RBC # FLD: 13 %
RESP SYN VIRUS RESULT: NOT DETECTED
SARS-COV-2 RESULT: NOT DETECTED
SODIUM SERPL-SCNC: 140 MMOL/L
WBC # FLD AUTO: 8.17 K/UL

## 2024-01-28 NOTE — OCCUPATIONAL THERAPY INITIAL EVALUATION ADULT - PERSONAL SAFETY AND JUDGMENT, REHAB EVAL
Assessment  35yo  @38+6 wga presenting in labor. Will admit to LND  Renal pyelectasis b/l - evaluated by pediatric urology and recommended prophylactic amox and sono 2 weeks post natally  LGA. EFW 90th%tile, AC 98%tile  GBS unk    Plan  1. Admit to LND. Routine Labs. IVF.  2. Exp mgmt  3. Fetus: cat 1 tracing. VTX. EFW 3800g by sono. Continuous EFM. Sono. No concerns.  4. Prenatal issues:   Renal pyelectasis b/l - evaluated by pediatric urology and recommended prophylactic amox and sono 2 weeks post natally  LGA. EFW 90th%tile, AC 98%tile  5. GBS unk  6. Pain: IV pain meds/epidural PRN    Patient discussed with attending physician, Dr. Zen Johns MD PGY1   intact Assessment  33yo  @38+6 wga presenting in labor. Will admit to LND  Renal pyelectasis b/l - evaluated by pediatric urology and recommended prophylactic amox and sono 2 weeks post natally  LGA. EFW 90th%tile, AC 98%tile  GBS unk    Plan  1. Admit to LND. Routine Labs. IVF.  2. Exp mgmt  3. Fetus: cat 1 tracing. VTX. EFW 3800g by sono. Continuous EFM. Sono. No concerns.  4. Prenatal issues:   Renal pyelectasis b/l - evaluated by pediatric urology and recommended prophylactic amoxil post natally and sono 2 weeks post natally  LGA. EFW 90th%tile, AC 98%tile  5. GBS unk  6. Pain: IV pain meds/epidural PRN    Patient discussed with attending physician, Dr. Zen Johns MD PGY1

## 2024-01-29 ENCOUNTER — TRANSCRIPTION ENCOUNTER (OUTPATIENT)
Age: 64
End: 2024-01-29

## 2024-01-30 LAB — BACTERIA UR CULT: NORMAL

## 2024-02-02 ENCOUNTER — APPOINTMENT (OUTPATIENT)
Dept: TRANSPLANT | Facility: CLINIC | Age: 64
End: 2024-02-02

## 2024-02-12 ENCOUNTER — APPOINTMENT (OUTPATIENT)
Dept: INTERNAL MEDICINE | Facility: CLINIC | Age: 64
End: 2024-02-12
Payer: MEDICARE

## 2024-02-12 ENCOUNTER — APPOINTMENT (OUTPATIENT)
Dept: TRANSPLANT | Facility: CLINIC | Age: 64
End: 2024-02-12

## 2024-02-12 ENCOUNTER — NON-APPOINTMENT (OUTPATIENT)
Age: 64
End: 2024-02-12

## 2024-02-12 VITALS
OXYGEN SATURATION: 97 % | WEIGHT: 124 LBS | TEMPERATURE: 98.6 F | HEIGHT: 66 IN | RESPIRATION RATE: 16 BRPM | BODY MASS INDEX: 19.93 KG/M2 | DIASTOLIC BLOOD PRESSURE: 84 MMHG | HEART RATE: 71 BPM | SYSTOLIC BLOOD PRESSURE: 139 MMHG

## 2024-02-12 DIAGNOSIS — R05.8 OTHER SPECIFIED COUGH: ICD-10-CM

## 2024-02-12 DIAGNOSIS — I10 ESSENTIAL (PRIMARY) HYPERTENSION: ICD-10-CM

## 2024-02-12 DIAGNOSIS — Z29.9 ENCOUNTER FOR PROPHYLACTIC MEASURES, UNSPECIFIED: ICD-10-CM

## 2024-02-12 DIAGNOSIS — Q61.3 POLYCYSTIC KIDNEY, UNSPECIFIED: ICD-10-CM

## 2024-02-12 DIAGNOSIS — Z13.31 ENCOUNTER FOR SCREENING FOR DEPRESSION: ICD-10-CM

## 2024-02-12 DIAGNOSIS — Z00.00 ENCOUNTER FOR GENERAL ADULT MEDICAL EXAMINATION W/OUT ABNORMAL FINDINGS: ICD-10-CM

## 2024-02-12 DIAGNOSIS — Z13.6 ENCOUNTER FOR SCREENING FOR CARDIOVASCULAR DISORDERS: ICD-10-CM

## 2024-02-12 DIAGNOSIS — Z12.5 ENCOUNTER FOR SCREENING FOR MALIGNANT NEOPLASM OF PROSTATE: ICD-10-CM

## 2024-02-12 DIAGNOSIS — Z12.11 ENCOUNTER FOR SCREENING FOR MALIGNANT NEOPLASM OF COLON: ICD-10-CM

## 2024-02-12 PROCEDURE — G0439: CPT

## 2024-02-12 PROCEDURE — 36415 COLL VENOUS BLD VENIPUNCTURE: CPT

## 2024-02-12 PROCEDURE — 93000 ELECTROCARDIOGRAM COMPLETE: CPT

## 2024-02-12 RX ORDER — DOXYCYCLINE 100 MG/1
100 TABLET, FILM COATED ORAL TWICE DAILY
Qty: 14 | Refills: 0 | Status: COMPLETED | COMMUNITY
Start: 2024-01-25 | End: 2024-02-12

## 2024-02-12 RX ORDER — BENZOYL PEROXIDE 5 G/100G
5 LIQUID TOPICAL
Qty: 1 | Refills: 3 | Status: COMPLETED | COMMUNITY
Start: 2022-11-01 | End: 2024-02-12

## 2024-02-12 NOTE — HISTORY OF PRESENT ILLNESS
[de-identified] : 63 M here for annual well visit. Patient's blood work was drawn and medications reviewed. Patient's past medical history was reviewed, allergies verified and problems were identified and assessed. Patients medications were reviewed. Patient is feeling well from last visit.  PCKD s/p kidney transplant 2017 following Dr. Cedeno HTN - controlled. Chronic Back pain, following ortho and pain - Dr. Castillo and Kameron Pt is daily exercising? Yes  Vaccinations: covid -utd flu - deferred tdap- got in past 10 years shingles - deferred pcv- deferred    Screening: colonoscopy: due for colonoscopy this nov 2024 - last  2019 -  review and interpreted last report with patient.  PSA  - due for blood check today  Meds reviewed:  asa 81mg Gabapentin 300mg TID Losartan 100mg qd Mycophenolate 250mg BID Omega 3 BID Pravastatin 40mg qd Prednisone 5mg  qd Bactrim 400-80 qd Tacrolimus 0.5mg  Tamsuloslin 0.4mg   Depresison stable: mood stable denies si/hi not on any medication

## 2024-02-12 NOTE — HEALTH RISK ASSESSMENT
[No] : No [No falls in past year] : Patient reported no falls in the past year [0] : 2) Feeling down, depressed, or hopeless: Not at all (0) [PHQ-2 Negative - No further assessment needed] : PHQ-2 Negative - No further assessment needed [HIV Test offered] : HIV Test offered [Hepatitis C test offered] : Hepatitis C test offered [None] : None [Fully functional (bathing, dressing, toileting, transferring, walking, feeding)] : Fully functional (bathing, dressing, toileting, transferring, walking, feeding) [Fully functional (using the telephone, shopping, preparing meals, housekeeping, doing laundry, using] : Fully functional and needs no help or supervision to perform IADLs (using the telephone, shopping, preparing meals, housekeeping, doing laundry, using transportation, managing medications and managing finances) [Reports normal functional visual acuity (ie: able to read med bottle)] : Reports normal functional visual acuity [Smoke Detector] : smoke detector [Carbon Monoxide Detector] : carbon monoxide detector [Safety elements used in home] : safety elements used in home [Seat Belt] :  uses seat belt [Sunscreen] : uses sunscreen [Never] : Never [Alone] : lives alone [On disability] : on disability [Feels Safe at Home] : Feels safe at home [With Patient/Caregiver] : , with patient/caregiver [Designated Healthcare Proxy] : Designated healthcare proxy [Name: ___] : Health Care Proxy's Name: [unfilled]  [Relationship: ___] : Relationship: [unfilled] [Aggressive treatment] : aggressive treatment [I will adhere to the patient's wishes.] : I will adhere to the patient's wishes. [Time Spent: ___ minutes] : Time Spent: [unfilled] minutes [Change in mental status noted] : No change in mental status noted [Language] : denies difficulty with language [Behavior] : denies difficulty with behavior [Learning/Retaining New Information] : denies difficulty learning/retaining new information [Handling Complex Tasks] : denies difficulty handling complex tasks [Reasoning] : denies difficulty with reasoning [Spatial Ability and Orientation] : denies difficulty with spatial ability and orientation [Sexually Active] : not sexually active [High Risk Behavior] : no high risk behavior [Reports changes in hearing] : Reports no changes in hearing [Reports changes in vision] : Reports no changes in vision [Reports changes in dental health] : Reports no changes in dental health [Guns at Home] : no guns at home [TB Exposure] : is not being exposed to tuberculosis [Caregiver Concerns] : does not have caregiver concerns [FreeTextEntry2] :  and cook  [AdvancecareDate] : 2/12/2024 [FreeTextEntry4] : full code

## 2024-02-12 NOTE — ASSESSMENT
[FreeTextEntry1] : #CPE f/u blood and urine ekg: sinus bradycarida - asymptomatic  I spent 5 minutes with the patient conducting a screen using approved screening tool (PHQ2) and discussing results of said screen with patient during this encounter. The patient was counseled to get regular exercise and sleep, wear sunblock and wear a safety belt in the car. Check CBC, CMP, Hgba1c , TSH and UA Colonoscopy  review and interpreted previous report due Ophtho Derm Dental PSA # HTN Continue Medication DASH DIET Exercise. Lower your salt intake. Learn relaxation methods. Do not add salt while preparing or eating your meals. Try to avoid red meats, sweets and sugar containing beverages. Ensure you are eating 5 fruits and vegetables a day as well as whole grains. #HL Continue current management Compliant w/ medication No side effects Follow low cholesterol diet Discussed healthy diet and lifestyle. low cholesterol diet, life style modification, increase exercise, more fruits and vegetables less sweet, carbs and starch foods #Polycystic kidney disease - no acute complaints - f/u nephrology  #History of Depression mood stable denies si/hi not on any medication feels good pt agrees and understands plan via teach back method. all questions answered.

## 2024-02-12 NOTE — REVIEW OF SYSTEMS
[Negative] : Heme/Lymph [Suicidal] : not suicidal [Insomnia] : no insomnia [Anxiety] : no anxiety [Depression] : no depression [de-identified] : mood stable denies si/hi

## 2024-02-12 NOTE — PHYSICAL EXAM
[No Acute Distress] : no acute distress [Normal Sclera/Conjunctiva] : normal sclera/conjunctiva [PERRL] : pupils equal round and reactive to light [Normal Oropharynx] : the oropharynx was normal [No Lymphadenopathy] : no lymphadenopathy [No Respiratory Distress] : no respiratory distress  [No Accessory Muscle Use] : no accessory muscle use [Clear to Auscultation] : lungs were clear to auscultation bilaterally [Declined Breast Exam] : declined breast exam  [Soft] : abdomen soft [Non Tender] : non-tender [Non-distended] : non-distended [Normal Bowel Sounds] : normal bowel sounds [Declined Rectal Exam] : declined rectal exam [Normal Posterior Cervical Nodes] : no posterior cervical lymphadenopathy [Normal Anterior Cervical Nodes] : no anterior cervical lymphadenopathy [No CVA Tenderness] : no CVA  tenderness [No Spinal Tenderness] : no spinal tenderness [No Joint Swelling] : no joint swelling [Grossly Normal Strength/Tone] : grossly normal strength/tone [No Rash] : no rash [No Focal Deficits] : no focal deficits [Normal Affect] : the affect was normal [Alert and Oriented x3] : oriented to person, place, and time [Normal Insight/Judgement] : insight and judgment were intact [Normal Outer Ear/Nose] : the outer ears and nose were normal in appearance [de-identified] : RRR [de-identified] : port in left extremity [de-identified] : declined [de-identified] : mood stable denies si/hi

## 2024-02-14 ENCOUNTER — NON-APPOINTMENT (OUTPATIENT)
Age: 64
End: 2024-02-14

## 2024-02-14 ENCOUNTER — APPOINTMENT (OUTPATIENT)
Dept: NEPHROLOGY | Facility: CLINIC | Age: 64
End: 2024-02-14

## 2024-02-14 LAB
25(OH)D3 SERPL-MCNC: 58.2 NG/ML
ALBUMIN SERPL ELPH-MCNC: 4.6 G/DL
ALP BLD-CCNC: 59 U/L
ALT SERPL-CCNC: 7 U/L
ANION GAP SERPL CALC-SCNC: 11 MMOL/L
APPEARANCE: CLEAR
AST SERPL-CCNC: 17 U/L
BACTERIA: NEGATIVE /HPF
BILIRUB SERPL-MCNC: 0.3 MG/DL
BILIRUBIN URINE: NEGATIVE
BLOOD URINE: NEGATIVE
BUN SERPL-MCNC: 22 MG/DL
CALCIUM OXALATE CRYSTALS: PRESENT
CALCIUM SERPL-MCNC: 10.4 MG/DL
CAST: 2 /LPF
CHLORIDE SERPL-SCNC: 103 MMOL/L
CHOLEST SERPL-MCNC: 206 MG/DL
CO2 SERPL-SCNC: 26 MMOL/L
COLOR: YELLOW
CREAT SERPL-MCNC: 1.53 MG/DL
EGFR: 50 ML/MIN/1.73M2
EPITHELIAL CELLS: 1 /HPF
ESTIMATED AVERAGE GLUCOSE: 114 MG/DL
FOLATE SERPL-MCNC: 13.4 NG/ML
GLUCOSE QUALITATIVE U: NEGATIVE MG/DL
GLUCOSE SERPL-MCNC: 107 MG/DL
HBA1C MFR BLD HPLC: 5.6 %
HCT VFR BLD CALC: 41.8 %
HDLC SERPL-MCNC: 77 MG/DL
HGB BLD-MCNC: 13.3 G/DL
KETONES URINE: ABNORMAL MG/DL
LDLC SERPL CALC-MCNC: 100 MG/DL
LEUKOCYTE ESTERASE URINE: NEGATIVE
MCHC RBC-ENTMCNC: 31.5 PG
MCHC RBC-ENTMCNC: 31.8 GM/DL
MCV RBC AUTO: 99.1 FL
MICROSCOPIC-UA: NORMAL
NITRITE URINE: NEGATIVE
NONHDLC SERPL-MCNC: 129 MG/DL
PH URINE: 6
PLATELET # BLD AUTO: 210 K/UL
POTASSIUM SERPL-SCNC: 4.5 MMOL/L
PROT SERPL-MCNC: 6.5 G/DL
PROTEIN URINE: 30 MG/DL
PSA SERPL-MCNC: 10.2 NG/ML
RBC # BLD: 4.22 M/UL
RBC # FLD: 13.2 %
RED BLOOD CELLS URINE: 1 /HPF
REVIEW: NORMAL
SODIUM SERPL-SCNC: 140 MMOL/L
SPECIFIC GRAVITY URINE: >1.03
TRIGL SERPL-MCNC: 172 MG/DL
TSH SERPL-ACNC: 1.47 UIU/ML
UROBILINOGEN URINE: 1 MG/DL
VIT B12 SERPL-MCNC: 1271 PG/ML
WBC # FLD AUTO: 6.85 K/UL
WHITE BLOOD CELLS URINE: 0 /HPF

## 2024-02-20 ENCOUNTER — NON-APPOINTMENT (OUTPATIENT)
Age: 64
End: 2024-02-20

## 2024-03-27 ENCOUNTER — RX RENEWAL (OUTPATIENT)
Age: 64
End: 2024-03-27

## 2024-03-27 RX ORDER — PRAVASTATIN SODIUM 40 MG/1
40 TABLET ORAL
Qty: 3 | Refills: 2 | Status: ACTIVE | COMMUNITY
Start: 2017-08-31 | End: 1900-01-01

## 2024-03-27 RX ORDER — LOSARTAN POTASSIUM 100 MG/1
100 TABLET, FILM COATED ORAL DAILY
Qty: 90 | Refills: 2 | Status: ACTIVE | COMMUNITY
Start: 2018-07-03 | End: 1900-01-01

## 2024-04-25 ENCOUNTER — APPOINTMENT (OUTPATIENT)
Dept: INTERNAL MEDICINE | Facility: CLINIC | Age: 64
End: 2024-04-25
Payer: MEDICARE

## 2024-04-25 VITALS
OXYGEN SATURATION: 98 % | TEMPERATURE: 98 F | BODY MASS INDEX: 19.29 KG/M2 | HEART RATE: 66 BPM | RESPIRATION RATE: 16 BRPM | HEIGHT: 66 IN | SYSTOLIC BLOOD PRESSURE: 98 MMHG | WEIGHT: 120 LBS | DIASTOLIC BLOOD PRESSURE: 64 MMHG

## 2024-04-25 DIAGNOSIS — M47.816 SPONDYLOSIS W/OUT MYELOPATHY OR RADICULOPATHY, LUMBAR REGION: ICD-10-CM

## 2024-04-25 DIAGNOSIS — H10.32 UNSPECIFIED ACUTE CONJUNCTIVITIS, LEFT EYE: ICD-10-CM

## 2024-04-25 PROCEDURE — G2211 COMPLEX E/M VISIT ADD ON: CPT

## 2024-04-25 PROCEDURE — 99214 OFFICE O/P EST MOD 30 MIN: CPT

## 2024-04-25 RX ORDER — OFLOXACIN 3 MG/ML
0.3 SOLUTION/ DROPS OPHTHALMIC
Qty: 1 | Refills: 0 | Status: ACTIVE | COMMUNITY
Start: 2024-04-25 | End: 1900-01-01

## 2024-04-25 NOTE — HISTORY OF PRESENT ILLNESS
[FreeTextEntry8] : 63 F C/o  Left Eye Erythema x 3 days Denies watery eye discharge for months. + itching  + contact lens use Denies mucoid or purulent discharge  Denies stuck together in am. No periocular tenderness. URI sx - Denies  pt denies eye pain, vision changes, photosensitivity, photophobia, trauma to eye Pt denies any flashing lights  has history of  floaters pt denies any foreign objects going into eyes. In addition, pt has chronic back pain - need to see orthospine need referral no acute symptoms taking tylenol otc due to hx of one kidney no focal neurological deficits or changes in urination or defication needs referral to see ortho

## 2024-04-25 NOTE — ASSESSMENT
[FreeTextEntry1] : #Acute Conjunctvitis  # Conjunctivitis Counseling on good hand hygiene, no contact lens use while sx present. Trial of cool compresses and liberal use of refrigerated artificial tears. ofloxacin 0.3 1gtt qid x 7 days denies any allgeries to fluroquinalones  #Chronic Back Pain tylenol prn pain f/u orthospine no red flags Physical activity as tolerated, encouraged pt to stay active Reassured pain will most likely resolve in a few weeks Lifestyle modification, good lifting techniques pt agrees and understands plan via teach back method. all questions answered.

## 2024-04-25 NOTE — REVIEW OF SYSTEMS
[Redness] : redness [Itching] : itching [Negative] : Psychiatric [Back Pain] : back pain [Fever] : no fever [Chills] : no chills [Fatigue] : no fatigue [Hot Flashes] : no hot flashes [Night Sweats] : no night sweats [Recent Change In Weight] : ~T no recent weight change [Discharge] : no discharge [Pain] : no pain [Dryness] : no dryness [Vision Problems] : no vision problems [FreeTextEntry3] : L conjunctiva erythema and itching [FreeTextEntry9] : chronic back pain

## 2024-04-25 NOTE — PHYSICAL EXAM
[No Acute Distress] : no acute distress [Well Nourished] : well nourished [Well Developed] : well developed [Well-Appearing] : well-appearing [PERRL] : pupils equal round and reactive to light [EOMI] : extraocular movements intact [Normal Outer Ear/Nose] : the outer ears and nose were normal in appearance [Normal Oropharynx] : the oropharynx was normal [No JVD] : no jugular venous distention [No Lymphadenopathy] : no lymphadenopathy [Supple] : supple [No Respiratory Distress] : no respiratory distress  [No Accessory Muscle Use] : no accessory muscle use [Clear to Auscultation] : lungs were clear to auscultation bilaterally [Normal Rate] : normal rate  [Regular Rhythm] : with a regular rhythm [Normal S1, S2] : normal S1 and S2 [Soft] : abdomen soft [Non Tender] : non-tender [Non-distended] : non-distended [Normal Posterior Cervical Nodes] : no posterior cervical lymphadenopathy [Normal Anterior Cervical Nodes] : no anterior cervical lymphadenopathy [No CVA Tenderness] : no CVA  tenderness [No Spinal Tenderness] : no spinal tenderness [No Joint Swelling] : no joint swelling [Grossly Normal Strength/Tone] : grossly normal strength/tone [No Rash] : no rash [Coordination Grossly Intact] : coordination grossly intact [No Focal Deficits] : no focal deficits [Normal Gait] : normal gait [Deep Tendon Reflexes (DTR)] : deep tendon reflexes were 2+ and symmetric [Normal Affect] : the affect was normal [Normal Insight/Judgement] : insight and judgment were intact [de-identified] : L conjunctiva erythema

## 2024-04-25 NOTE — HEALTH RISK ASSESSMENT
[No] : No [No falls in past year] : Patient reported no falls in the past year [0] : 2) Feeling down, depressed, or hopeless: Not at all (0) [PHQ-2 Negative - No further assessment needed] : PHQ-2 Negative - No further assessment needed [Former] : Former [[id number]] : 0

## 2024-05-13 ENCOUNTER — NON-APPOINTMENT (OUTPATIENT)
Age: 64
End: 2024-05-13

## 2024-05-13 ENCOUNTER — APPOINTMENT (OUTPATIENT)
Dept: SPINE | Facility: CLINIC | Age: 64
End: 2024-05-13
Payer: MEDICARE

## 2024-05-13 VITALS
HEIGHT: 66 IN | BODY MASS INDEX: 19.29 KG/M2 | WEIGHT: 120 LBS | DIASTOLIC BLOOD PRESSURE: 72 MMHG | HEART RATE: 69 BPM | OXYGEN SATURATION: 95 % | SYSTOLIC BLOOD PRESSURE: 112 MMHG

## 2024-05-13 DIAGNOSIS — M48.02 SPINAL STENOSIS, CERVICAL REGION: ICD-10-CM

## 2024-05-13 DIAGNOSIS — M48.061 SPINAL STENOSIS, LUMBAR REGION WITHOUT NEUROGENIC CLAUDICATION: ICD-10-CM

## 2024-05-13 DIAGNOSIS — G99.2 SPINAL STENOSIS, CERVICAL REGION: ICD-10-CM

## 2024-05-13 PROCEDURE — 99204 OFFICE O/P NEW MOD 45 MIN: CPT

## 2024-05-24 ENCOUNTER — APPOINTMENT (OUTPATIENT)
Dept: CT IMAGING | Facility: CLINIC | Age: 64
End: 2024-05-24

## 2024-05-24 ENCOUNTER — APPOINTMENT (OUTPATIENT)
Dept: MRI IMAGING | Facility: CLINIC | Age: 64
End: 2024-05-24

## 2024-05-30 ENCOUNTER — APPOINTMENT (OUTPATIENT)
Dept: NEPHROLOGY | Facility: CLINIC | Age: 64
End: 2024-05-30
Payer: MEDICARE

## 2024-05-30 VITALS
DIASTOLIC BLOOD PRESSURE: 84 MMHG | SYSTOLIC BLOOD PRESSURE: 121 MMHG | HEIGHT: 66 IN | OXYGEN SATURATION: 100 % | TEMPERATURE: 98.1 F | BODY MASS INDEX: 18.16 KG/M2 | RESPIRATION RATE: 16 BRPM | HEART RATE: 62 BPM | WEIGHT: 113 LBS

## 2024-05-30 DIAGNOSIS — Z79.899 OTHER LONG TERM (CURRENT) DRUG THERAPY: ICD-10-CM

## 2024-05-30 DIAGNOSIS — Q44.6 CYSTIC DISEASE OF LIVER: ICD-10-CM

## 2024-05-30 DIAGNOSIS — Z94.0 KIDNEY TRANSPLANT STATUS: ICD-10-CM

## 2024-05-30 DIAGNOSIS — R68.81 EARLY SATIETY: ICD-10-CM

## 2024-05-30 PROCEDURE — 99214 OFFICE O/P EST MOD 30 MIN: CPT

## 2024-05-30 RX ORDER — SULFAMETHOXAZOLE AND TRIMETHOPRIM 400; 80 MG/1; MG/1
400-80 TABLET ORAL
Qty: 90 | Refills: 2 | Status: DISCONTINUED | COMMUNITY
Start: 2017-02-24 | End: 2024-05-30

## 2024-05-30 RX ORDER — PREDNISONE 5 MG/1
5 TABLET ORAL
Qty: 90 | Refills: 2 | Status: DISCONTINUED | COMMUNITY
Start: 2017-02-24 | End: 2024-05-30

## 2024-05-30 NOTE — ASSESSMENT
[FreeTextEntry1] : 62 y.o male hx depression, PCKD, HTN here for follow up. He is s/p DDRTx (DCD) 2/24/17;   1.  Renal transplant - immediate function.  Baseline creatinine on ARB is about 1.7.  Tends to fluctuate, BK negative. Had biopsy 5/2017 for elevated creatinine, no rejection.   2.  Immunosuppression - On tacro, pred and MMF 250mgs BId.  Tacro goal 4-6.  Reviewed medications.  Check tacro trough today.  3.  HTN - stable.  4.  Hx Bipolar/ Depression - Needs to f/u with psychiatry or PMD.  Will make appointment with therapist.   5.  PKD:  Also has large liver consistent with polycystic liver.   6.  Polycythemia: Last hemoglobin improved.  Has seen hematology.   7.  BK: resolved.  He is on low dose tacro, pred 5 and MMF 250mgs BID.  Will repeat BK pcr  8.  Back pain:  hx of herniated disk, spinal stenosis, spondylolisthesis.   Following with ortho - on neurontin.  9.  Hyperlipidemia: cont pravachol, restarted Omega 3.  10.  Elevated PSA - needs to see Urology.  discussed with patient.    Labs in 3 months, visit in 6.

## 2024-05-30 NOTE — PHYSICAL EXAM
[General Appearance - Alert] : alert [General Appearance - In No Acute Distress] : in no acute distress [General Appearance - Well Nourished] : well nourished [Sclera] : the sclera and conjunctiva were normal [Extraocular Movements] : extraocular movements were intact [Outer Ear] : the ears and nose were normal in appearance [Neck Appearance] : the appearance of the neck was normal [Neck Cervical Mass (___cm)] : no neck mass was observed [Respiration, Rhythm And Depth] : normal respiratory rhythm and effort [Exaggerated Use Of Accessory Muscles For Inspiration] : no accessory muscle use [Auscultation Breath Sounds / Voice Sounds] : lungs were clear to auscultation bilaterally [Heart Rate And Rhythm] : heart rate was normal and rhythm regular [Heart Sounds] : normal S1 and S2 [Heart Sounds Pericardial Friction Rub] : no pericardial rub [Edema] : there was no peripheral edema [Bowel Sounds] : normal bowel sounds [Abdomen Soft] : soft [Abdomen Tenderness] : non-tender [Cervical Lymph Nodes Enlarged Posterior Bilaterally] : posterior cervical [Cervical Lymph Nodes Enlarged Anterior Bilaterally] : anterior cervical [Supraclavicular Lymph Nodes Enlarged Bilaterally] : supraclavicular [Abnormal Walk] : normal gait [___ (cm) Fistula] : [unfilled] (cm) fistula [] : no rash [No Focal Deficits] : no focal deficits [Oriented To Time, Place, And Person] : oriented to person, place, and time [Affect] : the affect was normal [Impaired Insight] : insight and judgment were intact [FreeTextEntry1] : palpable liver.

## 2024-05-30 NOTE — HISTORY OF PRESENT ILLNESS
[Not Working] : Not working [90: Able to carry normal activity; minor signs or symptoms of disease.] : 90: Able to carry normal activity; minor signs or symptoms of disease.  [FreeTextEntry1] : Pt is a 60 y.o male s/p DDRTx on 2/24/17.  Donor was 19 y.o DCD female with normal creatinine.  Pt had immediate but slow function.  Donor urine culture positive was for yeast.  Pt was on fluconazole early post transplant . \par  Had kidney biopsy 5/2017 - no rejection, c4 d neg.\par  \par  ESRD secondary to PCKD\par  Bipolar disorder. \par  \par   [de-identified] : Pt has not been seen for a year.  Had labs in February, creatinine 1.5 but no tacro trough.  He has been very stressed and not eating well.  Has lost weight.  PSA was high, has not seen urology.

## 2024-05-30 NOTE — REVIEW OF SYSTEMS
[As Noted in HPI] : as noted in HPI [Negative] : Heme/Lymph [Heart Rate Is Fast] : the heart rate was not fast [de-identified] : RLQ incision clean dry. No drainage

## 2024-05-31 LAB
25(OH)D3 SERPL-MCNC: 60.2 NG/ML
ALBUMIN SERPL ELPH-MCNC: 4.4 G/DL
ALP BLD-CCNC: 56 U/L
ALT SERPL-CCNC: 11 U/L
ANION GAP SERPL CALC-SCNC: 12 MMOL/L
APPEARANCE: CLEAR
AST SERPL-CCNC: 14 U/L
BACTERIA: NEGATIVE /HPF
BILIRUB SERPL-MCNC: 0.4 MG/DL
BILIRUBIN URINE: NEGATIVE
BLOOD URINE: NEGATIVE
BUN SERPL-MCNC: 29 MG/DL
CALCIUM SERPL-MCNC: 10.2 MG/DL
CALCIUM SERPL-MCNC: 10.2 MG/DL
CAST: 0 /LPF
CHLORIDE SERPL-SCNC: 106 MMOL/L
CHOLEST SERPL-MCNC: 214 MG/DL
CMV DNA SPEC QL NAA+PROBE: NOT DETECTED IU/ML
CMVPCR LOG: NOT DETECTED LOG10IU/ML
CO2 SERPL-SCNC: 23 MMOL/L
COLOR: YELLOW
CREAT SERPL-MCNC: 1.76 MG/DL
CREAT SPEC-SCNC: 254 MG/DL
CREAT/PROT UR: 0.1 RATIO
EGFR: 42 ML/MIN/1.73M2
EPITHELIAL CELLS: 0 /HPF
ESTIMATED AVERAGE GLUCOSE: 114 MG/DL
GLUCOSE QUALITATIVE U: NEGATIVE MG/DL
GLUCOSE SERPL-MCNC: 97 MG/DL
HBA1C MFR BLD HPLC: 5.6 %
HCT VFR BLD CALC: 42.3 %
HDLC SERPL-MCNC: 76 MG/DL
HGB BLD-MCNC: 13.6 G/DL
KETONES URINE: ABNORMAL MG/DL
LDLC SERPL CALC-MCNC: 121 MG/DL
LEUKOCYTE ESTERASE URINE: NEGATIVE
MAGNESIUM SERPL-MCNC: 1.9 MG/DL
MCHC RBC-ENTMCNC: 30.7 PG
MCHC RBC-ENTMCNC: 32.2 GM/DL
MCV RBC AUTO: 95.5 FL
MICROSCOPIC-UA: NORMAL
NITRITE URINE: NEGATIVE
NONHDLC SERPL-MCNC: 138 MG/DL
PARATHYROID HORMONE INTACT: 50 PG/ML
PH URINE: 6
PHOSPHATE SERPL-MCNC: 2.8 MG/DL
PLATELET # BLD AUTO: 180 K/UL
POTASSIUM SERPL-SCNC: 4.6 MMOL/L
PROT SERPL-MCNC: 6.9 G/DL
PROT UR-MCNC: 23 MG/DL
PROTEIN URINE: NORMAL MG/DL
RBC # BLD: 4.43 M/UL
RBC # FLD: 13.4 %
RED BLOOD CELLS URINE: 0 /HPF
SODIUM SERPL-SCNC: 141 MMOL/L
SPECIFIC GRAVITY URINE: 1.03
TACROLIMUS SERPL-MCNC: 6 NG/ML
TRIGL SERPL-MCNC: 98 MG/DL
URATE SERPL-MCNC: 5.9 MG/DL
UROBILINOGEN URINE: 0.2 MG/DL
WBC # FLD AUTO: 5.84 K/UL
WHITE BLOOD CELLS URINE: 0 /HPF

## 2024-06-03 LAB — BKV DNA SPEC QL NAA+PROBE: 43 IU/ML

## 2024-06-17 NOTE — ED PROVIDER NOTE - CHIEF COMPLAINT
"  Occupational Therapy Treatment Note     Patient Name: Jairon Oconnell  Today's Date: 6/17/2024  Problem List  Principal Problem:    Cardiac arrest (HCC)  Active Problems:    Generalized convulsive epilepsy (HCC)    HLD (hyperlipidemia)    HTN (hypertension)    Acquired hypothyroidism    Behavior concern in adult    Ambulatory dysfunction    Dysphagia    Closed traumatic fracture of ribs of right side with pneumothorax    Acute respiratory failure with hypoxia and hypercapnia (HCC)    DM (diabetes mellitus) (HCC)        06/17/24 0813   OT Last Visit   OT Visit Date 06/17/24   Note Type   Note Type Treatment   Pain Assessment   Pain Assessment Tool FLACC   Pain Score   (pt reported \"no\" to pain)   Pain Rating: FLACC (Rest) - Face 0   Pain Rating: FLACC (Rest) - Legs 0   Pain Rating: FLACC (Rest) - Activity 0   Pain Rating: FLACC (Rest) - Cry 0   Pain Rating: FLACC (Rest) - Consolability 0   Score: FLACC (Rest) 0   Pain Rating: FLACC (Activity) - Face 1   Pain Rating: FLACC (Activity) - Legs 0   Pain Rating: FLACC (Activity) - Activity 0   Pain Rating: FLACC (Activity) - Cry 0   Pain Rating: FLACC (Activity) - Consolability 0   Score: FLACC (Activity) 1   Restrictions/Precautions   Weight Bearing Precautions Per Order Yes   RLE Weight Bearing Per Order WBAT   Other Precautions Cognitive;Chair Alarm;Bed Alarm;Multiple lines;Telemetry;Fall Risk   Lifestyle   Autonomy Per chart review, patient requires assistance with ADLs/IADLs and lives in a two story house with 1st floor set-up   Reciprocal Relationships Caregiver and Sister   Service to Others Retired   Bed Mobility   Supine to Sit 4  Minimal assistance   Additional items Assist x 2;HOB elevated;Bedrails;Increased time required;Verbal cues;LE management   Sit to Supine   (DNT: pt seated OOB in recliner at end of session)   Transfers   Sit to Stand 4  Minimal assistance   Additional items Assist x 2;Increased time required;Verbal cues;Bedrails   Stand to Sit 4  " The patient is a 58y Male complaining of abdominal pain. Minimal assistance   Additional items Assist x 2;Increased time required;Verbal cues   Functional Mobility   Functional Mobility 4  Minimal assistance  (Assist of 2)   Additional Comments Pt ambulated short distance to recliner. Pt unsteady and requires assist to manage RW. Attempted to have pt complete 2nd ambulation trial but pt adamently refused despite max verbal cues/encouragement   Additional items Rolling walker   Therapeutic Exercise - ROM   UE-ROM Yes  (to increase strength/ROM/endurance)   ROM- Right Upper Extremities   R Shoulder AAROM;Flexion;Extension   R Position Seated;Against gravity   R Weight/Reps/Sets 1 set x 10 reps each   ROM - Left Upper Extremities    L Shoulder AAROM;Flexion;Extension   L Position Seated;Against gravity   L Weight/Reps/Sets 1 set x 10 rep each   LUE ROM Comment Pt declined additional exercises at this time   Cognition   Overall Cognitive Status Impaired   Arousal/Participation Alert;Responsive   Attention Attends with cues to redirect   Orientation Level Oriented to person;Disoriented to place;Disoriented to time;Disoriented to situation   Memory Decreased short term memory;Decreased recall of recent events   Following Commands Follows one step commands inconsistently   Comments Pt agreeable to OT session with encouragement   Activity Tolerance   Activity Tolerance Patient limited by fatigue   Medical Staff Made Aware Pt seen for co-treatment with Physical Therapist due to pt's medical complexity, functional limitations and limited activity tolerance.   Assessment   Assessment Patient participated in Skilled OT session this date with interventions consisting of therapeutic activities to: increase activity tolerance, increase dynamic sit/ stand balance during functional activity , and therapeutic exercises. Patient agreeable to OT treatment session, upon arrival patient was found supine in bed and in no apparent distress. Patient completed bed mobility with min assist of 2 and  functional transfers with min assist of 2. Pt sat EOB with sup-CGA for sitting balance. Pt completed 1 STS transfers with min assist of 2 and ambulated to recliner with min assist of 2 utilizing RW. While seated in recliner, pt completed 1 set x 10 reps of BUE exercise to increase strength and endurance. In comparison to previous session, patient with improvements in functional transfers. Patient requiring verbal cues for correct technique, cognitive assistance to anticipate next step, and one step directives. Patient continues to be functioning below baseline level, occupational performance remains limited secondary to factors listed above and increased risk for falls and injury. From OT standpoint, recommendation at time of d/c would be Level II (moderate resource intensity). Patient to benefit from continued Occupational Therapy treatment while in the hospital to address deficits as defined above and maximize level of functional independence with ADLs and functional mobility.   Plan   Treatment Interventions Functional transfer training;UE strengthening/ROM;Endurance training;Patient/family training   Goal Expiration Date 06/20/24   OT Treatment Day 3   OT Frequency 3-5x/wk   Discharge Recommendation   Rehab Resource Intensity Level, OT II (Moderate Resource Intensity)   AM-PAC Daily Activity Inpatient   Lower Body Dressing 1   Bathing 1   Toileting 1   Upper Body Dressing 2   Grooming 3   Eating 3   Daily Activity Raw Score 11   Daily Activity Standardized Score (Calc for Raw Score >=11) 29.04   AM-PAC Applied Cognition Inpatient   Following a Speech/Presentation 2   Understanding Ordinary Conversation 3   Taking Medications 1   Remembering Where Things Are Placed or Put Away 1   Remembering List of 4-5 Errands 1   Taking Care of Complicated Tasks 1   Applied Cognition Raw Score 9   Applied Cognition Standardized Score 22.48   Modified Gui Scale   Modified [place] Scale 4   End of Consult   Patient Position at  End of Consult Bedside chair;Bed/Chair alarm activated;All needs within reach   Nurse Communication Nurse aware of consult     Yanira Lange OTD, OTR/L

## 2024-06-19 ENCOUNTER — RX RENEWAL (OUTPATIENT)
Age: 64
End: 2024-06-19

## 2024-06-19 RX ORDER — TACROLIMUS 0.5 MG/1
0.5 CAPSULE ORAL
Qty: 90 | Refills: 2 | Status: ACTIVE | COMMUNITY
Start: 2019-10-18 | End: 1900-01-01

## 2024-06-19 RX ORDER — PREDNISONE 5 MG/1
5 TABLET ORAL
Qty: 180 | Refills: 2 | Status: ACTIVE | COMMUNITY
Start: 1900-01-01 | End: 1900-01-01

## 2024-06-19 RX ORDER — TACROLIMUS 1 MG/1
1 CAPSULE ORAL TWICE DAILY
Qty: 360 | Refills: 2 | Status: ACTIVE | COMMUNITY
Start: 2017-09-21 | End: 1900-01-01

## 2024-06-19 RX ORDER — MYCOPHENOLATE MOFETIL 250 MG/1
250 CAPSULE ORAL
Qty: 180 | Refills: 2 | Status: ACTIVE | COMMUNITY
Start: 2020-07-30 | End: 1900-01-01

## 2024-08-20 NOTE — ED PROVIDER NOTE - PHYSICAL EXAMINATION
Hx of spinal MAC  [facility] previous ID note showed continuation of ethambutol, rifampin, and azithromycin    - ID consulted and recommends continued ethambutol, rifampin, clarithromycin     General: NAD  HEENT: NCAT.   Cardiac: RRR, 2+ radial pulses  Chest: CTA  Abdomen: soft, non-distended, + RLQ ttp, no rebound. + guarding. No inguinal hernia noted. No color change of the external abdomen.   Extremities: no peripheral edema, calf tenderness, or leg size discrepancies  Skin: no rashes  Neuro: AAOx3, motor and sensory grossly intact.   Psych: mood and affect appropriate

## 2024-09-03 ENCOUNTER — RX RENEWAL (OUTPATIENT)
Age: 64
End: 2024-09-03

## 2024-09-25 ENCOUNTER — NON-APPOINTMENT (OUTPATIENT)
Age: 64
End: 2024-09-25

## 2024-09-25 ENCOUNTER — APPOINTMENT (OUTPATIENT)
Dept: TRANSPLANT | Facility: CLINIC | Age: 64
End: 2024-09-25

## 2024-09-26 LAB
ALBUMIN SERPL ELPH-MCNC: 4.4 G/DL
ALP BLD-CCNC: 55 U/L
ALT SERPL-CCNC: 11 U/L
ANION GAP SERPL CALC-SCNC: 12 MMOL/L
APPEARANCE: CLEAR
AST SERPL-CCNC: 16 U/L
BACTERIA: NEGATIVE /HPF
BASOPHILS # BLD AUTO: 0.03 K/UL
BASOPHILS NFR BLD AUTO: 0.4 %
BILIRUB SERPL-MCNC: 0.5 MG/DL
BILIRUBIN URINE: NEGATIVE
BKV DNA SPEC QL NAA+PROBE: NOT DETECTED IU/ML
BLOOD URINE: NEGATIVE
BUN SERPL-MCNC: 19 MG/DL
CALCIUM SERPL-MCNC: 10 MG/DL
CALCIUM SERPL-MCNC: 10 MG/DL
CAST: 0 /LPF
CHLORIDE SERPL-SCNC: 104 MMOL/L
CMV DNA SPEC QL NAA+PROBE: NOT DETECTED IU/ML
CMVPCR LOG: NOT DETECTED LOG10IU/ML
CO2 SERPL-SCNC: 26 MMOL/L
COLOR: YELLOW
CREAT SERPL-MCNC: 1.43 MG/DL
CREAT SPEC-SCNC: 71 MG/DL
CREAT/PROT UR: 0.1 RATIO
EGFR: 54 ML/MIN/1.73M2
EOSINOPHIL # BLD AUTO: 0.1 K/UL
EOSINOPHIL NFR BLD AUTO: 1.5 %
EPITHELIAL CELLS: 0 /HPF
GLUCOSE QUALITATIVE U: NEGATIVE MG/DL
GLUCOSE SERPL-MCNC: 90 MG/DL
HCT VFR BLD CALC: 41.9 %
HGB BLD-MCNC: 13.6 G/DL
IMM GRANULOCYTES NFR BLD AUTO: 0.3 %
KETONES URINE: NEGATIVE MG/DL
LEUKOCYTE ESTERASE URINE: NEGATIVE
LYMPHOCYTES # BLD AUTO: 1.87 K/UL
LYMPHOCYTES NFR BLD AUTO: 27.4 %
MAGNESIUM SERPL-MCNC: 2 MG/DL
MAN DIFF?: NORMAL
MCHC RBC-ENTMCNC: 30.6 PG
MCHC RBC-ENTMCNC: 32.5 GM/DL
MCV RBC AUTO: 94.4 FL
MICROSCOPIC-UA: NORMAL
MONOCYTES # BLD AUTO: 0.57 K/UL
MONOCYTES NFR BLD AUTO: 8.3 %
NEUTROPHILS # BLD AUTO: 4.24 K/UL
NEUTROPHILS NFR BLD AUTO: 62.1 %
NITRITE URINE: NEGATIVE
PARATHYROID HORMONE INTACT: 45 PG/ML
PH URINE: 6.5
PHOSPHATE SERPL-MCNC: 2.8 MG/DL
PLATELET # BLD AUTO: 168 K/UL
POTASSIUM SERPL-SCNC: 4.4 MMOL/L
PROT SERPL-MCNC: 6.6 G/DL
PROT UR-MCNC: 7 MG/DL
PROTEIN URINE: NEGATIVE MG/DL
RBC # BLD: 4.44 M/UL
RBC # FLD: 13.3 %
RED BLOOD CELLS URINE: 0 /HPF
SODIUM SERPL-SCNC: 142 MMOL/L
SPECIFIC GRAVITY URINE: 1.01
TACROLIMUS SERPL-MCNC: 4.1 NG/ML
UROBILINOGEN URINE: 0.2 MG/DL
WBC # FLD AUTO: 6.83 K/UL
WHITE BLOOD CELLS URINE: 0 /HPF

## 2024-10-17 NOTE — PATIENT PROFILE ADULT - FUNCTIONAL ASSESSMENT - BASIC MOBILITY 6.
----- Message from Jacqueline MCNALLY sent at 9/27/2024  5:08 PM CDT -----  Hi, I evaluated Amanda today for OT.  She does appear to have some symptoms of DeQuervain's. She is however complaining of more diffuse elbow, forearm and UE/biceps pain described as \"nerve-like\" and also states that she has had chronic neck stiffness and pain. Would you be able to put in an order for a PT evaluation for neck/shoulder? Just want to make sure we're getting her appropriately treated. Thanks!  Sapphire Ann   4 = No assist / stand by assistance

## 2024-11-19 NOTE — ED ADULT NURSE NOTE - CHIEF COMPLAINT
The patient is a 56y Male complaining of [Follow-Up] : a follow-up visit [COPD] : COPD [Bronchiectasis] : bronchiectasis

## 2025-01-09 ENCOUNTER — APPOINTMENT (OUTPATIENT)
Dept: TRANSPLANT | Facility: CLINIC | Age: 65
End: 2025-01-09

## 2025-01-09 LAB
ALBUMIN SERPL ELPH-MCNC: 4.2 G/DL
ALP BLD-CCNC: 54 U/L
ALT SERPL-CCNC: 8 U/L
ANION GAP SERPL CALC-SCNC: 11 MMOL/L
APPEARANCE: CLEAR
AST SERPL-CCNC: 13 U/L
BACTERIA: NEGATIVE /HPF
BASOPHILS # BLD AUTO: 0.02 K/UL
BASOPHILS NFR BLD AUTO: 0.3 %
BILIRUB SERPL-MCNC: 0.6 MG/DL
BILIRUBIN URINE: NEGATIVE
BLOOD URINE: NEGATIVE
BUN SERPL-MCNC: 20 MG/DL
CALCIUM SERPL-MCNC: 10.1 MG/DL
CALCIUM SERPL-MCNC: 10.1 MG/DL
CAST: 0 /LPF
CHLORIDE SERPL-SCNC: 106 MMOL/L
CO2 SERPL-SCNC: 25 MMOL/L
COLOR: YELLOW
CREAT SERPL-MCNC: 1.53 MG/DL
CREAT SPEC-SCNC: 254 MG/DL
CREAT/PROT UR: 0.1 RATIO
EGFR: 50 ML/MIN/1.73M2
EOSINOPHIL # BLD AUTO: 0.08 K/UL
EOSINOPHIL NFR BLD AUTO: 1.3 %
EPITHELIAL CELLS: 0 /HPF
GLUCOSE QUALITATIVE U: NEGATIVE MG/DL
GLUCOSE SERPL-MCNC: 91 MG/DL
HCT VFR BLD CALC: 42.1 %
HGB BLD-MCNC: 13.5 G/DL
IMM GRANULOCYTES NFR BLD AUTO: 0.3 %
KETONES URINE: NEGATIVE MG/DL
LEUKOCYTE ESTERASE URINE: NEGATIVE
LYMPHOCYTES # BLD AUTO: 1.69 K/UL
LYMPHOCYTES NFR BLD AUTO: 27.8 %
MAGNESIUM SERPL-MCNC: 2 MG/DL
MAN DIFF?: NORMAL
MCHC RBC-ENTMCNC: 30.9 PG
MCHC RBC-ENTMCNC: 32.1 G/DL
MCV RBC AUTO: 96.3 FL
MICROSCOPIC-UA: NORMAL
MONOCYTES # BLD AUTO: 0.52 K/UL
MONOCYTES NFR BLD AUTO: 8.5 %
NEUTROPHILS # BLD AUTO: 3.76 K/UL
NEUTROPHILS NFR BLD AUTO: 61.8 %
NITRITE URINE: NEGATIVE
PARATHYROID HORMONE INTACT: 43 PG/ML
PH URINE: 6.5
PHOSPHATE SERPL-MCNC: 2.7 MG/DL
PLATELET # BLD AUTO: 176 K/UL
POTASSIUM SERPL-SCNC: 4.5 MMOL/L
PROT SERPL-MCNC: 6.7 G/DL
PROT UR-MCNC: 31 MG/DL
PROTEIN URINE: 30 MG/DL
RBC # BLD: 4.37 M/UL
RBC # FLD: 14 %
RED BLOOD CELLS URINE: 1 /HPF
SODIUM SERPL-SCNC: 142 MMOL/L
SPECIFIC GRAVITY URINE: 1.02
TACROLIMUS SERPL-MCNC: 3.3 NG/ML
UROBILINOGEN URINE: 0.2 MG/DL
WBC # FLD AUTO: 6.09 K/UL
WHITE BLOOD CELLS URINE: 0 /HPF

## 2025-01-10 ENCOUNTER — NON-APPOINTMENT (OUTPATIENT)
Age: 65
End: 2025-01-10

## 2025-01-10 LAB
BKV DNA SPEC QL NAA+PROBE: ABNORMAL IU/ML
CMV DNA SPEC QL NAA+PROBE: NOT DETECTED IU/ML
CMVPCR LOG: NOT DETECTED LOG10IU/ML

## 2025-01-27 ENCOUNTER — APPOINTMENT (OUTPATIENT)
Dept: NEPHROLOGY | Facility: CLINIC | Age: 65
End: 2025-01-27
Payer: MEDICARE

## 2025-01-27 VITALS
WEIGHT: 114 LBS | OXYGEN SATURATION: 98 % | RESPIRATION RATE: 16 BRPM | HEART RATE: 69 BPM | HEIGHT: 66 IN | BODY MASS INDEX: 18.32 KG/M2 | TEMPERATURE: 98 F

## 2025-01-27 DIAGNOSIS — Q61.3 POLYCYSTIC KIDNEY, UNSPECIFIED: ICD-10-CM

## 2025-01-27 DIAGNOSIS — Z94.0 KIDNEY TRANSPLANT STATUS: ICD-10-CM

## 2025-01-27 DIAGNOSIS — Z79.60 LONG TERM (CURRENT) USE OF UNSPECIFIED IMMUNOMODULATORS AND IMMUNOSUPPRESSANTS: ICD-10-CM

## 2025-01-27 PROCEDURE — 99214 OFFICE O/P EST MOD 30 MIN: CPT

## 2025-01-28 ENCOUNTER — NON-APPOINTMENT (OUTPATIENT)
Age: 65
End: 2025-01-28

## 2025-01-28 LAB
ALBUMIN SERPL ELPH-MCNC: 4.4 G/DL
ALP BLD-CCNC: 58 U/L
ALT SERPL-CCNC: 8 U/L
ANION GAP SERPL CALC-SCNC: 10 MMOL/L
APPEARANCE: CLEAR
AST SERPL-CCNC: 12 U/L
BASOPHILS # BLD AUTO: 0.02 K/UL
BASOPHILS NFR BLD AUTO: 0.3 %
BILIRUB SERPL-MCNC: 0.4 MG/DL
BILIRUBIN URINE: NEGATIVE
BLOOD URINE: NEGATIVE
BUN SERPL-MCNC: 18 MG/DL
CALCIUM SERPL-MCNC: 9.9 MG/DL
CALCIUM SERPL-MCNC: 9.9 MG/DL
CHLORIDE SERPL-SCNC: 105 MMOL/L
CMV DNA SPEC QL NAA+PROBE: NOT DETECTED IU/ML
CMVPCR LOG: NOT DETECTED LOG10IU/ML
CO2 SERPL-SCNC: 27 MMOL/L
COLOR: YELLOW
CREAT SERPL-MCNC: 1.58 MG/DL
CREAT SPEC-SCNC: 79 MG/DL
CREAT/PROT UR: 0.1 RATIO
EGFR: 48 ML/MIN/1.73M2
EOSINOPHIL # BLD AUTO: 0.05 K/UL
EOSINOPHIL NFR BLD AUTO: 0.9 %
GLUCOSE QUALITATIVE U: NEGATIVE MG/DL
GLUCOSE SERPL-MCNC: 91 MG/DL
HCT VFR BLD CALC: 41.6 %
HGB BLD-MCNC: 13.3 G/DL
IMM GRANULOCYTES NFR BLD AUTO: 0.2 %
KETONES URINE: NEGATIVE MG/DL
LEUKOCYTE ESTERASE URINE: NEGATIVE
LYMPHOCYTES # BLD AUTO: 1.46 K/UL
LYMPHOCYTES NFR BLD AUTO: 25.5 %
MAGNESIUM SERPL-MCNC: 2 MG/DL
MAN DIFF?: NORMAL
MCHC RBC-ENTMCNC: 30.4 PG
MCHC RBC-ENTMCNC: 32 G/DL
MCV RBC AUTO: 95 FL
MONOCYTES # BLD AUTO: 0.54 K/UL
MONOCYTES NFR BLD AUTO: 9.4 %
NEUTROPHILS # BLD AUTO: 3.65 K/UL
NEUTROPHILS NFR BLD AUTO: 63.7 %
NITRITE URINE: NEGATIVE
PARATHYROID HORMONE INTACT: 49 PG/ML
PH URINE: 7
PHOSPHATE SERPL-MCNC: 2.8 MG/DL
PLATELET # BLD AUTO: 177 K/UL
POTASSIUM SERPL-SCNC: 4.7 MMOL/L
PROT SERPL-MCNC: 6.9 G/DL
PROT UR-MCNC: 7 MG/DL
PROTEIN URINE: NEGATIVE MG/DL
RBC # BLD: 4.38 M/UL
RBC # FLD: 13.8 %
SODIUM SERPL-SCNC: 141 MMOL/L
SPECIFIC GRAVITY URINE: 1.01
TACROLIMUS SERPL-MCNC: 4.9 NG/ML
UROBILINOGEN URINE: 0.2 MG/DL
WBC # FLD AUTO: 5.73 K/UL

## 2025-01-29 ENCOUNTER — NON-APPOINTMENT (OUTPATIENT)
Age: 65
End: 2025-01-29

## 2025-01-29 LAB — PSA SERPL-MCNC: 9.77 NG/ML

## 2025-01-30 LAB — BKV DNA SPEC QL NAA+PROBE: NOT DETECTED IU/ML

## 2025-02-06 ENCOUNTER — APPOINTMENT (OUTPATIENT)
Dept: UROLOGY | Facility: CLINIC | Age: 65
End: 2025-02-06
Payer: MEDICARE

## 2025-02-06 DIAGNOSIS — Z78.9 OTHER SPECIFIED HEALTH STATUS: ICD-10-CM

## 2025-02-06 DIAGNOSIS — R97.20 ELEVATED PROSTATE, SPECIFIC ANTIGEN [PSA]: ICD-10-CM

## 2025-02-06 PROCEDURE — G2211 COMPLEX E/M VISIT ADD ON: CPT

## 2025-02-06 PROCEDURE — 99204 OFFICE O/P NEW MOD 45 MIN: CPT

## 2025-02-18 ENCOUNTER — RESULT REVIEW (OUTPATIENT)
Age: 65
End: 2025-02-18

## 2025-02-18 ENCOUNTER — OUTPATIENT (OUTPATIENT)
Dept: OUTPATIENT SERVICES | Facility: HOSPITAL | Age: 65
LOS: 1 days | End: 2025-02-18
Payer: MEDICARE

## 2025-02-18 ENCOUNTER — APPOINTMENT (OUTPATIENT)
Dept: MRI IMAGING | Facility: CLINIC | Age: 65
End: 2025-02-18
Payer: MEDICARE

## 2025-02-18 DIAGNOSIS — R97.20 ELEVATED PROSTATE SPECIFIC ANTIGEN [PSA]: ICD-10-CM

## 2025-02-18 DIAGNOSIS — Z99.2 DEPENDENCE ON RENAL DIALYSIS: Chronic | ICD-10-CM

## 2025-02-18 DIAGNOSIS — Z94.0 KIDNEY TRANSPLANT STATUS: Chronic | ICD-10-CM

## 2025-02-18 DIAGNOSIS — Z98.89 OTHER SPECIFIED POSTPROCEDURAL STATES: Chronic | ICD-10-CM

## 2025-02-18 PROCEDURE — 76498P: CUSTOM | Mod: 26

## 2025-02-18 PROCEDURE — 72197 MRI PELVIS W/O & W/DYE: CPT | Mod: 26

## 2025-02-24 ENCOUNTER — NON-APPOINTMENT (OUTPATIENT)
Age: 65
End: 2025-02-24

## 2025-02-24 DIAGNOSIS — R97.20 ELEVATED PROSTATE, SPECIFIC ANTIGEN [PSA]: ICD-10-CM

## 2025-03-11 ENCOUNTER — OUTPATIENT (OUTPATIENT)
Dept: OUTPATIENT SERVICES | Facility: HOSPITAL | Age: 65
LOS: 1 days | End: 2025-03-11

## 2025-03-11 VITALS
HEIGHT: 66 IN | WEIGHT: 115.96 LBS | HEART RATE: 71 BPM | SYSTOLIC BLOOD PRESSURE: 120 MMHG | OXYGEN SATURATION: 96 % | RESPIRATION RATE: 18 BRPM | DIASTOLIC BLOOD PRESSURE: 78 MMHG | TEMPERATURE: 97 F

## 2025-03-11 DIAGNOSIS — R97.20 ELEVATED PROSTATE SPECIFIC ANTIGEN [PSA]: ICD-10-CM

## 2025-03-11 DIAGNOSIS — G47.33 OBSTRUCTIVE SLEEP APNEA (ADULT) (PEDIATRIC): ICD-10-CM

## 2025-03-11 DIAGNOSIS — E78.5 HYPERLIPIDEMIA, UNSPECIFIED: ICD-10-CM

## 2025-03-11 DIAGNOSIS — K21.9 GASTRO-ESOPHAGEAL REFLUX DISEASE WITHOUT ESOPHAGITIS: ICD-10-CM

## 2025-03-11 DIAGNOSIS — I10 ESSENTIAL (PRIMARY) HYPERTENSION: ICD-10-CM

## 2025-03-11 DIAGNOSIS — Z94.0 KIDNEY TRANSPLANT STATUS: Chronic | ICD-10-CM

## 2025-03-11 DIAGNOSIS — Z94.0 KIDNEY TRANSPLANT STATUS: ICD-10-CM

## 2025-03-11 DIAGNOSIS — Z99.2 DEPENDENCE ON RENAL DIALYSIS: Chronic | ICD-10-CM

## 2025-03-11 DIAGNOSIS — Z98.89 OTHER SPECIFIED POSTPROCEDURAL STATES: Chronic | ICD-10-CM

## 2025-03-11 NOTE — H&P PST ADULT - ASSESSMENT
65 yo male with history of polycystic kidney disease s/p renal transplant x1 on 02/25/2017. He presents to PST with pre-op diagnosis of elevated PSA scheduled for transperineal fusion prostate biopsy with Dr. Reyes.

## 2025-03-11 NOTE — H&P PST ADULT - PROBLEM SELECTOR PLAN 1
Scheduled for transperineal fusion prostate biopsy with Dr. Reyes on 03/18/2025.  Verbal and written pre-op instructions provided to patient. Patient verbalized understanding and will call surgeons office for revised instructions if surgery is rescheduled.  Pepcid for GI prophylaxis provided.

## 2025-03-12 LAB
CULTURE RESULTS: SIGNIFICANT CHANGE UP
SPECIMEN SOURCE: SIGNIFICANT CHANGE UP

## 2025-03-14 ENCOUNTER — NON-APPOINTMENT (OUTPATIENT)
Age: 65
End: 2025-03-14

## 2025-03-18 ENCOUNTER — OUTPATIENT (OUTPATIENT)
Dept: OUTPATIENT SERVICES | Facility: HOSPITAL | Age: 65
LOS: 1 days | Discharge: HOME | End: 2025-03-18
Payer: MEDICARE

## 2025-03-18 ENCOUNTER — TRANSCRIPTION ENCOUNTER (OUTPATIENT)
Age: 65
End: 2025-03-18

## 2025-03-18 ENCOUNTER — APPOINTMENT (OUTPATIENT)
Dept: UROLOGY | Facility: AMBULATORY SURGERY CENTER | Age: 65
End: 2025-03-18

## 2025-03-18 ENCOUNTER — RESULT REVIEW (OUTPATIENT)
Age: 65
End: 2025-03-18

## 2025-03-18 ENCOUNTER — APPOINTMENT (OUTPATIENT)
Dept: UROLOGY | Facility: HOSPITAL | Age: 65
End: 2025-03-18

## 2025-03-18 VITALS
RESPIRATION RATE: 15 BRPM | HEART RATE: 57 BPM | TEMPERATURE: 97 F | DIASTOLIC BLOOD PRESSURE: 81 MMHG | OXYGEN SATURATION: 99 % | SYSTOLIC BLOOD PRESSURE: 120 MMHG

## 2025-03-18 VITALS
WEIGHT: 115.96 LBS | HEART RATE: 56 BPM | OXYGEN SATURATION: 100 % | RESPIRATION RATE: 18 BRPM | TEMPERATURE: 99 F | HEIGHT: 66 IN | SYSTOLIC BLOOD PRESSURE: 146 MMHG | DIASTOLIC BLOOD PRESSURE: 82 MMHG

## 2025-03-18 DIAGNOSIS — R97.20 ELEVATED PROSTATE SPECIFIC ANTIGEN [PSA]: ICD-10-CM

## 2025-03-18 DIAGNOSIS — Z94.0 KIDNEY TRANSPLANT STATUS: Chronic | ICD-10-CM

## 2025-03-18 PROCEDURE — 55706 BX PRST8 NDL SAT SAMPLING: CPT

## 2025-03-18 PROCEDURE — 88344 IMHCHEM/IMCYTCHM EA MLT ANTB: CPT | Mod: 26

## 2025-03-18 PROCEDURE — 76999F: CUSTOM | Mod: 26

## 2025-03-18 PROCEDURE — G0416: CPT | Mod: 26

## 2025-03-18 RX ORDER — SODIUM CHLORIDE 9 G/1000ML
1000 INJECTION, SOLUTION INTRAVENOUS
Refills: 0 | Status: DISCONTINUED | OUTPATIENT
Start: 2025-03-18 | End: 2025-04-01

## 2025-03-18 RX ORDER — ICOSAPENT ETHYL 500 MG/1
1 CAPSULE ORAL
Refills: 0 | DISCHARGE

## 2025-03-18 NOTE — ASU DISCHARGE PLAN (ADULT/PEDIATRIC) - CARE PROVIDER_API CALL
Stanley Reyes  Urology  [address] [place] [address], [address] [address]  Phone: [phone]  Fax: [phone]  Follow Up Time:

## 2025-03-18 NOTE — BRIEF OPERATIVE NOTE - NSICDXBRIEFPROCEDURE_GEN_ALL_CORE_FT
PROCEDURES:  Biopsy of prostate using magnetic resonance imaging-ultrasound fusion guidance 18-Mar-2025 13:40:12  Perico Simon

## 2025-03-18 NOTE — ASU DISCHARGE PLAN (ADULT/PEDIATRIC) - FINANCIAL ASSISTANCE
[facility] provides services at a reduced cost to those who are determined to be eligible through [facility]’s financial assistance program. Information regarding [facility]’s financial assistance program can be found by going to [email or web address] or by calling [phone].

## 2025-03-18 NOTE — ASU PREOP CHECKLIST - STERILIZATION AFFIRMATION
Refill request received for Atorvastatin    Last office visit: 6/16/2022  Next office visit: 3/15/2023  Last refill: 3/10/22  Last labs: 3/9/22    RX refilled.      
n/a

## 2025-03-18 NOTE — ASU DISCHARGE PLAN (ADULT/PEDIATRIC) - CALL YOUR DOCTOR IF YOU HAVE ANY OF THE FOLLOWING:
Bleeding that does not stop/Fever greater than (need to indicate Fahrenheit or Celsius) Bleeding that does not stop/Pain not relieved by Medications/Fever greater than (need to indicate Fahrenheit or Celsius)/Nausea and vomiting that does not stop/Unable to urinate

## 2025-03-19 ENCOUNTER — NON-APPOINTMENT (OUTPATIENT)
Age: 65
End: 2025-03-19

## 2025-03-21 LAB — SURGICAL PATHOLOGY STUDY: SIGNIFICANT CHANGE UP

## 2025-03-21 PROCEDURE — A9585: CPT

## 2025-03-21 PROCEDURE — 76377 3D RENDER W/INTRP POSTPROCES: CPT | Mod: 26

## 2025-03-21 PROCEDURE — 76498 UNLISTED MR PROCEDURE: CPT

## 2025-03-21 PROCEDURE — 72197 MRI PELVIS W/O & W/DYE: CPT

## 2025-03-21 PROCEDURE — C8001: CPT

## 2025-03-23 PROBLEM — C61 PROSTATE CANCER: Status: ACTIVE | Noted: 2025-03-23

## 2025-03-27 ENCOUNTER — APPOINTMENT (OUTPATIENT)
Dept: NEPHROLOGY | Facility: CLINIC | Age: 65
End: 2025-03-27

## 2025-03-28 ENCOUNTER — APPOINTMENT (OUTPATIENT)
Dept: UROLOGY | Facility: CLINIC | Age: 65
End: 2025-03-28
Payer: MEDICARE

## 2025-03-28 VITALS — SYSTOLIC BLOOD PRESSURE: 126 MMHG | DIASTOLIC BLOOD PRESSURE: 79 MMHG

## 2025-03-28 DIAGNOSIS — R35.0 FREQUENCY OF MICTURITION: ICD-10-CM

## 2025-03-28 DIAGNOSIS — C61 MALIGNANT NEOPLASM OF PROSTATE: ICD-10-CM

## 2025-03-28 DIAGNOSIS — Z78.9 OTHER SPECIFIED HEALTH STATUS: ICD-10-CM

## 2025-03-28 DIAGNOSIS — R97.20 ELEVATED PROSTATE, SPECIFIC ANTIGEN [PSA]: ICD-10-CM

## 2025-03-28 PROCEDURE — G2211 COMPLEX E/M VISIT ADD ON: CPT

## 2025-03-28 PROCEDURE — 99214 OFFICE O/P EST MOD 30 MIN: CPT | Mod: 24

## 2025-04-04 ENCOUNTER — APPOINTMENT (OUTPATIENT)
Dept: NEPHROLOGY | Facility: CLINIC | Age: 65
End: 2025-04-04
Payer: MEDICARE

## 2025-04-04 VITALS
HEART RATE: 69 BPM | WEIGHT: 114 LBS | RESPIRATION RATE: 16 BRPM | DIASTOLIC BLOOD PRESSURE: 73 MMHG | BODY MASS INDEX: 18.32 KG/M2 | OXYGEN SATURATION: 99 % | SYSTOLIC BLOOD PRESSURE: 129 MMHG | HEIGHT: 66 IN | TEMPERATURE: 98.2 F

## 2025-04-04 DIAGNOSIS — Z79.60 LONG TERM (CURRENT) USE OF UNSPECIFIED IMMUNOMODULATORS AND IMMUNOSUPPRESSANTS: ICD-10-CM

## 2025-04-04 DIAGNOSIS — Q44.6 CYSTIC DISEASE OF LIVER: ICD-10-CM

## 2025-04-04 DIAGNOSIS — Z94.0 KIDNEY TRANSPLANT STATUS: ICD-10-CM

## 2025-04-04 LAB
ALBUMIN SERPL ELPH-MCNC: 4.6 G/DL
ALP BLD-CCNC: 58 U/L
ALT SERPL-CCNC: 7 U/L
ANION GAP SERPL CALC-SCNC: 12 MMOL/L
APPEARANCE: CLEAR
AST SERPL-CCNC: 15 U/L
BACTERIA: NEGATIVE /HPF
BASOPHILS # BLD AUTO: 0.02 K/UL
BASOPHILS NFR BLD AUTO: 0.3 %
BILIRUB SERPL-MCNC: 0.5 MG/DL
BILIRUBIN URINE: NEGATIVE
BLOOD URINE: NEGATIVE
BUN SERPL-MCNC: 27 MG/DL
CALCIUM SERPL-MCNC: 10.1 MG/DL
CALCIUM SERPL-MCNC: 10.1 MG/DL
CAST: 0 /LPF
CHLORIDE SERPL-SCNC: 105 MMOL/L
CO2 SERPL-SCNC: 25 MMOL/L
COLOR: YELLOW
CREAT SERPL-MCNC: 1.51 MG/DL
CREAT SPEC-SCNC: 59 MG/DL
CREAT/PROT UR: 0.1 RATIO
EGFRCR SERPLBLD CKD-EPI 2021: 51 ML/MIN/1.73M2
EOSINOPHIL # BLD AUTO: 0.09 K/UL
EOSINOPHIL NFR BLD AUTO: 1.2 %
EPITHELIAL CELLS: 0 /HPF
GLUCOSE QUALITATIVE U: NEGATIVE MG/DL
GLUCOSE SERPL-MCNC: 84 MG/DL
HCT VFR BLD CALC: 42.6 %
HGB BLD-MCNC: 13.6 G/DL
IMM GRANULOCYTES NFR BLD AUTO: 0.4 %
KETONES URINE: NEGATIVE MG/DL
LEUKOCYTE ESTERASE URINE: NEGATIVE
LYMPHOCYTES # BLD AUTO: 1.94 K/UL
LYMPHOCYTES NFR BLD AUTO: 26.5 %
MAGNESIUM SERPL-MCNC: 2.1 MG/DL
MAN DIFF?: NORMAL
MCHC RBC-ENTMCNC: 31 PG
MCHC RBC-ENTMCNC: 31.9 G/DL
MCV RBC AUTO: 97 FL
MICROSCOPIC-UA: NORMAL
MONOCYTES # BLD AUTO: 0.69 K/UL
MONOCYTES NFR BLD AUTO: 9.4 %
NEUTROPHILS # BLD AUTO: 4.54 K/UL
NEUTROPHILS NFR BLD AUTO: 62.2 %
NITRITE URINE: NEGATIVE
PARATHYROID HORMONE INTACT: 56 PG/ML
PH URINE: 6.5
PHOSPHATE SERPL-MCNC: 3.5 MG/DL
PLATELET # BLD AUTO: 182 K/UL
POTASSIUM SERPL-SCNC: 5 MMOL/L
PROT SERPL-MCNC: 6.9 G/DL
PROT UR-MCNC: 7 MG/DL
PROTEIN URINE: NEGATIVE MG/DL
RBC # BLD: 4.39 M/UL
RBC # FLD: 13.8 %
RED BLOOD CELLS URINE: 0 /HPF
SODIUM SERPL-SCNC: 142 MMOL/L
SPECIFIC GRAVITY URINE: 1.01
TACROLIMUS SERPL-MCNC: 3.9 NG/ML
UROBILINOGEN URINE: 0.2 MG/DL
WBC # FLD AUTO: 7.31 K/UL
WHITE BLOOD CELLS URINE: 0 /HPF

## 2025-04-04 PROCEDURE — 99214 OFFICE O/P EST MOD 30 MIN: CPT

## 2025-04-07 ENCOUNTER — NON-APPOINTMENT (OUTPATIENT)
Age: 65
End: 2025-04-07

## 2025-04-10 ENCOUNTER — APPOINTMENT (OUTPATIENT)
Dept: PAIN MANAGEMENT | Facility: CLINIC | Age: 65
End: 2025-04-10

## 2025-04-10 DIAGNOSIS — M48.061 SPINAL STENOSIS, LUMBAR REGION WITHOUT NEUROGENIC CLAUDICATION: ICD-10-CM

## 2025-04-10 DIAGNOSIS — M79.2 NEURALGIA AND NEURITIS, UNSPECIFIED: ICD-10-CM

## 2025-04-10 DIAGNOSIS — G89.4 CHRONIC PAIN SYNDROME: ICD-10-CM

## 2025-04-10 DIAGNOSIS — M79.18 MYALGIA, OTHER SITE: ICD-10-CM

## 2025-04-10 DIAGNOSIS — M47.817 SPONDYLOSIS W/OUT MYELOPATHY OR RADICULOPATHY, LUMBOSACRAL REGION: ICD-10-CM

## 2025-04-10 DIAGNOSIS — M54.16 RADICULOPATHY, LUMBAR REGION: ICD-10-CM

## 2025-04-10 PROCEDURE — 99203 OFFICE O/P NEW LOW 30 MIN: CPT | Mod: 2W

## 2025-04-10 RX ORDER — GABAPENTIN 300 MG/1
300 CAPSULE ORAL
Qty: 60 | Refills: 0 | Status: ACTIVE | COMMUNITY
Start: 2025-04-10 | End: 1900-01-01

## 2025-04-10 RX ORDER — GABAPENTIN 300 MG/1
300 CAPSULE ORAL
Qty: 60 | Refills: 1 | Status: ACTIVE | COMMUNITY
Start: 2025-04-10 | End: 1900-01-01

## 2025-04-14 ENCOUNTER — APPOINTMENT (OUTPATIENT)
Dept: MRI IMAGING | Facility: CLINIC | Age: 65
End: 2025-04-14
Payer: MEDICARE

## 2025-04-14 ENCOUNTER — OUTPATIENT (OUTPATIENT)
Dept: OUTPATIENT SERVICES | Facility: HOSPITAL | Age: 65
LOS: 1 days | End: 2025-04-14
Payer: MEDICARE

## 2025-04-14 DIAGNOSIS — Z94.0 KIDNEY TRANSPLANT STATUS: Chronic | ICD-10-CM

## 2025-04-14 DIAGNOSIS — M43.17 SPONDYLOLISTHESIS, LUMBOSACRAL REGION: ICD-10-CM

## 2025-04-14 DIAGNOSIS — Z98.89 OTHER SPECIFIED POSTPROCEDURAL STATES: Chronic | ICD-10-CM

## 2025-04-14 DIAGNOSIS — Z00.8 ENCOUNTER FOR OTHER GENERAL EXAMINATION: ICD-10-CM

## 2025-04-14 PROCEDURE — 72148 MRI LUMBAR SPINE W/O DYE: CPT | Mod: 26

## 2025-04-14 PROCEDURE — 72148 MRI LUMBAR SPINE W/O DYE: CPT

## 2025-04-18 ENCOUNTER — APPOINTMENT (OUTPATIENT)
Dept: MRI IMAGING | Facility: CLINIC | Age: 65
End: 2025-04-18

## 2025-05-05 NOTE — H&P PST ADULT - NS HP PST ANES REACTION
DNR but not hospice.   Receives chemo and has finished radiation.    Follows with Dr. Swanson.    5/5 will attempt to contact Dr. Swanson   No

## 2025-05-06 ENCOUNTER — NON-APPOINTMENT (OUTPATIENT)
Age: 65
End: 2025-05-06

## 2025-05-06 ENCOUNTER — APPOINTMENT (OUTPATIENT)
Dept: NEPHROLOGY | Facility: CLINIC | Age: 65
End: 2025-05-06
Payer: MEDICARE

## 2025-05-06 ENCOUNTER — APPOINTMENT (OUTPATIENT)
Dept: RADIATION ONCOLOGY | Facility: CLINIC | Age: 65
End: 2025-05-06
Payer: MEDICARE

## 2025-05-06 VITALS
OXYGEN SATURATION: 100 % | TEMPERATURE: 98.2 F | HEART RATE: 82 BPM | BODY MASS INDEX: 18.48 KG/M2 | HEIGHT: 66 IN | SYSTOLIC BLOOD PRESSURE: 145 MMHG | WEIGHT: 115 LBS | DIASTOLIC BLOOD PRESSURE: 102 MMHG

## 2025-05-06 VITALS
HEART RATE: 63 BPM | RESPIRATION RATE: 18 BRPM | HEIGHT: 66 IN | OXYGEN SATURATION: 100 % | TEMPERATURE: 97.7 F | BODY MASS INDEX: 18.9 KG/M2 | DIASTOLIC BLOOD PRESSURE: 93 MMHG | WEIGHT: 117.61 LBS | SYSTOLIC BLOOD PRESSURE: 153 MMHG

## 2025-05-06 DIAGNOSIS — Z94.0 KIDNEY TRANSPLANT STATUS: ICD-10-CM

## 2025-05-06 DIAGNOSIS — I10 ESSENTIAL (PRIMARY) HYPERTENSION: ICD-10-CM

## 2025-05-06 DIAGNOSIS — Z79.60 LONG TERM (CURRENT) USE OF UNSPECIFIED IMMUNOMODULATORS AND IMMUNOSUPPRESSANTS: ICD-10-CM

## 2025-05-06 DIAGNOSIS — C61 MALIGNANT NEOPLASM OF PROSTATE: ICD-10-CM

## 2025-05-06 LAB
ALBUMIN SERPL ELPH-MCNC: 4.5 G/DL
ALP BLD-CCNC: 55 U/L
ALT SERPL-CCNC: 12 U/L
ANION GAP SERPL CALC-SCNC: 14 MMOL/L
APPEARANCE: CLEAR
AST SERPL-CCNC: 18 U/L
BACTERIA: NEGATIVE /HPF
BASOPHILS # BLD AUTO: 0.02 K/UL
BASOPHILS NFR BLD AUTO: 0.3 %
BILIRUB SERPL-MCNC: 0.6 MG/DL
BILIRUBIN URINE: NEGATIVE
BLOOD URINE: NEGATIVE
BUN SERPL-MCNC: 36 MG/DL
CALCIUM SERPL-MCNC: 10.7 MG/DL
CALCIUM SERPL-MCNC: 10.7 MG/DL
CAST: 0 /LPF
CHLORIDE SERPL-SCNC: 103 MMOL/L
CO2 SERPL-SCNC: 24 MMOL/L
COLOR: YELLOW
CREAT SERPL-MCNC: 1.73 MG/DL
CREAT SPEC-SCNC: 75 MG/DL
CREAT/PROT UR: 0.1 RATIO
EGFRCR SERPLBLD CKD-EPI 2021: 43 ML/MIN/1.73M2
EOSINOPHIL # BLD AUTO: 0.07 K/UL
EOSINOPHIL NFR BLD AUTO: 1 %
EPITHELIAL CELLS: 0 /HPF
GLUCOSE QUALITATIVE U: NEGATIVE MG/DL
GLUCOSE SERPL-MCNC: 115 MG/DL
HCT VFR BLD CALC: 42.4 %
HGB BLD-MCNC: 13.6 G/DL
IMM GRANULOCYTES NFR BLD AUTO: 0.1 %
KETONES URINE: NEGATIVE MG/DL
LEUKOCYTE ESTERASE URINE: NEGATIVE
LYMPHOCYTES # BLD AUTO: 2.06 K/UL
LYMPHOCYTES NFR BLD AUTO: 29.2 %
MAGNESIUM SERPL-MCNC: 2.1 MG/DL
MAN DIFF?: NORMAL
MCHC RBC-ENTMCNC: 30.4 PG
MCHC RBC-ENTMCNC: 32.1 G/DL
MCV RBC AUTO: 94.9 FL
MICROSCOPIC-UA: NORMAL
MONOCYTES # BLD AUTO: 0.57 K/UL
MONOCYTES NFR BLD AUTO: 8.1 %
NEUTROPHILS # BLD AUTO: 4.32 K/UL
NEUTROPHILS NFR BLD AUTO: 61.3 %
NITRITE URINE: NEGATIVE
PARATHYROID HORMONE INTACT: 36 PG/ML
PH URINE: 7
PHOSPHATE SERPL-MCNC: 2.1 MG/DL
PLATELET # BLD AUTO: 200 K/UL
POTASSIUM SERPL-SCNC: 4.5 MMOL/L
PROT SERPL-MCNC: 6.8 G/DL
PROT UR-MCNC: 6 MG/DL
PROTEIN URINE: NEGATIVE MG/DL
RBC # BLD: 4.47 M/UL
RBC # FLD: 14 %
RED BLOOD CELLS URINE: 0 /HPF
SODIUM SERPL-SCNC: 140 MMOL/L
SPECIFIC GRAVITY URINE: 1.01
TACROLIMUS SERPL-MCNC: 4.4 NG/ML
UROBILINOGEN URINE: 0.2 MG/DL
WBC # FLD AUTO: 7.05 K/UL
WHITE BLOOD CELLS URINE: 0 /HPF

## 2025-05-06 PROCEDURE — 99214 OFFICE O/P EST MOD 30 MIN: CPT

## 2025-05-06 PROCEDURE — 99204 OFFICE O/P NEW MOD 45 MIN: CPT

## 2025-05-08 ENCOUNTER — NON-APPOINTMENT (OUTPATIENT)
Age: 65
End: 2025-05-08

## 2025-05-08 LAB
BKV DNA SPEC QL NAA+PROBE: 111 IU/ML
CMV DNA SPEC QL NAA+PROBE: NOT DETECTED IU/ML
CMVPCR LOG: NOT DETECTED LOG10IU/ML

## 2025-05-08 RX ORDER — CIPROFLOXACIN HYDROCHLORIDE 250 MG/1
250 TABLET, FILM COATED ORAL
Qty: 6 | Refills: 0 | Status: ACTIVE | COMMUNITY
Start: 2025-05-08 | End: 1900-01-01

## 2025-05-13 NOTE — OCCUPATIONAL THERAPY INITIAL EVALUATION ADULT - LIVES WITH, PROFILE
blurred vision
Pt lives in an apartment alone, no steps to negotiate, IND PTA without AD. Owns cane.

## 2025-05-28 ENCOUNTER — OUTPATIENT (OUTPATIENT)
Dept: OUTPATIENT SERVICES | Facility: HOSPITAL | Age: 65
LOS: 1 days | End: 2025-05-28

## 2025-05-28 ENCOUNTER — NON-APPOINTMENT (OUTPATIENT)
Age: 65
End: 2025-05-28

## 2025-05-28 VITALS
RESPIRATION RATE: 17 BRPM | TEMPERATURE: 98 F | HEIGHT: 66 IN | OXYGEN SATURATION: 99 % | HEART RATE: 59 BPM | WEIGHT: 115.08 LBS | SYSTOLIC BLOOD PRESSURE: 116 MMHG | DIASTOLIC BLOOD PRESSURE: 73 MMHG

## 2025-05-28 DIAGNOSIS — I10 ESSENTIAL (PRIMARY) HYPERTENSION: ICD-10-CM

## 2025-05-28 DIAGNOSIS — C61 MALIGNANT NEOPLASM OF PROSTATE: ICD-10-CM

## 2025-05-28 DIAGNOSIS — Z94.0 KIDNEY TRANSPLANT STATUS: Chronic | ICD-10-CM

## 2025-05-28 DIAGNOSIS — Z94.0 KIDNEY TRANSPLANT STATUS: ICD-10-CM

## 2025-05-28 DIAGNOSIS — R07.89 OTHER CHEST PAIN: ICD-10-CM

## 2025-05-28 DIAGNOSIS — Z98.890 OTHER SPECIFIED POSTPROCEDURAL STATES: Chronic | ICD-10-CM

## 2025-05-28 DIAGNOSIS — Z98.89 OTHER SPECIFIED POSTPROCEDURAL STATES: Chronic | ICD-10-CM

## 2025-05-28 NOTE — H&P PST ADULT - HISTORY OF PRESENT ILLNESS
63 yo male with history of ESRD secondary to polycystic kidney disease s/p renal transplant x1 on 02/2017.  ?  He had PSA of 9.77 on 01/28/25. The PSA was 10.2 on 2/12/24 and 1.81 on 12/07/16.? Now s/p prostate MRI and prostate biopsy revealing malignancy.  Pt is planned for radiation treatment.  Pt is scheduled for spaceoar.     Pt was recently seen at [place] [facility] 5/20/25 within the last month r/t prolapsed hemorrhoid - fall due to pain and complained of chest pressure while he was in the ED.  63 yo male with history of ESRD secondary to polycystic kidney disease s/p renal transplant x1 on 02/2017.  ?  He had PSA of 9.77 on 01/28/25. The PSA was 10.2 on 2/12/24 and 1.81 on 12/07/16.? Now s/p prostate MRI and prostate biopsy revealing malignancy.  Pt is planned for radiation treatment.  Pt is scheduled for spaceoar.     Pt was recently seen at [place] [facility] 5/20/25 within the last month r/t prolapsed hemorrhoid - fall due to pain and complained of chest pressure while he was in the ED - troponin and EKG done.  63 yo male with history of ESRD secondary to polycystic kidney disease s/p renal transplant x1 on 02/2017.  ?  He had PSA of 9.77 on 01/28/25. The PSA was 10.2 on 2/12/24 and 1.81 on 12/07/16.? Now s/p prostate MRI and prostate biopsy revealing malignancy.  Pt is planned for radiation treatment.  Pt is scheduled for spaceoar.     Pt was recently seen at [place] [facility] on 5/20/25 c/o fall and low back pain, per pt he also complained on Chest pressure while he was in the ED, Tropinin I and EKG done.  Pt states was advised to be admitted for observation but he signed out AMA.  He has not followed up with his PCP. 65 yo male with history of ESRD secondary to polycystic kidney disease s/p renal transplant x1 on 02/2017.  ?  He had PSA of 9.77 on 01/28/25. The PSA was 10.2 on 2/12/24 and 1.81 on 12/07/16.? Now s/p prostate MRI and prostate biopsy revealing malignancy.  Pt is planned for radiation treatment.  Pt is scheduled for spaceoar.     Pt was recently seen at [place] ED on 5/20/25 c/o fall and low back pain, per pt he also complained of Chest pressure while he was in the ED, Tropinin I and EKG done.  Pt states was advised to be admitted for observation but he signed out AMA.  He has not followed up with his PCP.

## 2025-05-28 NOTE — H&P PST ADULT - PROBLEM SELECTOR PLAN 3
Pt instructed to take losartan AM of surgery with a sip of water, pt able to verbalize understanding.

## 2025-05-28 NOTE — H&P PST ADULT - PROBLEM SELECTOR PLAN 4
Pt was recently seen at [place] ED on 5/20/25 c/o fall and low back pain, per pt he also complained on Chest pressure while he was in the ED, Tropinin I and EKG done.  Pt states was advised to be admitted for observation but he signed out AMA.  He has not followed up with his PCP. Surgeon notified via email. Pt was recently seen at [place] [facility] on 5/20/25 c/o fall and low back pain, per pt he also complained of Chest pressure while he was in the ED, Tropinin I and EKG done.  Pt states was advised to be admitted for observation but he signed out AMA.  He has not followed up with his PCP. Surgeon notified via email.

## 2025-05-28 NOTE — H&P PST ADULT - SPO2 (%)
During interdisciplinary rounds at 0930. Care management, physical therapy, and nursing discussed discharge and plan of care. See clinical pathway and/or care plan for interventions and desired outcomes. 99
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
## 2025-05-28 NOTE — H&P PST ADULT - PROBLEM SELECTOR PLAN 2
Pt instructed to take tacrolimus and prednisone AM of surgery with a sip of water, pt able to verbalize understanding.

## 2025-05-28 NOTE — H&P PST ADULT - CARDIOVASCULAR COMMENTS
"had single episode of chest pressure around 2010, and another episode in [facility] 5/20/25 - not triggered by activity - lasted  about 30 minutes self resolved.  I was very aggravated"

## 2025-05-28 NOTE — H&P PST ADULT - NSICDXPASTMEDICALHX_GEN_ALL_CORE_FT
PAST MEDICAL HISTORY:  Acid reflux     Alcoholism /alcohol abuse     Bipolar 1 disorder     ESRD (end stage renal disease)     History of TIAs     Hypertension     Liver, polycystic     Malignant neoplasm of prostate     YULI (obstructive sleep apnea)     Polycystic kidney disease with ESRD s/p kidney txp 2/24/17    Renal transplant recipient

## 2025-05-28 NOTE — H&P PST ADULT - PROBLEM SELECTOR PLAN 1
Pt is scheduled for spaceoar on 6/3/25.  Verbal and written pre op instructions reviewed with patient and pt able to verbalize understanding.    Urine culture sent. Pt is scheduled for spaceoar on 6/3/25.  Verbal and written pre op instructions reviewed with patient and pt able to verbalize understanding.    Urine culture sent.  CBC BMP stable from 5/6/25 - in HIE.

## 2025-05-29 LAB
CULTURE RESULTS: SIGNIFICANT CHANGE UP
SPECIMEN SOURCE: SIGNIFICANT CHANGE UP

## 2025-06-02 ENCOUNTER — NON-APPOINTMENT (OUTPATIENT)
Age: 65
End: 2025-06-02

## 2025-06-03 ENCOUNTER — NON-APPOINTMENT (OUTPATIENT)
Age: 65
End: 2025-06-03

## 2025-06-04 ENCOUNTER — NON-APPOINTMENT (OUTPATIENT)
Age: 65
End: 2025-06-04

## 2025-06-05 ENCOUNTER — NON-APPOINTMENT (OUTPATIENT)
Age: 65
End: 2025-06-05

## 2025-06-09 RX ORDER — DOCUSATE SODIUM 100 MG/1
100 CAPSULE ORAL TWICE DAILY
Qty: 180 | Refills: 3 | Status: ACTIVE | COMMUNITY
Start: 2025-06-09 | End: 1900-01-01

## 2025-06-09 RX ORDER — PRAMOXINE HYDROCHLORIDE 150 MG/15G
1 AEROSOL, FOAM RECTAL DAILY
Qty: 1 | Refills: 0 | Status: ACTIVE | COMMUNITY
Start: 2025-06-09 | End: 1900-01-01

## 2025-06-09 RX ORDER — MINERAL OIL 100 G/100ML
ENEMA RECTAL DAILY
Qty: 1 | Refills: 0 | Status: ACTIVE | COMMUNITY
Start: 2025-06-09 | End: 1900-01-01

## 2025-06-10 ENCOUNTER — NON-APPOINTMENT (OUTPATIENT)
Age: 65
End: 2025-06-10

## 2025-06-13 RX ORDER — LACTULOSE 10 G/15ML
10 SOLUTION ORAL
Qty: 1 | Refills: 3 | Status: ACTIVE | COMMUNITY
Start: 2025-06-13 | End: 1900-01-01

## 2025-06-24 ENCOUNTER — NON-APPOINTMENT (OUTPATIENT)
Age: 65
End: 2025-06-24

## 2025-06-28 NOTE — ED ADULT TRIAGE NOTE - BP NONINVASIVE DIASTOLIC (MM HG)
06/28/25                            Monty Gold  [address]  [facility] [address]    To Whom It May Concern:    This is to certify Monty Gold was evaluated with Jax Kern DO on 06/28/25 and can return to school on 7/1/2025.     RESTRICTIONS: none            Electronically signed by:  Jax Kern DO  [facility] [address]  [address] [facility] [address]  Dept Phone: [phone]    80

## 2025-07-01 PROBLEM — C61 MALIGNANT NEOPLASM OF PROSTATE: Chronic | Status: ACTIVE | Noted: 2025-05-28

## 2025-07-01 PROBLEM — N18.6 END STAGE RENAL DISEASE: Chronic | Status: ACTIVE | Noted: 2025-05-28

## 2025-07-01 PROBLEM — G47.33 OBSTRUCTIVE SLEEP APNEA (ADULT) (PEDIATRIC): Chronic | Status: ACTIVE | Noted: 2025-05-28

## 2025-07-01 PROBLEM — Z86.73 PERSONAL HISTORY OF TRANSIENT ISCHEMIC ATTACK (TIA), AND CEREBRAL INFARCTION WITHOUT RESIDUAL DEFICITS: Chronic | Status: ACTIVE | Noted: 2025-05-28

## 2025-07-01 PROBLEM — Q44.6 CYSTIC DISEASE OF LIVER: Chronic | Status: ACTIVE | Noted: 2025-05-28

## 2025-07-01 PROBLEM — Z94.0 KIDNEY TRANSPLANT STATUS: Chronic | Status: ACTIVE | Noted: 2025-05-28

## 2025-07-03 ENCOUNTER — NON-APPOINTMENT (OUTPATIENT)
Age: 65
End: 2025-07-03

## 2025-07-07 ENCOUNTER — NON-APPOINTMENT (OUTPATIENT)
Age: 65
End: 2025-07-07

## 2025-07-07 PROBLEM — R19.5 CHANGE IN CONSISTENCY OF STOOL: Status: ACTIVE | Noted: 2025-07-07

## 2025-07-08 ENCOUNTER — EMERGENCY (EMERGENCY)
Facility: HOSPITAL | Age: 65
LOS: 1 days | End: 2025-07-08
Attending: STUDENT IN AN ORGANIZED HEALTH CARE EDUCATION/TRAINING PROGRAM
Payer: MEDICARE

## 2025-07-08 ENCOUNTER — NON-APPOINTMENT (OUTPATIENT)
Age: 65
End: 2025-07-08

## 2025-07-08 VITALS
RESPIRATION RATE: 16 BRPM | DIASTOLIC BLOOD PRESSURE: 60 MMHG | SYSTOLIC BLOOD PRESSURE: 100 MMHG | HEART RATE: 87 BPM | OXYGEN SATURATION: 99 %

## 2025-07-08 VITALS
WEIGHT: 113.1 LBS | TEMPERATURE: 98 F | RESPIRATION RATE: 20 BRPM | HEART RATE: 91 BPM | SYSTOLIC BLOOD PRESSURE: 96 MMHG | OXYGEN SATURATION: 96 % | DIASTOLIC BLOOD PRESSURE: 65 MMHG | HEIGHT: 66 IN

## 2025-07-08 DIAGNOSIS — Z98.89 OTHER SPECIFIED POSTPROCEDURAL STATES: Chronic | ICD-10-CM

## 2025-07-08 DIAGNOSIS — Z94.0 KIDNEY TRANSPLANT STATUS: Chronic | ICD-10-CM

## 2025-07-08 LAB
ALBUMIN SERPL ELPH-MCNC: 4.2 G/DL — SIGNIFICANT CHANGE UP (ref 3.3–5)
ALP SERPL-CCNC: 48 U/L — SIGNIFICANT CHANGE UP (ref 40–120)
ALT FLD-CCNC: 11 U/L — SIGNIFICANT CHANGE UP (ref 10–45)
ANION GAP SERPL CALC-SCNC: 12 MMOL/L — SIGNIFICANT CHANGE UP (ref 5–17)
AST SERPL-CCNC: 17 U/L — SIGNIFICANT CHANGE UP (ref 10–40)
BASOPHILS # BLD AUTO: 0.02 K/UL — SIGNIFICANT CHANGE UP (ref 0–0.2)
BASOPHILS NFR BLD AUTO: 0.3 % — SIGNIFICANT CHANGE UP (ref 0–2)
BILIRUB SERPL-MCNC: 0.6 MG/DL — SIGNIFICANT CHANGE UP (ref 0.2–1.2)
BUN SERPL-MCNC: 23 MG/DL — SIGNIFICANT CHANGE UP (ref 7–23)
CALCIUM SERPL-MCNC: 10 MG/DL — SIGNIFICANT CHANGE UP (ref 8.4–10.5)
CHLORIDE SERPL-SCNC: 104 MMOL/L — SIGNIFICANT CHANGE UP (ref 96–108)
CO2 SERPL-SCNC: 22 MMOL/L — SIGNIFICANT CHANGE UP (ref 22–31)
CREAT SERPL-MCNC: 1.47 MG/DL — HIGH (ref 0.5–1.3)
EGFR: 52 ML/MIN/1.73M2 — LOW
EGFR: 52 ML/MIN/1.73M2 — LOW
EOSINOPHIL # BLD AUTO: 0.12 K/UL — SIGNIFICANT CHANGE UP (ref 0–0.5)
EOSINOPHIL NFR BLD AUTO: 1.7 % — SIGNIFICANT CHANGE UP (ref 0–6)
GLUCOSE SERPL-MCNC: 95 MG/DL — SIGNIFICANT CHANGE UP (ref 70–99)
HCT VFR BLD CALC: 41.4 % — SIGNIFICANT CHANGE UP (ref 39–50)
HGB BLD-MCNC: 13.1 G/DL — SIGNIFICANT CHANGE UP (ref 13–17)
IMM GRANULOCYTES # BLD AUTO: 0.02 K/UL — SIGNIFICANT CHANGE UP (ref 0–0.07)
IMM GRANULOCYTES NFR BLD AUTO: 0.3 % — SIGNIFICANT CHANGE UP (ref 0–0.9)
LYMPHOCYTES # BLD AUTO: 0.59 K/UL — LOW (ref 1–3.3)
LYMPHOCYTES NFR BLD AUTO: 8.5 % — LOW (ref 13–44)
MCHC RBC-ENTMCNC: 30.8 PG — SIGNIFICANT CHANGE UP (ref 27–34)
MCHC RBC-ENTMCNC: 31.6 G/DL — LOW (ref 32–36)
MCV RBC AUTO: 97.2 FL — SIGNIFICANT CHANGE UP (ref 80–100)
MONOCYTES # BLD AUTO: 0.37 K/UL — SIGNIFICANT CHANGE UP (ref 0–0.9)
MONOCYTES NFR BLD AUTO: 5.3 % — SIGNIFICANT CHANGE UP (ref 2–14)
NEUTROPHILS # BLD AUTO: 5.84 K/UL — SIGNIFICANT CHANGE UP (ref 1.8–7.4)
NEUTROPHILS NFR BLD AUTO: 83.9 % — HIGH (ref 43–77)
NRBC # BLD AUTO: 0 K/UL — SIGNIFICANT CHANGE UP (ref 0–0)
NRBC # FLD: 0 K/UL — SIGNIFICANT CHANGE UP (ref 0–0)
NRBC BLD AUTO-RTO: 0 /100 WBCS — SIGNIFICANT CHANGE UP (ref 0–0)
PLATELET # BLD AUTO: 204 K/UL — SIGNIFICANT CHANGE UP (ref 150–400)
PMV BLD: 11.9 FL — SIGNIFICANT CHANGE UP (ref 7–13)
POTASSIUM SERPL-MCNC: 4.5 MMOL/L — SIGNIFICANT CHANGE UP (ref 3.5–5.3)
POTASSIUM SERPL-SCNC: 4.5 MMOL/L — SIGNIFICANT CHANGE UP (ref 3.5–5.3)
PROT SERPL-MCNC: 6.8 G/DL — SIGNIFICANT CHANGE UP (ref 6–8.3)
RBC # BLD: 4.26 M/UL — SIGNIFICANT CHANGE UP (ref 4.2–5.8)
RBC # FLD: 13.8 % — SIGNIFICANT CHANGE UP (ref 10.3–14.5)
SODIUM SERPL-SCNC: 138 MMOL/L — SIGNIFICANT CHANGE UP (ref 135–145)
WBC # BLD: 6.96 K/UL — SIGNIFICANT CHANGE UP (ref 3.8–10.5)
WBC # FLD AUTO: 6.96 K/UL — SIGNIFICANT CHANGE UP (ref 3.8–10.5)

## 2025-07-08 PROCEDURE — 36415 COLL VENOUS BLD VENIPUNCTURE: CPT

## 2025-07-08 PROCEDURE — 85025 COMPLETE CBC W/AUTO DIFF WBC: CPT

## 2025-07-08 PROCEDURE — 99284 EMERGENCY DEPT VISIT MOD MDM: CPT

## 2025-07-08 PROCEDURE — 99283 EMERGENCY DEPT VISIT LOW MDM: CPT

## 2025-07-08 PROCEDURE — 80053 COMPREHEN METABOLIC PANEL: CPT

## 2025-07-08 RX ORDER — ACETAMINOPHEN 500 MG/5ML
1000 LIQUID (ML) ORAL ONCE
Refills: 0 | Status: COMPLETED | OUTPATIENT
Start: 2025-07-08 | End: 2025-07-08

## 2025-07-08 RX ADMIN — Medication 1000 MILLILITER(S): at 14:55

## 2025-07-08 NOTE — ED ADULT NURSE NOTE - NSFALLUNIVINTERV_ED_ALL_ED
Bed/Stretcher in lowest position, wheels locked, appropriate side rails in place/Call bell, personal items and telephone in reach/Instruct patient to call for assistance before getting out of bed/chair/stretcher/Non-slip footwear applied when patient is off stretcher/Prentice to call system/Physically safe environment - no spills, clutter or unnecessary equipment/Purposeful proactive rounding/Room/bathroom lighting operational, light cord in reach

## 2025-07-08 NOTE — ED PROVIDER NOTE - OBJECTIVE STATEMENT
65 yo w pmh pld + pkd s/p renal transplant, htn, hld, gerd, chronic lbp s/p back surgery, alc abuse, pancreatitis, anx/dep/bipolar disorder, p/w Rectal pain that is chronic in nature, states it has been for many years.  Patient states he had multiple hemorrhoids and he has been chronically managing, with sitz bath, stool softeners, Fleet enemas.  However he still having significant patient which prompted him to present to the emergency room.  Denies fevers, abdominal pain, nausea, vomiting, numbness/tingling/LOC or other concerning symptoms.Denies any blood in stool.  Currently on radiation therapy for prostate cancer.

## 2025-07-08 NOTE — ED ADULT NURSE REASSESSMENT NOTE - NS ED NURSE REASSESS COMMENT FT1
MD at bedside explaining plan of care to patient, deescalation techniques used to explain plan of care, pt verbalized understanding. Will continue to monitor and assess while offering support and reassurance.

## 2025-07-08 NOTE — ED PROVIDER NOTE - PATIENT PORTAL LINK FT
You can access the FollowMyHealth Patient Portal offered by [facility] by registering at the following website: [email or web address]. By joining [facility]’s FollowMyHealth portal, you will also be able to view your health information using other applications (apps) compatible with our system.

## 2025-07-08 NOTE — ED PROVIDER NOTE - ATTENDING CONTRIBUTION TO CARE
I have personally performed a face to face bedside history and physical examination of this patient. I have discussed the history, examination, review of systems, assessment and plan of management with the resident. I have reviewed the electronic medical record and amended it to reflect my history, review of systems, physical exam, assessment and plan.    Male with history of prostate cancer status postradiation therapy kidney transplant 2017 presenting with rectal pain.  Patient reports symptoms for several years, however, states that he was unable to seek care for his symptoms due to ongoing personal family matters.  Patient also complains of acute on chronic lower back pain.  Patient states that symptoms have been ongoing for years and have not worsened.  Patient denies saddle anesthesia, bowel incontinence, urinary retention, urinary incontinence, fevers, lower extremity weakness, lower extremity paresthesias.  Trauma or falls.  Patient denies melena or hematochezia. Patient also complains of nausea and generalized weakness.    On exam, patient is in no acute distress.  No external hemorrhoids.  Internal hemorrhoid at the 6 o'clock position noted.  No gross blood on RICHIE.  Abdomen is soft nondistended nontender.  Patient ambulatory in the ED.    Will check basic labs given reported history of weakness and hydrate with IV fluids.  If labs are unremarkable, patient will follow-up with general surgery for management of hemorrhoids.

## 2025-07-08 NOTE — ED ADULT NURSE NOTE - OBJECTIVE STATEMENT
Pt is a 64y male pmhx of prostate cancer, kidney transplant with fistula, hemorrhoids, herniated disc Pt is a 64y male pmhx of prostate cancer, kidney transplant with fistula, hemorrhoids, herniated disc, HTN, HLD, GERD, pancreatitis, chronic rectal pain, currently receiving radiation for prostate CA, with c/o of constant rectal pain unrelieved by sitz bath and fleet enemas. pt denies CP, SOB, nausea/vomiting, numbness/tingling, fever, cough, chills, dizziness, headache, blurred vision, neuro intact. pt a&ox3, lung sounds clear, heart rate regular, abdomen soft nontender nondistended to palp. skin intact. Will continue to monitor and assess while offering support and reassurance.

## 2025-07-08 NOTE — ED PROVIDER NOTE - NSFOLLOWUPINSTRUCTIONS_ED_ALL_ED_FT
Your blood work did not show any significant abnormalities.    Continue taking all your medications as prescribed.    Please follow up with a colorectal surgeon for further management of your hemorrhoids. An urgent referral was sent and someone should contact you within 3-5 business days to schedule an appointment.     SEEK IMMEDIATE MEDICAL CARE IF YOU HAVE ANY OF THE FOLLOWING SYMPTOMS: extreme weakness/chest pain/shortness of breath, black or bloody stools, vomiting blood, fainting, fever, or any signs of dehydration.

## 2025-07-08 NOTE — ED PROVIDER NOTE - CLINICAL SUMMARY MEDICAL DECISION MAKING FREE TEXT BOX
65 yo w pmh pld + pkd s/p renal transplant, htn, hld, gerd, chronic lbp s/p back surgery, alc abuse, pancreatitis, anx/dep/bipolar disorder, p/w Rectal pain that is chronic in nature, states it has been for many years.vital signs are stable, on exam patient has internal/external hemorrhoids that are moderate size.  He is doing all the right things at home including sitz bath, stool softeners, enemas.  Symptoms are very chronic in nature.  Will check labs assess for electrolyte abnormalities give fluids and pain control however main thing will be setting up patient with colorectal surgery for possible evaluation of hemorrhoidectomy which we will do on discharge.

## 2025-07-08 NOTE — ED PROVIDER NOTE - NS ED ATTENDING STATEMENT MOD
I have seen and examined this patient and fully participated in the care of this patient as the teaching attending.  The service was shared with the MARCOS.  I reviewed and verified the documentation.

## 2025-07-08 NOTE — ED ADULT NURSE NOTE - NSFALLRISKFACTORS_ED_ALL_ED
[de-identified] : 5/31/2023 Sinus jose juan 51, low voltage , negative precordial T waves [de-identified] : 4/15/2023  LVEF 60-65%, mild MR, mild AI [de-identified] : 4/17/2023 [facility], RRA: Single vessel CAD: mid RCA 80% BRENDA x 1, POBA.     LM no disease, LAD minor irregularities, DIAG : minor irregularities, LCX: minor irregularities, RCA mid 80% No indicators present

## 2025-07-08 NOTE — ED PROVIDER NOTE - PROGRESS NOTE DETAILS
ALEXUS Leach PGY3- no significant lab abnormalities. will dc home with colorectal f/u Iza Mota DO (Attending): I have received sign out on this patient. I am aware of what studies are pending from the previous provider  I have documented where appropriate and have supervised the determined plan of the Fellow/Resident/ACP/Student.

## 2025-07-08 NOTE — ED PROVIDER NOTE - PHYSICAL EXAMINATION
Physical Exam:  Gen:  Well appearing, awake, alert, non-toxic appearing  Head: NCAT  HEENT: Normal conjunctiva, trachea midline, moist mucous membranes  Lung: CTAB, no respiratory distress, no wheezes/rhonchi/rales B/L, speaking in full sentences  CV: RRR, no murmurs, rubs or gallops  Abd: Soft, non-tender, non-distended, no guarding, rigidity, rebound tenderness, or CVA tenderness |  RECTAL: No blood on RICHIE, internal and external hemmrhoids non thrombosed  MSK: No visible deformities, moves all four extremities  Neuro: No focal motor deficits  Skin: Warm, well perfused, no visible rashes, no leg swelling  Psych: appropriate affect and mood

## 2025-07-09 ENCOUNTER — APPOINTMENT (OUTPATIENT)
Dept: NEPHROLOGY | Facility: CLINIC | Age: 65
End: 2025-07-09

## 2025-07-09 ENCOUNTER — NON-APPOINTMENT (OUTPATIENT)
Age: 65
End: 2025-07-09

## 2025-07-09 VITALS
TEMPERATURE: 98 F | SYSTOLIC BLOOD PRESSURE: 135 MMHG | OXYGEN SATURATION: 98 % | DIASTOLIC BLOOD PRESSURE: 79 MMHG | HEART RATE: 77 BPM | HEIGHT: 66 IN | BODY MASS INDEX: 18.16 KG/M2 | WEIGHT: 113 LBS

## 2025-07-09 LAB
25(OH)D3 SERPL-MCNC: 60.3 NG/ML
ALBUMIN SERPL ELPH-MCNC: 4.5 G/DL
ALP BLD-CCNC: 52 U/L
ALT SERPL-CCNC: 13 U/L
ANION GAP SERPL CALC-SCNC: 13 MMOL/L
APPEARANCE: CLEAR
AST SERPL-CCNC: 16 U/L
BASOPHILS # BLD AUTO: 0.01 K/UL
BASOPHILS NFR BLD AUTO: 0.1 %
BILIRUB SERPL-MCNC: 0.7 MG/DL
BILIRUBIN URINE: NEGATIVE
BKV DNA SPEC QL NAA+PROBE: NOT DETECTED IU/ML
BLOOD URINE: NEGATIVE
BUN SERPL-MCNC: 30 MG/DL
CALCIUM SERPL-MCNC: 9.7 MG/DL
CALCIUM SERPL-MCNC: 9.7 MG/DL
CHLORIDE SERPL-SCNC: 99 MMOL/L
CHOLEST SERPL-MCNC: 227 MG/DL
CMV DNA SPEC QL NAA+PROBE: NOT DETECTED IU/ML
CMVPCR LOG: NOT DETECTED LOG10IU/ML
CO2 SERPL-SCNC: 21 MMOL/L
COLOR: YELLOW
CREAT SERPL-MCNC: 1.75 MG/DL
CREAT SPEC-SCNC: 23 MG/DL
CREAT/PROT UR: NORMAL RATIO
EGFRCR SERPLBLD CKD-EPI 2021: 42 ML/MIN/1.73M2
EOSINOPHIL # BLD AUTO: 0.02 K/UL
EOSINOPHIL NFR BLD AUTO: 0.3 %
GLUCOSE QUALITATIVE U: NEGATIVE MG/DL
GLUCOSE SERPL-MCNC: 106 MG/DL
HCT VFR BLD CALC: 40.6 %
HDLC SERPL-MCNC: 83 MG/DL
HGB BLD-MCNC: 13 G/DL
IMM GRANULOCYTES NFR BLD AUTO: 0.4 %
KETONES URINE: NEGATIVE MG/DL
LDLC SERPL-MCNC: 131 MG/DL
LEUKOCYTE ESTERASE URINE: NEGATIVE
LYMPHOCYTES # BLD AUTO: 0.64 K/UL
LYMPHOCYTES NFR BLD AUTO: 9.3 %
MAGNESIUM SERPL-MCNC: 1.9 MG/DL
MAN DIFF?: NORMAL
MCHC RBC-ENTMCNC: 30.6 PG
MCHC RBC-ENTMCNC: 32 G/DL
MCV RBC AUTO: 95.5 FL
MONOCYTES # BLD AUTO: 0.38 K/UL
MONOCYTES NFR BLD AUTO: 5.5 %
NEUTROPHILS # BLD AUTO: 5.77 K/UL
NEUTROPHILS NFR BLD AUTO: 84.4 %
NITRITE URINE: NEGATIVE
NONHDLC SERPL-MCNC: 144 MG/DL
PARATHYROID HORMONE INTACT: 52 PG/ML
PH URINE: 6.5
PHOSPHATE SERPL-MCNC: 2.6 MG/DL
PLATELET # BLD AUTO: 163 K/UL
POTASSIUM SERPL-SCNC: 4.8 MMOL/L
PROT SERPL-MCNC: 6.9 G/DL
PROT UR-MCNC: 4 MG/DL
PROTEIN URINE: NEGATIVE MG/DL
RBC # BLD: 4.25 M/UL
RBC # FLD: 13.4 %
SODIUM SERPL-SCNC: 133 MMOL/L
SPECIFIC GRAVITY URINE: 1.01
TACROLIMUS SERPL-MCNC: 8.5 NG/ML
TRIGL SERPL-MCNC: 75 MG/DL
UROBILINOGEN URINE: 0.2 MG/DL
WBC # FLD AUTO: 6.85 K/UL

## 2025-07-10 ENCOUNTER — APPOINTMENT (OUTPATIENT)
Dept: COLORECTAL SURGERY | Facility: CLINIC | Age: 65
End: 2025-07-10
Payer: MEDICARE

## 2025-07-10 VITALS
SYSTOLIC BLOOD PRESSURE: 129 MMHG | HEIGHT: 66 IN | WEIGHT: 110 LBS | DIASTOLIC BLOOD PRESSURE: 86 MMHG | TEMPERATURE: 97.5 F | HEART RATE: 68 BPM | BODY MASS INDEX: 17.68 KG/M2 | OXYGEN SATURATION: 97 %

## 2025-07-10 PROBLEM — K59.09 CONSTIPATION, CHRONIC: Status: ACTIVE | Noted: 2025-06-13

## 2025-07-10 PROCEDURE — 99204 OFFICE O/P NEW MOD 45 MIN: CPT | Mod: 25

## 2025-07-10 PROCEDURE — 46600 DIAGNOSTIC ANOSCOPY SPX: CPT

## 2025-07-11 RX ORDER — HYDROCORTISONE ACETATE 30 MG/1
30 SUPPOSITORY RECTAL TWICE DAILY
Qty: 20 | Refills: 0 | Status: ACTIVE | COMMUNITY
Start: 2025-07-11 | End: 1900-01-01

## 2025-07-21 ENCOUNTER — APPOINTMENT (OUTPATIENT)
Dept: UROLOGY | Facility: CLINIC | Age: 65
End: 2025-07-21
Payer: MEDICARE

## 2025-07-21 VITALS
HEIGHT: 66 IN | RESPIRATION RATE: 16 BRPM | SYSTOLIC BLOOD PRESSURE: 118 MMHG | WEIGHT: 110 LBS | HEART RATE: 87 BPM | OXYGEN SATURATION: 97 % | TEMPERATURE: 98 F | DIASTOLIC BLOOD PRESSURE: 72 MMHG | BODY MASS INDEX: 17.68 KG/M2

## 2025-07-21 DIAGNOSIS — R35.0 FREQUENCY OF MICTURITION: ICD-10-CM

## 2025-07-21 DIAGNOSIS — R97.20 ELEVATED PROSTATE, SPECIFIC ANTIGEN [PSA]: ICD-10-CM

## 2025-07-21 DIAGNOSIS — C61 MALIGNANT NEOPLASM OF PROSTATE: ICD-10-CM

## 2025-07-21 LAB — PSA SERPL-MCNC: 10.6 NG/ML

## 2025-07-21 PROCEDURE — G2211 COMPLEX E/M VISIT ADD ON: CPT

## 2025-07-21 PROCEDURE — 99214 OFFICE O/P EST MOD 30 MIN: CPT

## 2025-08-12 ENCOUNTER — APPOINTMENT (OUTPATIENT)
Dept: RADIATION ONCOLOGY | Facility: CLINIC | Age: 65
End: 2025-08-12
Payer: MEDICARE

## 2025-08-12 DIAGNOSIS — C61 MALIGNANT NEOPLASM OF PROSTATE: ICD-10-CM

## 2025-08-12 PROCEDURE — 99212 OFFICE O/P EST SF 10 MIN: CPT

## 2025-08-14 ENCOUNTER — APPOINTMENT (OUTPATIENT)
Dept: COLORECTAL SURGERY | Facility: CLINIC | Age: 65
End: 2025-08-14
Payer: MEDICARE

## 2025-08-14 DIAGNOSIS — K64.9 UNSPECIFIED HEMORRHOIDS: ICD-10-CM

## 2025-08-14 PROCEDURE — 99213 OFFICE O/P EST LOW 20 MIN: CPT

## 2025-08-14 RX ORDER — HYDROCORTISONE 25 MG/G
2.5 CREAM TOPICAL
Qty: 1 | Refills: 5 | Status: ACTIVE | COMMUNITY
Start: 2025-08-14 | End: 1900-01-01

## 2025-08-27 ENCOUNTER — NON-APPOINTMENT (OUTPATIENT)
Age: 65
End: 2025-08-27

## 2025-09-10 ENCOUNTER — APPOINTMENT (OUTPATIENT)
Dept: NEPHROLOGY | Facility: CLINIC | Age: 65
End: 2025-09-10
Payer: MEDICARE

## 2025-09-10 VITALS
BODY MASS INDEX: 18 KG/M2 | SYSTOLIC BLOOD PRESSURE: 137 MMHG | TEMPERATURE: 97.7 F | RESPIRATION RATE: 16 BRPM | HEART RATE: 64 BPM | HEIGHT: 66 IN | WEIGHT: 112 LBS | DIASTOLIC BLOOD PRESSURE: 80 MMHG | OXYGEN SATURATION: 98 %

## 2025-09-10 PROCEDURE — 99214 OFFICE O/P EST MOD 30 MIN: CPT

## 2025-09-11 LAB
ALBUMIN SERPL ELPH-MCNC: 5 G/DL
ALP BLD-CCNC: 58 U/L
ALT SERPL-CCNC: 15 U/L
ANION GAP SERPL CALC-SCNC: 15 MMOL/L
APPEARANCE: CLEAR
AST SERPL-CCNC: 19 U/L
BACTERIA: NEGATIVE /HPF
BASOPHILS # BLD AUTO: 0.02 K/UL
BASOPHILS NFR BLD AUTO: 0.3 %
BILIRUB SERPL-MCNC: 0.4 MG/DL
BILIRUBIN URINE: NEGATIVE
BLOOD URINE: NEGATIVE
BUN SERPL-MCNC: 31 MG/DL
CALCIUM SERPL-MCNC: 10.5 MG/DL
CALCIUM SERPL-MCNC: 10.5 MG/DL
CAST: 0 /LPF
CHLORIDE SERPL-SCNC: 105 MMOL/L
CHOLEST SERPL-MCNC: 270 MG/DL
CMV DNA SPEC QL NAA+PROBE: NOT DETECTED IU/ML
CMVPCR LOG: NOT DETECTED LOG10IU/ML
CO2 SERPL-SCNC: 21 MMOL/L
COLOR: YELLOW
CREAT SERPL-MCNC: 1.77 MG/DL
CREAT SPEC-SCNC: 208 MG/DL
CREAT/PROT UR: 0.1 RATIO
EGFRCR SERPLBLD CKD-EPI 2021: 42 ML/MIN/1.73M2
EOSINOPHIL # BLD AUTO: 0.07 K/UL
EOSINOPHIL NFR BLD AUTO: 1.2 %
EPITHELIAL CELLS: 0 /HPF
GLUCOSE QUALITATIVE U: NEGATIVE MG/DL
GLUCOSE SERPL-MCNC: 100 MG/DL
HCT VFR BLD CALC: 42.3 %
HDLC SERPL-MCNC: 99 MG/DL
HGB BLD-MCNC: 13.7 G/DL
IMM GRANULOCYTES NFR BLD AUTO: 0.2 %
KETONES URINE: NEGATIVE MG/DL
LDLC SERPL-MCNC: 154 MG/DL
LEUKOCYTE ESTERASE URINE: NEGATIVE
LYMPHOCYTES # BLD AUTO: 1.32 K/UL
LYMPHOCYTES NFR BLD AUTO: 21.9 %
MAGNESIUM SERPL-MCNC: 2.1 MG/DL
MAN DIFF?: NORMAL
MCHC RBC-ENTMCNC: 30.8 PG
MCHC RBC-ENTMCNC: 32.4 G/DL
MCV RBC AUTO: 95.1 FL
MICROSCOPIC-UA: NORMAL
MONOCYTES # BLD AUTO: 0.57 K/UL
MONOCYTES NFR BLD AUTO: 9.5 %
NEUTROPHILS # BLD AUTO: 4.03 K/UL
NEUTROPHILS NFR BLD AUTO: 66.9 %
NITRITE URINE: NEGATIVE
NONHDLC SERPL-MCNC: 171 MG/DL
PARATHYROID HORMONE INTACT: 41 PG/ML
PH URINE: 6
PHOSPHATE SERPL-MCNC: 2.9 MG/DL
PLATELET # BLD AUTO: 180 K/UL
POTASSIUM SERPL-SCNC: 4.6 MMOL/L
PROT SERPL-MCNC: 7.5 G/DL
PROT UR-MCNC: 20 MG/DL
PROTEIN URINE: NEGATIVE MG/DL
RBC # BLD: 4.45 M/UL
RBC # FLD: 13.9 %
RED BLOOD CELLS URINE: 1 /HPF
SODIUM SERPL-SCNC: 141 MMOL/L
SPECIFIC GRAVITY URINE: 1.02
TACROLIMUS SERPL-MCNC: 4.8 NG/ML
TRIGL SERPL-MCNC: 98 MG/DL
URATE SERPL-MCNC: 6.3 MG/DL
UROBILINOGEN URINE: 0.2 MG/DL
WBC # FLD AUTO: 6.02 K/UL
WHITE BLOOD CELLS URINE: 1 /HPF

## 2025-09-12 LAB — BKV DNA SPEC QL NAA+PROBE: NOT DETECTED IU/ML

## (undated) DEVICE — GLV 7.5 PROTEXIS (WHITE)

## (undated) DEVICE — CATH ANGIOCATH 14G

## (undated) DEVICE — GRID BRACHYTHERAPY EZ 18G

## (undated) DEVICE — NDL MAX CORE 18G X 25CM

## (undated) DEVICE — BALLOON ENDOCAVITY 2X14CM

## (undated) DEVICE — VENODYNE/SCD SLEEVE CALF MEDIUM

## (undated) DEVICE — PREP CHLORAPREP HI-LITE ORANGE 3ML

## (undated) DEVICE — DRSG TELFA 3 X 8

## (undated) DEVICE — POSITIONER FOAM EGG CRATE ULNAR 2PCS (PINK)

## (undated) DEVICE — Device

## (undated) DEVICE — NEOGUARD ENDOCAVITY PROBE COVER 1 X 11.8"

## (undated) DEVICE — BASIN SET DOUBLE

## (undated) DEVICE — SYR LUER LOK 20CC

## (undated) DEVICE — GLV 7.5 PROTEXIS (BLUE)

## (undated) DEVICE — WARMING BLANKET UPPER ADULT

## (undated) DEVICE — NDL SPINAL 20G X 6" QUINCKE